# Patient Record
Sex: MALE | Race: WHITE | NOT HISPANIC OR LATINO | Employment: OTHER | URBAN - NONMETROPOLITAN AREA
[De-identification: names, ages, dates, MRNs, and addresses within clinical notes are randomized per-mention and may not be internally consistent; named-entity substitution may affect disease eponyms.]

---

## 2017-03-23 ENCOUNTER — COMMUNICATION - GICH (OUTPATIENT)
Dept: FAMILY MEDICINE | Facility: OTHER | Age: 82
End: 2017-03-23

## 2017-03-23 DIAGNOSIS — I10 ESSENTIAL (PRIMARY) HYPERTENSION: ICD-10-CM

## 2017-04-25 ENCOUNTER — HISTORY (OUTPATIENT)
Dept: FAMILY MEDICINE | Facility: OTHER | Age: 82
End: 2017-04-25

## 2017-04-25 ENCOUNTER — OFFICE VISIT - GICH (OUTPATIENT)
Dept: FAMILY MEDICINE | Facility: OTHER | Age: 82
End: 2017-04-25

## 2017-04-25 DIAGNOSIS — E11.65 TYPE 2 DIABETES MELLITUS WITH HYPERGLYCEMIA (H): ICD-10-CM

## 2017-04-25 DIAGNOSIS — I10 ESSENTIAL (PRIMARY) HYPERTENSION: ICD-10-CM

## 2017-04-25 DIAGNOSIS — N40.0 ENLARGED PROSTATE WITHOUT LOWER URINARY TRACT SYMPTOMS (LUTS): ICD-10-CM

## 2017-04-25 DIAGNOSIS — Z00.00 ENCOUNTER FOR GENERAL ADULT MEDICAL EXAMINATION WITHOUT ABNORMAL FINDINGS: ICD-10-CM

## 2017-04-26 ENCOUNTER — AMBULATORY - GICH (OUTPATIENT)
Dept: LAB | Facility: OTHER | Age: 82
End: 2017-04-26

## 2017-04-26 DIAGNOSIS — I10 ESSENTIAL (PRIMARY) HYPERTENSION: ICD-10-CM

## 2017-04-26 DIAGNOSIS — E11.65 TYPE 2 DIABETES MELLITUS WITH HYPERGLYCEMIA (H): ICD-10-CM

## 2017-04-26 DIAGNOSIS — Z00.00 ENCOUNTER FOR GENERAL ADULT MEDICAL EXAMINATION WITHOUT ABNORMAL FINDINGS: ICD-10-CM

## 2017-04-26 LAB
A/G RATIO - HISTORICAL: 1.4 (ref 1–2)
ABSOLUTE BASOPHILS - HISTORICAL: 0 THOU/CU MM
ABSOLUTE EOSINOPHILS - HISTORICAL: 0.1 THOU/CU MM
ABSOLUTE LYMPHOCYTES - HISTORICAL: 2.2 THOU/CU MM (ref 0.9–2.9)
ABSOLUTE MONOCYTES - HISTORICAL: 0.5 THOU/CU MM
ABSOLUTE NEUTROPHILS - HISTORICAL: 3.5 THOU/CU MM (ref 1.7–7)
ALBUMIN SERPL-MCNC: 4 G/DL (ref 3.5–5.7)
ALP SERPL-CCNC: 65 IU/L (ref 34–104)
ALT (SGPT) - HISTORICAL: 13 IU/L (ref 7–52)
ANION GAP - HISTORICAL: 8 (ref 5–18)
AST SERPL-CCNC: 18 IU/L (ref 13–39)
BASOPHILS # BLD AUTO: 0.5 %
BILIRUB SERPL-MCNC: 0.9 MG/DL (ref 0.3–1)
BILIRUB UR QL: NEGATIVE
BUN SERPL-MCNC: 18 MG/DL (ref 7–25)
BUN/CREAT RATIO - HISTORICAL: 17
CALCIUM SERPL-MCNC: 9.5 MG/DL (ref 8.6–10.3)
CHLORIDE SERPLBLD-SCNC: 103 MMOL/L (ref 98–107)
CHOL/HDL RATIO - HISTORICAL: 3.79
CHOLESTEROL TOTAL: 182 MG/DL
CLARITY, URINE: CLEAR CLARITY
CO2 SERPL-SCNC: 28 MMOL/L (ref 21–31)
COLOR UR: YELLOW COLOR
CREAT SERPL-MCNC: 1.03 MG/DL (ref 0.7–1.3)
EOSINOPHIL NFR BLD AUTO: 1.4 %
ERYTHROCYTE [DISTWIDTH] IN BLOOD BY AUTOMATED COUNT: 12.1 % (ref 11.5–15.5)
ESTIMATED AVERAGE GLUCOSE: 163 MG/DL
GFR IF NOT AFRICAN AMERICAN - HISTORICAL: >60 ML/MIN/1.73M2
GLOBULIN - HISTORICAL: 2.8 G/DL (ref 2–3.7)
GLUCOSE SERPL-MCNC: 149 MG/DL (ref 70–105)
GLUCOSE URINE: NEGATIVE MG/DL
HCT VFR BLD AUTO: 47.8 % (ref 37–53)
HDLC SERPL-MCNC: 48 MG/DL (ref 23–92)
HEMOGLOBIN A1C MONITORING (POCT) - HISTORICAL: 7.3 % (ref 4–6.2)
HEMOGLOBIN: 15.9 G/DL (ref 13.5–17.5)
KETONES UR QL: NEGATIVE MG/DL
LDLC SERPL CALC-MCNC: 107 MG/DL
LEUKOCYTE ESTERASE URINE: NEGATIVE
LYMPHOCYTES NFR BLD AUTO: 35 % (ref 20–44)
MCH RBC QN AUTO: 31.4 PG (ref 26–34)
MCHC RBC AUTO-ENTMCNC: 33.3 G/DL (ref 32–36)
MCV RBC AUTO: 94 FL (ref 80–100)
MONOCYTES NFR BLD AUTO: 8 %
NEUTROPHILS NFR BLD AUTO: 55.1 % (ref 42–72)
NITRITE UR QL STRIP: NEGATIVE
NON-HDL CHOLESTEROL - HISTORICAL: 134 MG/DL
OCCULT BLOOD,URINE - HISTORICAL: NEGATIVE
PATIENT STATUS - HISTORICAL: ABNORMAL
PH UR: 6 [PH]
PLATELET # BLD AUTO: 162 THOU/CU MM (ref 140–440)
PMV BLD: 8.4 FL (ref 6.5–11)
POTASSIUM SERPL-SCNC: 4.6 MMOL/L (ref 3.5–5.1)
PROT SERPL-MCNC: 6.8 G/DL (ref 6.4–8.9)
PROTEIN QUALITATIVE,URINE - HISTORICAL: NEGATIVE MG/DL
RED BLOOD COUNT - HISTORICAL: 5.07 MIL/CU MM (ref 4.3–5.9)
SODIUM SERPL-SCNC: 139 MMOL/L (ref 133–143)
SP GR UR STRIP: 1.02
TRIGL SERPL-MCNC: 136 MG/DL
TSH - HISTORICAL: 1.8 UIU/ML (ref 0.34–5.6)
UROBILINOGEN,QUALITATIVE - HISTORICAL: NORMAL EU/DL
WHITE BLOOD COUNT - HISTORICAL: 6.3 THOU/CU MM (ref 4.5–11)

## 2017-05-04 ENCOUNTER — AMBULATORY - GICH (OUTPATIENT)
Dept: EDUCATION SERVICES | Facility: OTHER | Age: 82
End: 2017-05-04

## 2017-05-04 DIAGNOSIS — E11.65 TYPE 2 DIABETES MELLITUS WITH HYPERGLYCEMIA (H): ICD-10-CM

## 2017-05-25 ENCOUNTER — OFFICE VISIT - GICH (OUTPATIENT)
Dept: FAMILY MEDICINE | Facility: OTHER | Age: 82
End: 2017-05-25

## 2017-05-25 ENCOUNTER — HISTORY (OUTPATIENT)
Dept: FAMILY MEDICINE | Facility: OTHER | Age: 82
End: 2017-05-25

## 2017-05-25 DIAGNOSIS — E11.65 TYPE 2 DIABETES MELLITUS WITH HYPERGLYCEMIA (H): ICD-10-CM

## 2017-06-20 ENCOUNTER — COMMUNICATION - GICH (OUTPATIENT)
Dept: FAMILY MEDICINE | Facility: OTHER | Age: 82
End: 2017-06-20

## 2017-06-20 DIAGNOSIS — I10 ESSENTIAL (PRIMARY) HYPERTENSION: ICD-10-CM

## 2017-07-13 ENCOUNTER — HISTORY (OUTPATIENT)
Dept: EMERGENCY MEDICINE | Facility: OTHER | Age: 82
End: 2017-07-13

## 2017-07-18 ENCOUNTER — COMMUNICATION - GICH (OUTPATIENT)
Dept: FAMILY MEDICINE | Facility: OTHER | Age: 82
End: 2017-07-18

## 2017-07-24 ENCOUNTER — HISTORY (OUTPATIENT)
Dept: FAMILY MEDICINE | Facility: OTHER | Age: 82
End: 2017-07-24

## 2017-07-24 ENCOUNTER — OFFICE VISIT - GICH (OUTPATIENT)
Dept: FAMILY MEDICINE | Facility: OTHER | Age: 82
End: 2017-07-24

## 2017-07-24 DIAGNOSIS — I10 ESSENTIAL (PRIMARY) HYPERTENSION: ICD-10-CM

## 2017-07-24 DIAGNOSIS — R00.1 BRADYCARDIA: ICD-10-CM

## 2017-07-26 ENCOUNTER — HISTORY (OUTPATIENT)
Dept: FAMILY MEDICINE | Facility: OTHER | Age: 82
End: 2017-07-26

## 2017-07-26 ENCOUNTER — OFFICE VISIT - GICH (OUTPATIENT)
Dept: FAMILY MEDICINE | Facility: OTHER | Age: 82
End: 2017-07-26

## 2017-07-26 ENCOUNTER — COMMUNICATION - GICH (OUTPATIENT)
Dept: CARDIOLOGY | Facility: OTHER | Age: 82
End: 2017-07-26

## 2017-07-26 DIAGNOSIS — I10 ESSENTIAL (PRIMARY) HYPERTENSION: ICD-10-CM

## 2017-07-26 DIAGNOSIS — E11.9 TYPE 2 DIABETES MELLITUS WITHOUT COMPLICATIONS (H): ICD-10-CM

## 2017-07-26 LAB
ESTIMATED AVERAGE GLUCOSE: 131 MG/DL
HEMOGLOBIN A1C MONITORING (POCT) - HISTORICAL: 6.2 % (ref 4–6.2)

## 2017-08-08 ENCOUNTER — OFFICE VISIT - GICH (OUTPATIENT)
Dept: FAMILY MEDICINE | Facility: OTHER | Age: 82
End: 2017-08-08

## 2017-08-08 ENCOUNTER — HISTORY (OUTPATIENT)
Dept: FAMILY MEDICINE | Facility: OTHER | Age: 82
End: 2017-08-08

## 2017-08-08 DIAGNOSIS — I10 ESSENTIAL (PRIMARY) HYPERTENSION: ICD-10-CM

## 2017-08-17 ENCOUNTER — OFFICE VISIT - GICH (OUTPATIENT)
Dept: CARDIOLOGY | Facility: OTHER | Age: 82
End: 2017-08-17

## 2017-08-17 ENCOUNTER — HISTORY (OUTPATIENT)
Dept: CARDIOLOGY | Facility: OTHER | Age: 82
End: 2017-08-17

## 2017-08-17 ENCOUNTER — TRANSFERRED RECORDS (OUTPATIENT)
Dept: HEALTH INFORMATION MANAGEMENT | Facility: CLINIC | Age: 82
End: 2017-08-17

## 2017-08-17 ENCOUNTER — MEDICAL CORRESPONDENCE (OUTPATIENT)
Dept: CARDIOLOGY | Facility: CLINIC | Age: 82
End: 2017-08-17
Payer: COMMERCIAL

## 2017-08-17 DIAGNOSIS — E78.5 HYPERLIPIDEMIA: ICD-10-CM

## 2017-08-17 DIAGNOSIS — R00.1 BRADYCARDIA: ICD-10-CM

## 2017-08-17 DIAGNOSIS — I47.10 SUPRAVENTRICULAR TACHYCARDIA (H): ICD-10-CM

## 2017-08-17 DIAGNOSIS — I10 ESSENTIAL (PRIMARY) HYPERTENSION: ICD-10-CM

## 2017-08-17 PROCEDURE — 99204 OFFICE O/P NEW MOD 45 MIN: CPT | Mod: ZP | Performed by: NURSE PRACTITIONER

## 2017-08-18 ENCOUNTER — COMMUNICATION - GICH (OUTPATIENT)
Dept: CARDIOLOGY | Facility: OTHER | Age: 82
End: 2017-08-18

## 2017-09-01 ENCOUNTER — COMMUNICATION - GICH (OUTPATIENT)
Dept: FAMILY MEDICINE | Facility: OTHER | Age: 82
End: 2017-09-01

## 2017-09-01 DIAGNOSIS — I10 ESSENTIAL (PRIMARY) HYPERTENSION: ICD-10-CM

## 2017-09-21 ENCOUNTER — HISTORY (OUTPATIENT)
Dept: FAMILY MEDICINE | Facility: OTHER | Age: 82
End: 2017-09-21

## 2017-09-21 ENCOUNTER — OFFICE VISIT - GICH (OUTPATIENT)
Dept: FAMILY MEDICINE | Facility: OTHER | Age: 82
End: 2017-09-21

## 2017-09-21 DIAGNOSIS — I10 ESSENTIAL (PRIMARY) HYPERTENSION: ICD-10-CM

## 2017-09-21 DIAGNOSIS — N40.0 ENLARGED PROSTATE WITHOUT LOWER URINARY TRACT SYMPTOMS (LUTS): ICD-10-CM
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
## 2017-12-28 NOTE — TELEPHONE ENCOUNTER
Patient Information     Patient Name MRN Ilia Gerardo [id number] Male 1932      Telephone Encounter by Mayte Wagner RN at 2017  8:00 AM     Author:  Mayte Wagner RN Service:  (none) Author Type:  NURS- Registered Nurse     Filed:  2017  8:08 AM Encounter Date:  2017 Status:  Signed     :  Mayte Wagner RN (NURS- Registered Nurse)            Ace Inhibitors    Office visit in the past 12 months or per provider note.    Last visit with ZEN GALLARDO was on: 2017 in [facility]  Next visit with ZEN GALLARDO is on: No future appointment listed with this provider  Next visit with Family Practice is on: No future appointment listed in this department    Lab test requirements:  Creatinine and Potassium annually, if ordering lab, order BMP.  CREATININE (mg/dL)    Date Value   2017 1.01     POTASSIUM (mmol/L)    Date Value   2017 4.4     BP Readings from Last 4 Encounters:    17 136/74   17 142/70   17 134/66   17 140/68       Max refill for 12 months from last office visit or per provider note    Dose increased 17. Patient BP was controlled at cardiology follow up on 17. Patient was to follow up two weeks after dose increase, no appointment noted. Limited refill and letter sent to patient.    Prescription refilled per RN Medication Refill Policy.................... Mayte Wagner RN ....................  2017   8:04 AM

## 2017-12-28 NOTE — TELEPHONE ENCOUNTER
Patient Information     Patient Name MRN Ilia Gerardo [id number] Male 1932      Telephone Encounter by Mónica Trujillo at 2017  4:53 PM     Author:  Mónica Trujillo Service:  (none) Author Type:  (none)     Filed:  2017  4:53 PM Encounter Date:  2017 Status:  Signed     :  Mónica Trujillo            ----- Message from Radha Burnett NP sent at 2017 10:24 AM CDT -----  Regarding: patient call back  Please notify patient that his cardiac event monitor was reviewed it is not felt that he qualifies for a pacemaker currently. His lowest heart rates were only occurring during the night which is considered normal. Throughout the day his heart rate was stable averaging 65 bpm. There was no second or third degree AV block or pauses on monitor which is also reassuring. He should follow-up with cardiology if he develops recurrence of his previous symptoms i.e. lightheadedness, near syncope or jerking.  Thanks!  Radha
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
## 2017-12-28 NOTE — TELEPHONE ENCOUNTER
Patient Information     Patient Name MRN Ilia Gerardo [id number] Male 1932      Telephone Encounter by Isis Lazaro at 2017  2:25 PM     Author:  Isis Lazaro Service:  (none) Author Type:  (none)     Filed:  2017  2:28 PM Encounter Date:  2017 Status:  Signed     :  Isis Lazaro            Appointment made.  Isis Lazaro LPN....................2017 2:28 PM

## 2017-12-28 NOTE — TELEPHONE ENCOUNTER
Patient Information     Patient Name MRN Ilia Gerardo [id number] Male 1932      Telephone Encounter by Bell Webster at 2017  2:25 PM     Author:  Bell Webster Service:  (none) Author Type:  (none)     Filed:  2017  2:30 PM Encounter Date:  2017 Status:  Signed     :  Bell Webster            Patient stopped by the window with questions regarding the follow-up he is supposed to have with [facility].  It was supposed to be done in 3 weeks which would be the first week of Aug.  He has a monitor on right now that will be on for 2 weeks and is worried about making an appt with [facility] before those results come back.  Either way, both [facility] and [facility] are filling up fast and will need to be worked in depending on what is decided.  There is one opening for [facility] on 2017 but he did not wish to schedule without talking to Dr./Nurse.  Please call to discuss and advise.  Bell Webster ....................  2017   2:29 PM
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
## 2017-12-28 NOTE — TELEPHONE ENCOUNTER
Patient Information     Patient Name MRN Ilia Gerardo [id number] Male 1932      Telephone Encounter by Mayte Wagner RN at 2017  9:00 AM     Author:  Mayte Wagner RN Service:  (none) Author Type:  NURS- Registered Nurse     Filed:  2017  9:03 AM Encounter Date:  2017 Status:  Signed     :  Mayte Wagner RN (NURS- Registered Nurse)            Redundant Refill Request refused;  Medication:atenolol (TENORMIN) 50 mg tablet    Qty:90 tablet   Ref:3  Start:2017   Unable to complete prescription refill per RN Medication Refill Policy.................... Mayte Wagner RN ....................  2017   9:00 AM

## 2017-12-28 NOTE — TELEPHONE ENCOUNTER
Patient Information     Patient Name MRN Ilia Gerardo [id number] Male 1932      Telephone Encounter by Sulema Vargas RN at 2017  2:50 PM     Author:  Sulema Vargas RN Service:  (none) Author Type:  NURS- Registered Nurse     Filed:  2017  2:57 PM Encounter Date:  2017 Status:  Signed     :  Sulema Vargas RN (NURS- Registered Nurse)            Called pt to discuss [employer] patch instructions and follow up appointment.  He was not sure how to get the patch to where it was supposed to go when he's done.  Informed him it should be mailed to the company per the instructions that came with it.  He also was not quite sure if the follow up was supposed to be 2-3 after his ER visit or from the time he sent the patch in.  Informed him it takes about 2 weeks for the results to get to us.  He verbalized understanding.  Scheduled him for Aug 11, at 0845 with Radha Burnett NP.    Sulema Vargas RN 2017 2:54 PM

## 2017-12-28 NOTE — TELEPHONE ENCOUNTER
Patient Information     Patient Name MRN Ilia Gerardo [id number] Male 1932      Telephone Encounter by Liliane Navarro at 2017  2:06 PM     Author:  Liliane Navarro Service:  (none) Author Type:  (none)     Filed:  2017  2:08 PM Encounter Date:  2017 Status:  Signed     :  Liliane Navarro            RE: Pt called, he was in the ER, he needs a blood pressure check and his BP meds checked, ER follow up, next available was for Monday which we made an appt. for 9:00, if he can be worked in Thurs.  please call patient back.

## 2017-12-28 NOTE — TELEPHONE ENCOUNTER
Patient Information     Patient Name MRN Ilia Gerardo [id number] Male 1932      Telephone Encounter by Mónica Trujillo at 2017  4:53 PM     Author:  Mónica Trujillo Service:  (none) Author Type:  (none)     Filed:  2017  4:54 PM Encounter Date:  2017 Status:  Signed     :  Mónica Trujillo            After verification, notified patient of results per Dr. Cuellar / Radha Burnett NP. Mónica Trujillo 2017 4:53 PM

## 2017-12-30 NOTE — NURSING NOTE
Patient Information     Patient Name MRN Ilia eGrardo [id number] Male 1932      Nursing Note by Mónica Trujillo at 2017  1:45 PM     Author:  Mónica Trujillo Service:  (none) Author Type:  (none)     Filed:  2017  2:17 PM Encounter Date:  2017 Status:  Signed     :  Mónica Trujillo            Patient comes in for follow up on medication and tests.  Mónica Trujillo LPN ....................  2017   2:17 PM

## 2017-12-30 NOTE — NURSING NOTE
Patient Information     Patient Name MRN Ilia Gerardo [id number] Male 1932      Nursing Note by Isis Lazaro at 2017  8:45 AM     Author:  Isis Lazaro Service:  (none) Author Type:  (none)     Filed:  2017  8:57 AM Encounter Date:  2017 Status:  Signed     :  Isis Lazaro            Patient presents to the clinic to get medications refilled.  Isis Lazaro LPN....................2017 8:49 AM

## 2017-12-30 NOTE — NURSING NOTE
Patient Information     Patient Name MRN Ilia Gerardo [id number] Male 1932      Nursing Note by Isis Lazaro at 2017  1:15 PM     Author:  Isis Lazaro Service:  (none) Author Type:  (none)     Filed:  2017  4:24 PM Encounter Date:  2017 Status:  Signed     :  Isis Lazaro            Patient presents to the clinic for a blood pressure check.  It's been running okay.  140's/80's          Isis Lazaro LPN..............2017 1:08 PM    (pt states he is not a diabetic, he started checking his blood sugars because he has a family history of diabetes but it was determined that he is not diabetic)

## 2017-12-30 NOTE — NURSING NOTE
Patient Information     Patient Name MRN Ilia Gerardo [id number] Male 1932      Nursing Note by Isis Lazaro at 2017  9:45 AM     Author:  Isis Lazaro Service:  (none) Author Type:  (none)     Filed:  2017 10:17 AM Encounter Date:  2017 Status:  Signed     :  Isis Lazaro            Patient presents to the clinic for a follow up on his blood pressure.  Isis Lazaro LPN....................2017 9:54 AM

## 2017-12-30 NOTE — NURSING NOTE
Patient Information     Patient Name MRN Sex Ilia Galicia [id number] Male 1932      Nursing Note by Mónica Trujillo at 2017  1:45 PM     Author:  Mónica Trujillo Service:  (none) Author Type:  (none)     Filed:  2017  2:42 PM Encounter Date:  2017 Status:  Signed     :  Mónica Trujillo            Performed EKG in clinic per VORB from Radha Ling.  Order sent to provider for co-sign.  Mónica Trujillo 2017 2:42 PM

## 2017-12-30 NOTE — NURSING NOTE
Patient Information     Patient Name MRN Ilia Gerardo [id number] Male 1932      Nursing Note by Isis Lazaro at 2017  9:00 AM     Author:  Isis Lazaro Service:  (none) Author Type:  (none)     Filed:  2017  9:00 AM Encounter Date:  2017 Status:  Signed     :  Isis Lazaro            Patient presents to the clinic for a follow up on his blood pressure.  Isis Lazaro LPN....................2017 8:45 AM
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
## 2018-01-04 NOTE — ADDENDUM NOTE
Patient Information     Patient Name MRN Ilia Gerardo [id number] Male 1932      Addendum Note by Cholo Rodriguez MD at 2017 10:57 AM     Author:  Cholo Rodriguez MD Service:  (none) Author Type:  Physician     Filed:  2017 10:57 AM Encounter Date:  2017 Status:  Signed     :  Chloo Rodriguez MD (Physician)       Addended by: CHOLO RODRIGUEZ on: 2017 10:57 AM        Modules accepted: Orders

## 2018-01-04 NOTE — NURSING NOTE
Patient Information     Patient Name MRN Ilia Gerardo [id number] Male 1932      Nursing Note by Isis Lazaro at 2017 10:15 AM     Author:  Isis Lazaro Service:  (none) Author Type:  (none)     Filed:  2017 10:25 AM Encounter Date:  2017 Status:  Signed     :  Isis Lazaro            Patient presents to the clinic to get medications refilled.  Isis Lazaro LPN....................2017 10:15 AM

## 2018-01-04 NOTE — TELEPHONE ENCOUNTER
Patient Information     Patient Name MRN Sex Ilia Galicia [id number] Male 1932      Telephone Encounter by Sulema Vargas RN at 3/24/2017  8:41 AM     Author:  Sulema Vargas RN Service:  (none) Author Type:  NURS- Registered Nurse     Filed:  3/24/2017  8:43 AM Encounter Date:  3/23/2017 Status:  Signed     :  Sulema Vargas RN (NURS- Registered Nurse)            Ace Inhibitors    Office visit in the past 12 months or per provider note.    Last visit with ZEN GALLARDO was on: 2016 in [facility]  Next visit with ZEN GALLARDO is on: No future appointment listed with this provider  Next visit with Family Practice is on: No future appointment listed in this department    Lab test requirements:  Creatinine and Potassium annually, if ordering lab, order BMP.  CREATININE (mg/dL)    Date Value   2016 1.03     POTASSIUM (mmol/L)    Date Value   2016 4.5       Max refill for 12 months from last office visit or per provider note    Beta Blockers     Office visit in the past 12 months or per provider note.    Max refill for 12 months from last office visit or per provider note.  Patient is due for medication management appointment. Limited refill provided at this time and letter sent for reminder to patient. Prescription refilled per RN Medication Refill Policy.................... Sulema Vargas RN ....................  3/24/2017   8:42 AM

## 2018-01-05 NOTE — NURSING NOTE
Patient Information     Patient Name MRN Ilia Gerardo [id number] Male 1932      Nursing Note by Isis Lazaro at 2017 10:15 AM     Author:  Isis Lazaro Service:  (none) Author Type:  (none)     Filed:  2017 10:19 AM Encounter Date:  2017 Status:  Signed     :  Isis Lazaro            Patient presents to the clinic for a diabetic follow up, he saw the dietitian, has been keeping track of his blood sugars.  Isis Lazaro LPN....................2017 10:19 AM
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
## 2018-01-24 ENCOUNTER — DOCUMENTATION ONLY (OUTPATIENT)
Dept: FAMILY MEDICINE | Facility: OTHER | Age: 83
End: 2018-01-24

## 2018-01-24 PROBLEM — R73.01 IMPAIRED FASTING GLUCOSE: Status: ACTIVE | Noted: 2018-01-24

## 2018-01-24 PROBLEM — E66.9 OBESITY: Status: ACTIVE | Noted: 2018-01-24

## 2018-01-24 PROBLEM — R00.1 BRADYCARDIA: Status: ACTIVE | Noted: 2017-07-24

## 2018-01-24 PROBLEM — F52.8 PSYCHOSEXUAL DYSFUNCTION WITH INHIBITED SEXUAL EXCITEMENT: Status: ACTIVE | Noted: 2018-01-24

## 2018-01-24 PROBLEM — Z87.448 PERSONAL HISTORY OF OTHER DISORDER OF URINARY SYSTEM: Status: ACTIVE | Noted: 2018-01-24

## 2018-01-24 PROBLEM — I10 HYPERTENSION: Status: ACTIVE | Noted: 2018-01-24

## 2018-01-24 PROBLEM — I47.10 PAROXYSMAL SVT (SUPRAVENTRICULAR TACHYCARDIA) (H): Status: ACTIVE | Noted: 2017-08-17

## 2018-01-24 PROBLEM — E78.5 HYPERLIPIDEMIA: Status: ACTIVE | Noted: 2018-01-24

## 2018-01-24 RX ORDER — TAMSULOSIN HYDROCHLORIDE 0.4 MG/1
0.4 CAPSULE ORAL
COMMUNITY
End: 2018-07-30

## 2018-01-24 RX ORDER — ASPIRIN 81 MG/1
81 TABLET ORAL
COMMUNITY
Start: 2013-06-17 | End: 2018-07-30

## 2018-01-24 RX ORDER — DIPHENOXYLATE HYDROCHLORIDE AND ATROPINE SULFATE 2.5; .025 MG/1; MG/1
1 TABLET ORAL DAILY
COMMUNITY

## 2018-01-24 RX ORDER — ENALAPRIL MALEATE 20 MG/1
20 TABLET ORAL DAILY
COMMUNITY
Start: 2017-09-21 | End: 2018-04-26

## 2018-01-27 VITALS
BODY MASS INDEX: 29.12 KG/M2 | SYSTOLIC BLOOD PRESSURE: 122 MMHG | BODY MASS INDEX: 28.92 KG/M2 | WEIGHT: 202 LBS | DIASTOLIC BLOOD PRESSURE: 64 MMHG | WEIGHT: 203.4 LBS | HEIGHT: 70 IN | DIASTOLIC BLOOD PRESSURE: 80 MMHG | HEIGHT: 70 IN | SYSTOLIC BLOOD PRESSURE: 112 MMHG

## 2018-01-27 VITALS
WEIGHT: 203 LBS | BODY MASS INDEX: 28.92 KG/M2 | SYSTOLIC BLOOD PRESSURE: 140 MMHG | DIASTOLIC BLOOD PRESSURE: 74 MMHG | HEART RATE: 59 BPM | HEIGHT: 70 IN | DIASTOLIC BLOOD PRESSURE: 68 MMHG | WEIGHT: 202 LBS | BODY MASS INDEX: 29.06 KG/M2 | SYSTOLIC BLOOD PRESSURE: 136 MMHG | HEART RATE: 70 BPM

## 2018-01-27 VITALS
HEIGHT: 70 IN | BODY MASS INDEX: 28.98 KG/M2 | WEIGHT: 202.4 LBS | DIASTOLIC BLOOD PRESSURE: 66 MMHG | HEART RATE: 62 BPM | SYSTOLIC BLOOD PRESSURE: 134 MMHG

## 2018-01-27 VITALS — SYSTOLIC BLOOD PRESSURE: 142 MMHG | BODY MASS INDEX: 28.46 KG/M2 | WEIGHT: 198.8 LBS | DIASTOLIC BLOOD PRESSURE: 70 MMHG

## 2018-01-27 VITALS
SYSTOLIC BLOOD PRESSURE: 134 MMHG | DIASTOLIC BLOOD PRESSURE: 80 MMHG | WEIGHT: 205 LBS | HEIGHT: 70 IN | BODY MASS INDEX: 29.35 KG/M2

## 2018-02-13 ENCOUNTER — APPOINTMENT (OUTPATIENT)
Dept: CT IMAGING | Facility: OTHER | Age: 83
DRG: 065 | End: 2018-02-13
Attending: FAMILY MEDICINE
Payer: MEDICARE

## 2018-02-13 ENCOUNTER — HOSPITAL ENCOUNTER (INPATIENT)
Facility: OTHER | Age: 83
LOS: 3 days | Discharge: NURSING, REHAB OR LONG-TERM CARE | DRG: 065 | End: 2018-02-16
Attending: FAMILY MEDICINE | Admitting: INTERNAL MEDICINE
Payer: MEDICARE

## 2018-02-13 ENCOUNTER — MEDICAL CORRESPONDENCE (OUTPATIENT)
Dept: HEALTH INFORMATION MANAGEMENT | Facility: OTHER | Age: 83
End: 2018-02-13

## 2018-02-13 DIAGNOSIS — I63.312 CEREBROVASCULAR ACCIDENT (CVA) DUE TO THROMBOSIS OF LEFT MIDDLE CEREBRAL ARTERY (H): Primary | ICD-10-CM

## 2018-02-13 DIAGNOSIS — Z79.82 ENCOUNTER FOR LONG-TERM (CURRENT) USE OF ASPIRIN: ICD-10-CM

## 2018-02-13 DIAGNOSIS — I67.89 ACUTE, BUT ILL-DEFINED, CEREBROVASCULAR DISEASE: ICD-10-CM

## 2018-02-13 DIAGNOSIS — I10 ESSENTIAL HYPERTENSION, MALIGNANT: ICD-10-CM

## 2018-02-13 DIAGNOSIS — Z79.899 DRUG THERAPY: ICD-10-CM

## 2018-02-13 DIAGNOSIS — E78.5 HYPERLIPIDEMIA, UNSPECIFIED HYPERLIPIDEMIA TYPE: ICD-10-CM

## 2018-02-13 DIAGNOSIS — I10 ESSENTIAL HYPERTENSION: ICD-10-CM

## 2018-02-13 DIAGNOSIS — E66.9 OBESITY, UNSPECIFIED CLASSIFICATION, UNSPECIFIED OBESITY TYPE, UNSPECIFIED WHETHER SERIOUS COMORBIDITY PRESENT: ICD-10-CM

## 2018-02-13 PROBLEM — I63.9 STROKE (H): Status: ACTIVE | Noted: 2018-02-13

## 2018-02-13 LAB
ANION GAP SERPL CALCULATED.3IONS-SCNC: 11 MMOL/L (ref 3–14)
APTT PPP: 30 SEC (ref 29–50)
BASOPHILS # BLD AUTO: 0 10E9/L (ref 0–0.2)
BASOPHILS NFR BLD AUTO: 0.3 %
BUN SERPL-MCNC: 14 MG/DL (ref 7–25)
CALCIUM SERPL-MCNC: 9.5 MG/DL (ref 8.6–10.3)
CHLORIDE SERPL-SCNC: 106 MMOL/L (ref 98–107)
CO2 SERPL-SCNC: 26 MMOL/L (ref 21–31)
CREAT SERPL-MCNC: 0.94 MG/DL (ref 0.7–1.3)
DIFFERENTIAL METHOD BLD: NORMAL
EOSINOPHIL # BLD AUTO: 0.1 10E9/L (ref 0–0.7)
EOSINOPHIL NFR BLD AUTO: 0.9 %
ERYTHROCYTE [DISTWIDTH] IN BLOOD BY AUTOMATED COUNT: 13.4 % (ref 10–15)
GFR SERPL CREATININE-BSD FRML MDRD: 76 ML/MIN/1.7M2
GLUCOSE SERPL-MCNC: 127 MG/DL (ref 70–105)
HCT VFR BLD AUTO: 41.4 % (ref 40–53)
HGB BLD-MCNC: 14.3 G/DL (ref 13.3–17.7)
IMM GRANULOCYTES # BLD: 0 10E9/L (ref 0–0.4)
IMM GRANULOCYTES NFR BLD: 0.3 %
INR PPP: 0.98 (ref 0–1.3)
LYMPHOCYTES # BLD AUTO: 1.7 10E9/L (ref 0.8–5.3)
LYMPHOCYTES NFR BLD AUTO: 29.4 %
MCH RBC QN AUTO: 31.1 PG (ref 26.5–33)
MCHC RBC AUTO-ENTMCNC: 34.5 G/DL (ref 31.5–36.5)
MCV RBC AUTO: 90 FL (ref 78–100)
MONOCYTES # BLD AUTO: 0.5 10E9/L (ref 0–1.3)
MONOCYTES NFR BLD AUTO: 8.9 %
NEUTROPHILS # BLD AUTO: 3.5 10E9/L (ref 1.6–8.3)
NEUTROPHILS NFR BLD AUTO: 60.2 %
PLATELET # BLD AUTO: 176 10E9/L (ref 150–450)
POTASSIUM SERPL-SCNC: 4.1 MMOL/L (ref 3.5–5.1)
RBC # BLD AUTO: 4.6 10E12/L (ref 4.4–5.9)
SODIUM SERPL-SCNC: 143 MMOL/L (ref 134–144)
TROPONIN I SERPL-MCNC: <0.03 UG/L (ref 0–0.03)
TROPONIN I SERPL-MCNC: <0.03 UG/L (ref 0–0.03)
WBC # BLD AUTO: 5.8 10E9/L (ref 4–11)

## 2018-02-13 PROCEDURE — 25000132 ZZH RX MED GY IP 250 OP 250 PS 637: Mod: GY | Performed by: INTERNAL MEDICINE

## 2018-02-13 PROCEDURE — 99284 EMERGENCY DEPT VISIT MOD MDM: CPT | Mod: Z6 | Performed by: FAMILY MEDICINE

## 2018-02-13 PROCEDURE — 93010 ELECTROCARDIOGRAM REPORT: CPT | Performed by: INTERNAL MEDICINE

## 2018-02-13 PROCEDURE — 84484 ASSAY OF TROPONIN QUANT: CPT | Performed by: FAMILY MEDICINE

## 2018-02-13 PROCEDURE — 85730 THROMBOPLASTIN TIME PARTIAL: CPT | Performed by: FAMILY MEDICINE

## 2018-02-13 PROCEDURE — 25000128 H RX IP 250 OP 636: Performed by: FAMILY MEDICINE

## 2018-02-13 PROCEDURE — 85025 COMPLETE CBC W/AUTO DIFF WBC: CPT | Performed by: FAMILY MEDICINE

## 2018-02-13 PROCEDURE — A9270 NON-COVERED ITEM OR SERVICE: HCPCS | Mod: GY | Performed by: INTERNAL MEDICINE

## 2018-02-13 PROCEDURE — 93005 ELECTROCARDIOGRAM TRACING: CPT | Performed by: INTERNAL MEDICINE

## 2018-02-13 PROCEDURE — 99285 EMERGENCY DEPT VISIT HI MDM: CPT | Mod: 25 | Performed by: FAMILY MEDICINE

## 2018-02-13 PROCEDURE — 12000000 ZZH R&B MED SURG/OB

## 2018-02-13 PROCEDURE — 85610 PROTHROMBIN TIME: CPT | Performed by: FAMILY MEDICINE

## 2018-02-13 PROCEDURE — 36415 COLL VENOUS BLD VENIPUNCTURE: CPT | Performed by: INTERNAL MEDICINE

## 2018-02-13 PROCEDURE — 99223 1ST HOSP IP/OBS HIGH 75: CPT | Mod: AI | Performed by: INTERNAL MEDICINE

## 2018-02-13 PROCEDURE — 84484 ASSAY OF TROPONIN QUANT: CPT | Performed by: INTERNAL MEDICINE

## 2018-02-13 PROCEDURE — 25000125 ZZHC RX 250: Performed by: FAMILY MEDICINE

## 2018-02-13 PROCEDURE — 80048 BASIC METABOLIC PNL TOTAL CA: CPT | Performed by: FAMILY MEDICINE

## 2018-02-13 PROCEDURE — 93005 ELECTROCARDIOGRAM TRACING: CPT | Performed by: FAMILY MEDICINE

## 2018-02-13 PROCEDURE — 25000128 H RX IP 250 OP 636: Performed by: INTERNAL MEDICINE

## 2018-02-13 PROCEDURE — 70496 CT ANGIOGRAPHY HEAD: CPT

## 2018-02-13 PROCEDURE — 36415 COLL VENOUS BLD VENIPUNCTURE: CPT | Performed by: FAMILY MEDICINE

## 2018-02-13 PROCEDURE — 70450 CT HEAD/BRAIN W/O DYE: CPT

## 2018-02-13 RX ORDER — NALOXONE HYDROCHLORIDE 0.4 MG/ML
.1-.4 INJECTION, SOLUTION INTRAMUSCULAR; INTRAVENOUS; SUBCUTANEOUS
Status: DISCONTINUED | OUTPATIENT
Start: 2018-02-13 | End: 2018-02-16 | Stop reason: HOSPADM

## 2018-02-13 RX ORDER — AMOXICILLIN 250 MG
1 CAPSULE ORAL 2 TIMES DAILY PRN
Status: DISCONTINUED | OUTPATIENT
Start: 2018-02-13 | End: 2018-02-16 | Stop reason: HOSPADM

## 2018-02-13 RX ORDER — SODIUM CHLORIDE 9 MG/ML
INJECTION, SOLUTION INTRAVENOUS
Status: DISCONTINUED
Start: 2018-02-13 | End: 2018-02-13 | Stop reason: HOSPADM

## 2018-02-13 RX ORDER — PROCHLORPERAZINE MALEATE 5 MG
5 TABLET ORAL EVERY 6 HOURS PRN
Status: DISCONTINUED | OUTPATIENT
Start: 2018-02-13 | End: 2018-02-16 | Stop reason: HOSPADM

## 2018-02-13 RX ORDER — ONDANSETRON 2 MG/ML
4 INJECTION INTRAMUSCULAR; INTRAVENOUS EVERY 6 HOURS PRN
Status: DISCONTINUED | OUTPATIENT
Start: 2018-02-13 | End: 2018-02-16 | Stop reason: HOSPADM

## 2018-02-13 RX ORDER — AMOXICILLIN 250 MG
2 CAPSULE ORAL 2 TIMES DAILY PRN
Status: DISCONTINUED | OUTPATIENT
Start: 2018-02-13 | End: 2018-02-16 | Stop reason: HOSPADM

## 2018-02-13 RX ORDER — ACETAMINOPHEN 650 MG/1
650 SUPPOSITORY RECTAL EVERY 4 HOURS PRN
Status: DISCONTINUED | OUTPATIENT
Start: 2018-02-13 | End: 2018-02-16 | Stop reason: HOSPADM

## 2018-02-13 RX ORDER — ONDANSETRON 4 MG/1
4 TABLET, ORALLY DISINTEGRATING ORAL EVERY 6 HOURS PRN
Status: DISCONTINUED | OUTPATIENT
Start: 2018-02-13 | End: 2018-02-16 | Stop reason: HOSPADM

## 2018-02-13 RX ORDER — BISACODYL 10 MG
10 SUPPOSITORY, RECTAL RECTAL DAILY PRN
Status: DISCONTINUED | OUTPATIENT
Start: 2018-02-13 | End: 2018-02-16 | Stop reason: HOSPADM

## 2018-02-13 RX ORDER — CLOPIDOGREL BISULFATE 75 MG/1
75 TABLET ORAL DAILY
Status: DISCONTINUED | OUTPATIENT
Start: 2018-02-13 | End: 2018-02-16 | Stop reason: HOSPADM

## 2018-02-13 RX ORDER — SODIUM CHLORIDE 9 MG/ML
INJECTION, SOLUTION INTRAVENOUS CONTINUOUS
Status: DISCONTINUED | OUTPATIENT
Start: 2018-02-13 | End: 2018-02-15

## 2018-02-13 RX ORDER — PROCHLORPERAZINE 25 MG
12.5 SUPPOSITORY, RECTAL RECTAL EVERY 12 HOURS PRN
Status: DISCONTINUED | OUTPATIENT
Start: 2018-02-13 | End: 2018-02-16 | Stop reason: HOSPADM

## 2018-02-13 RX ORDER — ACETAMINOPHEN 325 MG/1
650 TABLET ORAL EVERY 4 HOURS PRN
Status: DISCONTINUED | OUTPATIENT
Start: 2018-02-13 | End: 2018-02-16 | Stop reason: HOSPADM

## 2018-02-13 RX ORDER — TAMSULOSIN HYDROCHLORIDE 0.4 MG/1
0.4 CAPSULE ORAL DAILY
Status: DISCONTINUED | OUTPATIENT
Start: 2018-02-14 | End: 2018-02-16 | Stop reason: HOSPADM

## 2018-02-13 RX ORDER — ENALAPRIL MALEATE 10 MG/1
20 TABLET ORAL DAILY
Status: DISCONTINUED | OUTPATIENT
Start: 2018-02-14 | End: 2018-02-14 | Stop reason: CLARIF

## 2018-02-13 RX ORDER — LIDOCAINE 40 MG/G
CREAM TOPICAL
Status: DISCONTINUED | OUTPATIENT
Start: 2018-02-13 | End: 2018-02-16 | Stop reason: HOSPADM

## 2018-02-13 RX ADMIN — IOHEXOL 100 ML: 350 INJECTION, SOLUTION INTRAVENOUS at 17:58

## 2018-02-13 RX ADMIN — ASPIRIN 325 MG: 325 TABLET, COATED ORAL at 21:19

## 2018-02-13 RX ADMIN — CLOPIDOGREL 75 MG: 75 TABLET, FILM COATED ORAL at 21:19

## 2018-02-13 RX ADMIN — SODIUM CHLORIDE: 900 INJECTION, SOLUTION INTRAVENOUS at 21:52

## 2018-02-13 RX ADMIN — SODIUM CHLORIDE 500 ML: 900 INJECTION, SOLUTION INTRAVENOUS at 17:28

## 2018-02-13 ASSESSMENT — ACTIVITIES OF DAILY LIVING (ADL)
RETIRED_COMMUNICATION: 0-->UNDERSTANDS/COMMUNICATES WITHOUT DIFFICULTY
COGNITION: 0 - NO COGNITION ISSUES REPORTED
TRANSFERRING: 0-->INDEPENDENT
SWALLOWING: 0-->SWALLOWS FOODS/LIQUIDS WITHOUT DIFFICULTY
NUMBER_OF_TIMES_PATIENT_HAS_FALLEN_WITHIN_LAST_SIX_MONTHS: 1
TOILETING: 0-->INDEPENDENT
AMBULATION: 0-->INDEPENDENT
DRESS: 0-->INDEPENDENT
BATHING: 0-->INDEPENDENT
FALL_HISTORY_WITHIN_LAST_SIX_MONTHS: YES
RETIRED_EATING: 0-->INDEPENDENT
CHANGE_IN_FUNCTIONAL_STATUS_SINCE_ONSET_OF_CURRENT_ILLNESS/INJURY: YES

## 2018-02-13 ASSESSMENT — ENCOUNTER SYMPTOMS
EYES NEGATIVE: 1
FEVER: 0
RESPIRATORY NEGATIVE: 1
CHILLS: 0
DIAPHORESIS: 0
SPEECH DIFFICULTY: 1
CARDIOVASCULAR NEGATIVE: 1

## 2018-02-13 ASSESSMENT — VISUAL ACUITY
OU: NORMAL ACUITY
OU: NORMAL ACUITY

## 2018-02-13 NOTE — IP AVS SNAPSHOT
[facility]    [address]    [place] [address]    Phone:  [phone]    Fax:  [phone]                                       After Visit Summary   2/13/2018    Ilia Ferguson    MRN: [id number]           After Visit Summary Signature Page     I have received my discharge instructions, and my questions have been answered. I have discussed any challenges I see with this plan with the nurse or doctor.    ..........................................................................................................................................  Patient/Patient Representative Signature      ..........................................................................................................................................  Patient Representative Print Name and Relationship to Patient    ..................................................               ................................................  Date                                            Time    ..........................................................................................................................................  Reviewed by Signature/Title    ...................................................              ..............................................  Date                                                            Time

## 2018-02-13 NOTE — IP AVS SNAPSHOT
Ilia Ferguson #[id number] (CSN: [id number])  (85 year old M)  (Adm: 18)     [id number]               [facility]: [phone]            Patient Demographics     Patient Name Sex          Age SSN Address Phone    Ilia Ferguson Male 1932 (85 year old) [id number] [facility] [address] [phone] (Home)      Emergency Contact(s)     Name Relation Home Work Mobile    Rima Ferguson Spouse [phone]      Parrish Ferguson Daughter [phone]        Admission Information     Attending Provider Admitting Provider Admission Type Admission Date/Time    Tulio Ibrahim MD Harker, Jamison L, MD Emergency 18  1655    Discharge Date Hospital Service Auth/Cert Status Service Area     Hospitalist [facility]    Unit Room/Bed Admission Status        MEDICAL SURGICAL  Admission (Confirmed)       Admission     Complaint    Stroke (H), Stroke (H)      Hospital Account     Name Acct ID Class Status Primary Coverage    Ilia Ferguson [id number] Inpatient Open MEDICARE - MEDICARE FOR HB SUPPLEMENT            Guarantor Account (for Hospital Account #[id number])     Name Relation to Pt Service Area Active? Acct Type    Ilia Ferguson Self FCS Yes Personal/Family    Address Phone          [address] [place] [address] [phone](H)              Coverage Information (for Hospital Account #[id number])     1. MEDICARE/MEDICARE FOR HB SUPPLEMENT     F/O Payor/Plan Precert #    MEDICARE/MEDICARE FOR HB SUPPLEMENT     Subscriber Subscriber #    Ilia Ferguson [id number]    Address Phone    ATTN CLAIMS  [address]  [place] [address] [phone]          2. BCBS/BCBS PLATINUM BLUE     F/O Payor/Plan Precert #    BCBS/BCBS PLATINUM BLUE     Subscriber Subscriber #    Ilia Ferguson [id number]    Address Phone    [address] [address]  [place] [address] [phone]                                                       "INTERAGENCY TRANSFER FORM - PHYSICIAN ORDERS   2/13/2018                       [facility]: [phone]            Attending Provider: Tulio Ibrahim MD     Allergies:  Sulfa Drugs    Infection:  None   Service:  HOSPITALIST    Ht:  1.778 m (5' 10\")   Wt:  92.5 kg (203 lb 14.8 oz)   Admission Wt:  90.7 kg (200 lb)    BMI:  29.26 kg/m 2   BSA:  2.14 m 2            ED Clinical Impression     Diagnosis Description Comment Added By Time Added    Acute, but ill-defined, cerebrovascular disease [I67.89] Acute, but ill-defined, cerebrovascular disease [I67.89]  Jace Haines MD 2/13/2018  5:15 PM    Essential hypertension [I10] Essential hypertension [I10]  Jace Haines MD 2/13/2018  6:44 PM      Hospital Problems as of 2/16/2018              Priority Class Noted POA    Pulmonary nodules/lesions, multiple Medium  12/11/2013 Yes    Hypertension Medium  1/24/2018 Yes    * (Principal)Stroke (H) Medium  2/13/2018 Yes      Non-Hospital Problems as of 2/16/2018              Priority Class Noted    Anemia Medium  12/20/2010    Hematuria Medium  12/20/2010    Other cells and casts in urine Medium  2/21/2013    Abnormal CXR Medium  12/9/2013    Bradycardia Medium  7/24/2017    Paroxysmal SVT (supraventricular tachycardia) (H) Medium  8/17/2017    Psychosexual dysfunction with inhibited sexual excitement Medium  1/24/2018    Hyperlipidemia Medium  1/24/2018    Impaired fasting glucose Medium  1/24/2018    Obesity Medium  1/24/2018    Personal history of other disorder of urinary system Medium  1/24/2018      Code Status History     Date Active Date Inactive Code Status Order ID Comments User Context    2/16/2018  9:30 AM  Full Code [id number]  Tulio Ibrahim MD Outpatient    2/13/2018  8:11 PM 2/16/2018  9:30 AM Full Code [id number]  Tulio Ibrahim MD Inpatient      Current Code Status     Date Active Code Status Order ID Comments User Context       Prior      Summary of Visit     " Reason for your hospital stay       stroke                Medication Review      START taking        Dose / Directions Comments    atorvastatin 20 MG tablet   Commonly known as:  LIPITOR        Dose:  20 mg   Take 1 tablet (20 mg) by mouth daily   Quantity:  30 tablet   Refills:  1        clopidogrel 75 MG tablet   Commonly known as:  PLAVIX        Dose:  75 mg   Start taking on:  2/17/2018   1 tablet (75 mg) by Oral or NG Tube route daily   Quantity:  30 tablet   Refills:  0          CONTINUE these medications which may have CHANGED, or have new prescriptions. If we are uncertain of the size of tablets/capsules you have at home, strength may be listed as something that might have changed.        Dose / Directions Comments    * acetaminophen 500 MG tablet   Commonly known as:  TYLENOL   This may have changed:  Another medication with the same name was added. Make sure you understand how and when to take each.        Dose:  500 mg   Take 500 mg by mouth daily as needed (leg pain)   Refills:  0        * acetaminophen 650 MG Suppository   Commonly known as:  TYLENOL   This may have changed:  You were already taking a medication with the same name, and this prescription was added. Make sure you understand how and when to take each.        Dose:  650 mg   Place 1 suppository (650 mg) rectally every 4 hours as needed for mild pain   Quantity:  60 suppository   Refills:  0        * Notice:  This list has 2 medication(s) that are the same as other medications prescribed for you. Read the directions carefully, and ask your doctor or other care provider to review them with you.      CONTINUE these medications which have NOT CHANGED        Dose / Directions Comments    aspirin EC 81 MG EC tablet        Dose:  81 mg   Take 81 mg by mouth daily with food   Refills:  0        enalapril 20 MG tablet   Commonly known as:  VASOTEC        Dose:  20 mg   Take 20 mg by mouth daily   Refills:  0        MULTI-VITAMINS Tabs        Dose:   1 tablet   Take 1 tablet by mouth daily   Refills:  0        naproxen sodium 220 MG tablet   Commonly known as:  ANAPROX        Dose:  220 mg   Take 220 mg by mouth daily as needed (leg pain)   Refills:  0        tamsulosin 0.4 MG capsule   Commonly known as:  FLOMAX        Dose:  0.4 mg   Take 0.4 mg by mouth daily with food   Refills:  0                After Care     Activity - Up with assistive device           Activity - Up with nursing assistance           Advance Diet as Tolerated       Follow this diet upon discharge: Orders Placed This Encounter      Dysphagia Diet Level 3 Advanced Nectar Thickened Liquids (pre-thickened or use instant food thickener)       General info for SNF       Length of Stay Estimate: Short Term Care: Estimated # of Days 31-90  Condition at Discharge: Stable  Level of care:skilled   Rehabilitation Potential: Good  Admission H&P remains valid and up-to-date: Yes  Recent Chemotherapy: N/A  Use Nursing Home Standing Orders: Yes       Mantoux instructions       Give two-step Mantoux (PPD) Per Facility Policy Yes             Referrals     Occupational Therapy Adult Consult       Evaluate and treat as clinically indicated.    Reason:  stroke       Physical Therapy Adult Consult       Evaluate and treat as clinically indicated.    Reason:  stroke       Speech Language Path Adult Consult       Evaluate and treat as clinically indicated.    Reason:  stroke             Statement of Approval     Ordered          02/16/18 0932  I have reviewed and agree with all the recommendations and orders detailed in this document.  EFFECTIVE NOW     Approved and electronically signed by:  Tulio Ibrahim MD                                                 INTERAGENCY TRANSFER FORM - NURSING   2/13/2018                       [facility]: [phone]            Attending Provider: Tulio Ibrahim MD     Allergies:  Sulfa Drugs    Infection:  None   Service:  HOSPITALIST    Ht:  1.778  "m (5' 10\")   Wt:  92.5 kg (203 lb 14.8 oz)   Admission Wt:  90.7 kg (200 lb)    BMI:  29.26 kg/m 2   BSA:  2.14 m 2            Advance Directives        Scanned docmt in ACP Activity?           No scanned doc        Immunizations     Name Date      Influenza (High Dose) 3 valent vaccine 02/15/18       ASSESSMENT     Discharge Profile Flowsheet     DISCHARGE NEEDS ASSESSMENT     GASTROINTESTINAL (ADULT,PEDIATRIC,OB)      Readmission Within The Last 30 Days  no previous admission in last 30 days 02/13/18 2006   GI WDL  WDL 02/16/18 0854    Anticipated Changes Related to Illness  none 02/13/18 2006   Last Bowel Movement  02/14/18 02/14/18 1922    Equipment Currently Used at Home  none 02/14/18 1600   Passing flatus  yes 02/15/18 1902    Transportation Available  car 02/14/18 1600   COMMUNICATION ASSESSMENT      FUNCTIONAL LEVEL CURRENT     Patient's communication style  spoken language (English or Bilingual) 02/13/18 2009    Ambulation  4 - completely dependent 02/16/18 0854   SKIN      Transferring  3 - assistive equipment and person 02/16/18 0854   Inspection of bony prominences  Full 02/16/18 0925    Toileting  3 - assistive equipment and person 02/16/18 0854   Inspection under devices  Full 02/16/18 0532    Bathing  2 - assistive person 02/16/18 0854   Skin WDL  WDL 02/16/18 0925    Dressing  2 - assistive person 02/16/18 0854   SAFETY      Eating  2 - assistive person 02/16/18 0854   Safety WDL  WDL;safety factors 02/16/18 0854    Communication  0 - understands/communicates without difficulty 02/16/18 0854   Safety Factors  upper side rails raised x 2, lower side rail raised x 1;call light in reach;bed in low position 02/16/18 0854    Swallowing  2 - difficulty swallowing liquids/foods 02/16/18 0854   All Alarms  alarm(s) activated and audible 02/16/18 0409    Change in Functional Status Since Onset of Current Illness/Injury  yes (Acute TIA resolving) 02/13/18 2004                      Assessment WDL (Within " "Defined Limits) Definitions           Safety WDL     Effective: 09/28/15    Row Information: <b>WDL Definition:</b> Bed in low position, wheels locked; call light in reach; upper side rails up x 2; ID band on<br> <font color=\"gray\"><i>Item=AS safety wdl>>List=AS safety wdl>>Version=F14</i></font>      Skin WDL     Effective: 09/28/15    Row Information: <b>WDL Definition:</b> Warm; dry; intact; elastic; without discoloration; pressure points without redness<br> <font color=\"gray\"><i>Item=AS skin wdl>>List=AS skin wdl>>Version=F14</i></font>      Vitals     Vital Signs Flowsheet     VITAL SIGNS     Word Pain Scale  0 02/14/18 1545    Temp  97.2  F (36.2  C) 02/16/18 0814   POINT OF CARE TESTING      Temp src  Tympanic 02/16/18 0814   Puncture Site  fingertip 02/16/18 0915    Resp  18 02/16/18 0814   Bedside Glucose (mg/dl )   131 mg/dl 02/16/18 0915    Pulse  66 02/16/18 0814   HEIGHT AND WEIGHT      Heart Rate  68 02/15/18 1824   Height  1.778 m (5' 10\") 02/13/18 1959    Pulse/Heart Rate Source  Radial 02/15/18 1824   Height Method  Stated 02/13/18 1659    BP  165/64 02/16/18 0814   Weight  92.5 kg (203 lb 14.8 oz) 02/16/18 0609    ECG     Weight Method  Standing scale 02/16/18 0609    ECG Rhythm  Sinus rhythm;First degree AV block 02/16/18 0935   BSA (Calculated - sq m)  2.13 02/13/18 1959    Ectopy  Other (Comment) (sinus pause at times) 02/16/18 0935   BMI (Calculated)  29.13 02/13/18 1959    Ectopy Frequency  -- (pauses) 02/16/18 0935   ABRAN COMA SCALE      MI Interval  0.20 02/16/18 0935   Best Eye Response  4-->(E4) spontaneous 02/16/18 0819    QRS Interval  0.09 02/16/18 0935   Best Motor Response  6-->(M6) obeys commands 02/16/18 0819    QT Interval  0.34 02/16/18 0935   Best Verbal Response  5-->(V5) oriented 02/16/18 0819    Lead Monitored  Lead II 02/16/18 0935   Abran Coma Scale Score  15 02/16/18 0819    Rhythm Comment  Other (describe) (yung, pauses) 02/14/18 6975   Assessment Qualifiers  " patient not sedated/intubated 02/13/18 1928    OXYGEN THERAPY     POSITIONING      SpO2  95 % 02/16/18 0814   Body Position  supine;log-rolled;position maintained 02/16/18 0200    O2 Device  None (Room air) 02/16/18 0530   Head of Bed (HOB)  HOB at 20 degrees 02/15/18 1716    PAIN/COMFORT     Positioning/Transfer Devices  pillows 02/15/18 2235    Patient Currently in Pain  no 02/16/18 0530   DAILY CARE      Preferred Pain Scale  word (verbal rating pain scale) 02/15/18 1824   Activity Management  activity encouraged;up in chair 02/16/18 0925    Patient's Stated Pain Goal  No pain 02/14/18 1545   Activity Assistance Provided  assistance, 2 people 02/16/18 0925    0-10 Pain Scale  0 02/15/18 1824                 Patient Lines/Drains/Airways Status    Active LINES/DRAINS/AIRWAYS     None            Patient Lines/Drains/Airways Status    Active PICC/CVC     None            Intake/Output Detail Report     Date Intake     Output Net    Shift P.O. I.V. IV Piggyback Total Urine Total       Noc 02/14/18 2300 - 02/15/18 0659 -- 605 -- 605 50 50 555    Day 02/15/18 0700 - 02/15/18 1459 840 651 -- 1491     [place]ierra 02/15/18 1500 - 02/15/18 2259 -- -- -- -- 325 325 -325    Noc 02/15/18 2300 - 02/16/18 0659 -- -- -- -- 450 450 -450    Day 02/16/18 0700 - 02/16/18 1459 150 -- -- 150 150 150 0      Last Void/BM       Most Recent Value    Urine Occurrence 1 at 02/16/2018 0922    Stool Occurrence       Case Management/Discharge Planning     Case Management/Discharge Planning Flowsheet     LIVING ENVIRONMENT     Discharge Plan Concerns  no concerns 02/13/18 2006    Lives With  spouse 02/14/18 1600   FINAL RESOURCES      Living Arrangements  house 02/14/18 1600   Equipment Currently Used at Home  none 02/14/18 1600    COPING/STRESS     ABUSE RISK SCREEN      Major Change/Loss/Stressor  denies 02/13/18 2009   QUESTION TO PATIENT:  Has a member of your family or a partner(now or in the past) intimidated, hurt, manipulated, or  controlled you in any way?  no 02/13/18 2005    DISCHARGE PLANNING     QUESTION TO PATIENT: Do you feel safe going back to the place where you are living?  yes 02/13/18 2005    Readmission Within The Last 30 Days  no previous admission in last 30 days 02/13/18 2006   OBSERVATION: Is there reason to believe there has been maltreatment of a vulnerable adult (ie. Physical/Sexual/Emotional abuse, self neglect, lack of adequate food, shelter, medical care, or financial exploitation)?  no 02/13/18 2005    Anticipated Changes Related to Illness  none 02/13/18 2006   OTHER      Transportation Available  car 02/14/18 1600   Are you depressed or being treated for depression?  No 02/13/18 2005                  [facility]: [phone]            Medication Administration Report for Ilia Ferguson as of 02/16/18 1007   Legend:    Given Hold Not Given Due Canceled Entry Other Actions    Time Time (Time) Time  Time-Action       Inactive    Active    Linked        Medications 02/10/18 02/11/18 02/12/18 02/13/18 02/14/18 02/15/18 02/16/18    acetaminophen (TYLENOL) Suppository 650 mg  Dose: 650 mg  Freq: EVERY 4 HOURS PRN Route: RE  PRN Reason: mild pain  Start: 02/13/18 2011   Admin Instructions: Alternate ibuprofen (if ordered) with acetaminophen.  Maximum acetaminophen dose from all sources = 75 mg/kg/day not to exceed 4 grams/day.               acetaminophen (TYLENOL) tablet 650 mg  Dose: 650 mg  Freq: EVERY 4 HOURS PRN Route: PO  PRN Reason: mild pain  Start: 02/13/18 2011   Admin Instructions: Alternate ibuprofen (if ordered) with acetaminophen.  Maximum acetaminophen dose from all sources = 75 mg/kg/day not to exceed 4 grams/day.               aspirin EC EC tablet 81 mg  Dose: 81 mg  Freq: DAILY WITH BREAKFAST Route: PO  Start: 02/15/18 0800   Admin Instructions: DO NOT CRUSH.          0754 (81 mg)-Given        0918 (81 mg)-Given           bisacodyl (DULCOLAX) Suppository 10 mg  Dose: 10 mg  Freq: DAILY  "PRN Route: RE  PRN Reason: constipation  Start: 02/13/18 2011   Admin Instructions: Hold for loose stools.  This is the third step of a three step constipation treatment.               clopidogrel (PLAVIX) tablet 75 mg  Dose: 75 mg  Freq: DAILY Route: ORAL OR NG T  Start: 02/13/18 2015   Admin Instructions: Give immediately upon admission if not given in ED, then daily.        2119 (75 mg)-Given        1042 (75 mg)-Given        1106 (75 mg)-Given        0918 (75 mg)-Given           lidocaine (LMX4) cream  Freq: EVERY 1 HOUR PRN Route: Top  PRN Reason: pain  PRN Comment: with VAD insertion or accessing implanted port.  Start: 02/13/18 2011   Admin Instructions: Do NOT give if patient has a history of allergy to any local anesthetic or any \"crystal\" product.   Apply 30 minutes prior to VAD insertion or port access.  MAX Dose:  2.5 g (  of 5 g tube)               lidocaine 1 % 1 mL  Dose: 1 mL  Freq: EVERY 1 HOUR PRN Route: OTHER  PRN Comment: mild pain with VAD insertion or accessing implanted port  Start: 02/13/18 2011   Admin Instructions: Do NOT give if patient has a history of allergy to any local anesthetic or any \"crystal\" product. MAX dose 1 mL subcutaneous OR intradermal in divided doses.               LORazepam (ATIVAN) tablet 0.5 mg  Dose: 0.5 mg  Freq: EVERY 6 HOURS PRN Route: PO  PRN Reason: anxiety  Start: 02/14/18 2103        2154 (0.5 mg)-Given        2231 (0.5 mg)-Given            magnesium hydroxide (MILK OF MAGNESIA) suspension 30 mL  Dose: 30 mL  Freq: DAILY PRN Route: PO  PRN Reason: constipation  Start: 02/13/18 2011   Admin Instructions: Hold for loose stools.  This is the second step of a three step constipation treatment.               naloxone (NARCAN) injection 0.1-0.4 mg  Dose: 0.1-0.4 mg  Freq: EVERY 2 MIN PRN Route: IV  PRN Reason: opioid reversal  Start: 02/13/18 2011   Admin Instructions: For respiratory rate LESS than or EQUAL to 8.  Partial reversal dose:  0.1 mg titrated q 2 minutes for " Analgesia Side Effects Monitoring Sedation Level of 3 (frequently drowsy, arousable, drifts to sleep during conversation).Full reversal dose:  0.4 mg bolus for Analgesia Side Effects Monitoring Sedation Level of 4 (somnolent, minimal or no response to stimulation).  For ordered doses up to 2mg give IVP. Give each 0.4mg over 15 seconds in emergency situations. For non-emergent situations further dilute in 9mL of NS to facilitate titration of response.               ondansetron (ZOFRAN-ODT) ODT tab 4 mg  Dose: 4 mg  Freq: EVERY 6 HOURS PRN Route: PO  PRN Reasons: nausea,vomiting  Start: 02/13/18 2011   Admin Instructions: This is Step 1 of nausea and vomiting management.  If nausea not resolved in 15 minutes, go to Step 2 prochlorperazine (COMPAZINE). Do not push through foil backing. Peel back foil and gently remove. Place on tongue immediately. Administration with liquid unnecessary  With dry hands, peel back foil backing and gently remove tablet; do not push oral disintegrating tablet through foil backing; administer immediately on tongue and oral disintegrating tablet dissolves in seconds; then swallow with saliva; liquid not required.              Or  ondansetron (ZOFRAN) injection 4 mg  Dose: 4 mg  Freq: EVERY 6 HOURS PRN Route: IV  PRN Reasons: nausea,vomiting  Start: 02/13/18 2011   Admin Instructions: This is Step 1 of nausea and vomiting management.  If nausea not resolved in 15 minutes, go to Step 2 prochlorperazine (COMPAZINE).  Irritant. For ordered doses up to 4 mg, give IV Push undiluted over 2-5 minutes.               prochlorperazine (COMPAZINE) injection 5 mg  Dose: 5 mg  Freq: EVERY 6 HOURS PRN Route: IV  PRN Reasons: nausea,vomiting  Start: 02/13/18 2011   Admin Instructions: This is Step 2 of nausea and vomiting management. Give if nausea not resolved 15 minutes after giving ondansetron (ZOFRAN). If nausea not resolved in 15 minutes, go to Step 3 metoclopramide (REGLAN), if ordered.  For ordered  doses up to 10 mg, give IV Push undiluted. Each 5mg over 1 minute.              Or  prochlorperazine (COMPAZINE) tablet 5 mg  Dose: 5 mg  Freq: EVERY 6 HOURS PRN Route: PO  PRN Reason: vomiting  Start: 02/13/18 2011   Admin Instructions: This is Step 2 of nausea and vomiting management. Give if nausea not resolved 15 minutes after giving ondansetron (ZOFRAN). If nausea not resolved in 15 minutes, go to Step 3 metoclopramide (REGLAN), if ordered.              Or  prochlorperazine (COMPAZINE) Suppository 12.5 mg  Dose: 12.5 mg  Freq: EVERY 12 HOURS PRN Route: RE  PRN Reasons: nausea,vomiting  Start: 02/13/18 2011   Admin Instructions: This is Step 2 of nausea and vomiting management. Give if nausea not resolved 15 minutes after giving ondansetron (ZOFRAN). If nausea not resolved in 15 minutes, go to Step 3 metoclopramide (REGLAN), if ordered.               senna-docusate (SENOKOT-S;PERICOLACE) 8.6-50 MG per tablet 1 tablet  Dose: 1 tablet  Freq: 2 TIMES DAILY PRN Route: PO  PRN Reason: constipation  Start: 02/13/18 2011   Admin Instructions: If no bowel movement in 24 hours, increase to 2 tablets PO.  Hold for loose stools.  This is the first step of a three step constipation treatment.              Or  senna-docusate (SENOKOT-S;PERICOLACE) 8.6-50 MG per tablet 2 tablet  Dose: 2 tablet  Freq: 2 TIMES DAILY PRN Route: PO  PRN Reason: constipation  Start: 02/13/18 2011   Admin Instructions: Hold for loose stools.  This is the first step of a three step constipation treatment.               sodium chloride (PF) 0.9% PF flush 3 mL  Dose: 3 mL  Freq: EVERY 8 HOURS Route: IK  Start: 02/13/18 2015   Admin Instructions: And Q1H PRN, to lock peripheral IV dormant line.        2122 (3 mL)-Given        0514-Hold       1043 (3 mL)-Given       1215-Hold [C]       2155 (3 mL)-Given        (0619)-Not Given              1648 (3 mL)-Given       2232 (3 mL)-Given        (0921)-Not Given [C]       [ ] 1400       [ ] 2201            sodium chloride (PF) 0.9% PF flush 3 mL  Dose: 3 mL  Freq: EVERY 1 HOUR PRN Route: IK  PRN Reason: line flush  PRN Comment: for peripheral IV flush post IV meds  Start: 02/13/18 2011              tamsulosin (FLOMAX) capsule 0.4 mg  Dose: 0.4 mg  Freq: DAILY Route: PO  Start: 02/14/18 1000   Admin Instructions: Administer 30 minutes after the same meal each day.  Capsules should be swallowed whole; do not crush chew or open.         1042 (0.4 mg)-Given        1106 (0.4 mg)-Given        0918 (0.4 mg)-Given          Completed Medications  Medications 02/10/18 02/11/18 02/12/18 02/13/18 02/14/18 02/15/18 02/16/18         Dose: 500 mL  Freq: ONCE Route: IV  Last Dose: Stopped (02/13/18 1816)  Start: 02/13/18 1703   End: 02/13/18 1816       1728 (500 mL)-New Bag       1816-Stopped                Dose: 225 mg  Freq: ONCE Route: PO  Start: 02/15/18 1115   End: 02/15/18 1257         1257 (225 mg)-Given              Dose: 20 mL  Freq: ONCE Route: IV  Start: 02/14/18 0845   End: 02/14/18 0936        0936 (20 mL)-Given               Dose: 0.5 mL  Freq: PRIOR TO DISCHARGE Route: IM  Start: 02/14/18 1000   End: 02/15/18 1109   Admin Instructions: Administer when afebrile (less than 100.4F) x 24 hours, or Prior to Discharge.  If not administering when scheduled , change the due time by following the instructions in the reference link below.  If patient refuses vaccine, chart as Vaccine Refused.          1109 (0.5 mL)-Given              Dose: 100 mL  Freq: ONCE Route: IV  Start: 02/13/18 1758   End: 02/13/18 1758       1758 (100 mL)-Given                Dose: 20 mL  Freq: ONCE Route: IV  Start: 02/14/18 1015   End: 02/14/18 1010   Admin Instructions: FOR RADIOLOGY PROCEDURE ONLY         1010 (30 mL)-Given            Discontinued Medications  Medications 02/10/18 02/11/18 02/12/18 02/13/18 02/14/18 02/15/18 02/16/18         Rate: 100 mL/hr   Freq: CONTINUOUS Route: IV  Last Dose: Stopped (02/15/18 1340)  Start: 02/13/18 2015   End:  02/15/18 1314       2152 ( )-New Bag        0754 ( )-New Bag       2018 ( )-New Bag        0644 ( )-New Bag       1314-Med Discontinued  1340-Stopped              Dose: 325 mg  Freq: DAILY Route: PO  Start: 02/13/18 2015   End: 02/14/18 1151   Admin Instructions: DO NOT CRUSH.        2119 (325 mg)-Given        1042 (325 mg)-Given       1151-Med Discontinued           Dose: 20 mg  Freq: DAILY Route: PO  Start: 02/14/18 1000   End: 02/14/18 0851        0851-Med Discontinued           Dose: 20 mg  Freq: DAILY Route: PO  Start: 02/14/18 1000   End: 02/15/18 1101   Admin Instructions: Changed from enalapril per [facility] policy         1042 (20 mg)-Given        1101-Med Discontinued                Start: 02/13/18 1726   End: 02/13/18 1932   Admin Instructions: Violet Black P: cabinet override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 RICHARDSON MD[JH1.1]          Revision History        User Key Date/Time User Provider Type Action    > JH1.2 2/16/2018  9:48 AM Tulio Ibrahim MD Physician Sign     JH1.3 2/16/2018  9:33 AM Tulio Ibrahim MD Physician      JH1.1 2/16/2018  9:32 AM Tulio Ibrahim MD Physician                       Consult Notes      Consults by Froylan Martinez MD at 2018  6:48 PM     Author:  Froylan Martinez MD Service:  Neurology Author Type:  Physician    Filed:  2018  6:48 PM Date of Service:  2018  6:48 PM Creation Time:  2018  6:32 PM    Status:  Signed :  Froylan Martinez MD (Physician)     Consult Orders:    1. Tele-Stroke Video Adult IP Consult: Patient to be seen: EMERGENT within 10 minutes; Call back #: [phone]; New RUE weakness since 11AM and chronic RLE weakness (sciatica hx) possibly worse. [[id number]] ordered by Jace Haines MD at 18 1702                Stroke Consult Note - [facility]    Patient Name: Ilia Ferguson  : 1932 MRN#: [id number]    Contact received from [facility].     STROKE DATA    Stroke Code:  Time called:[OB1.1]  2018[OB1.2] 1739  Time patient seen:[OB1.1]  2018[OB1.2] 1750  Onset of symptoms:[OB1.1]  2018[OB1.3] 1230  Last known normal (pt's baseline):[OB1.1]  2018[OB1.3] 1100  Head CT read by me at:[OB1.1]  2018[OB1.4] 1750  Stroke Code de-escalated at[OB1.1] 2018[OB1.4] 1835 after discussion with Dr. Margot Haines due to patient is outside emergent treatment time parameters.     TPA treatment:  Not given due to outside the time window.    National Institutes of Health Stroke Scale    Time NIHSS Done:[OB1.1] 18[OB1.5] 1812    Score   Level of consciousness: (0)   Alert, keenly responsive   LOC questions: (0)   Answers both questions correctly   LOC commands: (0)   Performs both tasks correctly   Best gaze: (0)   Normal   Visual: (0)   No visual loss   Facial palsy: (1)  Minor paralysis (flat nasolabial fold, smile asymmetry)   Motor arm (left): (0)   No drift   Motor arm (right): (1)   Drift   Motor leg (left): (0)   No drift   Motor leg (right): (1)   Drift   Limb ataxia: (0)   Absent   Sensory: (0)   Normal- no sensory loss   Best language:  "(0)   Normal- no aphasia   Dysarthria: (0)   Normal   Extinction and inattention: (0)   No abnormality       NIHSS Total Score: 3          ASSESSMENT     85 year old, male who presented with rt arm face leg weakness. Concerning for acute stroke. CTH with possible left thalamic stroke. CTA with no major vessels occlusion.      RECOMMENDATION       #Work-up for Ischemic Stroke (no TPA)     The patient will remain at local hospital.    -Dysphagia swallow screen recommended before any po intake.  - TTE, lipid panel, A1c  -cont aspirin 81 mg and add plavix 75 mg  -start atorvastatin 40 mg daily  -MRI brain stroke  -PT/OT/SLP    Please call hospital Physician referral number at [phone] with questions or change in patient state.    Froylan Martinez MD[OB1.1]        Revision History        User Key Date/Time User Provider Type Action    > OB1.5 2/13/2018  6:48 PM Froylan Martinez MD Physician Sign     OB1.4 2/13/2018  6:36 PM Froylan Martinez MD Physician      OB1.3 2/13/2018  6:35 PM Froylan Martinez MD Physician      OB1.2 2/13/2018  6:33 PM Froylan Martinez MD Physician      OB1.1 2/13/2018  6:32 PM Froylan Martinez MD Physician                      Progress Notes - Physician (Notes for yesterday and today)      Progress Notes by Tulio Ibrahim MD at 2/15/2018 11:17 AM     Author:  Tulio Ibrahim MD Service:  Internal Medicine Author Type:  Physician    Filed:  2/15/2018 12:28 PM Date of Service:  2/15/2018 11:17 AM Creation Time:  2/15/2018 11:17 AM    Status:  Signed :  Tulio Ibrahim MD (Physician)         [facility]    Hospitalist Progress Note      Assessment & Plan   Ilia Ferguson is a 85 year old male who was admitted on 2/13/2018. Overnight, symptoms of right sided weakness have persisted. I spoke with Dr. Martinez from Neurology, who was the initial consultant in our ED with telemedicine. 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 He recommended a clopidogrel load of 300 mg today to help stabilize the plaque.    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     Plan: Discussed with neurology as above. Per recommendations of Dr. Martinez: Continue aspirin 81 mg daily, Plavix load 300 mg today and daily Plavix 75 mg. Speech/swallow therapy, physical and occupational therapy.  I talked with the patient and his wife and strongly recommended an acute rehab unit rather than skilled nursing facility.      Hypertension    Assessment: some normal/low bp's overnight, would like to avoid this and allow for permissive hypertension post stroke.    Plan: stop lisinopril      Pulmonary nodules/lesions, multiple    Assessment: chronic      Sinus bradycardia     Assessment: overnight, while sleeping. Patient had pauses. No recurrence during the day. Not on beta blocker or CCB.     Plan: continue to monitor on tele.     # Pain Assessment:   Current Pain Score 2/14/2018 2/14/2018 2/13/2018   Patient currently in pain? no no no   Pain score (0-10) 0 0 0   Ilia s pain level was assessed and he currently denies pain.      DVT Prophylaxis: Pneumatic Compression Devices  Code Status: Full Code    Tulio Ibrahim    Interval History[JH1.1]   Persistent right-sided weakness.  He denies any pain.  He denies fevers or chills.  He denies any shortness of breath.  He denies any nausea or vomiting.  He tolerated eating breakfast.[JH1.2]    -Data reviewed today: I reviewed all new labs and imaging results over the last 24 hours. I personally reviewed[JH1.1] the tele tracing showing pauses and some sinus bradycardia.[JH1.2]    Physical Exam   Temp: 98.8  F (37.1  C) Temp src: Tympanic BP: 130/62 Pulse: 58 Heart Rate: 56 Resp: 16 SpO2: 93 % O2 Device: None (Room air)    Vitals:    02/13/18 1956 02/14/18 0607 02/15/18 0500   Weight: 91.9 kg (202 lb 9.6 oz) 93.5 kg (206 lb 2.1 oz) 93.2 kg (205 lb 7.5 oz) "     Vital Signs with Ranges  Temp:  [97.1  F (36.2  C)-98.8  F (37.1  C)] 98.8  F (37.1  C)  Pulse:  [42-64] 58  Heart Rate:  [56-71] 56  Resp:  [16-18] 16  BP: (106-146)/(57-70) 130/62  SpO2:  [93 %-97 %] 93 %  I/O last 3 completed shifts:  In: 2455 [P.O.:480; I.V.:1975]  Out: 625 [Urine:625][JH1.1]    GENERAL: Comfortable, talkative, in no apparent distress.  HEENT: Anicteric, non-injected sclera, mouth moist.   NECK: No JVD.  CARDIOVASCULAR: regular rate and rhythm, no murmur. No lower extremity edema   RESPIRATORY: Clear to auscultation bilaterally, no wheezes, no crackles.  GI: Non-distended, normal bowel sounds, soft, non-tender.  SKIN: No rashes, sores.   NEUROLOGY: Alert and oriented x3, follows commands, slurred speech. Right upper extremity flaccid, left upper extremity 5/5, right lower extremity weakness[JH1.2]    Medications     NaCl 100 mL/hr at 02/15/18 0644       clopidogrel  225 mg Oral Once     aspirin EC  81 mg Oral Daily with breakfast     tamsulosin  0.4 mg Oral Daily     sodium chloride (PF)  3 mL Intracatheter Q8H     clopidogrel  75 mg Oral or NG Tube Daily       Data     Recent Labs  Lab 02/14/18  0412 02/13/18  2040 02/13/18  1710   WBC 5.5  --  5.8   HGB 12.1*  --  14.3   MCV 90  --  90     --  176   INR  --   --  0.98     --  143   POTASSIUM 4.0  --  4.1   CHLORIDE 109*  --  106   CO2 28  --  26   BUN 11  --  14   CR 0.92  --  0.94   ANIONGAP 6  --  11   VON 8.2*  --  9.5   *  --  127*   ALBUMIN 3.1*  --   --    PROTTOTAL 4.9*  --   --    BILITOTAL 0.6  --   --    ALKPHOS 60  --   --    ALT 9  --   --    AST 14  --   --    TROPI <0.030 <0.030 <0.030       No results found for this or any previous visit (from the past 24 hour(s)).[JH1.1]       Revision History        User Key Date/Time User Provider Type Action    > JH1.2 2/15/2018 12:28 PM Tulio Ibrahim MD Physician Sign     JH1.1 2/15/2018 11:17 AM Tulio Ibrahim MD Physician                   Procedure Notes   "   No notes of this type exist for this encounter.         Progress Notes - Therapies (Notes from 02/13/18 through 02/16/18)      Progress Notes by Bhavesh Stanton SLP at 2/14/2018  5:28 PM     Author:  Bhavesh Stanton SLP Service:  (none) Author Type:  Speech Therapist    Filed:  2/14/2018  5:28 PM Date of Service:  2/14/2018  5:28 PM Creation Time:  2/14/2018  5:28 PM    Status:  Signed :  Bhavesh Stanton SLP (Speech Therapist)            02/14/18 1700   General Information, SLP   Type of Evaluation Dysarthria   Type of Visit Initial   Start of Care Date 02/14/18   Onset of Illness/Injury or Date of Surgery - Date 02/13/18   Referring Physician Tulio Ibrahim MD   Patient/Family Goals Statement \"I want to talk again.\"    Pertinent History of Current Problem CVA with right sided weakness   Precautions/Limitations swallowing precautions   General Observations Alert but fatigued   Oral Motor Sensory Function   Completed on Swallow Evaluation Completed Clinical Bedside Swallow Evaluation   Deficits noted in Labial Function (m-VII, S-V) Coordination;Protrusion   Deficits noted in Lingual Function (m-XII, s-V, VII, IX, XII) Protrusion;Retraction   Deficits noted in Mandibular Function (m-V) None   Deficits noted in Velar Function (m-V, X, XI, s-IX) None   Deficits noted in Laryngeal Function (m-X, s-X) Elevation   Speech   Deficits in Speech Respiration None   Deficits in Phonation Loudness (soft)   Sustained vowel production 5   Deficits in Articulation Flaccid dysarthria   Deficits in [place] Disordered stress patterns   AIDS-words, % intelligible 90   AIDS-sentences, % intelligible 80   Speech Comments Mild dysarthria    General Therapy Interventions   Planned Therapy Interventions Communication   Communication Improve speech intelligibility   Intervention Comments Over-articulation and education   Clinical Impression, SLP Eval   Criteria for Skilled Therapeutic Interventions Met Yes   SLP Diagnosis Dysarthria "   Influenced by the following factors/impairments CVA, Right sided weakness   Functional limitations due to impairments impaired intelligibility   Rehab Potential Good, to achieve stated therapy goals   Rehab potential affected by Motivation to return to prior function   Therapy Frequency 5 times/wk   Predicted Duration of Therapy Intervention (days/wks) 1 week   Anticipated Equipment Needs at Discharge other (see comments)  (NTL)   Anticipated Discharge Disposition Acute Rehabilitation Facility   Risks and Benefits of Treatment have been explained. Yes   Patient, Family & other staff in agreement with plan of care Yes   Clinical Impression Comments PT with mild dysarthria and would benefit from ongoing ST to better communicate with spouse    Therapy Certification   Certification date from 02/14/18   Certification date to 02/21/18   Medical Diagnosis CVA; Dysarthria; Dysphagia    Total Evaluation Time      Total Evaluation Time (Minutes) 15[AG1.1]        Revision History        User Key Date/Time User Provider Type Action    > AG1.1 2/14/2018  5:28 PM Bhavesh Stanton, SLP Speech Therapist Sign            Progress Notes by Arslan Jose PT at 2/14/2018  4:49 PM     Author:  Arslan Jose PT Service:  (none) Author Type:  Physical Therapist    Filed:  2/14/2018  4:49 PM Date of Service:  2/14/2018  4:49 PM Creation Time:  2/14/2018  4:49 PM    Status:  Signed :  Arslan Jose PT (Physical Therapist)            02/14/18 1600   Functional Level Prior   Ambulation 0-->independent   Transferring 0-->independent   Toileting 0-->independent   Communication 0-->understands/communicates without difficulty   Cognition 0 - no cognition issues reported   Fall history within last six months yes   Number of times patient has fallen within last six months 1   Which of the above functional risks had a recent onset or change? ambulation;transferring;toileting;cognition   General Information   Referring Physician  Patrice   Patient/Family Goals Statement return home eventually   Pertinent History of Current Problem (include personal factors and/or comorbidities that impact the POC) moderate   Precautions/Limitations fall precautions;swallowing precautions   Weight-Bearing Status - LLE full weight-bearing   Weight-Bearing Status - RLE full weight-bearing   Cognitive Status Examination   Orientation orientation to person, place and time   Level of Consciousness alert   Follows Commands and Answers Questions 100% of the time   Personal Safety and Judgment intact   Memory intact   Pain Assessment   Patient Currently in Pain No   Range of Motion (ROM)   ROM Comment decreased right LE AROM secondary to weakness   Strength   Strength Comments right LE grossly 2+/5 proximally and 1/5 distally   Bed Mobility   Bed Mobility Bed mobility skill: Sit to supine;Bed mobility skill: Supine to sit   Bed Mobility Skill: Sit to Supine   Level of [place]: Sit/Supine minimum assist (75% patients effort)   Physical Assist/Nonphysical Assist: Sit/Supine 2 persons   Bed Mobility Skill: Supine to Sit   Level of [place]: Supine/Sit moderate assist (50% patients effort)   Physical Assist/Nonphysical Assist: Supine/Sit 2 persons   Transfer Skills   Transfer Transfer Skill: Bed to Chair/Chair to Bed   Transfer Skill: Bed/Chair   Level of [place]: Bed/Chair minimum assist (75% patients effort)   Physical/Nonphysical Assist: Bed/Chair 1 person assist   Weightbearing Restrictions: Bed/Chair full weight-bearing   Assistive Device: Bed/Chair rolling walker   Gait   Gait Gait Skill   Gait Skills   Level of [place]: Gait minimum assist (75% patients effort)   Physical Assist/Nonphysical Assist: Gait 2 persons;verbal cues   Weight-Bearing Restrictions: Gait full weight-bearing   Assistive Device for Transfer: Gait rolling walker   Gait Distance 75 feet   Balance   Balance other (describe)  (notable decreased dynamic stability)   Balance  "Comments ( decreased dynamic stability)   Sensory Examination   Sensory Perception (propriception and light touch appear intact in right LE)   Coordination   Coordination other (see comments)   Coordination Comments decreased right LE coordination   General Therapy Interventions   Planned Therapy Interventions bed mobility training;gait training;neuromuscular re-education;strengthening;transfer training   Clinical Impression   Criteria for Skilled Therapeutic Intervention yes, treatment indicated   PT Diagnosis CVA   Functional limitations due to impairments safe mobility   Clinical Presentation Evolving/Changing   Clinical Decision Making (Complexity) Moderate complexity   Therapy Frequency` daily   Predicted Duration of Therapy Intervention (days/wks) 3-5 days   Anticipated Equipment Needs at Discharge front wheeled walker   Anticipated Discharge Disposition Acute Rehabilitation Facility   Risk & Benefits of therapy have been explained Yes   Patient, Family & other staff in agreement with plan of care Yes   Total Evaluation Time   Total Evaluation Time (Minutes) 15[JM1.1]        Revision History        User Key Date/Time User Provider Type Action    > JM1.1 2/14/2018  4:49 PM Arslan Jose, PT Physical Therapist Sign            Progress Notes by Bhavesh Stanton SLP at 2/14/2018  4:22 PM     Author:  Bhavesh Stanton SLP Service:  (none) Author Type:  Speech Therapist    Filed:  2/14/2018  4:22 PM Date of Service:  2/14/2018  4:22 PM Creation Time:  2/14/2018  4:22 PM    Status:  Signed :  Bhavesh Stanton SLP (Speech Therapist)          02/14/18 1500   General Information   Onset Date 02/13/18   Start of Care Date 02/14/18   Referring Physician Tulio Ibrahim MD   Patient/Family Goals Statement Family would like \"for him to get better. Not cough so much.\"    Swallowing Evaluation Bedside swallow evaluation   Behaviorial Observations WFL (within functional limits)   Mode of current nutrition Oral diet "   Type of oral diet Regular   Respiratory Status Room air   Clinical Swallow Evaluation   Oral Musculature anomalies present   Structural Abnormalities present   Dentition present and adequate   Secretion Management problems swallowing secretions   Mucosal Quality adequate   Mandibular Strength and Mobility impaired   Oral Labial Strength and Mobility impaired retraction;impaired pursing;impaired seal;impaired coordination   Lingual Strength and Mobility impaired coordination   Velar Elevation intact   Buccal Strength and Mobility intact   Laryngeal Function Cough;Throat clear   Oral Musculature Comments Mild right sided weakness    Additional Documentation Yes   Clinical Swallow Eval: Thin Liquid Texture Trial   Mode of Presentation, Thin Liquids cup;straw   Volume of Liquid or Food Presented 1 teaspoon +   Oral Phase of Swallow Premature pharyngeal entry   Pharyngeal Phase of Swallow coughing/choking   Successful Strategies Trialed During Procedure chin tuck   Diagnostic Statement DIfficulties with thin liquids on numerous attempts    Clinical Swallow Eval: Nectar Thick Liquid Texture Trial   Mode of Presentation, Nectar cup   Oral Phase, [place] WFL   Pharyngeal Phase, Nectar intact   Successful Strategies Trialed During Procedure, Nectar chin tuck   Diagnostic Statement No difficulties with NTL   Clinical Swallow Eval: Semisolid Texture Trial   Mode of Presentation, Semisolid self-fed;spoon   Oral Phase, Semisolid WFL   Pharyngeal Phase, Semisolid intact   Diagnostic Statement No overt signs or symptoms of aspiration   Clinical Swallow Eval: Solid Food Texture Trial   Mode of Presentation, Solid self-fed   Oral Phase, Solid WFL   Pharyngeal Phase, Solid intact   Successful Strategies Trialed During Procedure, Solid chin tuck   Diagnostic Statement No overt signs or symptoms of aspiration   Swallow Compensations   Swallow Compensations Chin tuck   Results Suspect aspiration   Esophageal Phase of Swallow   Patient  reports or presents with symptoms of esophageal dysphagia No   Esophageal sweep performed during today s vidofluoroscopic exam  No   General Therapy Interventions   Planned Therapy Interventions Dysphagia Treatment   Dysphagia treatment Oropharyngeal exercise training;Instruction of safe swallow strategies;Modified diet education;Compensatory strategies for swallowing   Intervention Comments No difficulties, family in agreement   Swallow Eval: Clinical Impressions   Skilled Criteria for Therapy Intervention Skilled criteria met.  Treatment indicated.   Functional Assessment Scale (FAS) 5   Dysphagia Outcome Severity Scale (LINDSAY) Level 5 - LINDSAY   Treatment Diagnosis Dysphagia    Diet texture recommendations Regular diet;Nectar thick liquids   Recommended Feeding/Eating Techniques tuck chin during every swallow   Therapy Frequency 5 times/wk   Predicted Duration of Therapy Intervention (days/wks) 1 week   Anticipated Discharge Disposition inpatient rehabilitation facility   Risks and Benefits of Treatment have been explained. Yes   Patient, family and/or staff in agreement with Plan of Care Yes   Therapy Certification   Certification date from 02/14/18   Certification date to 02/21/18   Medical Diagnosis Dysphagia    Total Evaluation Time   Total Evaluation Time (Minutes) 15[AG1.1]        Revision History        User Key Date/Time User Provider Type Action    > AG1.1 2/14/2018  4:22 PM Bhavesh Stanton, SLP Speech Therapist Sign            Progress Notes by Sulema Hairston OT at 2/14/2018  3:59 PM     Author:  Sulema Hairston OT Service:  (none) Author Type:  Occupational Therapist    Filed:  2/14/2018  4:00 PM Date of Service:  2/14/2018  3:59 PM Creation Time:  2/14/2018  3:59 PM    Status:  Signed :  Sulema Hairston OT (Occupational Therapist)            02/14/18 1400   Quick Adds   Type of Visit Initial Occupational Therapy Evaluation   Living Environment   Lives With spouse   Living Arrangements  house   Home Accessibility bath not on first floor   Number of Stairs Within Home 13   Stair Railings at Home inside, present on left side   Transportation Available car   Self-Care   Dominant Hand right   Usual Activity Tolerance excellent   Current Activity Tolerance poor   Equipment Currently Used at Home none   Functional Level Prior   Ambulation 3-->assistive equipment and person   Transferring 3-->assistive equipment and person   General Information   Referring Physician Dr Ibrahim    Cognitive Status Examination   Orientation orientation to person, place and time   Level of Consciousness alert   Able to Follow Commands success, 2-step commands   Memory intact   Attention No deficits were identified   Visual Perception   Visual Perception No deficits were identified   Visual Acuity wears reading glassess normally    Visual Field intact   Sensation Light Touch, Rehab Eval   RUE within normal limits   Pain Assessment   Patient Currently in Pain No   Range of Motion (ROM)   ROM Comment Left U/E WNL, Impaired Right U/E    MMT: Shoulder   Left Flexion (5/5) normal, left   Right Flexion (2/5) poor, right   Hand Strength   Hand Strength Comments Left WNL, Right 2/5    Coordination   Upper Extremity Coordination Right UE impaired   Fine Motor Coordination impaired right    Mobility   Bed Mobility Bed mobility skill: Supine to sit   Bed Mobility Skill: Supine to Sit   Level of [place]: Supine/Sit maximum assist (25% patients effort)   Transfer Skills   Transfer Transfer Safety Analysis Bed/Chair   Transfer Skill: Bed to Chair/Chair to Bed   Level of [place]: Bed to Chair maximum assist (25% patients effort)   Physical Assist/Nonphysical Assist: Bed to Chair 2 persons   Assistive Device - Transfer Skill Bed to Chair Chair to Bed Rehab Eval rolling walker   Transfer Skill: Sit to Stand   Level of [place]: Sit/Stand minimum assist (75% patients effort)   Bathing   Level of [place] minimum assist (75%  patients effort)  (with upper body using left hand )   Activities of Daily Living Analysis   Impairments Contributing to Impaired Activities of Daily Living balance impaired;coordination impaired;strength decreased   General Therapy Interventions   Planned Therapy Interventions ADL retraining;balance training;strengthening   Clinical Impression   Criteria for Skilled Therapeutic Interventions Met yes, treatment indicated   OT Diagnosis CVA   Influenced by the following impairments weakness, balance,    Assessment of Occupational Performance 3-5 Performance Deficits   Identified Performance Deficits dressing, bathing, mobility, strenght coordination    Clinical Decision Making (Complexity) Moderate complexity   Therapy Frequency daily   Predicted Duration of Therapy Intervention (days/wks) 3-4 days    Anticipated Equipment Needs at Discharge shower chair   Anticipated Discharge Disposition Acute Rehabilitation Facility   Risks and Benefits of Treatment have been explained. Yes   Patient, Family & other staff in agreement with plan of care Yes   Total Evaluation Time   Total Evaluation Time (Minutes) 15[RQ1.1]        Revision History        User Key Date/Time User Provider Type Action    > RQ1.1 2/14/2018  4:00 PM Sulema Hairston OT Occupational Therapist Sign                                                      INTERAGENCY TRANSFER FORM - LAB / IMAGING / EKG / EMG RESULTS   2/13/2018                       [facility]: [phone]            Unresulted Labs     None         Lab Results - 3 Days      Basic metabolic panel [[id number]] (Abnormal)  Resulted: 02/14/18 0544, Result status: Final result    Ordering provider: Tulio Ibrahim MD  02/13/18 2300 Resulting lab: [facility]    Specimen Information    Type Source Collected On   Blood  02/14/18 0180          Components       Value Reference Range Flag Lab   Sodium 143 134 - 144 mmol/L  [facility]   Potassium 4.0 3.5  - 5.1 mmol/L  [facility]   Chloride 109 98 - 107 mmol/L H [facility]   Carbon Dioxide 28 21 - 31 mmol/L  [facility]   Anion Gap 6 3 - 14 mmol/L  [facility]   Glucose 126 70 - 105 mg/dL H [facility]   Urea Nitrogen 11 7 - 25 mg/dL  [facility]   Creatinine 0.92 0.70 - 1.30 mg/dL  [facility]   GFR Estimate 78 >60 mL/min/1.7m2  [facility]   GFR Estimate If Black >90 >60 mL/min/1.7m2  [facility]   Calcium 8.2 8.6 - 10.3 mg/dL [facility]            Lipid Profile [[id number]]  Resulted: 02/14/18 0544, Result status: Final result    Ordering provider: Tulio Ibrahim MD  02/13/18 2300 Resulting lab: [facility]    Specimen Information    Type Source Collected On   Blood  02/14/18 0412          Components       Value Reference Range Flag Lab   Cholesterol 152 <200 mg/dL  [facility]   Triglycerides 89 <150 mg/dL  [facility]   HDL Cholesterol 41 23 - 92 mg/dL  [facility]   LDL Cholesterol Calculated 93 <100 mg/dL  [facility]   Comment:  Desirable:       <100 mg/dl   Non HDL Cholesterol 111 <130 mg/dL  [facility]            Hemoglobin A1c [[id number]] (Abnormal)  Resulted: 02/14/18 0542, Result status: Final result    Ordering provider: Tulio Ibrahim MD  02/14/18 0501 Resulting lab: [facility]    Specimen Information    Type Source Collected On     02/14/18 0412          Components       Value Reference Range Flag Lab   Hemoglobin A1C 6.9 4.0 - 6.0 % H [facility]            Hepatic panel [[id number]] (Abnormal)  Resulted: 02/14/18 0536, Result status: Final result    Ordering provider: Tulio Ibrahim MD  02/14/18 0501 Resulting lab: [facility]    Specimen Information    Type Source Collected On     02/14/18 0412          Components       Value Reference Range Flag Lab   Bilirubin Direct 0.1 0.0 - 0.2 mg/dL  [facility]   Bilirubin Total 0.6 0.3 - 1.0 mg/dL  [facility]   Albumin 3.1 3.5 - 5.7 g/dL L [facility]   Protein Total 4.9 6.4 - 8.9 g/dL L  [facility]   Alkaline Phosphatase 60 34 - 104 U/L  [facility]   ALT 9 7 - 52 U/L  [facility]   AST 14 13 - 39 U/L  [facility]            Troponin I [[id number]]  Resulted: 02/14/18 0536, Result status: Final result    Ordering provider: Tulio Ibrahim MD  02/13/18 2215 Resulting lab: [facility]    Specimen Information    Type Source Collected On   Blood  02/14/18 0412          Components       Value Reference Range Flag Lab   Troponin I ES <0.030 0.000 - 0.034 ug/L  [facility]            CBC with platelets [[id number]] (Abnormal)  Resulted: 02/14/18 0534, Result status: Final result    Ordering provider: Tulio Ibrahim MD  02/13/18 2300 Resulting lab: [facility]    Specimen Information    Type Source Collected On   Blood  02/14/18 0412          Components       Value Reference Range Flag Lab   WBC 5.5 4.0 - 11.0 10e9/L  [facility]   RBC Count 3.87 4.4 - 5.9 10e12/L L [facility]   Hemoglobin 12.1 13.3 - 17.7 g/dL L [facility]   Hematocrit 35.0 40.0 - 53.0 % L [facility]   MCV 90 78 - 100 fl  [facility]   MCH 31.3 26.5 - 33.0 pg  [facility]   MCHC 34.6 31.5 - 36.5 g/dL  [facility]   RDW 13.3 10.0 - 15.0 %  [facility]   Platelet Count 165 150 - 450 10e9/L  [facility]            Troponin I [[id number]]  Resulted: 02/13/18 2114, Result status: Final result    Ordering provider: Tulio Ibrahim MD  02/13/18 2011 Resulting lab: [facility]    Specimen Information    Type Source Collected On   Blood  02/13/18 2040          Components       Value Reference Range Flag Lab   Troponin I ES <0.030 0.000 - 0.034 ug/L  [facility]            Basic metabolic panel [[id number]] (Abnormal)  Resulted: 02/13/18 1747, Result status: Final result    Ordering provider: Jace Haines MD  02/13/18 1702 Resulting lab: [facility]    Specimen Information    Type Source Collected On   Blood  02/13/18 1710          Components       Value  Reference Range Flag Lab   Sodium 143 134 - 144 mmol/L  [facility]   Potassium 4.1 3.5 - 5.1 mmol/L  [facility]   Chloride 106 98 - 107 mmol/L  [facility]   Carbon Dioxide 26 21 - 31 mmol/L  [facility]   Anion Gap 11 3 - 14 mmol/L  [facility]   Glucose 127 70 - 105 mg/dL H [facility]   Urea Nitrogen 14 7 - 25 mg/dL  [facility]   Creatinine 0.94 0.70 - 1.30 mg/dL  [facility]   GFR Estimate 76 >60 mL/min/1.7m2  [facility]   GFR Estimate If Black >90 >60 mL/min/1.7m2  [facility]   Calcium 9.5 8.6 - 10.3 mg/dL  [facility]            Troponin I [[id number]]  Resulted: 02/13/18 1747, Result status: Final result    Ordering provider: Jace Haines MD  02/13/18 1702 Resulting lab: [facility]    Specimen Information    Type Source Collected On   Blood  02/13/18 1710          Components       Value Reference Range Flag Lab   Troponin I ES <0.030 0.000 - 0.034 ug/L  [facility]            INR [[id number]]  Resulted: 02/13/18 1738, Result status: Final result    Ordering provider: Jace Haines MD  02/13/18 1702 Resulting lab: [facility]    Specimen Information    Type Source Collected On   Blood  02/13/18 1710          Components       Value Reference Range Flag Lab   INR 0.98 0 - 1.3  [facility]            Partial thromboplastin time [[id number]]  Resulted: 02/13/18 1738, Result status: Final result    Ordering provider: Jace Haines MD  02/13/18 1702 Resulting lab: [facility]    Specimen Information    Type Source Collected On   Blood  02/13/18 1710          Components       Value Reference Range Flag Lab   PTT 30 29 - 50 sec  [facility]            CBC with platelets differential [[id number]]  Resulted: 02/13/18 1725, Result status: Final result    Ordering provider: Jace Haines MD  02/13/18 1702 Resulting lab: [facility]    Specimen Information    Type Source Collected On   Blood  02/13/18 6141           Components       Value Reference Range Flag Lab   WBC 5.8 4.0 - 11.0 10e9/L  [facility]   RBC Count 4.60 4.4 - 5.9 10e12/L  [facility]   Hemoglobin 14.3 13.3 - 17.7 g/dL  [facility]   Hematocrit 41.4 40.0 - 53.0 %  [facility]   MCV 90 78 - 100 fl  [facility]   MCH 31.1 26.5 - 33.0 pg  [facility]   MCHC 34.5 31.5 - 36.5 g/dL  [facility]   RDW 13.4 10.0 - 15.0 %  [facility]   Platelet Count 176 150 - 450 10e9/L  [facility]   Diff Method Automated Method   [facility]   Absolute Basophils 0.0 0.0 - 0.2 10e9/L  [facility]   Absolute Eosinophils 0.1 0.0 - 0.7 10e9/L  [facility]   Abs Immature Granulocytes 0.0 0 - 0.4 10e9/L  [facility]   Absolute Lymphocytes 1.7 0.8 - 5.3 10e9/L  [facility]   Absolute Monocytes 0.5 0.0 - 1.3 10e9/L  [facility]   Absolute Neutrophil 3.5 1.6 - 8.3 10e9/L  [facility]   % Basophils 0.3 %  [facility]   % Eosinophils 0.9 %  [facility]   % Immature Granulocytes 0.3 %  [facility]   % Lymphocytes 29.4 %  [facility]   % Monocytes 8.9 %  [facility]   % Neutrophils 60.2 %  [facility]            Testing Performed By     Lab - Abbreviation Name Director Address Valid Date Range    1743 - [facility] Unknown [address]  [facility] [address] 08/07/15 0948 - Present               Imaging Results - 3 Days      MRI Brain w & w/o contrast [[id number]]  Resulted: 02/14/18 1020, Result status: Final result    Ordering provider: Tulio Ibrahim MD  02/13/18 2011 Resulted by: Juan Steele MD    Performed: 02/14/18 0912 - 02/14/18 0949 Resulting lab: RADIOLOGY RESULTS    Narrative:       EXAM:  MR BRAIN W/O & W CONTRAST    HISTORY:  Suspected stroke, right-sided weakness. .    TECHNIQUE:  Sagittal T1, axial T1, T2, FLAIR, diffusion, echo planar  as well as axial and 3D postcontrast imaging of the whole brain was  performed. SWI sequences were also obtained.    COMPARISON:  CT brain 2/13/2018     FINDINGS:    Restricted diffusion is present within the left  medial central aspect  of the jerel. No other restricted diffusion is present. No abnormal  enhancement is seen. A few scattered foci of gradient susceptibility  are nonspecific, likely chronic microhemorrhages. There are moderate  background microvascular changes.    No abnormal extra-axial collection, hydrocephalus, mass effect or  midline shift is seen. The basal cisterns are preserved. The major  intracranial vascular flow voids are preserved.     The T1 marrow signal is unremarkable.      Impression:       IMPRESSION: Subtle focal restricted diffusion within the left central  aspect of the jerel, most compatible with acute ischemia and likely  accounting for the described symptoms.    CARLY RICHARDSON MD      CTA Angiogram Head Neck [[id number]]  Resulted: 02/13/18 1855, Result status: Final result    Ordering provider: Jace Haines MD  02/13/18 1747 Resulted by: Jaden Banerjee MD    Performed: 02/13/18 1755 - 02/13/18 1821 Resulting lab: RADIOLOGY RESULTS    Narrative:       PROCEDURE: CTA ANGIOGRAM HEAD NECK 2/13/2018 6:21 PM    HISTORY: RUE and RLE weakness and word finding difficulty - last known  well 11AM with waxing/waning course.;     COMPARISONS: None.  Technique: CT  Angiogram of the neck with sagittal coronal reconstructions. CT  angiogram of the brain with sagittal coronal reconstructions      FINDINGS: The brachiocephalic artery is widely patent. There is  calcific plaquing seen in the right subclavian artery. The right  vertebral artery is widely patent to the skull base. Common carotid  artery on the right is widely patent. There is some mild calcific  plaquing at the carotid bifurcation on the right. The right internal  carotid artery is widely patent to the skull base. There is some mild  calcific plaquing without stenoses in the left common carotid artery.  There is mild calcific plaquing in the proximal internal carotid  artery on the left without stenosis.    The distal  vertebral arteries are widely patent basilar artery is  widely patent both superior cerebellar and posterior cerebral branches  appear normal. There is some calcific plaquing in the carotid siphons.  Supraclinoid internal carotid artery appears normal. Both anterior  cerebral arteries are widely patent both middle cerebral arteries are  widely patent. No intracranial stenoses aneurysms or vascular shifts  are noted.    The paranasal sinuses are clear. The muscles of mastication in the  parapharyngeal spaces are normal. The parotid and submandibular glands  appear normal. The larynx and upper trachea is normal. Thyroid gland  appears normal. There is abnormal areas of increased density in both  upper lobes. This is suspicious for infectious disease.         Impression:       IMPRESSION: No intracranial stenoses aneurysms or vascular shifts.  There is mild calcific plaquing in the carotid systems bilaterally but  no significant significant stenoses are noted. In the vertebral  systems the vertebral arteries are markedly tortuous particularly in  the upper cervical region but no significant stenoses are noted    DARIEN JENNINGS MD      CT Head w/o Contrast [[id number]]  Resulted: 02/13/18 1717, Result status: Final result    Ordering provider: Jace Haines MD  02/13/18 1702 Resulted by: Juan Steele MD    Performed: 02/13/18 1702 - 02/13/18 1710 Resulting lab: RADIOLOGY RESULTS    Narrative:       PROCEDURE: CT HEAD W/O CONTRAST     HISTORY: onset of RUE weakness and change in speech since 11AM.; .    COMPARISON: None.    TECHNIQUE:  Helical images of the head from the foramen magnum to the  vertex were obtained without contrast.    FINDINGS: There is a subtle area of diminished attenuation within the  left thalamus on axial image 13. A more well-defined focal area of  hypoattenuation is present in the medial right thalamus on image 14.  Scattered areas of hypoattenuation are seen within the  supratentorial  white matter.    There is mild generalized volume loss. No acute intracranial  hemorrhage, mass effect, midline shift, hydrocephalus or basilar  cistern effacement is seen.    The calvarium is intact. There is atherosclerotic calcifications of  the intracranial internal carotid arteries and vertebral arteries.       Impression:       IMPRESSION: Age indeterminant bilateral thalamic ischemic injuries.  The patient's symptoms could potentially correlate with an acute  (left) thalamic infarct. Moderate background age-indeterminate  microvascular ischemic changes. Consider MR for more definitive  assessment for acute ischemia.      CARLY RICHARDSON MD      Testing Performed By     Lab - Abbreviation Name Director Address Valid Date Range    104 - [facility] RADIOLOGY RESULTS Unknown Unknown 05 1553 - Present               ECG/EMG Results      Echocardiogram - bubble study [[id number]]  Resulted: 18, Result status: Edited Result - FINAL    Ordering provider: Tulio Ibrahim MD  18 Resulted by: MASON Man MD    Performed: 18 0945 - 18 104 Resulting lab: RADIOLOGY RESULTS    Narrative:       [id number]  [facility]  [id number]  [id number]^FRANCISCO^TULIO^RENNY        [facility]  [address] [place] [address]     Name: AMINA STEELE  MRN: [id number]  : 1932  Study Date: 2018 08:19 AM  Age: 85 yrs  Gender: Male  Patient Location: [facility]  Reason For Study: Cerebrovascular Incident  Ordering Physician: TULIO IBRAHIM  Performed By: [place]     BSA: 2.1 m2  Height: 70 in  Weight: 206 lb  HR: 64  BP: 144/72 mmHg  _____________________________________________________________________________  __        Procedure  Bubble Echocardiogram with two-dimensional, color and spectral Doppler  performed.  _____________________________________________________________________________  __        Interpretation Summary  No cardiac source  for embolus identified. The atrial septum is intact as  assessed by color Doppler and agitated dextrose bubble study .  _____________________________________________________________________________  __        Left Ventricle  Global and regional left ventricular function is normal with an EF of 60-65%.  Left ventricular size is normal. Borderline concentric wall thickening  consistent with left ventricular hypertrophy is present. Left ventricular  diastolic function is indeterminate. No regional wall motion abnormalities are  seen.     Right Ventricle  Right ventricular function, chamber size, wall motion, and thickness are  normal.     Atria  The right atria appears normal. Severe left atrial enlargement is present. The  atrial septum is intact as assessed by color Doppler and agitated dextrose  bubble study .     Mitral Valve  The mitral valve is normal. Mild mitral insufficiency is present.     Aortic Valve  Mild aortic valve sclerosis is present. Mild aortic insufficiency is present.        Tricuspid Valve  The tricuspid valve is normal. Trace to mild tricuspid insufficiency is  present.     Pulmonic Valve  The pulmonic valve is normal. Trace pulmonic insufficiency is present.     Vessels  The pulmonary artery is normal. The inferior vena cava is normal. Ascending  aorta 4.2 cm.     Pericardium  No pericardial effusion is present.     _____________________________________________________________________________  __  MMode/2D Measurements & Calculations  IVSd: 1.1 cm  LVIDd: 4.4 cm  LVIDs: 2.9 cm  LVPWd: 1.1 cm  FS: 35.0 %  EDV(Teich): 89.1 ml  ESV(Teich): 31.7 ml  LV mass(C)d: 172.9 grams  LV mass(C)dI: 81.8 grams/m2  Ao root diam: 3.7 cm  LA dimension: 5.6 cm  asc Aorta Diam: 4.2 cm     LA/Ao: 1.5  LVOT diam: 2.3 cm  LVOT area: 4.2 cm2  LA Volume (BP): 106.0 ml  LA Volume Index (BP): 50.2 ml/m2  RWT: 0.52        Doppler Measurements & Calculations  MV E max stephanie: 90.7 cm/sec  MV A max stephanie: 85.4 cm/sec  MV E/A:  1.1  MV dec time: 0.16 sec  Ao V2 max: 157.0 cm/sec  Ao max PG: 10.0 mmHg  Ao V2 mean: 109.0 cm/sec  Ao mean P.0 mmHg  Ao V2 VTI: 38.4 cm  STEVEN(I,D): 2.7 cm2  STEVEN(V,D): 2.3 cm2  AI P1/2t: 789.6 msec     LV V1 max PG: 3.1 mmHg  LV V1 max: 87.5 cm/sec  LV V1 VTI: 24.9 cm  SV(LVOT): 103.5 ml  SI(LVOT): 48.9 ml/m2  STEVEN Index (cm2/m2): 1.3  E/E' av.1  Lateral E/e': 11.3  Medial E/e': 14.9           _____________________________________________________________________________  __           Report approved by: Richmond Gregg 2018 11:10 AM       1    Type Source Collected On     18 0819          View Image (below)              Encounter-Level Documents:     There are no encounter-level documents.      Order-Level Documents:     There are no order-level documents.

## 2018-02-13 NOTE — IP AVS SNAPSHOT
MRN:[id number]                      After Visit Summary   2/13/2018    Ilia Ferguson    MRN: [id number]           Thank you!     Thank you for choosing [facility] for your care. Our goal is always to provide you with excellent care. Hearing back from our patients is one way we can continue to improve our services. Please take a few minutes to complete the written survey that you may receive in the mail after you visit with us. Thank you!        Patient Information     Date Of Birth          2/20/1932        Designated Caregiver       Most Recent Value    Caregiver    Will someone help with your care after discharge? yes    Name of designated caregiver Radha    Phone number of caregiver [phone]    Caregiver address [place], [place] wife of patient      About your hospital stay     You were admitted on:  February 13, 2018 You last received care in the:  [facility]    You were discharged on:  February 16, 2018        Reason for your hospital stay       stroke                  Who to Call     For medical emergencies, please call 911.  For non-urgent questions about your medical care, please call your primary care provider or clinic, [phone]          Attending Provider     Provider Specialty    Jace Haines MD [facility], Tulio LAWSON MD Internal Medicine       Primary Care Provider Office Phone # Fax #    Cholo Rodriguez [phone]      After Care Instructions     Activity - Up with assistive device           Activity - Up with nursing assistance           Advance Diet as Tolerated       Follow this diet upon discharge: Orders Placed This Encounter      Dysphagia Diet Level 3 Advanced Nectar Thickened Liquids (pre-thickened or use instant food thickener)            General info for SNF       Length of Stay Estimate: Short Term Care: Estimated # of Days 31-90  Condition at Discharge: Stable  Level of care:skilled   Rehabilitation  "Potential: Good  Admission H&P remains valid and up-to-date: Yes  Recent Chemotherapy: N/A  Use Nursing Home Standing Orders: Yes            Mantoux instructions       Give two-step Mantoux (PPD) Per Facility Policy Yes                  Additional Services     Occupational Therapy Adult Consult       Evaluate and treat as clinically indicated.    Reason:  stroke            Physical Therapy Adult Consult       Evaluate and treat as clinically indicated.    Reason:  stroke            Speech Language Path Adult Consult       Evaluate and treat as clinically indicated.    Reason:  stroke                  Pending Results     Date and Time Order Name Status Description    2/13/2018 2011 EKG 12-lead, tracing only In process     2/13/2018 1702 EKG 12-lead, tracing only In process             Statement of Approval     Ordered          02/16/18 0932  I have reviewed and agree with all the recommendations and orders detailed in this document.  EFFECTIVE NOW     Approved and electronically signed by:  Tulio Ibrahim MD             Admission Information     Date & Time Provider Department Dept. Phone    2/13/2018 Tulio Ibrahim MD [facility] [phone]      Your Vitals Were     Blood Pressure Pulse Temperature Respirations Height Weight    165/64 66 97.2  F (36.2  C) (Tympanic) 18 1.778 m (5' 10\") 92.5 kg (203 lb 14.8 oz)    Pulse Oximetry BMI (Body Mass Index)                95% 29.26 kg/m2          Optimal [employer] Information     [employer] lets you send messages to your doctor, view your test results, renew your prescriptions, schedule appointments and more. To sign up, go to [email or web address] . Click on \"Log in\" on the left side of the screen, which will take you to the Welcome page. Then click on \"Sign up Now\" on the right side of the page.     You will be asked to enter the access code listed below, as well as some personal information. Please follow the directions to create your username and " password.     Your access code is: [email or web address]  Expires: 2018  6:51 PM     Your access code will  in 90 days. If you need help or a new code, please call your [facility] clinic or [phone].        Care EveryWhere ID     This is your Care EveryWhere ID. This could be used by other organizations to access your [facility] medical records  [id number]        Equal Access to Services     APOORVA THAKKAR : Hadii chandler ku hadasho Soomaali, waaxda luqadaha, qaybta kaalmada adeegyada, waxhemanth mylain hayangusn lennieareli godfrey claudia . So [facility] [phone].    ATENCIÓN: Si habla español, tiene a ramesh disposición servicios gratuitos de asistencia lingüística. Aubreyame al [phone].    We comply with applicable federal civil rights laws and [place] laws. We do not discriminate on the basis of race, color, national origin, age, disability, sex, sexual orientation, or gender identity.               Review of your medicines      START taking        Dose / Directions    atorvastatin 20 MG tablet   Commonly known as:  LIPITOR        Dose:  20 mg   Take 1 tablet (20 mg) by mouth daily   Quantity:  30 tablet   Refills:  1       clopidogrel 75 MG tablet   Commonly known as:  PLAVIX        Dose:  75 mg   Start taking on:  2018   1 tablet (75 mg) by Oral or NG Tube route daily   Quantity:  30 tablet   Refills:  0         CONTINUE these medicines which may have CHANGED, or have new prescriptions. If we are uncertain of the size of tablets/capsules you have at home, strength may be listed as something that might have changed.        Dose / Directions    * acetaminophen 500 MG tablet   Commonly known as:  TYLENOL   This may have changed:  Another medication with the same name was added. Make sure you understand how and when to take each.        Dose:  500 mg   Take 500 mg by mouth daily as needed (leg pain)   Refills:  0       * acetaminophen 650 MG Suppository   Commonly known as:  TYLENOL   This may have changed:  You were already  taking a medication with the same name, and this prescription was added. Make sure you understand how and when to take each.        Dose:  650 mg   Place 1 suppository (650 mg) rectally every 4 hours as needed for mild pain   Quantity:  60 suppository   Refills:  0       * Notice:  This list has 2 medication(s) that are the same as other medications prescribed for you. Read the directions carefully, and ask your doctor or other care provider to review them with you.      CONTINUE these medicines which have NOT CHANGED        Dose / Directions    aspirin EC 81 MG EC tablet        Dose:  81 mg   Take 81 mg by mouth daily with food   Refills:  0       enalapril 20 MG tablet   Commonly known as:  VASOTEC        Dose:  20 mg   Take 20 mg by mouth daily   Refills:  0       MULTI-VITAMINS Tabs        Dose:  1 tablet   Take 1 tablet by mouth daily   Refills:  0       naproxen sodium 220 MG tablet   Commonly known as:  ANAPROX        Dose:  220 mg   Take 220 mg by mouth daily as needed (leg pain)   Refills:  0       tamsulosin 0.4 MG capsule   Commonly known as:  FLOMAX        Dose:  0.4 mg   Take 0.4 mg by mouth daily with food   Refills:  0            Where to get your medicines      Some of these will need a paper prescription and others can be bought over the counter. Ask your nurse if you have questions.     You don't need a prescription for these medications     acetaminophen 650 MG Suppository    atorvastatin 20 MG tablet    clopidogrel 75 MG tablet                Protect others around you: Learn how to safely use, store and throw away your medicines at www.disposemymeds.org.             Medication List: This is a list of all your medications and when to take them. Check marks below indicate your daily home schedule. Keep this list as a reference.      Medications           Morning Afternoon Evening Bedtime As Needed    * acetaminophen 500 MG tablet   Commonly known as:  TYLENOL   Take 500 mg by mouth daily as needed  (leg pain)                                * acetaminophen 650 MG Suppository   Commonly known as:  TYLENOL   Place 1 suppository (650 mg) rectally every 4 hours as needed for mild pain                                aspirin EC 81 MG EC tablet   Take 81 mg by mouth daily with food   Last time this was given:  81 mg on 2/16/2018  9:18 AM                                atorvastatin 20 MG tablet   Commonly known as:  LIPITOR   Take 1 tablet (20 mg) by mouth daily                                clopidogrel 75 MG tablet   Commonly known as:  PLAVIX   1 tablet (75 mg) by Oral or NG Tube route daily   Start taking on:  2/17/2018   Last time this was given:  75 mg on 2/16/2018  9:18 AM                                enalapril 20 MG tablet   Commonly known as:  VASOTEC   Take 20 mg by mouth daily                                MULTI-VITAMINS Tabs   Take 1 tablet by mouth daily                                naproxen sodium 220 MG tablet   Commonly known as:  ANAPROX   Take 220 mg by mouth daily as needed (leg pain)                                tamsulosin 0.4 MG capsule   Commonly known as:  FLOMAX   Take 0.4 mg by mouth daily with food   Last time this was given:  0.4 mg on 2/16/2018  9:18 AM                                * Notice:  This list has 2 medication(s) that are the same as other medications prescribed for you. Read the directions carefully, and ask your doctor or other care provider to review them with you.
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
## 2018-02-13 NOTE — ED NOTES
Pt reports having decreased movement in right hand, LKW 1330. Pt is confused hard time gather thoughts. Pt is alert and oriented X3, person, time, place. Pt is brought in by EMS IV inserted. 12 lead negative. . VS taken.    Zoey Monzon RN

## 2018-02-13 NOTE — ED NOTES
Pt taken directly to CT scan after vitals were taken and MD had talked with pt.    Zoey Monzon RN

## 2018-02-14 ENCOUNTER — APPOINTMENT (OUTPATIENT)
Dept: PHYSICAL THERAPY | Facility: OTHER | Age: 83
DRG: 065 | End: 2018-02-14
Attending: INTERNAL MEDICINE
Payer: MEDICARE

## 2018-02-14 ENCOUNTER — APPOINTMENT (OUTPATIENT)
Dept: MRI IMAGING | Facility: OTHER | Age: 83
DRG: 065 | End: 2018-02-14
Attending: INTERNAL MEDICINE
Payer: MEDICARE

## 2018-02-14 ENCOUNTER — APPOINTMENT (OUTPATIENT)
Dept: SPEECH THERAPY | Facility: OTHER | Age: 83
DRG: 065 | End: 2018-02-14
Attending: INTERNAL MEDICINE
Payer: MEDICARE

## 2018-02-14 ENCOUNTER — APPOINTMENT (OUTPATIENT)
Dept: CARDIOLOGY | Facility: OTHER | Age: 83
DRG: 065 | End: 2018-02-14
Attending: INTERNAL MEDICINE
Payer: MEDICARE

## 2018-02-14 ENCOUNTER — APPOINTMENT (OUTPATIENT)
Dept: OCCUPATIONAL THERAPY | Facility: OTHER | Age: 83
DRG: 065 | End: 2018-02-14
Attending: INTERNAL MEDICINE
Payer: MEDICARE

## 2018-02-14 LAB
ALBUMIN SERPL-MCNC: 3.1 G/DL (ref 3.5–5.7)
ALP SERPL-CCNC: 60 U/L (ref 34–104)
ALT SERPL W P-5'-P-CCNC: 9 U/L (ref 7–52)
ANION GAP SERPL CALCULATED.3IONS-SCNC: 6 MMOL/L (ref 3–14)
AST SERPL W P-5'-P-CCNC: 14 U/L (ref 13–39)
BILIRUB DIRECT SERPL-MCNC: 0.1 MG/DL (ref 0–0.2)
BILIRUB SERPL-MCNC: 0.6 MG/DL (ref 0.3–1)
BUN SERPL-MCNC: 11 MG/DL (ref 7–25)
CALCIUM SERPL-MCNC: 8.2 MG/DL (ref 8.6–10.3)
CHLORIDE SERPL-SCNC: 109 MMOL/L (ref 98–107)
CHOLEST SERPL-MCNC: 152 MG/DL
CO2 SERPL-SCNC: 28 MMOL/L (ref 21–31)
CREAT SERPL-MCNC: 0.92 MG/DL (ref 0.7–1.3)
ERYTHROCYTE [DISTWIDTH] IN BLOOD BY AUTOMATED COUNT: 13.3 % (ref 10–15)
GFR SERPL CREATININE-BSD FRML MDRD: 78 ML/MIN/1.7M2
GLUCOSE SERPL-MCNC: 126 MG/DL (ref 70–105)
HBA1C MFR BLD: 6.9 % (ref 4–6)
HCT VFR BLD AUTO: 35 % (ref 40–53)
HDLC SERPL-MCNC: 41 MG/DL (ref 23–92)
HGB BLD-MCNC: 12.1 G/DL (ref 13.3–17.7)
LDLC SERPL CALC-MCNC: 93 MG/DL
MCH RBC QN AUTO: 31.3 PG (ref 26.5–33)
MCHC RBC AUTO-ENTMCNC: 34.6 G/DL (ref 31.5–36.5)
MCV RBC AUTO: 90 FL (ref 78–100)
NONHDLC SERPL-MCNC: 111 MG/DL
PLATELET # BLD AUTO: 165 10E9/L (ref 150–450)
POTASSIUM SERPL-SCNC: 4 MMOL/L (ref 3.5–5.1)
PROT SERPL-MCNC: 4.9 G/DL (ref 6.4–8.9)
RBC # BLD AUTO: 3.87 10E12/L (ref 4.4–5.9)
SODIUM SERPL-SCNC: 143 MMOL/L (ref 134–144)
TRIGL SERPL-MCNC: 89 MG/DL
TROPONIN I SERPL-MCNC: <0.03 UG/L (ref 0–0.03)
WBC # BLD AUTO: 5.5 10E9/L (ref 4–11)

## 2018-02-14 PROCEDURE — 25000132 ZZH RX MED GY IP 250 OP 250 PS 637: Mod: GY | Performed by: INTERNAL MEDICINE

## 2018-02-14 PROCEDURE — 80076 HEPATIC FUNCTION PANEL: CPT | Performed by: INTERNAL MEDICINE

## 2018-02-14 PROCEDURE — 36415 COLL VENOUS BLD VENIPUNCTURE: CPT | Performed by: INTERNAL MEDICINE

## 2018-02-14 PROCEDURE — 70553 MRI BRAIN STEM W/O & W/DYE: CPT

## 2018-02-14 PROCEDURE — 40000133 ZZH STATISTIC OT WARD VISIT: Performed by: OCCUPATIONAL THERAPIST

## 2018-02-14 PROCEDURE — 93306 TTE W/DOPPLER COMPLETE: CPT | Mod: 26 | Performed by: INTERNAL MEDICINE

## 2018-02-14 PROCEDURE — A9270 NON-COVERED ITEM OR SERVICE: HCPCS | Mod: GY | Performed by: INTERNAL MEDICINE

## 2018-02-14 PROCEDURE — A9575 INJ GADOTERATE MEGLUMI 0.1ML: HCPCS | Performed by: INTERNAL MEDICINE

## 2018-02-14 PROCEDURE — 97162 PT EVAL MOD COMPLEX 30 MIN: CPT | Mod: GP

## 2018-02-14 PROCEDURE — 25000128 H RX IP 250 OP 636: Performed by: INTERNAL MEDICINE

## 2018-02-14 PROCEDURE — 99232 SBSQ HOSP IP/OBS MODERATE 35: CPT | Performed by: INTERNAL MEDICINE

## 2018-02-14 PROCEDURE — 97530 THERAPEUTIC ACTIVITIES: CPT | Mod: GP

## 2018-02-14 PROCEDURE — 92522 EVALUATE SPEECH PRODUCTION: CPT | Mod: GN

## 2018-02-14 PROCEDURE — 97535 SELF CARE MNGMENT TRAINING: CPT | Mod: GO | Performed by: OCCUPATIONAL THERAPIST

## 2018-02-14 PROCEDURE — 97116 GAIT TRAINING THERAPY: CPT | Mod: GP

## 2018-02-14 PROCEDURE — 80061 LIPID PANEL: CPT | Performed by: INTERNAL MEDICINE

## 2018-02-14 PROCEDURE — 25500064 ZZH RX 255 OP 636: Performed by: INTERNAL MEDICINE

## 2018-02-14 PROCEDURE — 80048 BASIC METABOLIC PNL TOTAL CA: CPT | Performed by: INTERNAL MEDICINE

## 2018-02-14 PROCEDURE — 12000000 ZZH R&B MED SURG/OB

## 2018-02-14 PROCEDURE — 85027 COMPLETE CBC AUTOMATED: CPT | Performed by: INTERNAL MEDICINE

## 2018-02-14 PROCEDURE — 40000225 ZZH STATISTIC SLP WARD VISIT

## 2018-02-14 PROCEDURE — 97530 THERAPEUTIC ACTIVITIES: CPT | Mod: GO | Performed by: OCCUPATIONAL THERAPIST

## 2018-02-14 PROCEDURE — 40000193 ZZH STATISTIC PT WARD VISIT

## 2018-02-14 PROCEDURE — 92610 EVALUATE SWALLOWING FUNCTION: CPT | Mod: GN

## 2018-02-14 PROCEDURE — 97166 OT EVAL MOD COMPLEX 45 MIN: CPT | Mod: GO | Performed by: OCCUPATIONAL THERAPIST

## 2018-02-14 PROCEDURE — 93306 TTE W/DOPPLER COMPLETE: CPT

## 2018-02-14 PROCEDURE — 83036 HEMOGLOBIN GLYCOSYLATED A1C: CPT | Performed by: INTERNAL MEDICINE

## 2018-02-14 PROCEDURE — 93010 ELECTROCARDIOGRAM REPORT: CPT | Performed by: INTERNAL MEDICINE

## 2018-02-14 PROCEDURE — 84484 ASSAY OF TROPONIN QUANT: CPT | Performed by: INTERNAL MEDICINE

## 2018-02-14 PROCEDURE — 93005 ELECTROCARDIOGRAM TRACING: CPT

## 2018-02-14 RX ORDER — LISINOPRIL 20 MG/1
20 TABLET ORAL DAILY
Status: DISCONTINUED | OUTPATIENT
Start: 2018-02-14 | End: 2018-02-15

## 2018-02-14 RX ORDER — NAPROXEN SODIUM 220 MG
220 TABLET ORAL DAILY PRN
COMMUNITY
End: 2018-04-26

## 2018-02-14 RX ORDER — ACETAMINOPHEN 500 MG
500 TABLET ORAL DAILY PRN
COMMUNITY
End: 2018-07-30

## 2018-02-14 RX ORDER — ASPIRIN 81 MG/1
81 TABLET ORAL
Status: DISCONTINUED | OUTPATIENT
Start: 2018-02-15 | End: 2018-02-16 | Stop reason: HOSPADM

## 2018-02-14 RX ORDER — GADOTERATE MEGLUMINE 376.9 MG/ML
20 INJECTION INTRAVENOUS ONCE
Status: COMPLETED | OUTPATIENT
Start: 2018-02-14 | End: 2018-02-14

## 2018-02-14 RX ORDER — LORAZEPAM 0.5 MG/1
0.5 TABLET ORAL EVERY 6 HOURS PRN
Status: DISCONTINUED | OUTPATIENT
Start: 2018-02-14 | End: 2018-02-16 | Stop reason: HOSPADM

## 2018-02-14 RX ADMIN — ASPIRIN 325 MG: 325 TABLET, COATED ORAL at 10:42

## 2018-02-14 RX ADMIN — LISINOPRIL 20 MG: 20 TABLET ORAL at 10:42

## 2018-02-14 RX ADMIN — GADOTERATE MEGLUMINE 20 ML: 376.9 INJECTION INTRAVENOUS at 09:36

## 2018-02-14 RX ADMIN — SODIUM CHLORIDE: 900 INJECTION, SOLUTION INTRAVENOUS at 20:18

## 2018-02-14 RX ADMIN — SODIUM CHLORIDE: 900 INJECTION, SOLUTION INTRAVENOUS at 07:54

## 2018-02-14 RX ADMIN — TAMSULOSIN HYDROCHLORIDE 0.4 MG: 0.4 CAPSULE ORAL at 10:42

## 2018-02-14 RX ADMIN — CLOPIDOGREL 75 MG: 75 TABLET, FILM COATED ORAL at 10:42

## 2018-02-14 RX ADMIN — LORAZEPAM 0.5 MG: 0.5 TABLET ORAL at 21:54

## 2018-02-14 ASSESSMENT — VISUAL ACUITY
OU: NORMAL ACUITY

## 2018-02-14 NOTE — PLAN OF CARE
Problem: Patient Care Overview  Goal: Plan of Care/Patient Progress Review  Discharge Planner PT   Patient plan for discharge: return home when able  Current status: decreased functional mobility  Barriers to return to prior living situation: weakness, instability of gait  Recommendations for discharge: ARU  Rationale for recommendations: to promote strength, stability and safe mobility       Entered by: Arslan Jose 02/14/2018 4:47 PM

## 2018-02-14 NOTE — PROGRESS NOTES
" 02/14/18 1500   General Information   Onset Date 02/13/18   Start of Care Date 02/14/18   Referring Physician Tulio Ibrahim MD   Patient/Family Goals Statement Family would like \"for him to get better. Not cough so much.\"    Swallowing Evaluation Bedside swallow evaluation   Behaviorial Observations WFL (within functional limits)   Mode of current nutrition Oral diet   Type of oral diet Regular   Respiratory Status Room air   Clinical Swallow Evaluation   Oral Musculature anomalies present   Structural Abnormalities present   Dentition present and adequate   Secretion Management problems swallowing secretions   Mucosal Quality adequate   Mandibular Strength and Mobility impaired   Oral Labial Strength and Mobility impaired retraction;impaired pursing;impaired seal;impaired coordination   Lingual Strength and Mobility impaired coordination   Velar Elevation intact   Buccal Strength and Mobility intact   Laryngeal Function Cough;Throat clear   Oral Musculature Comments Mild right sided weakness    Additional Documentation Yes   Clinical Swallow Eval: Thin Liquid Texture Trial   Mode of Presentation, Thin Liquids cup;straw   Volume of Liquid or Food Presented 1 teaspoon +   Oral Phase of Swallow Premature pharyngeal entry   Pharyngeal Phase of Swallow coughing/choking   Successful Strategies Trialed During Procedure chin mereck   Diagnostic Statement DIfficulties with thin liquids on numerous attempts    Clinical Swallow Eval: Nectar Thick Liquid Texture Trial   Mode of Presentation, Nectar cup   Oral Phase, [place] WFL   Pharyngeal Phase, Nectar intact   Successful Strategies Trialed During Procedure, Nectar chin tuck   Diagnostic Statement No difficulties with NTL   Clinical Swallow Eval: Semisolid Texture Trial   Mode of Presentation, Semisolid self-fed;spoon   Oral Phase, Semisolid WFL   Pharyngeal Phase, Semisolid intact   Diagnostic Statement No overt signs or symptoms of aspiration   Clinical Swallow Eval: " Solid Food Texture Trial   Mode of Presentation, Solid self-fed   Oral Phase, Solid WFL   Pharyngeal Phase, Solid intact   Successful Strategies Trialed During Procedure, Solid chin tuck   Diagnostic Statement No overt signs or symptoms of aspiration   Swallow Compensations   Swallow Compensations Chin tuck   Results Suspect aspiration   Esophageal Phase of Swallow   Patient reports or presents with symptoms of esophageal dysphagia No   Esophageal sweep performed during today s vidofluoroscopic exam  No   General Therapy Interventions   Planned Therapy Interventions Dysphagia Treatment   Dysphagia treatment Oropharyngeal exercise training;Instruction of safe swallow strategies;Modified diet education;Compensatory strategies for swallowing   Intervention Comments No difficulties, family in agreement   Swallow Eval: Clinical Impressions   Skilled Criteria for Therapy Intervention Skilled criteria met.  Treatment indicated.   Functional Assessment Scale (FAS) 5   Dysphagia Outcome Severity Scale (LINDSAY) Level 5 - LINDSAY   Treatment Diagnosis Dysphagia    Diet texture recommendations Regular diet;Nectar thick liquids   Recommended Feeding/Eating Techniques tuck chin during every swallow   Therapy Frequency 5 times/wk   Predicted Duration of Therapy Intervention (days/wks) 1 week   Anticipated Discharge Disposition inpatient rehabilitation facility   Risks and Benefits of Treatment have been explained. Yes   Patient, family and/or staff in agreement with Plan of Care Yes   Therapy Certification   Certification date from 02/14/18   Certification date to 02/21/18   Medical Diagnosis Dysphagia    Total Evaluation Time   Total Evaluation Time (Minutes) 15

## 2018-02-14 NOTE — PROGRESS NOTES
Patient admitted to room [facility] at 1945 from Emergency Department. Patient's wife Radha and friend present at time of admission. Orientated to room and call light within reach. MD Dr. Ibrahim aware.

## 2018-02-14 NOTE — PROGRESS NOTES
Pharmacy -- Admission Medication Reconciliation    Prior to admission (PTA) medications were reviewed and the patient's PTA medication list was updated.    Sources Consulted: Patient interview, [facility]    The reliability of this Medication Reconciliation is: Reliability: Reliable  ** Completed without pharmacy records verifying Flomax.     The following significant changes were made:  1. Added acetaminophen PRN  2. Added naproxen PRN    In addition, the patient's allergies were reviewed with the patient and updated as follows:   Allergies: Sulfa drugs    The pharmacist has reviewed with the patient that all personal medications should be removed from the building or locked in the belongings safe.  Patient shall only take medications ordered by the physician and administered by the nursing staff.     Medication barriers identified: None noted.    Medication adherence concerns: None noted.    Understanding of emergency medications: LILIAN Ramos RP, 2/14/2018,  12:34 PM

## 2018-02-14 NOTE — PLAN OF CARE
Problem: Patient Care Overview  Goal: Plan of Care/Patient Progress Review  Discharge Planner OT   Patient plan for discharge: Pt did not fully state, is open to on-going rehab   Current status: pt needs max assist of 2 for transfers, unable to use right hand/arm functionally  Barriers to return to prior living situation: weakness, impaired balance, needs assist with all self cares   Recommendations for discharge: pt would benefit from on-going Speech, PT and OT at an ARU, as he was very independent prior and very motivated.   Rationale for recommendations: functional performance.        Entered by: Sulema Hairston 02/14/2018 4:04 PM

## 2018-02-14 NOTE — PROGRESS NOTES
[facility]    Hospitalist Progress Note      Assessment & Plan   Ilia Ferguson is a 85 year old male who was admitted on 2/13/2018.       Stroke (H)    Assessment: Acute, present on admission.  Patient was evaluated by telemedicine stroke team from [place].  MRI confirms stroke in jerel.The patient has been on an aspirin, and was started on Plavix. No evidence for cardiac source of stroke on echo. Would benefit from ARU.     Plan: physical and occupational therapy, speech/swallow therapy        Hypertension    Assessment: chronic, will allow for permissive     Plan: monitor, continue enalapril       Pulmonary nodules/lesions, multiple    Assessment: chronic      # Pain Assessment:   Current Pain Score 2/14/2018 2/13/2018   Patient currently in pain? no no   Pain score (0-10) 0 0   Ilia graham pain level was assessed and he currently denies pain.      DVT Prophylaxis: Pneumatic Compression Devices  Code Status: Full Code    Tulio Ibrahim    Interval History   Patient denies any headache.  Continues to have right-sided weakness.  Still having some troubles with swallowing and handling secretions. No nausea, vomiting. No fc.     -Data reviewed today: I reviewed all new labs and imaging results over the last 24 hours. I personally reviewed the brain MRI image(s) showing left pos stroke on diffusion imaging and the echo image(s) showing no cardiac source of stroke.    Physical Exam   Temp: 98.4  F (36.9  C) Temp src: Tympanic BP: 163/73 Pulse: 65 Heart Rate: 59 Resp: 18 SpO2: 98 % O2 Device: None (Room air)    Vitals:    02/13/18 1658 02/13/18 1956 02/14/18 0607   Weight: 90.7 kg (200 lb) 91.9 kg (202 lb 9.6 oz) 93.5 kg (206 lb 2.1 oz)     Vital Signs with Ranges  Temp:  [96.1  F (35.6  C)-98.4  F (36.9  C)] 98.4  F (36.9  C)  Pulse:  [49-78] 65  Heart Rate:  [56-86] 59  Resp:  [8-23] 18  BP: (126-163)/() 163/73  SpO2:  [93 %-98 %] 98 %  I/O last 3 completed shifts:  In: 854 [I.V.:854]  Out: 175  [Urine:175]    GENERAL: Comfortable, talkative, in no apparent distress.  HEENT: Anicteric, non-injected sclera, mouth moist.   NECK: No JVD.  CARDIOVASCULAR: regular rate and rhythm, no murmur. No lower extremity edema   RESPIRATORY: Clear to auscultation bilaterally, no wheezes, no crackles.  GI: Non-distended, normal bowel sounds, soft, non-tender.  SKIN: No rashes, sores.   NEUROLOGY: Alert and oriented x3, right facial droop. Right upper extremity, right lower extremity weakness.    Medications     NaCl 100 mL/hr at 18 0754       lisinopril  20 mg Oral Daily     [START ON 2/15/2018] aspirin EC  81 mg Oral Daily with breakfast     tamsulosin  0.4 mg Oral Daily     sodium chloride (PF)  3 mL Intracatheter Q8H     clopidogrel  75 mg Oral or NG Tube Daily     influenza Vac Split High-Dose  0.5 mL Intramuscular Prior to discharge       Data     Recent Labs  Lab 18  0412 18  2040 18  1710   WBC 5.5  --  5.8   HGB 12.1*  --  14.3   MCV 90  --  90     --  176   INR  --   --  0.98     --  143   POTASSIUM 4.0  --  4.1   CHLORIDE 109*  --  106   CO2 28  --  26   BUN 11  --  14   CR 0.92  --  0.94   ANIONGAP 6  --  11   VON 8.2*  --  9.5   *  --  127*   ALBUMIN 3.1*  --   --    PROTTOTAL 4.9*  --   --    BILITOTAL 0.6  --   --    ALKPHOS 60  --   --    ALT 9  --   --    AST 14  --   --    TROPI <0.030 <0.030 <0.030     Recent Results (from the past 4320 hour(s))   Echocardiogram - bubble study    Narrative    [id number]  [id number]  [id number]  [id number]^FRANCISCO^KATELYN^RENNY        [facility]  [address] [place] [address]     Name: AMINA STEELE  MRN: [id number]  : 1932  Study Date: 2018 08:19 AM  Age: 85 yrs  Gender: Male  Patient Location: [facility]  Reason For Study: Cerebrovascular Incident  Ordering Physician: KATELYN SINGH  Performed By: [place]     BSA: 2.1 m2  Height: 70 in  Weight: 206 lb  HR: 64  BP: 144/72  mmHg  _____________________________________________________________________________  __        Procedure  Bubble Echocardiogram with two-dimensional, color and spectral Doppler  performed.  _____________________________________________________________________________  __        Interpretation Summary  No cardiac source for embolus identified. The atrial septum is intact as  assessed by color Doppler and agitated dextrose bubble study .  _____________________________________________________________________________  __        Left Ventricle  Global and regional left ventricular function is normal with an EF of 60-65%.  Left ventricular size is normal. Borderline concentric wall thickening  consistent with left ventricular hypertrophy is present. Left ventricular  diastolic function is indeterminate. No regional wall motion abnormalities are  seen.     Right Ventricle  Right ventricular function, chamber size, wall motion, and thickness are  normal.     Atria  The right atria appears normal. Severe left atrial enlargement is present. The  atrial septum is intact as assessed by color Doppler and agitated dextrose  bubble study .     Mitral Valve  The mitral valve is normal. Mild mitral insufficiency is present.     Aortic Valve  Mild aortic valve sclerosis is present. Mild aortic insufficiency is present.        Tricuspid Valve  The tricuspid valve is normal. Trace to mild tricuspid insufficiency is  present.     Pulmonic Valve  The pulmonic valve is normal. Trace pulmonic insufficiency is present.     Vessels  The pulmonary artery is normal. The inferior vena cava is normal. Ascending  aorta 4.2 cm.     Pericardium  No pericardial effusion is present.     _____________________________________________________________________________  __  MMode/2D Measurements & Calculations  IVSd: 1.1 cm  LVIDd: 4.4 cm  LVIDs: 2.9 cm  LVPWd: 1.1 cm  FS: 35.0 %  EDV(Teich): 89.1 ml  ESV(Teich): 31.7 ml  LV mass(C)d: 172.9 grams  LV  mass(C)dI: 81.8 grams/m2  Ao root diam: 3.7 cm  LA dimension: 5.6 cm  asc Aorta Diam: 4.2 cm     LA/Ao: 1.5  LVOT diam: 2.3 cm  LVOT area: 4.2 cm2  LA Volume (BP): 106.0 ml  LA Volume Index (BP): 50.2 ml/m2  RWT: 0.52        Doppler Measurements & Calculations  MV E max stephanie: 90.7 cm/sec  MV A max stephanie: 85.4 cm/sec  MV E/A: 1.1  MV dec time: 0.16 sec  Ao V2 max: 157.0 cm/sec  Ao max PG: 10.0 mmHg  Ao V2 mean: 109.0 cm/sec  Ao mean P.0 mmHg  Ao V2 VTI: 38.4 cm  STEVEN(I,D): 2.7 cm2  STEVEN(V,D): 2.3 cm2  AI P1/2t: 789.6 msec     LV V1 max PG: 3.1 mmHg  LV V1 max: 87.5 cm/sec  LV V1 VTI: 24.9 cm  SV(LVOT): 103.5 ml  SI(LVOT): 48.9 ml/m2  STEVEN Index (cm2/m2): 1.3  E/E' av.1  Lateral E/e': 11.3  Medial E/e': 14.9           _____________________________________________________________________________  __           Report approved by: Richmond Gregg 2018 11:10 AM        MRI Brain w & w/o contrast    Narrative    EXAM:  MR BRAIN W/O & W CONTRAST    HISTORY:  Suspected stroke, right-sided weakness. .    TECHNIQUE:  Sagittal T1, axial T1, T2, FLAIR, diffusion, echo planar  as well as axial and 3D postcontrast imaging of the whole brain was  performed. SWI sequences were also obtained.    COMPARISON:  CT brain 2018     FINDINGS:    Restricted diffusion is present within the left medial central aspect  of the jerel. No other restricted diffusion is present. No abnormal  enhancement is seen. A few scattered foci of gradient susceptibility  are nonspecific, likely chronic microhemorrhages. There are moderate  background microvascular changes.    No abnormal extra-axial collection, hydrocephalus, mass effect or  midline shift is seen. The basal cisterns are preserved. The major  intracranial vascular flow voids are preserved.     The T1 marrow signal is unremarkable.      Impression    IMPRESSION: Subtle focal restricted diffusion within the left central  aspect of the jerel, most compatible with acute ischemia and  likely  accounting for the described symptoms.    CARLY RICHARDSON MD

## 2018-02-14 NOTE — PROGRESS NOTES
"   02/14/18 1700   General Information, SLP   Type of Evaluation Dysarthria   Type of Visit Initial   Start of Care Date 02/14/18   Onset of Illness/Injury or Date of Surgery - Date 02/13/18   Referring Physician Tulio Ibrahim MD   Patient/Family Goals Statement \"I want to talk again.\"    Pertinent History of Current Problem CVA with right sided weakness   Precautions/Limitations swallowing precautions   General Observations Alert but fatigued   Oral Motor Sensory Function   Completed on Swallow Evaluation Completed Clinical Bedside Swallow Evaluation   Deficits noted in Labial Function (m-VII, S-V) Coordination;Protrusion   Deficits noted in Lingual Function (m-XII, s-V, VII, IX, XII) Protrusion;Retraction   Deficits noted in Mandibular Function (m-V) None   Deficits noted in Velar Function (m-V, X, XI, s-IX) None   Deficits noted in Laryngeal Function (m-X, s-X) Elevation   Speech   Deficits in Speech Respiration None   Deficits in Phonation Loudness (soft)   Sustained vowel production 5   Deficits in Articulation Flaccid dysarthria   Deficits in [place] Disordered stress patterns   AIDS-words, % intelligible 90   AIDS-sentences, % intelligible 80   Speech Comments Mild dysarthria    General Therapy Interventions   Planned Therapy Interventions Communication   Communication Improve speech intelligibility   Intervention Comments Over-articulation and education   Clinical Impression, SLP Eval   Criteria for Skilled Therapeutic Interventions Met Yes   SLP Diagnosis Dysarthria   Influenced by the following factors/impairments CVA, Right sided weakness   Functional limitations due to impairments impaired intelligibility   Rehab Potential Good, to achieve stated therapy goals   Rehab potential affected by Motivation to return to prior function   Therapy Frequency 5 times/wk   Predicted Duration of Therapy Intervention (days/wks) 1 week   Anticipated Equipment Needs at Discharge other (see comments)  (NTL) "   Anticipated Discharge Disposition Acute Rehabilitation Facility   Risks and Benefits of Treatment have been explained. Yes   Patient, Family & other staff in agreement with plan of care Yes   Clinical Impression Comments PT with mild dysarthria and would benefit from ongoing ST to better communicate with spouse    Therapy Certification   Certification date from 02/14/18   Certification date to 02/21/18   Medical Diagnosis CVA; Dysarthria; Dysphagia    Total Evaluation Time      Total Evaluation Time (Minutes) 15

## 2018-02-14 NOTE — PROGRESS NOTES
:    Met with patient in his room to discuss discharge planning services and options.  Also present was patient's wife Radha.  We discussed ARU and SNF options.  Patient is unsure if he wants to be out of town.  They would like some time to think about this.  Provided them with several brochures on local facilities and the ARU in [place].   to follow up tomorrow.    SANDIE Staples on 2/14/2018 at 3:49 PM

## 2018-02-14 NOTE — CONSULTS
Stroke Consult Note - [facility]    Patient Name: Ilia Ferguson  : 1932 MRN#: [id number]    Contact received from [facility].     STROKE DATA    Stroke Code:  Time called:  2018 1739  Time patient seen:  2018 1750  Onset of symptoms:  2018 1230  Last known normal (pt's baseline):  2018 1100  Head CT read by me at:  2018 1750  Stroke Code de-escalated at 2018 1835 after discussion with Dr. Margot Haines due to patient is outside emergent treatment time parameters.     TPA treatment:  Not given due to outside the time window.    National Institutes of Health Stroke Scale    Time NIHSS Done: 18 1812    Score   Level of consciousness: (0)   Alert, keenly responsive   LOC questions: (0)   Answers both questions correctly   LOC commands: (0)   Performs both tasks correctly   Best gaze: (0)   Normal   Visual: (0)   No visual loss   Facial palsy: (1)  Minor paralysis (flat nasolabial fold, smile asymmetry)   Motor arm (left): (0)   No drift   Motor arm (right): (1)   Drift   Motor leg (left): (0)   No drift   Motor leg (right): (1)   Drift   Limb ataxia: (0)   Absent   Sensory: (0)   Normal- no sensory loss   Best language: (0)   Normal- no aphasia   Dysarthria: (0)   Normal   Extinction and inattention: (0)   No abnormality       NIHSS Total Score: 3          ASSESSMENT     85 year old, male who presented with rt arm face leg weakness. Concerning for acute stroke. CTH with possible left thalamic stroke. CTA with no major vessels occlusion.      RECOMMENDATION       #Work-up for Ischemic Stroke (no TPA)     The patient will remain at local hospital.    -Dysphagia swallow screen recommended before any po intake.  - TTE, lipid panel, A1c  -cont aspirin 81 mg and add plavix 75 mg  -start atorvastatin 40 mg daily  -MRI brain stroke  -PT/OT/SLP    Please call hospital Physician referral number at [phone] with questions or change in patient state.    Froylan  SANGEETA Martinez MD       Addendum:      Telestroke Service Details:    Type of service telemedicine diagnostic assessment of acute neurological changes   Time/date service began 2/13/18 1751   Time/date service ended 2/13/18 1830   Reason telemedicine is appropriate patient required immediate assistance from specialist   Mode of transmission secure interactive audio and video communication per Paolo   Originating site (patient location) [facility]    Distant site (provider location) [facility]

## 2018-02-14 NOTE — PROGRESS NOTES
02/14/18 1600   Functional Level Prior   Ambulation 0-->independent   Transferring 0-->independent   Toileting 0-->independent   Communication 0-->understands/communicates without difficulty   Cognition 0 - no cognition issues reported   Fall history within last six months yes   Number of times patient has fallen within last six months 1   Which of the above functional risks had a recent onset or change? ambulation;transferring;toileting;cognition   General Information   Referring Physician Patrice   Patient/Family Goals Statement return home eventually   Pertinent History of Current Problem (include personal factors and/or comorbidities that impact the POC) moderate   Precautions/Limitations fall precautions;swallowing precautions   Weight-Bearing Status - LLE full weight-bearing   Weight-Bearing Status - RLE full weight-bearing   Cognitive Status Examination   Orientation orientation to person, place and time   Level of Consciousness alert   Follows Commands and Answers Questions 100% of the time   Personal Safety and Judgment intact   Memory intact   Pain Assessment   Patient Currently in Pain No   Range of Motion (ROM)   ROM Comment decreased right LE AROM secondary to weakness   Strength   Strength Comments right LE grossly 2+/5 proximally and 1/5 distally   Bed Mobility   Bed Mobility Bed mobility skill: Sit to supine;Bed mobility skill: Supine to sit   Bed Mobility Skill: Sit to Supine   Level of [place]: Sit/Supine minimum assist (75% patients effort)   Physical Assist/Nonphysical Assist: Sit/Supine 2 persons   Bed Mobility Skill: Supine to Sit   Level of [place]: Supine/Sit moderate assist (50% patients effort)   Physical Assist/Nonphysical Assist: Supine/Sit 2 persons   Transfer Skills   Transfer Transfer Skill: Bed to Chair/Chair to Bed   Transfer Skill: Bed/Chair   Level of [place]: Bed/Chair minimum assist (75% patients effort)   Physical/Nonphysical Assist: Bed/Chair 1 person assist    Weightbearing Restrictions: Bed/Chair full weight-bearing   Assistive Device: Bed/Chair rolling walker   Gait   Gait Gait Skill   Gait Skills   Level of [place]: Gait minimum assist (75% patients effort)   Physical Assist/Nonphysical Assist: Gait 2 persons;verbal cues   Weight-Bearing Restrictions: Gait full weight-bearing   Assistive Device for Transfer: Gait rolling walker   Gait Distance 75 feet   Balance   Balance other (describe)  (notable decreased dynamic stability)   Balance Comments ( decreased dynamic stability)   Sensory Examination   Sensory Perception (propriception and light touch appear intact in right LE)   Coordination   Coordination other (see comments)   Coordination Comments decreased right LE coordination   General Therapy Interventions   Planned Therapy Interventions bed mobility training;gait training;neuromuscular re-education;strengthening;transfer training   Clinical Impression   Criteria for Skilled Therapeutic Intervention yes, treatment indicated   PT Diagnosis CVA   Functional limitations due to impairments safe mobility   Clinical Presentation Evolving/Changing   Clinical Decision Making (Complexity) Moderate complexity   Therapy Frequency` daily   Predicted Duration of Therapy Intervention (days/wks) 3-5 days   Anticipated Equipment Needs at Discharge front wheeled walker   Anticipated Discharge Disposition Acute Rehabilitation Facility   Risk & Benefits of therapy have been explained Yes   Patient, Family & other staff in agreement with plan of care Yes   Total Evaluation Time   Total Evaluation Time (Minutes) 15

## 2018-02-14 NOTE — PROGRESS NOTES
02/14/18 1400   Quick Adds   Type of Visit Initial Occupational Therapy Evaluation   Living Environment   Lives With spouse   Living Arrangements house   Home Accessibility bath not on first floor   Number of Stairs Within Home 13   Stair Railings at Home inside, present on left side   Transportation Available car   Self-Care   Dominant Hand right   Usual Activity Tolerance excellent   Current Activity Tolerance poor   Equipment Currently Used at Home none   Functional Level Prior   Ambulation 3-->assistive equipment and person   Transferring 3-->assistive equipment and person   General Information   Referring Physician Dr Ibrahim    Cognitive Status Examination   Orientation orientation to person, place and time   Level of Consciousness alert   Able to Follow Commands success, 2-step commands   Memory intact   Attention No deficits were identified   Visual Perception   Visual Perception No deficits were identified   Visual Acuity wears reading glassess normally    Visual Field intact   Sensation Light Touch, Rehab Eval   RUE within normal limits   Pain Assessment   Patient Currently in Pain No   Range of Motion (ROM)   ROM Comment Left U/E WNL, Impaired Right U/E    MMT: Shoulder   Left Flexion (5/5) normal, left   Right Flexion (2/5) poor, right   Hand Strength   Hand Strength Comments Left WNL, Right 2/5    Coordination   Upper Extremity Coordination Right UE impaired   Fine Motor Coordination impaired right    Mobility   Bed Mobility Bed mobility skill: Supine to sit   Bed Mobility Skill: Supine to Sit   Level of [place]: Supine/Sit maximum assist (25% patients effort)   Transfer Skills   Transfer Transfer Safety Analysis Bed/Chair   Transfer Skill: Bed to Chair/Chair to Bed   Level of [place]: Bed to Chair maximum assist (25% patients effort)   Physical Assist/Nonphysical Assist: Bed to Chair 2 persons   Assistive Device - Transfer Skill Bed to Chair Chair to Bed Rehab Eval rolling walker   Transfer  Skill: Sit to Stand   Level of [place]: Sit/Stand minimum assist (75% patients effort)   Bathing   Level of [place] minimum assist (75% patients effort)  (with upper body using left hand )   Activities of Daily Living Analysis   Impairments Contributing to Impaired Activities of Daily Living balance impaired;coordination impaired;strength decreased   General Therapy Interventions   Planned Therapy Interventions ADL retraining;balance training;strengthening   Clinical Impression   Criteria for Skilled Therapeutic Interventions Met yes, treatment indicated   OT Diagnosis CVA   Influenced by the following impairments weakness, balance,    Assessment of Occupational Performance 3-5 Performance Deficits   Identified Performance Deficits dressing, bathing, mobility, strenght coordination    Clinical Decision Making (Complexity) Moderate complexity   Therapy Frequency daily   Predicted Duration of Therapy Intervention (days/wks) 3-4 days    Anticipated Equipment Needs at Discharge shower chair   Anticipated Discharge Disposition Acute Rehabilitation Facility   Risks and Benefits of Treatment have been explained. Yes   Patient, Family & other staff in agreement with plan of care Yes   Total Evaluation Time   Total Evaluation Time (Minutes) 15

## 2018-02-14 NOTE — PLAN OF CARE
Problem: Stroke (Ischemic) (Adult)  Goal: Signs and Symptoms of Listed Potential Problems Will be Absent, Minimized or Managed (Stroke)  Signs and symptoms of listed potential problems will be absent, minimized or managed by discharge/transition of care (reference Stroke (Ischemic) (Adult) CPG).   Outcome: Declining  Patient has R sided weakness that has changed throughout the shift. He has been able to weakly squeeze my hand and lift the R arm but in the afternoon was unable to squeeze my hand or move his R hand/arm. Spoke to Dr. Neff and he stated that the weakness will not improve and that family would be notified by primary MD in the am. Dr. Neff did say that if patient has mental status changes then he would need another CT scan but the R sided weakness was expected.
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
## 2018-02-14 NOTE — PLAN OF CARE
Problem: Patient Care Overview  Goal: Plan of Care/Patient Progress Review  Discharge Planner SLP   Patient plan for discharge: ARU vs home  Current status: Regular with Nectar thickened, Mild dysarthria  Barriers to return to prior living situation: Dysphagia, Right sided weakness, Dysarthria   Recommendations for discharge: Continued rehab with ARU   Rationale for recommendations: Based on ability this date, pt would benefit from ongoing ST to address dysarthria and dysphagia        Entered by: Bhavesh Stanton 02/14/2018 5:32 PM

## 2018-02-14 NOTE — PLAN OF CARE
Problem: Patient Care Overview  Goal: Plan of Care/Patient Progress Review  Outcome: Improving  Patient's neurological checks remain the same and patient is able to speak sightly more clearly when asked questions.

## 2018-02-15 ENCOUNTER — APPOINTMENT (OUTPATIENT)
Dept: OCCUPATIONAL THERAPY | Facility: OTHER | Age: 83
DRG: 065 | End: 2018-02-15
Payer: MEDICARE

## 2018-02-15 ENCOUNTER — APPOINTMENT (OUTPATIENT)
Dept: PHYSICAL THERAPY | Facility: OTHER | Age: 83
DRG: 065 | End: 2018-02-15
Payer: MEDICARE

## 2018-02-15 ENCOUNTER — APPOINTMENT (OUTPATIENT)
Dept: SPEECH THERAPY | Facility: OTHER | Age: 83
DRG: 065 | End: 2018-02-15
Payer: MEDICARE

## 2018-02-15 PROCEDURE — A9270 NON-COVERED ITEM OR SERVICE: HCPCS | Mod: GY | Performed by: INTERNAL MEDICINE

## 2018-02-15 PROCEDURE — 40000193 ZZH STATISTIC PT WARD VISIT

## 2018-02-15 PROCEDURE — 25000132 ZZH RX MED GY IP 250 OP 250 PS 637: Mod: GY | Performed by: INTERNAL MEDICINE

## 2018-02-15 PROCEDURE — 99232 SBSQ HOSP IP/OBS MODERATE 35: CPT | Performed by: INTERNAL MEDICINE

## 2018-02-15 PROCEDURE — 40000133 ZZH STATISTIC OT WARD VISIT

## 2018-02-15 PROCEDURE — 92507 TX SP LANG VOICE COMM INDIV: CPT | Mod: GN

## 2018-02-15 PROCEDURE — 97116 GAIT TRAINING THERAPY: CPT | Mod: GP

## 2018-02-15 PROCEDURE — 90662 IIV NO PRSV INCREASED AG IM: CPT | Performed by: INTERNAL MEDICINE

## 2018-02-15 PROCEDURE — 40000225 ZZH STATISTIC SLP WARD VISIT

## 2018-02-15 PROCEDURE — 92526 ORAL FUNCTION THERAPY: CPT | Mod: GN

## 2018-02-15 PROCEDURE — 25000128 H RX IP 250 OP 636: Performed by: INTERNAL MEDICINE

## 2018-02-15 PROCEDURE — 97530 THERAPEUTIC ACTIVITIES: CPT | Mod: GO

## 2018-02-15 PROCEDURE — 12000000 ZZH R&B MED SURG/OB

## 2018-02-15 RX ORDER — CLOPIDOGREL BISULFATE 75 MG/1
225 TABLET ORAL ONCE
Status: COMPLETED | OUTPATIENT
Start: 2018-02-15 | End: 2018-02-15

## 2018-02-15 RX ADMIN — CLOPIDOGREL 225 MG: 75 TABLET, FILM COATED ORAL at 12:57

## 2018-02-15 RX ADMIN — ASPIRIN 81 MG: 81 TABLET ORAL at 07:54

## 2018-02-15 RX ADMIN — INFLUENZA A VIRUSA/MICHIGAN/45/2015 X-275 (H1N1) ANTIGEN (FORMALDEHYDE INACTIVATED), INFLUENZA A VIRUS A/HONG KONG/4801/2014 X-263B (H3N2) ANTIGEN (FORMALDEHYDE INACTIVATED), AND INFLUENZA B VIRUS B/BRISBANE/60/2008 ANTIGEN (FORMALDEHYDE INACTIVATED) 0.5 ML: 60; 60; 60 INJECTION, SUSPENSION INTRAMUSCULAR at 11:09

## 2018-02-15 RX ADMIN — TAMSULOSIN HYDROCHLORIDE 0.4 MG: 0.4 CAPSULE ORAL at 11:06

## 2018-02-15 RX ADMIN — SODIUM CHLORIDE: 900 INJECTION, SOLUTION INTRAVENOUS at 06:44

## 2018-02-15 RX ADMIN — CLOPIDOGREL 75 MG: 75 TABLET, FILM COATED ORAL at 11:06

## 2018-02-15 RX ADMIN — LORAZEPAM 0.5 MG: 0.5 TABLET ORAL at 22:31

## 2018-02-15 ASSESSMENT — VISUAL ACUITY
OU: NORMAL ACUITY

## 2018-02-15 NOTE — PROGRESS NOTES
"[facility]    Hospitalist Progress Note      Assessment & Plan   Ilia Ferguson is a 85 year old male who was admitted on 2/13/2018. Overnight, symptoms of right sided weakness have persisted. I spoke with Dr. Martinez from Neurology, who was the initial consultant in our ED with telemedicine. 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 He recommended a clopidogrel load of 300 mg today to help stabilize the plaque.    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     Plan: Discussed with neurology as above. Per recommendations of Dr. Martinez: Continue aspirin 81 mg daily, Plavix load 300 mg today and daily Plavix 75 mg. Speech/swallow therapy, physical and occupational therapy.  I talked with the patient and his wife and strongly recommended an acute rehab unit rather than skilled nursing facility.      Hypertension    Assessment: some normal/low bp's overnight, would like to avoid this and allow for permissive hypertension post stroke.    Plan: stop lisinopril      Pulmonary nodules/lesions, multiple    Assessment: chronic      Sinus bradycardia     Assessment: overnight, while sleeping. Patient had pauses. No recurrence during the day. Not on beta blocker or CCB.     Plan: continue to monitor on tele.     # Pain Assessment:   Current Pain Score 2/14/2018 2/14/2018 2/13/2018   Patient currently in pain? no no no   Pain score (0-10) 0 0 0   Ilia graham pain level was assessed and he currently denies pain.      DVT Prophylaxis: Pneumatic Compression Devices  Code Status: Full Code    Tulio LAWSON Patrice    Interval History   Persistent right-sided weakness.  He denies any pain.  He denies fevers or chills.  He denies any shortness of breath.  He denies any nausea or vomiting.  He tolerated eating breakfast.    -Data reviewed today: I reviewed all new labs and imaging results over the last 24 hours. I personally reviewed the tele " tracing showing pauses and some sinus bradycardia.    Physical Exam   Temp: 98.8  F (37.1  C) Temp src: Tympanic BP: 130/62 Pulse: 58 Heart Rate: 56 Resp: 16 SpO2: 93 % O2 Device: None (Room air)    Vitals:    02/13/18 1956 02/14/18 0607 02/15/18 0500   Weight: 91.9 kg (202 lb 9.6 oz) 93.5 kg (206 lb 2.1 oz) 93.2 kg (205 lb 7.5 oz)     Vital Signs with Ranges  Temp:  [97.1  F (36.2  C)-98.8  F (37.1  C)] 98.8  F (37.1  C)  Pulse:  [42-64] 58  Heart Rate:  [56-71] 56  Resp:  [16-18] 16  BP: (106-146)/(57-70) 130/62  SpO2:  [93 %-97 %] 93 %  I/O last 3 completed shifts:  In: 2455 [P.O.:480; I.V.:1975]  Out: 625 [Urine:625]    GENERAL: Comfortable, talkative, in no apparent distress.  HEENT: Anicteric, non-injected sclera, mouth moist.   NECK: No JVD.  CARDIOVASCULAR: regular rate and rhythm, no murmur. No lower extremity edema   RESPIRATORY: Clear to auscultation bilaterally, no wheezes, no crackles.  GI: Non-distended, normal bowel sounds, soft, non-tender.  SKIN: No rashes, sores.   NEUROLOGY: Alert and oriented x3, follows commands, slurred speech. Right upper extremity flaccid, left upper extremity 5/5, right lower extremity weakness    Medications     NaCl 100 mL/hr at 02/15/18 0644       clopidogrel  225 mg Oral Once     aspirin EC  81 mg Oral Daily with breakfast     tamsulosin  0.4 mg Oral Daily     sodium chloride (PF)  3 mL Intracatheter Q8H     clopidogrel  75 mg Oral or NG Tube Daily       Data     Recent Labs  Lab 02/14/18  0412 02/13/18  2040 02/13/18  1710   WBC 5.5  --  5.8   HGB 12.1*  --  14.3   MCV 90  --  90     --  176   INR  --   --  0.98     --  143   POTASSIUM 4.0  --  4.1   CHLORIDE 109*  --  106   CO2 28  --  26   BUN 11  --  14   CR 0.92  --  0.94   ANIONGAP 6  --  11   VON 8.2*  --  9.5   *  --  127*   ALBUMIN 3.1*  --   --    PROTTOTAL 4.9*  --   --    BILITOTAL 0.6  --   --    ALKPHOS 60  --   --    ALT 9  --   --    AST 14  --   --    TROPI <0.030 <0.030 <0.030        No results found for this or any previous visit (from the past 24 hour(s)).

## 2018-02-15 NOTE — PLAN OF CARE
Problem: Patient Care Overview  Goal: Plan of Care/Patient Progress Review  Discharge Planner OT   Patient plan for discharge: Agreed to ARU per MD report   Current status: Patient required Mod/Max assist for functional transfers, ADLs, and ambulation.  Prefers FWW over alee-walker at this time.    Barriers to return to prior living situation: decreased independence with ADLs   Recommendations for discharge: ARU for further therapies   Rationale for recommendations: Patient would benefit from daily rehabilitation in PT, OT, and SLP       Entered by: Palma Warren 02/15/2018 12:14 PM

## 2018-02-15 NOTE — PROGRESS NOTES
:    Met with patient and his wife to discuss discharge planning.  Patient would like to admit to the ARU in [place].      Contact with Cynthia, Admission's Coordinator, at  in [place].  Provided referral information on patient.  She will call once referral information has been reviewed.  Faxed referral information.    SANDIE Staples on 2/15/2018 at 11:22 AM

## 2018-02-15 NOTE — PROGRESS NOTES
:    Coordination with Cynthia from [facility].  They have accepted patient to admit tomorrow.  Patient will need to admit to their facility by 1300.  Discharge orders and summary to be faxed to [phone].  Nurse to Nurse to be made at [phone].      Attempted to reach patient's wife by telephone.  A message was left.  Met with patient in his room.  A family friend was also present.  He stated that his wife would be coming back in about 1 hour.  Provided patient with information on discharge planning and a written list of supplies he will need/directions/phone numbers.   Discussed transportation.  Patient said that his sister would be driving him to [place].   Will plan to attempt to meet with wife again later today to update on discharge planning.      SANDIE Staples on 2/15/2018 at 3:20 PM

## 2018-02-15 NOTE — PROGRESS NOTES
:    Telephone contact with patient's wife Radha.  Updated her on discharge planning.  Patient's sister Anahy Jenkins will be transporting patient to [place].  Radha will contact her to coordinate regarding a discharge for tomorrow.  Notified her that I have left a list of directions/address/needed items in patient's room.   will continue to coordinate through discharge.    SANDIE Staples on 2/15/2018 at 4:32 PM

## 2018-02-15 NOTE — PLAN OF CARE
Problem: Patient Care Overview  Goal: Plan of Care/Patient Progress Review  Discharge Planner SLP   Patient plan for discharge: Pt would like to attend ARU for continued strengthening and rehabilitation.   Current status: Pt had difficulties with dry solids this date, but was able to tolerate NDD III with Nectar Thickened Liquids. He was highly motivated to complete oral motor exercises and speech strategies.   Barriers to return to prior living situation: None, highly motivated to return to prior function and independence   Recommendations for discharge: Continued speech therapy at ARU for both dysarthric speech and dysphagia.   Rationale for recommendations: Pt is highly motivated to return to prior function and independence and shows improvement from previous date with ROM, strength, and speech intelligibility.        Entered by: Bhavesh Stanton 02/15/2018 3:22 PM

## 2018-02-15 NOTE — PLAN OF CARE
Problem: Patient Care Overview  Goal: Plan of Care/Patient Progress Review  Outcome: No Change  Patient has continued to have no right arm/hand movement, able to slightly move right leg/foot but not against gravity.  Patient has no decrease in sensation and was able to stand at side of bed for linen change.  Patient has remained alert, oriented X 4 and answers all questions appropriately.  Telemetry shows when patient is sleeping he drops to 30-40s.  This was also happening last night as well.  When patient is awakened rate is 50-60s.  VSS.  Patient denies any lightheadedness or dizziness.      After family left and this writer was doing assessment it was apparent that patient was anxious and worried.  Frustrated that now he was no longer able to even move his fingers.  Notified Dr. Neff and received orders for ativan and this was given last evening before bedtime, see MAR.  Patient rested throughout the night and in early morning neuro check patient smiled and stated that he slept well and felt more relaxed.

## 2018-02-15 NOTE — PLAN OF CARE
Problem: Patient Care Overview  Goal: Plan of Care/Patient Progress Review  Discharge Planner PT   Patient plan for discharge: Patient would benefit from discharge to possible ARU facility to continue with further therapy.  Current status: Patient is able to ambulate further compared to yesterday. Still requiring min assist for ambulation. Toe drag still present. Patient is aware of his limitations and deficits. Able to identify light touch to his right LE when assessed  Barriers to return to prior living situation: Impaired balance, weakness, impaired coordination and motor planning  Recommendations for discharge: Patient would benefit from continued therapy at an ARU facility. He was an extremely independent individual prior to his stroke and is very motivate to return to his prior level of function.   Rationale for recommendations: Still demonstrating impairments in his balance, coordination, motor planning, and mobility that would warrant further therapy to improve.        Entered by: Elias Bills 02/15/2018 11:44 AM

## 2018-02-16 ENCOUNTER — APPOINTMENT (OUTPATIENT)
Dept: PHYSICAL THERAPY | Facility: OTHER | Age: 83
DRG: 065 | End: 2018-02-16
Payer: MEDICARE

## 2018-02-16 ENCOUNTER — APPOINTMENT (OUTPATIENT)
Dept: OCCUPATIONAL THERAPY | Facility: OTHER | Age: 83
DRG: 065 | End: 2018-02-16
Payer: MEDICARE

## 2018-02-16 VITALS
WEIGHT: 203.93 LBS | OXYGEN SATURATION: 95 % | SYSTOLIC BLOOD PRESSURE: 165 MMHG | HEIGHT: 70 IN | HEART RATE: 66 BPM | BODY MASS INDEX: 29.19 KG/M2 | TEMPERATURE: 97.2 F | RESPIRATION RATE: 18 BRPM | DIASTOLIC BLOOD PRESSURE: 64 MMHG

## 2018-02-16 PROCEDURE — 40000133 ZZH STATISTIC OT WARD VISIT: Performed by: OCCUPATIONAL THERAPIST

## 2018-02-16 PROCEDURE — 40000193 ZZH STATISTIC PT WARD VISIT

## 2018-02-16 PROCEDURE — 97530 THERAPEUTIC ACTIVITIES: CPT | Mod: GP

## 2018-02-16 PROCEDURE — 99239 HOSP IP/OBS DSCHRG MGMT >30: CPT | Performed by: INTERNAL MEDICINE

## 2018-02-16 PROCEDURE — 25000132 ZZH RX MED GY IP 250 OP 250 PS 637: Mod: GY | Performed by: INTERNAL MEDICINE

## 2018-02-16 PROCEDURE — A9270 NON-COVERED ITEM OR SERVICE: HCPCS | Mod: GY | Performed by: INTERNAL MEDICINE

## 2018-02-16 PROCEDURE — 97116 GAIT TRAINING THERAPY: CPT | Mod: GP

## 2018-02-16 PROCEDURE — 97535 SELF CARE MNGMENT TRAINING: CPT | Mod: GO | Performed by: OCCUPATIONAL THERAPIST

## 2018-02-16 RX ORDER — ACETAMINOPHEN 650 MG/1
650 SUPPOSITORY RECTAL EVERY 4 HOURS PRN
Qty: 60 SUPPOSITORY | DISCHARGE
Start: 2018-02-16 | End: 2018-04-26

## 2018-02-16 RX ORDER — ATORVASTATIN CALCIUM 20 MG/1
20 TABLET, FILM COATED ORAL DAILY
Qty: 30 TABLET | Refills: 1 | DISCHARGE
Start: 2018-02-16 | End: 2018-04-26

## 2018-02-16 RX ORDER — CLOPIDOGREL BISULFATE 75 MG/1
75 TABLET ORAL DAILY
Qty: 30 TABLET | DISCHARGE
Start: 2018-02-17 | End: 2018-04-26

## 2018-02-16 RX ADMIN — TAMSULOSIN HYDROCHLORIDE 0.4 MG: 0.4 CAPSULE ORAL at 09:18

## 2018-02-16 RX ADMIN — ASPIRIN 81 MG: 81 TABLET ORAL at 09:18

## 2018-02-16 RX ADMIN — CLOPIDOGREL 75 MG: 75 TABLET, FILM COATED ORAL at 09:18

## 2018-02-16 ASSESSMENT — VISUAL ACUITY
OU: NORMAL ACUITY
OU: NORMAL ACUITY

## 2018-02-16 NOTE — PROGRESS NOTES
:    Met with patient this morning and updated on discharge planning.  Patient stated that his ride was set up.      Telephone call to patient's wife Radha and also spoke with daughter Cira.  They will be coming to the hospital around 1000.  Provided additional discharge information for placement at ARU.    [facility]  Spoke with Nesha and updated that patient will be discharging by 1100 to arrive at their facility by 1300.      SANDIE Staples on 2/16/2018 at 8:52 AM

## 2018-02-16 NOTE — PLAN OF CARE
Problem: Patient Care Overview  Goal: Plan of Care/Patient Progress Review  Discharge Planner PT   Patient plan for discharge: to ARU today  Current status: requiring assist of 1-2 for mobility: right hemiparesis  Barriers to return to prior living situation: weakness decrease mobility  Recommendations for discharge: ARU  Rationale for recommendations: effects of CVA require ongoing PT       Entered by: Arslan Jose 02/16/2018 9:54 AM

## 2018-02-16 NOTE — PLAN OF CARE
Problem: Patient Care Overview  Goal: Plan of Care/Patient Progress Review  Physical Therapy Discharge Summary    Reason for therapy discharge:    Discharged to acute rehabilitation facility.    Progress towards therapy goal(s). See goals on Care Plan in [facility] electronic health record for goal details.  Goals not met.  Barriers to achieving goals:   discharge from facility.    Therapy recommendation(s):    Continued therapy is recommended.  Rationale/Recommendations:  decreased functional mobility.

## 2018-02-16 NOTE — PHARMACY - DISCHARGE MEDICATION RECONCILIATION AND EDUCATION
Pharmacy:  Discharge Counseling and Medication Reconciliation    Ilia Ferguson  [address] [facility] [id number] [phone] (home)   85 year old male  PCP: Cholo Rodriguez    Allergies: Sulfa drugs    Discharge Counseling:    Pharmacist met with patient (and/or family) today to review the medication portion of the After Visit Summary (with an emphasis on NEW medications) and to address patient's questions/concerns.    Summary of Education: Pt was educated on use, duration and side effects of clopidogrel and atorvastatin.     Materials Provided:  MedCounselor sheets printed from Clinical Pharmacology on: clopidogrel and atorvastatin    Discharge Medication Reconciliation:    Eliana Almaguer has reviewed the patient's discharge medication orders and has compared them to the inpatient medication administration record and to what the patient was taking prior to admission - any discrepancies have been resolved.    It has been determined that the patient has an adequate supply of medications available or which can be obtained from [facility].    Thank you for the consult.    Eliana Almaguer........February 16, 2018 10:43 AM

## 2018-02-16 NOTE — PLAN OF CARE
Problem: Patient Care Overview  Goal: Plan of Care/Patient Progress Review  Outcome: Adequate for Discharge Date Met: 02/16/18  [facility] DISCHARGE NOTE    Patient discharged to acute rehab at 11:15 AM via wheel chair. Accompanied by daughter and staff. Discharge instructions reviewed with patient and daughter, opportunity offered to ask questions. Prescriptions sent to patients preferred pharmacy. All belongings sent with patient.    Melany Schaeffer RN

## 2018-02-16 NOTE — PLAN OF CARE
Problem: Stroke (Ischemic) (Adult)  Goal: Signs and Symptoms of Listed Potential Problems Will be Absent, Minimized or Managed (Stroke)  Signs and symptoms of listed potential problems will be absent, minimized or managed by discharge/transition of care (reference Stroke (Ischemic) (Adult) CPG).   Outcome: No Change  Pt up in chair several hours - tolerated well, but tired. Able to pivot transfer with assist of 2. Tolerating Dysphagia 3 diet with nectar thick liquids. Pt had episode this am of HR of 26 and this afternoon HR was 125 for approx one minute. Dr Ibrahim notified of both at time of event.

## 2018-02-16 NOTE — PLAN OF CARE
Problem: Patient Care Overview  Goal: Plan of Care/Patient Progress Review  Outcome: Improving  Occupational Therapy Discharge Summary    Reason for therapy discharge:    Discharged to acute rehabilitation facility.    Progress towards therapy goal(s). See goals on Care Plan in [facility] electronic health record for goal details.  Goals partially met.  Barriers to achieving goals:   discharge from facility.    Therapy recommendation(s):    Continued therapy is recommended.  Rationale/Recommendations:  ongoing daily OT at Acute Rehab ., pt very motivated to improve, excellent candidate for inpatient rehab.

## 2018-02-16 NOTE — DISCHARGE SUMMARY
"[facility]    Discharge Summary  Hospitalist    Date of Admission:  2/13/2018  Date of Discharge:  2/16/2018  Discharging Provider: Tulio Ibrahim  Date of Service (when I saw the patient): 02/16/18    Discharge Diagnoses   Principal Problem:    Stroke (H) (2/13/2018)  Active Problems:    Hypertension (1/24/2018)    Pulmonary nodules/lesions, multiple (12/11/2013)      History of Present Illness   Ilia Ferguson is an 85 year old male who presented with sided weakness and dysarthria.  Per the H&P:  \"Ilia Ferguson is a 85 year old male who presents with right-sided weakness and dysarthria.  He states he came back from coffee this morning to the house, and noted around 11 AM that he was having trouble walking and using his right hand.  His speech was also slurred and difficult to understand.  He had trouble swallowing and was choking on water.  Apparently he and his wife observe the symptoms until 4 PM, when he presented to the emergency department.  He has a history of having had a supraventricular tachycardia in the past but is on no rate controlling medication.  He has no history of stroke or diabetes.  He has high blood pressure and is on enalapril.  He noted a headache at the onset of symptoms this morning, but that is improved.  He denies any visual disturbances.  Denies any chest pain or palpitations.  He denies any fevers or chills.\"      Hospital Course   Ilia Ferguson was admitted on 2/13/2018.  The following problems were addressed during his hospitalization:    Stroke  He was assessed by telemedicine stroke in the emergency department. Dr. Martinez was the neurologist on service.  He underwent CT angiogram showing no intervenable occlusions.  Patient was outside of the window for thrombolytic therapy and his right-sided symptoms appear to be resolving in the emergency department.  Patient normally takes an 81 mg aspirin and it was recommended that he start clopidogrel 75 mg daily.  Was " admitted to the hospital.  On hospital day #2 he had more right-sided weakness later in the day.  We questioned whether this was more fatigue related or change in his stroke symptoms.  By the next day it was clear that his right-sided weakness and dysarthria were worse.  I spoke with Dr. Martinez who called this type of stroke in the lateral aspect of the jerel a stuttering lacune.  He recommended a Plavix load at this time 300 mg, by daily 75 mg dosing.  The patient participated with speech and swallow therapy, physical therapy and occupational therapy.  Considered a good candidate for an acute rehab unit.  She will work was involved and he will be transferred to [facility] on discharge today.    Bradycardia  Patient was noted to have sinus bradycardia with a few pauses at night on hospital day 1.  He had some bradycardia but no pauses the rest of his hospital stay.    Hypertension  Phonically takes enalapril.  This was held to allow for permissive hypertension but restarted at the time of discharge.    Pulmonary nodules  These are chronic and have been seen on imaging studies in the past.      # Discharge Pain Plan:   - Patient currently has NO PAIN and is not being prescribed pain medications on discharge.    Tulio Ibrahim MD      Code Status   Full Code       Primary Care Physician   ZEN GALLARDO    Physical Exam   Temp: 97.2  F (36.2  C) Temp src: Tympanic BP: 165/64 Pulse: 66 Heart Rate: 68 Resp: 18 SpO2: 95 % O2 Device: None (Room air)    Vitals:    02/14/18 0607 02/15/18 0500 02/16/18 0609   Weight: 93.5 kg (206 lb 2.1 oz) 93.2 kg (205 lb 7.5 oz) 92.5 kg (203 lb 14.8 oz)     Vital Signs with Ranges  Temp:  [97.2  F (36.2  C)-99.4  F (37.4  C)] 97.2  F (36.2  C)  Pulse:  [64-71] 66  Heart Rate:  [68-72] 68  Resp:  [16-20] 18  BP: (137-167)/(64-94) 165/64  SpO2:  [94 %-97 %] 95 %  I/O last 3 completed shifts:  In: 1491 [P.O.:840; I.V.:651]  Out: 975 [Urine:975]    GENERAL: Comfortable, no apparent  distress.  CARDIOVASCULAR: regular rate and rhythm, no murmur. No lower extremity edema   RESPIRATORY: Clear to auscultation bilaterally, no wheezes or crackles.  GI: non-tender, non-distended, normal bowel sounds.   SKIN: warm periphery, no rashes    Discharge Disposition   Discharged to rehabilitation facility  Condition at discharge: Stable    Consultations This Hospital Stay   TELE-STROKE VIDEO ADULT IP CONSULT  PHYSICAL THERAPY ADULT IP CONSULT  OCCUPATIONAL THERAPY ADULT IP CONSULT  SPEECH LANGUAGE PATH ADULT IP CONSULT  SWALLOW EVAL SPEECH PATH AT BEDSIDE IP CONSULT  SMOKING CESSATION PROGRAM IP CONSULT  OCCUPATIONAL THERAPY ADULT IP CONSULT  SOCIAL WORK IP CONSULT  PHYSICAL THERAPY ADULT IP CONSULT  OCCUPATIONAL THERAPY ADULT IP CONSULT  SPEECH LANGUAGE PATH ADULT IP CONSULT    Time Spent on this Encounter   ITulio, personally saw the patient today and spent greater than 30 minutes discharging this patient.    Discharge Orders     General info for SNF   Length of Stay Estimate: Short Term Care: Estimated # of Days 31-90  Condition at Discharge: Stable  Level of care:skilled   Rehabilitation Potential: Good  Admission H&P remains valid and up-to-date: Yes  Recent Chemotherapy: N/A  Use Nursing Home Standing Orders: Yes     Mantoux instructions   Give two-step Mantoux (PPD) Per Facility Policy Yes     Reason for your hospital stay   stroke     Activity - Up with nursing assistance     Activity - Up with assistive device     Full Code     Physical Therapy Adult Consult   Evaluate and treat as clinically indicated.    Reason:  stroke     Occupational Therapy Adult Consult   Evaluate and treat as clinically indicated.    Reason:  stroke     Speech Language Path Adult Consult   Evaluate and treat as clinically indicated.    Reason:  stroke     Advance Diet as Tolerated   Follow this diet upon discharge: Orders Placed This Encounter     Dysphagia Diet Level 3 Advanced Nectar Thickened Liquids  (pre-thickened or use instant food thickener)       Discharge Medications   Current Discharge Medication List      START taking these medications    Details   acetaminophen (TYLENOL) 650 MG Suppository Place 1 suppository (650 mg) rectally every 4 hours as needed for mild pain  Qty: 60 suppository    Associated Diagnoses: Cerebrovascular accident (CVA) due to thrombosis of left middle cerebral artery (H)      clopidogrel (PLAVIX) 75 MG tablet 1 tablet (75 mg) by Oral or NG Tube route daily  Qty: 30 tablet    Associated Diagnoses: Cerebrovascular accident (CVA) due to thrombosis of left middle cerebral artery (H)      atorvastatin (LIPITOR) 20 MG tablet Take 1 tablet (20 mg) by mouth daily  Qty: 30 tablet, Refills: 1    Associated Diagnoses: Cerebrovascular accident (CVA) due to thrombosis of left middle cerebral artery (H)         CONTINUE these medications which have NOT CHANGED    Details   acetaminophen (TYLENOL) 500 MG tablet Take 500 mg by mouth daily as needed (leg pain)      naproxen sodium (ANAPROX) 220 MG tablet Take 220 mg by mouth daily as needed (leg pain)      aspirin EC 81 MG EC tablet Take 81 mg by mouth daily with food      enalapril (VASOTEC) 20 MG tablet Take 20 mg by mouth daily      Multiple Vitamin (MULTI-VITAMINS) TABS Take 1 tablet by mouth daily      tamsulosin (FLOMAX) 0.4 MG capsule Take 0.4 mg by mouth daily with food           Allergies   Allergies   Allergen Reactions     Sulfa Drugs Hives     Data   Most Recent 3 CBC's:  Recent Labs   Lab Test  02/14/18   0412  02/13/18   1710  07/13/17   1358   WBC  5.5  5.8   --    HGB  12.1*  14.3  13.0*   MCV  90  90  92   PLT  165  176  155      Most Recent 3 BMP's:  Recent Labs   Lab Test  02/14/18   0412  02/13/18   1710  07/13/17   1417   NA  143  143  138   POTASSIUM  4.0  4.1  4.4   CHLORIDE  109*  106  106   CO2  28  26  26   BUN  11  14  18   CR  0.92  0.94  1.01   ANIONGAP  6  11   --    VON  8.2*  9.5  9.1   GLC  126*  127*  184*     Most  Recent 2 LFT's:  Recent Labs   Lab Test  02/14/18 0412 07/13/17   1417   AST  14   --    ALT  9   --    ALKPHOS  60  54   BILITOTAL  0.6  0.5     Most Recent INR's and Anticoagulation Dosing History:  Anticoagulation Dose History     Recent Dosing and Labs Latest Ref Rng & Units 2/13/2018    INR 0 - 1.3 0.98        Most Recent 3 Troponin's:  Recent Labs   Lab Test  02/14/18 0412 02/13/18   2040  02/13/18   1710   TROPI  <0.030  <0.030  <0.030     Most Recent Cholesterol Panel:  Recent Labs   Lab Test  02/14/18 0412   CHOL  152   LDL  93   HDL  41   TRIG  89     Most Recent 6 Bacteria Isolates From Any Culture (See EPIC Reports for Culture Details):No lab results found.  Most Recent TSH, T4 and A1c Labs:  Recent Labs   Lab Test  02/14/18 0412   A1C  6.9*     Results for orders placed or performed during the hospital encounter of 02/13/18   CT Head w/o Contrast    Narrative    PROCEDURE: CT HEAD W/O CONTRAST     HISTORY: onset of RUE weakness and change in speech since 11AM.; .    COMPARISON: None.    TECHNIQUE:  Helical images of the head from the foramen magnum to the  vertex were obtained without contrast.    FINDINGS: There is a subtle area of diminished attenuation within the  left thalamus on axial image 13. A more well-defined focal area of  hypoattenuation is present in the medial right thalamus on image 14.  Scattered areas of hypoattenuation are seen within the supratentorial  white matter.    There is mild generalized volume loss. No acute intracranial  hemorrhage, mass effect, midline shift, hydrocephalus or basilar  cistern effacement is seen.    The calvarium is intact. There is atherosclerotic calcifications of  the intracranial internal carotid arteries and vertebral arteries.       Impression    IMPRESSION: Age indeterminant bilateral thalamic ischemic injuries.  The patient's symptoms could potentially correlate with an acute  (left) thalamic infarct. Moderate background  age-indeterminate  microvascular ischemic changes. Consider MR for more definitive  assessment for acute ischemia.      CARLY RICHARDSON MD   CTA Angiogram Head Neck    Narrative    PROCEDURE: CTA ANGIOGRAM HEAD NECK 2/13/2018 6:21 PM    HISTORY: RUE and RLE weakness and word finding difficulty - last known  well 11AM with waxing/waning course.;     COMPARISONS: None.  Technique: CT  Angiogram of the neck with sagittal coronal reconstructions. CT  angiogram of the brain with sagittal coronal reconstructions      FINDINGS: The brachiocephalic artery is widely patent. There is  calcific plaquing seen in the right subclavian artery. The right  vertebral artery is widely patent to the skull base. Common carotid  artery on the right is widely patent. There is some mild calcific  plaquing at the carotid bifurcation on the right. The right internal  carotid artery is widely patent to the skull base. There is some mild  calcific plaquing without stenoses in the left common carotid artery.  There is mild calcific plaquing in the proximal internal carotid  artery on the left without stenosis.    The distal vertebral arteries are widely patent basilar artery is  widely patent both superior cerebellar and posterior cerebral branches  appear normal. There is some calcific plaquing in the carotid siphons.  Supraclinoid internal carotid artery appears normal. Both anterior  cerebral arteries are widely patent both middle cerebral arteries are  widely patent. No intracranial stenoses aneurysms or vascular shifts  are noted.    The paranasal sinuses are clear. The muscles of mastication in the  parapharyngeal spaces are normal. The parotid and submandibular glands  appear normal. The larynx and upper trachea is normal. Thyroid gland  appears normal. There is abnormal areas of increased density in both  upper lobes. This is suspicious for infectious disease.         Impression    IMPRESSION: No intracranial stenoses aneurysms or  vascular shifts.  There is mild calcific plaquing in the carotid systems bilaterally but  no significant significant stenoses are noted. In the vertebral  systems the vertebral arteries are markedly tortuous particularly in  the upper cervical region but no significant stenoses are noted    DARIEN JENNINGS MD   MRI Brain w & w/o contrast    Narrative    EXAM:  MR BRAIN W/O & W CONTRAST    HISTORY:  Suspected stroke, right-sided weakness. .    TECHNIQUE:  Sagittal T1, axial T1, T2, FLAIR, diffusion, echo planar  as well as axial and 3D postcontrast imaging of the whole brain was  performed. SWI sequences were also obtained.    COMPARISON:  CT brain 2/13/2018     FINDINGS:    Restricted diffusion is present within the left medial central aspect  of the jerel. No other restricted diffusion is present. No abnormal  enhancement is seen. A few scattered foci of gradient susceptibility  are nonspecific, likely chronic microhemorrhages. There are moderate  background microvascular changes.    No abnormal extra-axial collection, hydrocephalus, mass effect or  midline shift is seen. The basal cisterns are preserved. The major  intracranial vascular flow voids are preserved.     The T1 marrow signal is unremarkable.      Impression    IMPRESSION: Subtle focal restricted diffusion within the left central  aspect of the jerel, most compatible with acute ischemia and likely  accounting for the described symptoms.    CARLY RICHARDSON MD

## 2018-03-15 ENCOUNTER — TELEPHONE (OUTPATIENT)
Dept: FAMILY MEDICINE | Facility: OTHER | Age: 83
End: 2018-03-15

## 2018-03-15 PROCEDURE — G0180 MD CERTIFICATION HHA PATIENT: HCPCS | Performed by: FAMILY MEDICINE

## 2018-03-15 NOTE — TELEPHONE ENCOUNTER
URGENT: Response needed within 24 hours    This timeframe is established by CMS as  best practice  for the delivery of home health care. The home health clinician may need to contact you again if this timeframe is not met.      S:    Medication reconciliation discrepancies and/or drug interactions/contraindications have been identified.  Home Care s drug regime review has revealed significant medication issues.    B:    You are being contacted for orders related to medication issues.     A:     INTERACTIONS:  Moderate:  Plavix and Naproxen  Plavix and Aspirin EC  Plavix and Voltaren  Naproxen and Vasotec  Aspirin EC and Vasotec  Vasotec and Voltaren  Major:  Naproxen and Aspirin EC  Aspirin and Voltaren    R:    Please evaluate this information and indicate below whether or not changes are required. A copy of the patient's drug interaction/contraindications report is available upon request.       OK TO CONTINUE THESE MEDICATIONS?    Thank you for your time. Please call with questions or concerns.    Jesika Herrera RN on 3/15/2018 at 2:14 PM

## 2018-03-20 ENCOUNTER — TELEPHONE (OUTPATIENT)
Dept: FAMILY MEDICINE | Facility: OTHER | Age: 83
End: 2018-03-20

## 2018-03-20 NOTE — TELEPHONE ENCOUNTER
"Request for Home Care Physical Therapy orders as follows:    PT eval and treat date:  3/15/18    1 x 1 week then     Continuation frequency =  2 x week x _4___ weeks              Effective date = 3/19/18    Interventions include:    Therapeutic Exercise  Gait Training  Gait/Balance/Dysfunction  Home Safety Assessment      Acknowledging this order with an \"OK\" will suffice. Thank you for your time.  Please call if you have any questions or concerns.    Therapist: Rene Bustos PT      Request for Home Care Occupational Therapy orders as follows:    OT eval and treat date:  3/15/18    1 x 1 week then     Continuation frequency =  2 x week x _4___ weeks               Effective date = 3/19/18    Interventions include:    Therapeutic Exercise  ADL training  Adaptive equipment  Home safety assessment  Cognitive neuro rehab                                     Acknowledging this order with an \"OK\" will suffice. Thank you for your time.  Please call if you have any questions or concerns.    For therapist: Carmen Claudio, OT  "

## 2018-04-06 ENCOUNTER — TELEPHONE (OUTPATIENT)
Dept: FAMILY MEDICINE | Facility: OTHER | Age: 83
End: 2018-04-06

## 2018-04-06 DIAGNOSIS — I63.9 CEREBROVASCULAR ACCIDENT (CVA), UNSPECIFIED MECHANISM (H): Primary | ICD-10-CM

## 2018-04-06 NOTE — TELEPHONE ENCOUNTER
Dr. Rodriguez,    Mr. Ferguson has progressed very nicely with Home Care PT/OT after returning home from rehab unit in [place].    Both OT and PT, are ready to discharge him from home care. He does need to continue rehab.    Could you please put in some orders to outpatient rehab for both OT (right hand/arm post CVA)  and PT (gait, balance, moblity, strength post CVA).     (I gave him the options of other rehab clinics in [facility], and he chose [facility])    Thank you,  Rene Bustos.

## 2018-04-09 RX ORDER — GABAPENTIN 100 MG/1
CAPSULE ORAL
Qty: 30 CAPSULE | OUTPATIENT
Start: 2018-04-09

## 2018-04-09 NOTE — TELEPHONE ENCOUNTER
This medication appears to have been last prescribed at [facility] and [facility], per snapshot, on 3/14/2018 with an end date of 4/13/2018. This medication has never been prescribed to this Patient at [facility].    Patient will need appointment. Refill request refused at this time and message sent to [facility].    Unable to complete prescription refill per RN Medication Refill Policy. Sulema Gamino RN .............. 4/9/2018  11:21 AM

## 2018-04-12 ENCOUNTER — HOSPITAL ENCOUNTER (OUTPATIENT)
Dept: OCCUPATIONAL THERAPY | Facility: OTHER | Age: 83
Setting detail: THERAPIES SERIES
End: 2018-04-12
Attending: FAMILY MEDICINE
Payer: MEDICARE

## 2018-04-12 PROCEDURE — 97165 OT EVAL LOW COMPLEX 30 MIN: CPT | Mod: GO | Performed by: OCCUPATIONAL THERAPIST

## 2018-04-12 PROCEDURE — G8988 SELF CARE GOAL STATUS: HCPCS | Mod: GO,CJ | Performed by: OCCUPATIONAL THERAPIST

## 2018-04-12 PROCEDURE — 97127 ZZHC OT THERAPEUTIC INTERVENTION W/FOCUS ON COGNITIVE FUNCTION,EA 15 MIN: CPT | Mod: GO | Performed by: OCCUPATIONAL THERAPIST

## 2018-04-12 PROCEDURE — 97110 THERAPEUTIC EXERCISES: CPT | Mod: GO | Performed by: OCCUPATIONAL THERAPIST

## 2018-04-12 PROCEDURE — G8987 SELF CARE CURRENT STATUS: HCPCS | Mod: GO,CM | Performed by: OCCUPATIONAL THERAPIST

## 2018-04-13 NOTE — PROGRESS NOTES
[facility]          OUTPATIENT OCCUPATIONAL THERAPY  EVALUATION  PLAN OF TREATMENT FOR OUTPATIENT REHABILITATION  (COMPLETE FOR INITIAL CLAIMS ONLY)  Patient's Last Name, First Name, M.I.  YOB: 1932  Ilia Ferguson                        Provider's Name  [facility] Medical Record No.  [id number]                               Onset Date:     02/13/18   Start of Care Date:     04/12/18   Type:     ___PT   _X_OT   ___SLP Medical Diagnosis:     S/P CVA                          OT Diagnosis:     decline in ADLs Visits from SOC:  1   _________________________________________________________________________________  Plan of Treatment/Functional Goals:      [place]           Goals  Goal Identifier: self cares  Goal Description: Pt. will report the ability to hold a can of beer in his dominate right hand with mild to no difficulty  Target Date: 05/03/18     Goal Identifier: IADLs  Goal Description: Pt. will pass [facility] neccessary assessments for driving with a score of safe or better  Target Date: 06/07/18     Goal Identifier: ADLs  Goal Description: Pt. will report the ability to grasp and carry his dished with his right hand to the kitchen with mild to no difficulty  Target Date: 05/24/18            Therapy Frequency: 2x/week      Predicted Duration of Therapy Intervention (days/wks): W+8  Phyllis Jefferson OT          I CERTIFY THE NEED FOR THESE SERVICES FURNISHED UNDER        THIS PLAN OF TREATMENT AND WHILE UNDER MY CARE     (Physician co-signature of this document indicates review and certification of the therapy plan).                 ,     ,                 Referring Physician: Dr. Rodriguez/Dr. Dahl     Initial Assessment        See Epic Evaluation      Start Of Care Date: 04/12/18

## 2018-04-13 NOTE — PROGRESS NOTES
04/12/18 1500   Quick Adds   Type of Visit Initial Outpatient Occupational Therapy Evaluation   General Information   Start Of Care Date 04/12/18   Referring Physician Dr. Rodriguez/Dr. Dahl   Orders Evaluate and treat as indicated   Other Orders 4/6/2018   Medical Diagnosis S/P CVA   Onset of Illness/Injury or Date of Surgery 02/13/18   Surgical/Medical History Reviewed Yes   Additional Occupational Profile Info/Pertinent History of Current Problem Pt. is a 86 year old male who experienced a CVA on Feb. 3rd. he spent 2 days at the hospitalthen 4 weeks at [facility] in [place]. 2 weeks [facility] and had home.care. he is now transitioning to out patient.     Role/Living Environment   Current Living Environment House   Primary Bathroom Location/Comments is shower upstairs in the bathtub [facility] a shower chair adn [facility].   Prior Level - ADLS Independent   Prior Responsibilities - IADL (wash dishes, puzzles, )   Patient/family Goals Statement t [facility] right hang stronger    Fall Risk Screen   Fall screen completed by OT   Have you fallen 2 or more times in the past year? No   Have you fallen and had an injury in the past year? No   Is patient a fall risk? No   Cognitive Status Examination   Orientation Orientation to person, place and time   Level of Consciousness Alert   Follows Commands and Answers Questions 100% of the time   Personal Safety and Judgment Intact   Memory Impaired  (remembers 3/5 words after one minute)   Memory Comments remembers 3/5 words after one minute   Visual Perception   Visual Acuity cataract and implants   Visual Field intact   Visual Attention WFL   Oculomotor WFL   Left Shoulder Flexion ROM   Left Shoulder Flexion AROM - degrees 160   Left Shoulder ABduction ROM   Left Shoulder ABduction AROM - degrees 168   Right Shoulder Flexion ROM   Right Shoulder Flexion AROM - degrees 96   Right Shoulder Flexion PROM - degrees 102   Right Shoulder ABduction ROM   Right Shoulder ABduction AROM - degrees 97   Right  Shoulder ABduction PROM - degrees 118   Left Elbow Extension/Flexion ROM   Left Elbow Extension/Flexion AROM - degrees 0 to 141   Left Elbow Extension/Flexion PROM - degrees 0 to 141   Right Elbow Extension/Flexion ROM   Right Elbow Extension/Flexion AROM - degrees -6 to 131   Right Elbow Extension/Flexion PROM - degrees -6 to 134   Right Forearm Supination ROM   Right Forearm Supination AROM - degrees 75   Right Wrist Flexion ROM   Right Wrist Flexion AROM - degrees 56   Right Wrist Extension ROM   Right Wrist Extension AROM - degrees 56   Hand Strength   Hand Dominance Right   Left Hand  (pounds) 74 pounds   Right Hand  (pounds) 19 pounds   Left Lateral Pinch (pounds) 18 pounds   Right Lateral Pinch (pounds) 10 pounds   Left Three Point Pinch (pounds) 18 pounds   Right Three Point Pinch (pounds) 8 pounds   Coordination   Right Hand, Nine Hole Peg Test (seconds) 42   Left Hand, Box and Blocks Test (cubes transferred in 1 minute) 30.75   Bathing   Level of [place] - Bathing independent   Assistive Device tub seat with back   Upper Body Dressing   Level of [place]: Dress Upper Body independent   Lower Body Dressing   Level of [place]: Dress Lower Body independent   Toileting   Level of [place]: Toilet independent   OT Goal 1   Goal Identifier self cares   Goal Description Pt. will report the ability to hold a can of beer in his dominate right hand with mild to no difficulty   Target Date 05/03/18   OT Goal 2   Goal Identifier IADLs   Goal Description Pt. will pass [facility] neccessary assessments for driving with a score of safe or better   Target Date 06/07/18   OT Goal 3   Goal Identifier ADLs   Goal Description Pt. will report the ability to grasp and carry his dished with his right hand to the kitchen with mild to no difficulty   Target Date 05/24/18   Clinical Impression   Criteria for Skilled Therapeutic Interventions Met Yes, treatment indicated   OT Diagnosis decline in ADLs   Influenced  by the following impairments hand weakness, decreae ROM, decreae coordinaito,    Assessment of Occupational Performance 3-5 Performance Deficits   Clinical Decision Making (Complexity) Low complexity   Therapy Frequency 2x/week    Predicted Duration of Therapy Intervention (days/wks) W+8   Risks and Benefits of Treatment have been explained. Yes   Patient, Family & other staff in agreement with plan of care Yes   Clinical Impression Comments Decreae functional use of dominate hand   Total Evaluation Time   Total Evaluation Time 20

## 2018-04-17 ENCOUNTER — HOSPITAL ENCOUNTER (OUTPATIENT)
Dept: OCCUPATIONAL THERAPY | Facility: OTHER | Age: 83
Setting detail: THERAPIES SERIES
End: 2018-04-17
Attending: FAMILY MEDICINE
Payer: MEDICARE

## 2018-04-17 ENCOUNTER — HOSPITAL ENCOUNTER (OUTPATIENT)
Dept: PHYSICAL THERAPY | Facility: OTHER | Age: 83
Setting detail: THERAPIES SERIES
End: 2018-04-17
Attending: FAMILY MEDICINE
Payer: MEDICARE

## 2018-04-17 ENCOUNTER — TELEPHONE (OUTPATIENT)
Dept: FAMILY MEDICINE | Facility: OTHER | Age: 83
End: 2018-04-17

## 2018-04-17 DIAGNOSIS — G62.9 NEUROPATHY: Primary | ICD-10-CM

## 2018-04-17 PROBLEM — R13.12 OROPHARYNGEAL DYSPHAGIA: Status: ACTIVE | Noted: 2018-02-16

## 2018-04-17 PROBLEM — R47.1 DYSARTHRIA: Status: ACTIVE | Noted: 2018-02-16

## 2018-04-17 PROBLEM — N40.0 BENIGN PROSTATIC HYPERPLASIA WITHOUT LOWER URINARY TRACT SYMPTOMS: Status: ACTIVE | Noted: 2018-02-16

## 2018-04-17 PROBLEM — M54.31 SCIATICA OF RIGHT SIDE: Status: ACTIVE | Noted: 2018-02-16

## 2018-04-17 PROCEDURE — 97530 THERAPEUTIC ACTIVITIES: CPT | Mod: GO | Performed by: OCCUPATIONAL THERAPIST

## 2018-04-17 PROCEDURE — 97110 THERAPEUTIC EXERCISES: CPT | Mod: GP

## 2018-04-17 PROCEDURE — G8979 MOBILITY GOAL STATUS: HCPCS | Mod: GP,CI

## 2018-04-17 PROCEDURE — G8978 MOBILITY CURRENT STATUS: HCPCS | Mod: GP,CJ

## 2018-04-17 PROCEDURE — 97110 THERAPEUTIC EXERCISES: CPT | Mod: GO | Performed by: OCCUPATIONAL THERAPIST

## 2018-04-17 PROCEDURE — 40000185 ZZHC STATISTIC PT OUTPT VISIT

## 2018-04-17 PROCEDURE — 97162 PT EVAL MOD COMPLEX 30 MIN: CPT | Mod: GP

## 2018-04-17 PROCEDURE — 97112 NEUROMUSCULAR REEDUCATION: CPT | Mod: GP

## 2018-04-17 RX ORDER — GABAPENTIN 100 MG/1
CAPSULE ORAL
Refills: 0 | COMMUNITY
Start: 2018-03-14 | End: 2018-04-17

## 2018-04-17 RX ORDER — GABAPENTIN 100 MG/1
100 CAPSULE ORAL AT BEDTIME
Qty: 90 CAPSULE | Refills: 3 | Status: SHIPPED | OUTPATIENT
Start: 2018-04-17 | End: 2018-04-26

## 2018-04-17 NOTE — PROGRESS NOTES
04/17/18 0800   Quick Adds   Quick Adds Certification   Type of Visit Initial OP PT Evaluation   General Information   Start of Care Date 04/17/18   Referring Physician Jennifer   Orders Evaluate and Treat as Indicated   Order Date 04/09/18   Medical Diagnosis CVA   Onset of illness/injury or Date of Surgery 02/13/18   Surgical/Medical history reviewed Yes  (4 yr hx R herniated disk- chronic pain R buttock, numb toes)   Pertinent history of current problem (include personal factors and/or comorbidities that impact the POC) CVA 2/13/18, was in hopsital for 3 days then rehab in [place], had home health therapy and now starting out patient.  His R side was affected and pt feels making improvement, but not there yet.  is not very strong. Has AFO on R leg, otherwise the big toe drags. End of the foot is numb, but is getting better.  He's been doing HEP given by HHPT x3 days per week and about same by OT.    Prior level of functional mobility Transfers;Ambulation   Transfers independent   Ambulation independent without AD   Prior level of function comment Independent, no AD   Previous/Current Treatment Physical Therapy;Occupational Therapy   Improvement after PT Moderate   Improvement after OT Moderate   Current Community Support Family/friend caregiver   Patient role/Employment history Retired   Living environment House/[facility]   Home/Community Accessibility Comments Rails on all stairs, no grab bars. Shower in basement, tub upstairs. Currently uses the upstairs   Current Assistive Devices Standard Cane   Patient/Family Goals Statement Improve , gait, strength   Fall Risk Screen   Fall screen completed by PT   Have you fallen 2 or more times in the past year? Yes   Have you fallen and had an injury in the past year? No   Timed Up and Go score (seconds) 14.2   Is patient a fall risk? Yes   Pain   Patient currently in pain Yes   Pain location R arm   Pain rating 3   Pain description Ache   Additional pain  locations? Pain location 2   Pain location 2 R buttock into LE around knee   Pain rating 2 3   Pain comments not too bad   Cognitive Status Examination   Orientation orientation to person, place and time   Level of Consciousness alert   Personal Safety and Judgment intact   Observation   Observation Wife brought him to PT but didn't stay for session.    Strength   Strength Comments Hip flex L 4+/5, R 3+/5; hip abduction L 4/5, R 3/5; hip adduction B 4/5; knee ext L 5/5, R 4+/5; knee flex L 4+/5, R 3+/5; ankle DF L 4/5, R 3+; PF L 4+/5, R 3+/5;  Great toe ext R 4/5   Gait   Gait Gait Analysis   Gait Analysis   Gait Deviations Noted decreased stride length  (limited on R, more noticable as he fatigues)   Impairments Contributing to Gait Deviations impaired balance   Balance   Balance Comments unsteady without AD   Balance Special Tests   Balance Special Tests Timed up and go   Balance Special Tests Timed Up and Go   Seconds 14.2 Seconds   Comments with cane in L, antalgic gait on R   Sensory Examination   Sensory Perception Comments light touch- decreased on lateral and plantar and medial R foot, numb across toes   Muscle Tone   Muscle Tone no deficits were identified   Planned Therapy Interventions   Planned Therapy Interventions balance training;gait training;neuromuscular re-education;strengthening;ROM;stretching;transfer training   Clinical Impression   Criteria for Skilled Therapeutic Interventions Met yes, treatment indicated   PT Diagnosis CVA affecting R side   Influenced by the following impairments weakness, decreased ROM   Functional limitations due to impairments Impaired balance, impaired gait, impaired bed mobility   Clinical Presentation Evolving/Changing   Clinical Presentation Rationale Age, co-morbidities (chronic back issue), decreased activity level   Clinical Decision Making (Complexity) Moderate complexity   Therapy Frequency 2 times/Week   Predicted Duration of Therapy Intervention (days/wks) 8  weeks   Risk & Benefits of therapy have been explained Yes   Patient, Family & other staff in agreement with plan of care Yes   Clinical Impression Comments [id number]   GOALS   PT Eval Goals 1;2;3;4   Goal 1   Goal Identifier assistive device   Goal Description Pt to feels safe ambulating without cane in 8 weeks   Target Date 06/12/18   Goal 2   Goal Identifier gait speed    Goal Description Improve gait speed and safety demonstrated with TUG of 12 sec or better in 8 weeks.    Target Date 06/12/18   Goal 3   Goal Identifier balance   Goal Description Pt to feel safe walking on all surfaces to be able to walk outside this summer   Target Date 06/12/18   Goal 4   Goal Identifier stairs   Goal Description Pt comfortable going up/down stairs without rail in 8 weeks   Target Date 06/12/18   Total Evaluation Time   Total Evaluation Time (Minutes) 30   Therapy Certification   Certification date from 04/17/18   Certification date to 06/12/18   Medical Diagnosis CVA

## 2018-04-17 NOTE — TELEPHONE ENCOUNTER
Tried calling patient to verify dose and sig of Gabapentin. As it is not on his med list but is in the Medications that need reconciliation. Also need to get pharmacy.   Left message to call back  Alexia Chavis LPN........................4/17/2018  11:06 AM

## 2018-04-17 NOTE — TELEPHONE ENCOUNTER
Spoke with patient after he confirmed his last name and birth date. Patient is calling to request a refill of his Gabapentin. Verified the dose and pharmacy with the patient. Please sign if appropriate.  Sheridan Stewart LPN 4/17/2018 2:07 PM

## 2018-04-17 NOTE — TELEPHONE ENCOUNTER
Patient is requesting a refill on his Gabapentin. He missed his appointment with Dr Dahl so is rescheduled for 04/26/18 with Dr Dahl. Could he get enough medication until this appointment?    Monisha Sanders on 4/17/2018 at 11:01 AM

## 2018-04-17 NOTE — PROGRESS NOTES
[facility]        OUTPATIENT PHYSICAL THERAPY FUNCTIONAL EVALUATION  PLAN OF TREATMENT FOR OUTPATIENT REHABILITATION  (COMPLETE FOR INITIAL CLAIMS ONLY)  Patient's Last Name, First Name, M.I.  YOB: 1932  Ilia Ferguson     Provider's Name   [facility]   Medical Record No.  [id number]     Start of Care Date:  04/17/18   Onset Date:  02/13/18   Type:     _X__PT   ____OT  ____SLP Medical Diagnosis:  CVA     PT Diagnosis:  CVA affecting R side Visits from SOC:  1                              __________________________________________________________________________________  Plan of Treatment/Functional Goals:  balance training, gait training, neuromuscular re-education, strengthening, ROM, stretching, transfer training           GOALS  assistive device  Pt to feels safe ambulating without cane in 8 weeks  06/12/18    gait speed   Improve gait speed and safety demonstrated with TUG of 12 sec or better in 8 weeks.   06/12/18    balance  Pt to feel safe walking on all surfaces to be able to walk outside this summer  06/12/18    stairs  Pt comfortable going up/down stairs without rail in 8 weeks  06/12/18       Therapy Frequency:  2 times/Week   Predicted Duration of Therapy Intervention:  8 weeks    Archana Dowell, PT                                    I CERTIFY THE NEED FOR THESE SERVICES FURNISHED UNDER        THIS PLAN OF TREATMENT AND WHILE UNDER MY CARE     (Physician co-signature of this document indicates review and certification of the therapy plan).                Certification Date From:  04/17/18   Certification Date To:  06/12/18    Referring Provider:  Jennifer    Initial Assessment  See Epic Evaluation- Start of Care Date: 04/17/18

## 2018-04-20 ENCOUNTER — HOSPITAL ENCOUNTER (OUTPATIENT)
Dept: PHYSICAL THERAPY | Facility: OTHER | Age: 83
Setting detail: THERAPIES SERIES
End: 2018-04-20
Attending: FAMILY MEDICINE
Payer: MEDICARE

## 2018-04-20 ENCOUNTER — HOSPITAL ENCOUNTER (OUTPATIENT)
Dept: OCCUPATIONAL THERAPY | Facility: OTHER | Age: 83
Setting detail: THERAPIES SERIES
End: 2018-04-20
Attending: FAMILY MEDICINE
Payer: MEDICARE

## 2018-04-20 DIAGNOSIS — I69.351 HEMIPARESIS AFFECTING RIGHT SIDE AS LATE EFFECT OF CEREBROVASCULAR ACCIDENT (H): Primary | ICD-10-CM

## 2018-04-20 PROCEDURE — 97530 THERAPEUTIC ACTIVITIES: CPT | Mod: GO | Performed by: OCCUPATIONAL THERAPIST

## 2018-04-20 PROCEDURE — 97112 NEUROMUSCULAR REEDUCATION: CPT | Mod: GP

## 2018-04-20 PROCEDURE — 40000185 ZZHC STATISTIC PT OUTPT VISIT

## 2018-04-20 PROCEDURE — 97110 THERAPEUTIC EXERCISES: CPT | Mod: GO | Performed by: OCCUPATIONAL THERAPIST

## 2018-04-24 ENCOUNTER — HOSPITAL ENCOUNTER (OUTPATIENT)
Dept: PHYSICAL THERAPY | Facility: OTHER | Age: 83
Setting detail: THERAPIES SERIES
End: 2018-04-24
Attending: FAMILY MEDICINE
Payer: MEDICARE

## 2018-04-24 ENCOUNTER — HOSPITAL ENCOUNTER (OUTPATIENT)
Dept: OCCUPATIONAL THERAPY | Facility: OTHER | Age: 83
Setting detail: THERAPIES SERIES
End: 2018-04-24
Attending: FAMILY MEDICINE
Payer: MEDICARE

## 2018-04-24 PROCEDURE — 97140 MANUAL THERAPY 1/> REGIONS: CPT | Mod: GO | Performed by: OCCUPATIONAL THERAPIST

## 2018-04-24 PROCEDURE — 97112 NEUROMUSCULAR REEDUCATION: CPT | Mod: GP

## 2018-04-24 PROCEDURE — 97110 THERAPEUTIC EXERCISES: CPT | Mod: GP

## 2018-04-24 PROCEDURE — 97110 THERAPEUTIC EXERCISES: CPT | Mod: GO | Performed by: OCCUPATIONAL THERAPIST

## 2018-04-24 PROCEDURE — 40000185 ZZHC STATISTIC PT OUTPT VISIT

## 2018-04-26 ENCOUNTER — HOSPITAL ENCOUNTER (OUTPATIENT)
Dept: OCCUPATIONAL THERAPY | Facility: OTHER | Age: 83
Setting detail: THERAPIES SERIES
End: 2018-04-26
Attending: FAMILY MEDICINE
Payer: MEDICARE

## 2018-04-26 ENCOUNTER — OFFICE VISIT (OUTPATIENT)
Dept: PEDIATRICS | Facility: OTHER | Age: 83
End: 2018-04-26
Attending: INTERNAL MEDICINE
Payer: MEDICARE

## 2018-04-26 ENCOUNTER — HOSPITAL ENCOUNTER (OUTPATIENT)
Dept: PHYSICAL THERAPY | Facility: OTHER | Age: 83
Setting detail: THERAPIES SERIES
End: 2018-04-26
Attending: FAMILY MEDICINE
Payer: MEDICARE

## 2018-04-26 VITALS
HEART RATE: 90 BPM | HEIGHT: 70 IN | DIASTOLIC BLOOD PRESSURE: 80 MMHG | WEIGHT: 200 LBS | SYSTOLIC BLOOD PRESSURE: 126 MMHG | BODY MASS INDEX: 28.63 KG/M2

## 2018-04-26 DIAGNOSIS — Z76.89 ENCOUNTER TO ESTABLISH CARE: Primary | ICD-10-CM

## 2018-04-26 DIAGNOSIS — R91.1 INCIDENTAL PULMONARY NODULE: ICD-10-CM

## 2018-04-26 DIAGNOSIS — I10 ESSENTIAL HYPERTENSION: ICD-10-CM

## 2018-04-26 DIAGNOSIS — I63.312 CEREBROVASCULAR ACCIDENT (CVA) DUE TO THROMBOSIS OF LEFT MIDDLE CEREBRAL ARTERY (H): ICD-10-CM

## 2018-04-26 DIAGNOSIS — E11.8 CONTROLLED TYPE 2 DIABETES MELLITUS WITH COMPLICATION, WITHOUT LONG-TERM CURRENT USE OF INSULIN (H): ICD-10-CM

## 2018-04-26 DIAGNOSIS — G62.9 NEUROPATHY: ICD-10-CM

## 2018-04-26 DIAGNOSIS — I47.10 PAROXYSMAL SVT (SUPRAVENTRICULAR TACHYCARDIA) (H): ICD-10-CM

## 2018-04-26 DIAGNOSIS — M51.9 LUMBAR DISC DISEASE: ICD-10-CM

## 2018-04-26 DIAGNOSIS — N40.0 BENIGN PROSTATIC HYPERPLASIA WITHOUT LOWER URINARY TRACT SYMPTOMS: ICD-10-CM

## 2018-04-26 DIAGNOSIS — Z23 NEED FOR VACCINATION: ICD-10-CM

## 2018-04-26 DIAGNOSIS — M62.81 RIGHT-SIDED MUSCLE WEAKNESS: ICD-10-CM

## 2018-04-26 DIAGNOSIS — E78.2 MIXED HYPERLIPIDEMIA: ICD-10-CM

## 2018-04-26 DIAGNOSIS — R97.20 ELEVATED PROSTATE SPECIFIC ANTIGEN (PSA): ICD-10-CM

## 2018-04-26 PROBLEM — R47.1 DYSARTHRIA: Status: RESOLVED | Noted: 2018-02-16 | Resolved: 2018-04-26

## 2018-04-26 PROBLEM — I63.9 STROKE (H): Status: RESOLVED | Noted: 2018-02-13 | Resolved: 2018-04-26

## 2018-04-26 PROBLEM — E78.5 HYPERLIPIDEMIA: Status: RESOLVED | Noted: 2018-01-24 | Resolved: 2018-04-26

## 2018-04-26 PROCEDURE — 99214 OFFICE O/P EST MOD 30 MIN: CPT | Performed by: INTERNAL MEDICINE

## 2018-04-26 PROCEDURE — 97112 NEUROMUSCULAR REEDUCATION: CPT | Mod: GP

## 2018-04-26 PROCEDURE — G0009 ADMIN PNEUMOCOCCAL VACCINE: HCPCS

## 2018-04-26 PROCEDURE — 90670 PCV13 VACCINE IM: CPT | Performed by: INTERNAL MEDICINE

## 2018-04-26 PROCEDURE — G0463 HOSPITAL OUTPT CLINIC VISIT: HCPCS | Mod: 25

## 2018-04-26 PROCEDURE — 97110 THERAPEUTIC EXERCISES: CPT | Mod: GO | Performed by: OCCUPATIONAL THERAPIST

## 2018-04-26 PROCEDURE — 97110 THERAPEUTIC EXERCISES: CPT | Mod: GP

## 2018-04-26 PROCEDURE — 40000185 ZZHC STATISTIC PT OUTPT VISIT

## 2018-04-26 PROCEDURE — G0463 HOSPITAL OUTPT CLINIC VISIT: HCPCS

## 2018-04-26 RX ORDER — ATORVASTATIN CALCIUM 40 MG/1
40 TABLET, FILM COATED ORAL DAILY
Qty: 90 TABLET | Refills: 4 | Status: SHIPPED | OUTPATIENT
Start: 2018-04-26 | End: 2019-02-11

## 2018-04-26 RX ORDER — CLOPIDOGREL BISULFATE 75 MG/1
75 TABLET ORAL DAILY
Qty: 90 TABLET | Refills: 3 | Status: SHIPPED | OUTPATIENT
Start: 2018-04-26 | End: 2019-02-11

## 2018-04-26 RX ORDER — TAMSULOSIN HYDROCHLORIDE 0.4 MG/1
0.8 CAPSULE ORAL DAILY
Qty: 90 CAPSULE | Refills: 3 | Status: SHIPPED | OUTPATIENT
Start: 2018-04-26 | End: 2018-11-09

## 2018-04-26 RX ORDER — TAMSULOSIN HYDROCHLORIDE 0.4 MG/1
0.4 CAPSULE ORAL
Qty: 90 CAPSULE | Refills: 3 | Status: CANCELLED | OUTPATIENT
Start: 2018-04-26

## 2018-04-26 RX ORDER — GABAPENTIN 100 MG/1
100 CAPSULE ORAL AT BEDTIME
Qty: 90 CAPSULE | Refills: 3 | Status: SHIPPED | OUTPATIENT
Start: 2018-04-26 | End: 2019-02-11

## 2018-04-26 RX ORDER — ENALAPRIL MALEATE 20 MG/1
40 TABLET ORAL DAILY
Qty: 180 TABLET | Refills: 3 | Status: SHIPPED | OUTPATIENT
Start: 2018-04-26 | End: 2019-02-11

## 2018-04-26 RX ORDER — NAPROXEN SODIUM 220 MG
220 TABLET ORAL DAILY PRN
Qty: 90 TABLET | Refills: 3 | Status: SHIPPED | OUTPATIENT
Start: 2018-04-26 | End: 2019-02-11

## 2018-04-26 ASSESSMENT — PAIN SCALES - GENERAL: PAINLEVEL: NO PAIN (0)

## 2018-04-26 NOTE — PROGRESS NOTES
Subjective  Ilia Ferguson is a 86 year old male who presents for \[facility] [place] [facility]\"" primary care.  Previous physician was Dr. Rodriguez.  Unfortunately on 2018 he sustained a cerebrovascular accident.  He has had right-sided weakness including the foot drop and weakness of the right hand and foot.  He starting to make improvements with physical therapy.  He was started on Plavix and continues on aspirin for prophylaxis.  History of hyperlipidemia which has been stable with Lipitor.  History of hypertension which is borderline controlled with enalapril.  History of diabetes mellitus type 2 which has been well-controlled on metformin.  History of benign prostate enlargement and continues on tamsulosin.    Problem List/PMH: reviewed in EMR, and made relevant updates today.  Medications: reviewed in EMR, and made relevant updates today.  Allergies: reviewed in EMR, and made relevant updates today.    Social Hx:  Social History   Substance Use Topics     Smoking status: Never Smoker     Smokeless tobacco: Never Used     Alcohol use 0.0 oz/week      Comment: Alcoholic Drinks/day: 1 per day     Social History     Social History Narrative    Wife (Rima)    , nine children.      Lives in [place].      Retired oil  and .    Attends [employer].          I reviewed social history and made relevant updates today.    Family Hx:   Family History   Problem Relation Age of Onset     HEART DISEASE Father      Heart Disease, at age 57, MI     Hypertension Father      Hypertension     Other - See Comments Father 54     Stroke     HEART DISEASE Mother      Heart Disease, at age 86, heart failure.     Unknown/Adopted Brother      Unknown, at age 49, sudden death.     DIABETES Brother      Diabetes,Age 70     Hypertension Brother      Hypertension       Objective  Vitals: reviewed in EMR.  /80 (BP Location: Right arm, Patient Position: Sitting, Cuff  "Size: Adult Large)  Pulse 90  Ht 5' 10\" (1.778 m)  Wt 200 lb (90.7 kg)  BMI 28.7 kg/m2    Gen: Pleasant male, NAD.  HEENT: MMM, no OP erythema.   Neck: Supple, no JVD, no bruits.  CV: RRR no m/r/g.   Pulm: CTAB no w/r/r  Neuro: Grossly intact  Msk: No lower extremity edema.  Skin: No concerning lesions.  Psychiatric: Normal affect and insight. Does not appear anxious or depressed.    Labs:  Glucose   Date Value Ref Range Status   02/14/2018 126 (H) 70 - 105 mg/dL Final   02/13/2018 127 (H) 70 - 105 mg/dL Final     Hemoglobin A1C   Date Value Ref Range Status   02/14/2018 6.9 (H) 4.0 - 6.0 % Final     Creatinine   Date Value Ref Range Status   02/14/2018 0.92 0.70 - 1.30 mg/dL Final   02/13/2018 0.94 0.70 - 1.30 mg/dL Final     LDL Cholesterol Calculated   Date Value Ref Range Status   02/14/2018 93 <100 mg/dL Final     Comment:     Desirable:       <100 mg/dl             Assessment    ICD-10-CM    1. Encounter to establish care Z76.89 naproxen sodium (ANAPROX) 220 MG tablet   2. Cerebrovascular accident (CVA) due to thrombosis of left middle cerebral artery (H) I63.312 atorvastatin (LIPITOR) 40 MG tablet     clopidogrel (PLAVIX) 75 MG tablet     aspirin 81 MG EC tablet     Basic metabolic panel     Lipid Profile   3. Neuropathy G62.9 gabapentin (NEURONTIN) 100 MG capsule   4. Essential hypertension I10 enalapril (VASOTEC) 20 MG tablet   5. Mixed hyperlipidemia E78.2 Basic metabolic panel   6. Paroxysmal SVT (supraventricular tachycardia) (H) I47.1    7. Benign prostatic hyperplasia without lower urinary tract symptoms N40.0 tamsulosin (FLOMAX) 0.4 MG capsule   8. Incidental pulmonary nodule, benign R91.1     s/[facility]   9. Lumbar disc disease M51.9    10. Controlled type 2 diabetes mellitus with complication, without long-term current use of insulin (H) E11.8 metFORMIN (GLUCOPHAGE) 500 MG tablet     aspirin 81 MG EC tablet     Hemoglobin A1c     Basic metabolic panel   11. Right-sided muscle " weakness M62.81    12. Elevated prostate specific antigen (PSA) R97.20 tamsulosin (FLOMAX) 0.4 MG capsule   13. Need for vaccination Z23 Pneumococcal vaccine 13 valent PCV13 IM (Prevnar) [[id number]]     CANCELED: TDAP VACCINE (BOOSTRIX)     Orders Placed This Encounter   Procedures     Pneumococcal vaccine 13 valent PCV13 IM (Prevnar) [[id number]]     Hemoglobin A1c     Basic metabolic panel     Lipid Profile         Plan   -- Expected clinical course discussed   -- Medications and their side effects discussed  Patient Instructions    -- Start metformin 500 mg twice daily for diabetes   -- Increase atorvastatin to 40 mg for stroke prevention   -- Increase enalapril to 40 mg daily for blood pressure   -- Goal blood pressure is in 130s for top number   -- Increase tamsulosin to 0.8 mg for prostate   -- Take Diabetes Basics class   -- Lab visit in 3 months, fasting panel    -- MD visit after lab draw, at least an hour    Signed, Trino Dahl MD  Internal Medicine & Pediatrics

## 2018-04-26 NOTE — NURSING NOTE
Patient presents to clinic to establish care today and go over over his stroke and his medications.  Marely Gan LPN ....................  4/26/2018   8:46 AM

## 2018-04-26 NOTE — PATIENT INSTRUCTIONS
-- Start metformin 500 mg twice daily for diabetes   -- Increase atorvastatin to 40 mg for stroke prevention   -- Increase enalapril to 40 mg daily for blood pressure   -- Goal blood pressure is in 130s for top number   -- Increase tamsulosin to 0.8 mg for prostate   -- Take Diabetes Basics class   -- Lab visit in 3 months, fasting panel    -- MD visit after lab draw, at least an hour

## 2018-04-26 NOTE — MR AVS SNAPSHOT
After Visit Summary   4/26/2018    Ilia Ferguson    MRN: [id number]           Patient Information     Date Of Birth          2/20/1932        Visit Information        Provider Department      4/26/2018 8:30 AM Trino Dahl MD [facility]        Today's Diagnoses     Encounter to establish care    -  1    Cerebrovascular accident (CVA) due to thrombosis of left middle cerebral artery (H)        Neuropathy        Essential hypertension        Mixed hyperlipidemia        Paroxysmal SVT (supraventricular tachycardia) (H)        Benign prostatic hyperplasia without lower urinary tract symptoms        Incidental pulmonary nodule, benign        Lumbar disc disease        Controlled type 2 diabetes mellitus with complication, without long-term current use of insulin (H)        Right-sided muscle weakness        Elevated prostate specific antigen (PSA)        Need for vaccination          Care Instructions     -- Start metformin 500 mg twice daily for diabetes   -- Increase atorvastatin to 40 mg for stroke prevention   -- Increase enalapril to 40 mg daily for blood pressure   -- Goal blood pressure is in 130s for top number   -- Increase tamsulosin to 0.8 mg for prostate   -- Take Diabetes Basics class   -- Lab visit in 3 months, fasting panel    -- MD visit after lab draw, at least an hour          Follow-ups after your visit        Follow-up notes from your care team     Return in about 3 months (around 7/26/2018) for medication management, diabetes.      Your next 10 appointments already scheduled     Apr 26, 2018 10:00 AM CDT   Treatment with Phyllis Jefferson, OT   [facility] ([facility])    [address] [address]   [phone]            Apr 26, 2018  1:00 PM CDT   Treatment with Archana Dowell, PT   [facility] ([facility])    [address]  [address]   [phone]            May 03, 2018 10:30 AM CDT   Treatment with Phyllis LAWSON Isiahcameron, OT   [facility]    [address] [address]   [phone]            May 09, 2018 10:45 AM CDT   Treatment with Phyllis LAWSON Isiahcameron, OT   [facility]    [address] [address]   [phone]            May 11, 2018 10:15 AM CDT   Treatment with Phyllis LAWSON sIiahcameron, OT   [facility]    [address] [address]   [phone]            May 15, 2018 10:30 AM CDT   Treatment with Phyllis LAWSON Isiahcameron, OT   [facility]    [address] [address]   [phone]            May 17, 2018 10:30 AM CDT   Treatment with Phyllis LAWSON Isiahcameron, OT   [facility]    [address] [address]   [phone]            May 22, 2018 10:30 AM CDT   Treatment with Phyllis LAWSON Isiahcameron, OT   [facility]    [address] [address]   [phone]            May 24, 2018 10:30 AM CDT   Treatment with Phyllis LAWSON Ronny, OT   [facility]    [address] [address]   [phone]              Future tests that were ordered for you today     Open Future Orders        Priority Expected Expires Ordered    Hemoglobin A1c Routine  4/25/2019 4/26/2018    Basic metabolic panel Routine  4/25/2019 4/26/2018    Lipid Profile Routine  4/25/2019 4/26/2018            Who to contact     If you have questions or need follow up information about today's clinic visit or your schedule please contact [facility] directly at [phone].  Normal or non-critical lab and imaging results will be communicated to you by MyChart, letter or phone within 4 business days after the clinic has received the results. If you do not hear from us within 7 days, please contact the clinic through [employer] or phone. If you have a critical or abnormal lab result, we will notify you by phone as soon as possible.  Submit refill requests through [employer] or call your pharmacy and they will forward the refill request to us. Please allow 3 business days for your refill to be completed.          Additional Information About Your Visit        [employer] Information     [employer] lets you send messages to your doctor, view your test results, renew your prescriptions, schedule appointments and more. To sign up, go to [email or web address] . Click on \"Log in\" on the left side of the screen, which will take you to the Welcome page. Then click on \"Sign up Now\" on the right side of the page.     You will be asked to enter the access code listed below, as well as some personal information. Please follow the directions to create your username and password.     Your access code is: [email or web address]  Expires: 2018  7:51 PM     Your access code will  in 90 days. If you need help or a new code, please call your [facility] clinic or [phone].        Care EveryWhere ID     This is your Care EveryWhere ID. This could be used by other organizations to access your [facility] medical records  [id number]        Your Vitals Were     Pulse Height BMI (Body Mass Index)             90 5' 10\" (1.778 m) 28.7 kg/m2          Blood Pressure from Last 3 Encounters:   18 140/84   18 165/64   17 112/64    Weight from Last 3 Encounters:   18 200 lb (90.7 kg)   18 203 lb 14.8 oz (92.5 kg)   17 202 lb (91.6 kg)              We Performed the Following     Pneumococcal vaccine 13 valent PCV13 IM (Prevnar) [08250]     TDAP VACCINE " (BOOSTRIX)          Today's Medication Changes          These changes are accurate as of 4/26/18  9:44 AM.  If you have any questions, ask your nurse or doctor.               Start taking these medicines.        Dose/Directions    metFORMIN 500 MG tablet   Commonly known as:  GLUCOPHAGE   Used for:  Controlled type 2 diabetes mellitus with complication, without long-term current use of insulin (H)   Started by:  Trino Dahl MD        Dose:  500 mg   Take 1 tablet (500 mg) by mouth 2 times daily (with meals)   Quantity:  180 tablet   Refills:  3         These medicines have changed or have updated prescriptions.        Dose/Directions    acetaminophen 500 MG tablet   Commonly known as:  TYLENOL   This may have changed:  Another medication with the same name was removed. Continue taking this medication, and follow the directions you see here.   Changed by:  Trino Dahl MD        Dose:  500 mg   Take 500 mg by mouth daily as needed (leg pain)   Refills:  0       * aspirin EC 81 MG EC tablet   This may have changed:  Another medication with the same name was added. Make sure you understand how and when to take each.   Changed by:  Trino Dahl MD        Dose:  81 mg   Take 81 mg by mouth daily with food   Refills:  0       * aspirin 81 MG EC tablet   This may have changed:  You were already taking a medication with the same name, and this prescription was added. Make sure you understand how and when to take each.   Used for:  Cerebrovascular accident (CVA) due to thrombosis of left middle cerebral artery (H), Controlled type 2 diabetes mellitus with complication, without long-term current use of insulin (H)   Changed by:  Trino Dahl MD        Dose:  81 mg   Take 1 tablet (81 mg) by mouth daily   Quantity:  90 tablet   Refills:  3       atorvastatin 40 MG tablet   Commonly known as:  LIPITOR   This may have changed:    - medication strength  - how much to take   Used for:   Cerebrovascular accident (CVA) due to thrombosis of left middle cerebral artery (H)   Changed by:  Trino Dahl MD        Dose:  40 mg   Take 1 tablet (40 mg) by mouth daily   Quantity:  90 tablet   Refills:  4       clopidogrel 75 MG tablet   Commonly known as:  PLAVIX   This may have changed:  how to take this   Used for:  Cerebrovascular accident (CVA) due to thrombosis of left middle cerebral artery (H)   Changed by:  Trino Dahl MD        Dose:  75 mg   Take 1 tablet (75 mg) by mouth daily   Quantity:  90 tablet   Refills:  3       enalapril 20 MG tablet   Commonly known as:  VASOTEC   This may have changed:  how much to take   Used for:  Essential hypertension   Changed by:  Trino Dahl MD        Dose:  40 mg   Take 2 tablets (40 mg) by mouth daily   Quantity:  180 tablet   Refills:  3       * tamsulosin 0.4 MG capsule   Commonly known as:  FLOMAX   This may have changed:  Another medication with the same name was added. Make sure you understand how and when to take each.   Changed by:  Trino Dahl MD        Dose:  0.4 mg   Take 0.4 mg by mouth daily with food   Refills:  0       * tamsulosin 0.4 MG capsule   Commonly known as:  FLOMAX   This may have changed:  You were already taking a medication with the same name, and this prescription was added. Make sure you understand how and when to take each.   Used for:  Benign prostatic hyperplasia without lower urinary tract symptoms, Elevated prostate specific antigen (PSA)   Changed by:  Trino Dalh MD        Dose:  0.8 mg   Take 2 capsules (0.8 mg) by mouth daily   Quantity:  90 capsule   Refills:  3       * Notice:  This list has 4 medication(s) that are the same as other medications prescribed for you. Read the directions carefully, and ask your doctor or other care provider to review them with you.         Where to get your medicines      These medications were sent to [facility] -  [place], [place] - [address]  [address], [facility] [address]     Phone:  [phone]     aspirin 81 MG EC tablet    atorvastatin 40 MG tablet    clopidogrel 75 MG tablet    enalapril 20 MG tablet    gabapentin 100 MG capsule    metFORMIN 500 MG tablet    naproxen sodium 220 MG tablet    tamsulosin 0.4 MG capsule                Primary Care Provider Office Phone # Fax #    Trino Jose Dahl [phone]       [address]  [facility] [address]        Equal Access to Services     [facility]: Hadii aad ku hadasho Soomaali, waaxda luqadaha, qaybta kaalmada adeegyada, waxhemanth roberto hayjoe taylor . So [facility] [phone].    ATENCIÓN: Si habla español, tiene a ramesh disposición servicios gratuitos de asistencia lingüística. [facility] [phone].    We comply with applicable federal civil rights laws and [place] laws. We do not discriminate on the basis of race, color, national origin, age, disability, sex, sexual orientation, or gender identity.            Thank you!     Thank you for choosing [facility]  for your care. Our goal is always to provide you with excellent care. Hearing back from our patients is one way we can continue to improve our services. Please take a few minutes to complete the written survey that you may receive in the mail after your visit with us. Thank you!             Your Updated Medication List - Protect others around you: Learn how to safely use, store and throw away your medicines at www.disposemymeds.org.          This list is accurate as of 4/26/18  9:44 AM.  Always use your most recent med list.                   Brand Name Dispense Instructions for use Diagnosis    acetaminophen 500 MG tablet    TYLENOL     Take 500 mg by mouth daily as needed (leg pain)        * aspirin EC 81 MG EC tablet      Take 81 mg by mouth daily with food        * aspirin 81 MG EC tablet     90 tablet    Take 1 tablet (81 mg) by  mouth daily    Cerebrovascular accident (CVA) due to thrombosis of left middle cerebral artery (H), Controlled type 2 diabetes mellitus with complication, without long-term current use of insulin (H)       atorvastatin 40 MG tablet    LIPITOR    90 tablet    Take 1 tablet (40 mg) by mouth daily    Cerebrovascular accident (CVA) due to thrombosis of left middle cerebral artery (H)       clopidogrel 75 MG tablet    PLAVIX    90 tablet    Take 1 tablet (75 mg) by mouth daily    Cerebrovascular accident (CVA) due to thrombosis of left middle cerebral artery (H)       enalapril 20 MG tablet    VASOTEC    180 tablet    Take 2 tablets (40 mg) by mouth daily    Essential hypertension       gabapentin 100 MG capsule    NEURONTIN    90 capsule    Take 1 capsule (100 mg) by mouth At Bedtime    Neuropathy       metFORMIN 500 MG tablet    GLUCOPHAGE    180 tablet    Take 1 tablet (500 mg) by mouth 2 times daily (with meals)    Controlled type 2 diabetes mellitus with complication, without long-term current use of insulin (H)       MULTI-VITAMINS Tabs      Take 1 tablet by mouth daily        naproxen sodium 220 MG tablet    ANAPROX    90 tablet    Take 1 tablet (220 mg) by mouth daily as needed (leg pain)    Encounter to establish care       * tamsulosin 0.4 MG capsule    FLOMAX     Take 0.4 mg by mouth daily with food        * tamsulosin 0.4 MG capsule    FLOMAX    90 capsule    Take 2 capsules (0.8 mg) by mouth daily    Benign prostatic hyperplasia without lower urinary tract symptoms, Elevated prostate specific antigen (PSA)       * Notice:  This list has 4 medication(s) that are the same as other medications prescribed for you. Read the directions carefully, and ask your doctor or other care provider to review them with you.

## 2018-04-27 ASSESSMENT — PATIENT HEALTH QUESTIONNAIRE - PHQ9: SUM OF ALL RESPONSES TO PHQ QUESTIONS 1-9: 0

## 2018-05-03 ENCOUNTER — HOSPITAL ENCOUNTER (OUTPATIENT)
Dept: OCCUPATIONAL THERAPY | Facility: OTHER | Age: 83
Setting detail: THERAPIES SERIES
End: 2018-05-03
Attending: FAMILY MEDICINE
Payer: MEDICARE

## 2018-05-03 PROCEDURE — 97110 THERAPEUTIC EXERCISES: CPT | Mod: GO | Performed by: OCCUPATIONAL THERAPIST

## 2018-05-04 ENCOUNTER — HOSPITAL ENCOUNTER (OUTPATIENT)
Dept: PHYSICAL THERAPY | Facility: OTHER | Age: 83
Setting detail: THERAPIES SERIES
End: 2018-05-04
Attending: INTERNAL MEDICINE
Payer: MEDICARE

## 2018-05-04 PROCEDURE — 97112 NEUROMUSCULAR REEDUCATION: CPT | Mod: GP

## 2018-05-04 PROCEDURE — 97110 THERAPEUTIC EXERCISES: CPT | Mod: GP

## 2018-05-04 PROCEDURE — 40000185 ZZHC STATISTIC PT OUTPT VISIT

## 2018-05-09 ENCOUNTER — HOSPITAL ENCOUNTER (OUTPATIENT)
Dept: OCCUPATIONAL THERAPY | Facility: OTHER | Age: 83
Setting detail: THERAPIES SERIES
End: 2018-05-09
Attending: FAMILY MEDICINE
Payer: MEDICARE

## 2018-05-09 PROCEDURE — 97010 HOT OR COLD PACKS THERAPY: CPT | Mod: GO | Performed by: OCCUPATIONAL THERAPIST

## 2018-05-09 PROCEDURE — 97110 THERAPEUTIC EXERCISES: CPT | Mod: GO | Performed by: OCCUPATIONAL THERAPIST

## 2018-05-10 ENCOUNTER — HOSPITAL ENCOUNTER (OUTPATIENT)
Dept: PHYSICAL THERAPY | Facility: OTHER | Age: 83
Setting detail: THERAPIES SERIES
End: 2018-05-10
Attending: INTERNAL MEDICINE
Payer: MEDICARE

## 2018-05-10 PROCEDURE — 40000185 ZZHC STATISTIC PT OUTPT VISIT

## 2018-05-10 PROCEDURE — 97110 THERAPEUTIC EXERCISES: CPT | Mod: GP

## 2018-05-10 PROCEDURE — 97112 NEUROMUSCULAR REEDUCATION: CPT | Mod: GP

## 2018-05-11 ENCOUNTER — HOSPITAL ENCOUNTER (OUTPATIENT)
Dept: OCCUPATIONAL THERAPY | Facility: OTHER | Age: 83
Setting detail: THERAPIES SERIES
End: 2018-05-11
Attending: FAMILY MEDICINE
Payer: MEDICARE

## 2018-05-11 PROCEDURE — 97110 THERAPEUTIC EXERCISES: CPT | Mod: GO | Performed by: OCCUPATIONAL THERAPIST

## 2018-05-15 ENCOUNTER — HOSPITAL ENCOUNTER (OUTPATIENT)
Dept: OCCUPATIONAL THERAPY | Facility: OTHER | Age: 83
Setting detail: THERAPIES SERIES
End: 2018-05-15
Attending: FAMILY MEDICINE
Payer: MEDICARE

## 2018-05-15 PROCEDURE — 97110 THERAPEUTIC EXERCISES: CPT | Mod: GO | Performed by: OCCUPATIONAL THERAPIST

## 2018-05-16 ENCOUNTER — HOSPITAL ENCOUNTER (OUTPATIENT)
Dept: PHYSICAL THERAPY | Facility: OTHER | Age: 83
Setting detail: THERAPIES SERIES
End: 2018-05-16
Attending: INTERNAL MEDICINE
Payer: MEDICARE

## 2018-05-16 PROCEDURE — 40000185 ZZHC STATISTIC PT OUTPT VISIT

## 2018-05-16 PROCEDURE — 97112 NEUROMUSCULAR REEDUCATION: CPT | Mod: GP

## 2018-05-16 PROCEDURE — 97110 THERAPEUTIC EXERCISES: CPT | Mod: GP

## 2018-05-17 ENCOUNTER — HOSPITAL ENCOUNTER (OUTPATIENT)
Dept: OCCUPATIONAL THERAPY | Facility: OTHER | Age: 83
Setting detail: THERAPIES SERIES
End: 2018-05-17
Attending: FAMILY MEDICINE
Payer: MEDICARE

## 2018-05-17 PROCEDURE — 97110 THERAPEUTIC EXERCISES: CPT | Mod: GO | Performed by: OCCUPATIONAL THERAPIST

## 2018-05-17 PROCEDURE — 97530 THERAPEUTIC ACTIVITIES: CPT | Mod: GO | Performed by: OCCUPATIONAL THERAPIST

## 2018-05-17 PROCEDURE — 97018 PARAFFIN BATH THERAPY: CPT | Mod: GO | Performed by: OCCUPATIONAL THERAPIST

## 2018-05-17 NOTE — PROGRESS NOTES
"Outpatient Occupational Therapy Progress Note     Patient: Ilia Ferguson  : 1932    Beginning/End Dates of Reporting Period:  2018 to 2018    Referring Provider: Dr. Dahl    Therapy Diagnosis: S/P CVA with right side weakness    Client Self Report: I can feel the muscel in my arm  and I can lift it with out it falling down.      Objective Measurements:     Objective Measure: right hand strength:    Details: previous: : 19 key: 10 three point: 8 Current: : 23 key: 11 Three point: 9 A new dynamometer was used and readings are lower than expected.    Objective Measure: Shoulder ROM.    Details: Right: previous; flexoin: 96 current: 142  abduction: previous: 97 current: 140         Goals:     Goal Identifier self cares   Goal Description Pt. will report the ability to hold a can of beer in his dominate right hand with mild to no difficulty   Target Date 18 (5/3: \"I even drank a beer with my hand last night\" )   Date Met      Progress:     Goal Identifier IADLs   Goal Description Pt. will pass [facility] neccessary assessments for driving with a score of safe or better   Target Date 18   Date Met      Progress:     Goal Identifier ADLs   Goal Description Pt. will report the ability to grasp and carry his dished with his right hand to the kitchen with mild to no difficulty   Target Date 18 (is able to carry light dishes, half a glass of water,)   Date Met      Progress:       Progress Toward Goals:   Progress this reporting period: Ilia has progressed well in all areas. His RUE strength has improved so he is now able to lift his arm with control it on the way down . He is using his dominate RUE more for lifting and carrying of objects.     Plan:  Continue therapy per current plan of care.    Discharge:  No  "

## 2018-05-18 ENCOUNTER — HOSPITAL ENCOUNTER (OUTPATIENT)
Dept: PHYSICAL THERAPY | Facility: OTHER | Age: 83
Setting detail: THERAPIES SERIES
End: 2018-05-18
Attending: INTERNAL MEDICINE
Payer: MEDICARE

## 2018-05-18 PROCEDURE — 97112 NEUROMUSCULAR REEDUCATION: CPT | Mod: GP

## 2018-05-18 PROCEDURE — 40000185 ZZHC STATISTIC PT OUTPT VISIT

## 2018-05-18 PROCEDURE — 97110 THERAPEUTIC EXERCISES: CPT | Mod: GP

## 2018-05-22 ENCOUNTER — HOSPITAL ENCOUNTER (OUTPATIENT)
Dept: PHYSICAL THERAPY | Facility: OTHER | Age: 83
Setting detail: THERAPIES SERIES
End: 2018-05-22
Attending: INTERNAL MEDICINE
Payer: MEDICARE

## 2018-05-22 ENCOUNTER — HOSPITAL ENCOUNTER (OUTPATIENT)
Dept: OCCUPATIONAL THERAPY | Facility: OTHER | Age: 83
Setting detail: THERAPIES SERIES
End: 2018-05-22
Attending: FAMILY MEDICINE
Payer: MEDICARE

## 2018-05-22 PROCEDURE — 97110 THERAPEUTIC EXERCISES: CPT | Mod: GP

## 2018-05-22 PROCEDURE — 40000185 ZZHC STATISTIC PT OUTPT VISIT

## 2018-05-22 PROCEDURE — 97110 THERAPEUTIC EXERCISES: CPT | Mod: GO | Performed by: OCCUPATIONAL THERAPIST

## 2018-05-22 PROCEDURE — 97112 NEUROMUSCULAR REEDUCATION: CPT | Mod: GP

## 2018-05-24 ENCOUNTER — HOSPITAL ENCOUNTER (OUTPATIENT)
Dept: OCCUPATIONAL THERAPY | Facility: OTHER | Age: 83
Setting detail: THERAPIES SERIES
End: 2018-05-24
Attending: FAMILY MEDICINE
Payer: MEDICARE

## 2018-05-24 ENCOUNTER — HOSPITAL ENCOUNTER (OUTPATIENT)
Dept: PHYSICAL THERAPY | Facility: OTHER | Age: 83
Setting detail: THERAPIES SERIES
End: 2018-05-24
Attending: INTERNAL MEDICINE
Payer: MEDICARE

## 2018-05-24 PROCEDURE — 40000185 ZZHC STATISTIC PT OUTPT VISIT

## 2018-05-24 PROCEDURE — 97110 THERAPEUTIC EXERCISES: CPT | Mod: GP

## 2018-05-24 PROCEDURE — G8979 MOBILITY GOAL STATUS: HCPCS | Mod: GP,CI

## 2018-05-24 PROCEDURE — 97110 THERAPEUTIC EXERCISES: CPT | Mod: GO | Performed by: OCCUPATIONAL THERAPIST

## 2018-05-24 PROCEDURE — G8978 MOBILITY CURRENT STATUS: HCPCS | Mod: GP,CI

## 2018-05-24 PROCEDURE — 97112 NEUROMUSCULAR REEDUCATION: CPT | Mod: GP

## 2018-05-24 PROCEDURE — 97112 NEUROMUSCULAR REEDUCATION: CPT | Mod: GO | Performed by: OCCUPATIONAL THERAPIST

## 2018-05-24 NOTE — PROGRESS NOTES
Outpatient Physical Therapy Progress Note     Patient: Ilia Ferguson  : 1932    Beginning/End Dates of Reporting Period:  18 to 2018    Referring Provider: Dr. Rodriguez    Therapy Diagnosis: CVA with R side weakness     Client Self Report: Getting some of HEP in, yesterday got other 1/2 of lawn mowed.  Still stubs R toe on things not every day anymore, no falls. Can manage getting up in the night in the dark without cane.  Hasn't tried steps without rail.     Goals:  Goal Identifier assistive device   Goal Description Pt to feels safe ambulating without cane in 8 weeks   Target Date 18   Date Met  18   Progress:     Goal Identifier gait speed    Goal Description Improve gait speed and safety demonstrated with TUG of 12 sec or better in 8 weeks.    Target Date 18   Date Met  18   Progress:     Goal Identifier balance   Goal Description Pt to feel safe walking on all surfaces to be able to walk outside this summer   Target Date 18   Date Met  18   Progress:     Goal Identifier stairs   Goal Description Pt comfortable going up/down stairs without rail in 8 weeks   Target Date 18   Date Met      Progress:     Progress Toward Goals:   Progress this reporting period: making good gains    Plan:  Continue therapy per current plan of care.    Discharge:  No

## 2018-05-31 ENCOUNTER — HOSPITAL ENCOUNTER (OUTPATIENT)
Dept: OCCUPATIONAL THERAPY | Facility: OTHER | Age: 83
Setting detail: THERAPIES SERIES
End: 2018-05-31
Attending: FAMILY MEDICINE
Payer: MEDICARE

## 2018-05-31 PROCEDURE — 97110 THERAPEUTIC EXERCISES: CPT | Mod: GO | Performed by: OCCUPATIONAL THERAPIST

## 2018-05-31 PROCEDURE — 97112 NEUROMUSCULAR REEDUCATION: CPT | Mod: GO | Performed by: OCCUPATIONAL THERAPIST

## 2018-06-05 ENCOUNTER — HOSPITAL ENCOUNTER (OUTPATIENT)
Dept: PHYSICAL THERAPY | Facility: OTHER | Age: 83
Setting detail: THERAPIES SERIES
End: 2018-06-05
Attending: FAMILY MEDICINE
Payer: MEDICARE

## 2018-06-05 ENCOUNTER — HOSPITAL ENCOUNTER (OUTPATIENT)
Dept: OCCUPATIONAL THERAPY | Facility: OTHER | Age: 83
Setting detail: THERAPIES SERIES
End: 2018-06-05
Attending: FAMILY MEDICINE
Payer: MEDICARE

## 2018-06-05 PROCEDURE — 97112 NEUROMUSCULAR REEDUCATION: CPT | Mod: GP

## 2018-06-05 PROCEDURE — 97110 THERAPEUTIC EXERCISES: CPT | Mod: GO | Performed by: OCCUPATIONAL THERAPIST

## 2018-06-05 PROCEDURE — 40000185 ZZHC STATISTIC PT OUTPT VISIT

## 2018-06-05 PROCEDURE — 97110 THERAPEUTIC EXERCISES: CPT | Mod: GP

## 2018-06-07 ENCOUNTER — HOSPITAL ENCOUNTER (OUTPATIENT)
Dept: PHYSICAL THERAPY | Facility: OTHER | Age: 83
Setting detail: THERAPIES SERIES
End: 2018-06-07
Attending: FAMILY MEDICINE
Payer: MEDICARE

## 2018-06-07 ENCOUNTER — HOSPITAL ENCOUNTER (OUTPATIENT)
Dept: OCCUPATIONAL THERAPY | Facility: OTHER | Age: 83
Setting detail: THERAPIES SERIES
End: 2018-06-07
Attending: FAMILY MEDICINE
Payer: MEDICARE

## 2018-06-07 PROCEDURE — 40000185 ZZHC STATISTIC PT OUTPT VISIT

## 2018-06-07 PROCEDURE — 97110 THERAPEUTIC EXERCISES: CPT | Mod: GP

## 2018-06-07 PROCEDURE — 97018 PARAFFIN BATH THERAPY: CPT | Mod: GO,XU | Performed by: OCCUPATIONAL THERAPIST

## 2018-06-07 PROCEDURE — 97140 MANUAL THERAPY 1/> REGIONS: CPT | Mod: GO | Performed by: OCCUPATIONAL THERAPIST

## 2018-06-07 PROCEDURE — 97110 THERAPEUTIC EXERCISES: CPT | Mod: GO | Performed by: OCCUPATIONAL THERAPIST

## 2018-06-07 PROCEDURE — 97530 THERAPEUTIC ACTIVITIES: CPT | Mod: GO,XU | Performed by: OCCUPATIONAL THERAPIST

## 2018-06-07 PROCEDURE — 97112 NEUROMUSCULAR REEDUCATION: CPT | Mod: GP

## 2018-06-07 NOTE — PROGRESS NOTES
"                                                                                [facility]      OUTPATIENT OCCUPATIONAL THERAPY  PLAN OF TREATMENT FOR OUTPATIENT REHABILITATION    Patient's Last Name, First Name, M.I.                YOB: 1932  Ilia Ferguson                        Provider's Name  [facility] Medical Record No.  [id number]                               Onset Date: 2/13/2018   Start of Care Date: 4/12/2018   Type:     ___PT   _X_OT   ___SLP Medical Diagnosis: S/P CVA                       OT Diagnosis: right side weakness, hemiplegia      _________________________________________________________________________________  Plan of Treatment:    Frequency/Duration: 2x/week for 8 weeks  New certification Date: 6/7/2018 to 8/2/2018     Goals:    Goal Identifier self cares   Goal Description Pt. will report the ability to hold a can of beer in his dominate right hand with mild to no difficulty   Target Date 05/03/18 (5/3: \"I even drank a beer with my hand last night\" )   Date Met      Progress: 6/7/2018 reports increase consistency.      Goal Identifier IADLs   Goal Description Pt. will pass [facility] neccessary assessments for driving with a score of safe or better   Target Date 06/07/18   Date Met      Progress: 6/6/2018 scares are fall in the unsafe range when using his RUE.      Goal Identifier ADLs   Goal Description Pt. will report the ability to grasp and carry his dished with his right hand to the kitchen with mild to no difficulty   Target Date 05/24/18 (is able to carry light dishes, half a glass of water,)   Date Met      Progress: 6/6/2018 able to carry a full glass of milk and one plate.         Progress Toward Goals:   Progress this reporting period: Ilia is demonstrating increase RUE strength. He is now able to carry a glass of water in his right hand and lifting his arm above shoulder height to grab a towel with decrease compensation.  He " still has difficulty carrying object that are over 1#. He remains appropriate for skilled OT to maximize strength and function in RUE for increase independence and safety.     Certification date from 4/12/2018 to 6/7/2018.    Phyllis Jefferson, OT          I CERTIFY THE NEED FOR THESE SERVICES FURNISHED UNDER        THIS PLAN OF TREATMENT AND WHILE UNDER MY CARE     (Physician co-signature of this document indicates review and certification of the therapy plan).                Referring Provider: Dr. Dahl

## 2018-06-12 ENCOUNTER — HOSPITAL ENCOUNTER (OUTPATIENT)
Dept: OCCUPATIONAL THERAPY | Facility: OTHER | Age: 83
Setting detail: THERAPIES SERIES
End: 2018-06-12
Attending: FAMILY MEDICINE
Payer: MEDICARE

## 2018-06-12 ENCOUNTER — HOSPITAL ENCOUNTER (OUTPATIENT)
Dept: PHYSICAL THERAPY | Facility: OTHER | Age: 83
Setting detail: THERAPIES SERIES
End: 2018-06-12
Attending: FAMILY MEDICINE
Payer: MEDICARE

## 2018-06-12 PROCEDURE — 97530 THERAPEUTIC ACTIVITIES: CPT | Mod: GO

## 2018-06-12 PROCEDURE — 97110 THERAPEUTIC EXERCISES: CPT | Mod: GO

## 2018-06-12 PROCEDURE — 97112 NEUROMUSCULAR REEDUCATION: CPT | Mod: GO

## 2018-06-12 PROCEDURE — 40000185 ZZHC STATISTIC PT OUTPT VISIT

## 2018-06-12 PROCEDURE — 97110 THERAPEUTIC EXERCISES: CPT | Mod: GP

## 2018-06-12 PROCEDURE — 97112 NEUROMUSCULAR REEDUCATION: CPT | Mod: GP

## 2018-06-12 PROCEDURE — 40000125 ZZHC STATISTIC OT OUTPT VISIT

## 2018-06-12 NOTE — PROGRESS NOTES
[facility]      OUTPATIENT PHYSICAL THERAPY  PLAN OF TREATMENT FOR OUTPATIENT REHABILITATION    Patient's Last Name, First Name, M.I.                YOB: 1932  Ilia Ferguson                        Provider's Name  [facility] Medical Record No.  [id number]                               Onset Date: 2/13/18   Start of Care Date: 4/17/18   Type:     _X_PT   ___OT   ___SLP Medical Diagnosis: CVA                       PT Diagnosis: CVA with decreased balance, gait, R side weakness      _________________________________________________________________________________  Plan of Treatment:    Frequency/Duration: 1-2x/wk, we've worked with him for 8 weeks, still making gains and would like to continue 1-2 more months     Goals:  Goal Identifier assistive device   Goal Description Pt to feels safe ambulating without cane in 8 weeks   Target Date 06/12/18   Date Met  05/24/18   Progress:     Goal Identifier gait speed    Goal Description Improve gait speed and safety demonstrated with TUG of 12 sec or better in 8 weeks.    Target Date 06/12/18   Date Met  05/16/18   Progress:     Goal Identifier balance   Goal Description Pt to feel safe walking on all surfaces to be able to walk outside this summer   Target Date 06/12/18   Date Met  05/16/18   Progress:     Goal Identifier stairs   Goal Description Pt comfortable going up/down stairs without rail in 8 weeks   Target Date 06/12/18   Date Met      Progress: still needs rail for all stairs 6/12/18     Progress Toward Goals:   Progress this reporting period: improved balance, safety with gait, still limited R PF and DF and only seldom catches toe any more.  He's d/c AFO and cane for all gait.  Still building strength and balance with R leg and willing to continue for a month or two.     Certification date from 6/12/18 to 8/7/18.    Archana  Delmer, PT          I CERTIFY THE NEED FOR THESE SERVICES FURNISHED UNDER        THIS PLAN OF TREATMENT AND WHILE UNDER MY CARE     (Physician co-signature of this document indicates review and certification of the therapy plan).                Referring Provider: Dr. Dahl (initated by Dr. Rodriguez)

## 2018-06-13 ENCOUNTER — HOSPITAL ENCOUNTER (OUTPATIENT)
Dept: OCCUPATIONAL THERAPY | Facility: OTHER | Age: 83
Setting detail: THERAPIES SERIES
End: 2018-06-13
Attending: FAMILY MEDICINE
Payer: MEDICARE

## 2018-06-13 PROCEDURE — 97110 THERAPEUTIC EXERCISES: CPT | Mod: GO | Performed by: OCCUPATIONAL THERAPIST

## 2018-06-13 PROCEDURE — 97112 NEUROMUSCULAR REEDUCATION: CPT | Mod: GO | Performed by: OCCUPATIONAL THERAPIST

## 2018-06-13 NOTE — ADDENDUM NOTE
Encounter addended by: Phyllis Jefferson OT on: 6/13/2018  9:11 AM<BR>     Actions taken: Sign clinical note

## 2018-06-14 ENCOUNTER — HOSPITAL ENCOUNTER (OUTPATIENT)
Dept: PHYSICAL THERAPY | Facility: OTHER | Age: 83
Setting detail: THERAPIES SERIES
End: 2018-06-14
Attending: FAMILY MEDICINE
Payer: MEDICARE

## 2018-06-14 PROCEDURE — 40000185 ZZHC STATISTIC PT OUTPT VISIT

## 2018-06-14 PROCEDURE — 97110 THERAPEUTIC EXERCISES: CPT | Mod: GP

## 2018-06-14 PROCEDURE — 97112 NEUROMUSCULAR REEDUCATION: CPT | Mod: GP

## 2018-06-19 ENCOUNTER — HOSPITAL ENCOUNTER (OUTPATIENT)
Dept: PHYSICAL THERAPY | Facility: OTHER | Age: 83
Setting detail: THERAPIES SERIES
End: 2018-06-19
Attending: FAMILY MEDICINE
Payer: MEDICARE

## 2018-06-19 ENCOUNTER — HOSPITAL ENCOUNTER (OUTPATIENT)
Dept: OCCUPATIONAL THERAPY | Facility: OTHER | Age: 83
Setting detail: THERAPIES SERIES
End: 2018-06-19
Attending: FAMILY MEDICINE
Payer: MEDICARE

## 2018-06-19 PROCEDURE — 97110 THERAPEUTIC EXERCISES: CPT | Mod: GP

## 2018-06-19 PROCEDURE — 97110 THERAPEUTIC EXERCISES: CPT | Mod: GO | Performed by: OCCUPATIONAL THERAPIST

## 2018-06-19 PROCEDURE — 97112 NEUROMUSCULAR REEDUCATION: CPT | Mod: GO | Performed by: OCCUPATIONAL THERAPIST

## 2018-06-19 PROCEDURE — 40000185 ZZHC STATISTIC PT OUTPT VISIT

## 2018-06-19 PROCEDURE — 97530 THERAPEUTIC ACTIVITIES: CPT | Mod: GO | Performed by: OCCUPATIONAL THERAPIST

## 2018-06-21 ENCOUNTER — HOSPITAL ENCOUNTER (OUTPATIENT)
Dept: PHYSICAL THERAPY | Facility: OTHER | Age: 83
Setting detail: THERAPIES SERIES
End: 2018-06-21
Attending: FAMILY MEDICINE
Payer: MEDICARE

## 2018-06-21 ENCOUNTER — HOSPITAL ENCOUNTER (OUTPATIENT)
Dept: OCCUPATIONAL THERAPY | Facility: OTHER | Age: 83
Setting detail: THERAPIES SERIES
End: 2018-06-21
Attending: FAMILY MEDICINE
Payer: MEDICARE

## 2018-06-21 PROCEDURE — 40000185 ZZHC STATISTIC PT OUTPT VISIT

## 2018-06-21 PROCEDURE — 97110 THERAPEUTIC EXERCISES: CPT | Mod: GP

## 2018-06-21 PROCEDURE — 97110 THERAPEUTIC EXERCISES: CPT | Mod: GO | Performed by: OCCUPATIONAL THERAPIST

## 2018-06-21 PROCEDURE — 97140 MANUAL THERAPY 1/> REGIONS: CPT | Mod: GO | Performed by: OCCUPATIONAL THERAPIST

## 2018-06-22 ENCOUNTER — TELEPHONE (OUTPATIENT)
Dept: OCCUPATIONAL THERAPY | Facility: OTHER | Age: 83
End: 2018-06-22

## 2018-06-22 NOTE — TELEPHONE ENCOUNTER
Ilia Mercer has been wondering about resuming driving. I have done several of the driving assessment pieces with him and he has done well. The only concerns was with his right foot being able to move from the gas to the brake pedal quickly and accurately. We have been working on the in our sessions and he is sitting in the pickup at home and working on moving his foot back and forth. He is doing well. Cognitively, he is aware enough to use compensatory techniques and has also practiced using his left foot on the brake. Would you be OK in [place] for now? Then progressing to town trips after her is more comfortable with managing the pedals. Thanks, Phyllis

## 2018-06-26 ENCOUNTER — TELEPHONE (OUTPATIENT)
Dept: OCCUPATIONAL THERAPY | Facility: OTHER | Age: 83
End: 2018-06-26

## 2018-06-26 ENCOUNTER — TELEPHONE (OUTPATIENT)
Dept: PEDIATRICS | Facility: OTHER | Age: 83
End: 2018-06-26

## 2018-06-26 ENCOUNTER — HOSPITAL ENCOUNTER (OUTPATIENT)
Dept: OCCUPATIONAL THERAPY | Facility: OTHER | Age: 83
Setting detail: THERAPIES SERIES
End: 2018-06-26
Attending: FAMILY MEDICINE
Payer: MEDICARE

## 2018-06-26 ENCOUNTER — HOSPITAL ENCOUNTER (OUTPATIENT)
Dept: PHYSICAL THERAPY | Facility: OTHER | Age: 83
Setting detail: THERAPIES SERIES
End: 2018-06-26
Attending: FAMILY MEDICINE
Payer: MEDICARE

## 2018-06-26 PROCEDURE — 97110 THERAPEUTIC EXERCISES: CPT | Mod: GO | Performed by: OCCUPATIONAL THERAPIST

## 2018-06-26 PROCEDURE — 97110 THERAPEUTIC EXERCISES: CPT | Mod: GP

## 2018-06-26 PROCEDURE — 40000185 ZZHC STATISTIC PT OUTPT VISIT

## 2018-06-26 PROCEDURE — 97112 NEUROMUSCULAR REEDUCATION: CPT | Mod: GP

## 2018-06-26 NOTE — PROGRESS NOTES
"Outpatient Occupational Therapy Progress Note     Patient: Ilia Ferguson  : 1932    Beginning/End Dates of Reporting Period:  2018 to 2018    Referring Provider: Dr. Rodriguez , Primary provider: Dr. Dahl    Therapy Diagnosis: CVA    Client Self Report: Rei state he spills his coffee when carrying it in his right hand. The cup tips forward. He is goig to drive the pickup to the store today to get groceries. he was remeindeed that he has been instructed not to drive. he is interersted in [facility] into [place] after discharge to continue working on his strength.      Objective Measurements:     Objective Measure: right hand strength:    Details: previous: : 19 key: 10 three point: 8 Current: : 23 key: 11 Three point: 9 A new dynamometer was used and readings aer lower than expected.    Objective Measure: Shoulder ROM.    Details: Right: previous; flexion: 96 current: 142  abduction: previous: 97 current: 140                 Goals:     Goal Identifier self cares   Goal Description Pt. will report the ability to hold a can of beer in his dominate right hand with mild to no difficulty   Target Date 18 (5/3: \"I even drank a beer with my hand last night\" )   Date Met     5/3/2018   Progress:     Goal Identifier IADLs   Goal Description [facility] with a score of safe or better   Target Date 18   Date Met   2018   Progress: Note sent to Dr. Dahl to allow driving.      Goal Identifier ADLs   Goal Description Pt. will report the ability to grasp and carry his dished with his right hand to the kitchen with mild to no difficulty   Target Date 18 (is able to carry light dishes, half a glass of water,)   Date Met      Progress: 2018. Is able to carry dishes with both hands. Difficulty carrying full coffee cup      Progress Toward Goals:   Progress this reporting period: Rei stated his RU is about 90-95% of PLOF. He feels tightness in the " dorsum of his hand when making a tight fist. No other real concerns.     Plan:  Continue therapy per current plan of care for one more session to complete training in exercises routine for [employer] and final approval for driving.     Discharge:  No

## 2018-06-28 ENCOUNTER — HOSPITAL ENCOUNTER (OUTPATIENT)
Dept: OCCUPATIONAL THERAPY | Facility: OTHER | Age: 83
Setting detail: THERAPIES SERIES
End: 2018-06-28
Attending: FAMILY MEDICINE
Payer: MEDICARE

## 2018-06-28 PROCEDURE — 97110 THERAPEUTIC EXERCISES: CPT | Mod: GO | Performed by: OCCUPATIONAL THERAPIST

## 2018-06-29 ENCOUNTER — HOSPITAL ENCOUNTER (OUTPATIENT)
Dept: PHYSICAL THERAPY | Facility: OTHER | Age: 83
Setting detail: THERAPIES SERIES
End: 2018-06-29
Attending: FAMILY MEDICINE
Payer: MEDICARE

## 2018-06-29 PROCEDURE — 40000185 ZZHC STATISTIC PT OUTPT VISIT

## 2018-06-29 PROCEDURE — G8979 MOBILITY GOAL STATUS: HCPCS | Mod: GP,CI

## 2018-06-29 PROCEDURE — G8980 MOBILITY D/C STATUS: HCPCS | Mod: GP,CI

## 2018-06-29 PROCEDURE — 97110 THERAPEUTIC EXERCISES: CPT | Mod: GP

## 2018-06-29 PROCEDURE — 97112 NEUROMUSCULAR REEDUCATION: CPT | Mod: GP

## 2018-06-29 NOTE — PROGRESS NOTES
Outpatient Physical Therapy Discharge Note     Patient: Ilia Ferguson  : 1932    Beginning/End Dates of Reporting Period:  18 to 2018    Referring Provider: Dr. Rodriguez - switched to Dr. Dahl    Therapy Diagnosis: CVA     Client Self Report: Doing well, willing to continue on his own, set up with program to come into PT gym for [employer] and he is motivated to do 2x/wk.     Objective Measurements:  Objective Measure: Subjective pain rating   Details: 0/10       Goals:  Goal Identifier assistive device   Goal Description Pt to feels safe ambulating without cane in 8 weeks   Target Date 18   Date Met  18   Progress:     Goal Identifier gait speed    Goal Description Improve gait speed and safety demonstrated with TUG of 12 sec or better in 8 weeks.    Target Date 18   Date Met  18   Progress:     Goal Identifier balance   Goal Description Pt to feel safe walking on all surfaces to be able to walk outside this summer   Target Date 18   Date Met  18   Progress:     Goal Identifier stairs   Goal Description Pt comfortable going up/down stairs without rail in 8 weeks   Target Date 18   Date Met  18 (Still uses rail on stairs for safety, but much improved.)   Progress:       Progress Toward Goals:   Progress this reporting period: met    Plan:  Discharge from therapy.    Discharge:    Reason for Discharge: Patient has met all goals.    Equipment Issued: none    Discharge Plan: Patient to continue home program.

## 2018-06-29 NOTE — ADDENDUM NOTE
Encounter addended by: Phyllis Jefferson OT on: 6/29/2018  4:08 PM<BR>     Actions taken: Charge Capture section accepted

## 2018-07-11 NOTE — PROGRESS NOTES
"Outpatient Occupational Therapy Discharge Note     Patient: Ilia Ferguson  : 1932    Beginning/End Dates of Reporting Period:  2018 to 2018    Referring Provider: Dr. Dahl    Therapy Diagnosis: decline in self cares    Client Self Report:   Rei state he spills his coffee when carrying it in his right hand. The cup tips forward. He is goig to drive the pickup to the store today to get groceries. he was remeindeed that he has been in structed not to drive. he is interersted in [facility] into [place] after discharge to continue working on his strength.     Objective Measurements:     Objective Measure: right hand strength:    Details: previous: : 19 current: 52 key: 10 current: three point: 8 Current: : 23 key: 11 Three point: 9 A new dynamometer was used and readings aer lower than expected.    Objective Measure: Shoulder ROM.    Details: Right: previous; fleixon: 96 current: 142  abduction: previous: 97 current: 140                 Goals:     Goal Identifier self cares   Goal Description Pt. will report the ability to hold a can of beer in his dominate right hand with mild to no difficulty   Target Date 18 (5/3: \"I even drank a beer with my hand last night\" )   Date Met      Progress:   Goal met     Goal Identifier IADLs   Goal Description Pt. will pass [facility] neccessary assessments for driving with a score of safe or better (Rei has met the requirements on the dynavision and cogniti)   Target Date 18   Date Met      Progress: Goal met with written OK form Dr. Dahl to resume driving.      Goal Identifier ADLs   Goal Description Pt. will report the ability to grasp and carry his dished with his right hand to the kitchen with mild to no difficulty ( a bowl , cup and plate goal met)   Target Date 18 (is able to carry light dishes, half a glass of water,)   Date Met      Progress: gaol met      Progress Toward Goals:   Progress this reporting period: Rie is now able to use his " dominate right hand at 90-95% of PLOF. He is independence in all self cares with increase and independence. He has resumed driving.     Plan:  Discharge from therapy.    Discharge:    Reason for Discharge: Patient has met all goals.     Discharge Plan: Patient to continue home program.

## 2018-07-11 NOTE — ADDENDUM NOTE
Encounter addended by: Phyllis Jefferson OT on: 7/11/2018 10:34 AM<BR>     Actions taken: Flowsheet accepted, Pend clinical note, Sign clinical note, Episode resolved

## 2018-07-20 NOTE — ADDENDUM NOTE
Encounter addended by: Phyllis Jefferson OT on: 7/20/2018  3:39 PM<BR>     Actions taken: Charge Capture section accepted

## 2018-07-23 NOTE — PROGRESS NOTES
Patient Information     Patient Name  Ilia Ferguson MRN  [id number] Sex  Male   1932      Letter by Cholo Rodriguez MD at      Author:  Cholo Rodriguez MD Service:  (none) Author Type:  (none)    Filed:   Encounter Date:  2017 Status:  (Other)           Ilia Ferguson  [address] [facility] [address]          2017    Dear Mr. Ferguson:    Your labs suggest that your diabetes has not worsened, but you should eat less carbs and sweets in general. Avoid more than small amounts of bread, pasta, potatoes,a nd rice also. Your lipid levels look good.   Cholo Rodriguez MD ....................  2017   11:28 AM     Results for orders placed or performed in visit on 17       COMP METABOLIC PANEL       Result  Value Ref Range Status    SODIUM 139 133 - 143 mmol/L Final    POTASSIUM 4.6 3.5 - 5.1 mmol/L Final    CHLORIDE 103 98 - 107 mmol/L Final    CO2,TOTAL 28 21 - 31 mmol/L Final    ANION GAP 8 5 - 18                 Final    GLUCOSE 149 (H) 70 - 105 mg/dL Final    CALCIUM 9.5 8.6 - 10.3 mg/dL Final    BUN 18 7 - 25 mg/dL Final    CREATININE 1.03 0.70 - 1.30 mg/dL Final    BUN/CREAT RATIO           17                 Final    GFR if African American >60 >60 ml/min/1.73m2 Final    GFR if not African American >60 >60 ml/min/1.73m2 Final    ALBUMIN 4.0 3.5 - 5.7 g/dL Final    PROTEIN,TOTAL 6.8 6.4 - 8.9 g/dL Final    GLOBULIN                  2.8 2.0 - 3.7 g/dL Final    A/G RATIO 1.4 1.0 - 2.0                 Final    BILIRUBIN,TOTAL 0.9 0.3 - 1.0 mg/dL Final    ALK PHOSPHATASE 65 34 - 104 IU/L Final    ALT (SGPT) 13 7 - 52 IU/L Final    AST (SGOT) 18 13 - 39 IU/L Final   HEMOGLOBIN A1C MONITORING (POCT)       Result  Value Ref Range Status    HEMOGLOBIN A1C MONITORING (POCT) 7.3 (H) 4.0 - 6.2 % Final    ESTIMATED AVERAGE GLUCOSE  163 mg/dL Final   TSH       Result  Value Ref Range Status    TSH 1.80 0.34 - 5.60 uIU/mL Final   LIPID PANEL       Result  Value Ref Range Status     CHOLESTEROL,TOTAL 182 <200 mg/dL Final    TRIGLYCERIDES 136 <150 mg/dL Final    HDL CHOLESTEROL 48 23 - 92 mg/dL Final    NON-HDL CHOLESTEROL 134 <145 mg/dl Final    CHOL/HDL RATIO            3.79 <4.50                 Final    LDL CHOLESTEROL 107 (H) <100 mg/dL Final    PATIENT STATUS            FASTING                 Final   CBC WITH AUTO DIFFERENTIAL       Result  Value Ref Range Status    WHITE BLOOD COUNT         6.3 4.5 - 11.0 thou/cu mm Final    RED BLOOD COUNT           5.07 4.30 - 5.90 mil/cu mm Final    HEMOGLOBIN                15.9 13.5 - 17.5 g/dL Final    HEMATOCRIT                47.8 37.0 - 53.0 % Final    MCV                       94 80 - 100 fL Final    MCH                       31.4 26.0 - 34.0 pg Final    MCHC                      33.3 32.0 - 36.0 g/dL Final    RDW                       12.1 11.5 - 15.5 % Final    PLATELET COUNT            162 140 - 440 thou/cu mm Final    MPV                       8.4 6.5 - 11.0 fL Final    NEUTROPHILS               55.1 42.0 - 72.0 % Final    LYMPHOCYTES               35.0 20.0 - 44.0 % Final    MONOCYTES                 8.0 <12.0 % Final    EOSINOPHILS               1.4 <8.0 % Final    BASOPHILS                 0.5 <3.0 % Final    ABSOLUTE NEUTROPHILS      3.5 1.7 - 7.0 thou/cu mm Final    ABSOLUTE LYMPHOCYTES      2.2 0.9 - 2.9 thou/cu mm Final    ABSOLUTE MONOCYTES        0.5 <0.9 thou/cu mm Final    ABSOLUTE EOSINOPHILS      0.1 <0.5 thou/cu mm Final    ABSOLUTE BASOPHILS        0.0 <0.3 thou/cu mm Final   URINALYSIS W REFLEX MICROSCOPIC IF POSITIVE       Result  Value Ref Range Status    COLOR                     Yellow Yellow Color Final    CLARITY                   Clear Clear Clarity Final    SPECIFIC GRAVITY,URINE    1.020 1.010, 1.015, 1.020, 1.025                 Final    PH,URINE                  6.0 6.0, 7.0, 8.0, 5.5, 6.5, 7.5, 8.5                 Final    UROBILINOGEN,QUALITATIVE  Normal Normal EU/dl Final    PROTEIN, URINE Negative Negative  mg/dL Final    GLUCOSE, URINE Negative Negative mg/dL Final    KETONES,URINE             Negative Negative mg/dL Final    BILIRUBIN,URINE           Negative Negative                 Final    OCCULT BLOOD,URINE        Negative Negative                 Final    NITRITE                   Negative Negative                 Final    LEUKOCYTE ESTERASE        Negative Negative                 Final

## 2018-07-23 NOTE — PROGRESS NOTES
Patient Information     Patient Name  Ilia Ferguson MRN  [id number] Sex  Male   1932      Letter by Cholo Rodriguez MD at      Author:  Cholo Rodriguez MD Service:  (none) Author Type:  (none)    Filed:   Encounter Date:  2017 Status:  (Other)           Ilia Ferguson  [address] [facility] [address]          2017    Dear Mr. Ferguson:    This letter is to remind you that you are due for your follow up exam with Cholo Rodriguez MD after adjusting your blood pressure medication. You were instructed at your last visit to follow up around 17.    A LIMITED refill of enalapril (VASOTEC) 10 mg tablet has been called into your pharmacy. Additional refills require you to complete this appointment.    Please call the clinic at [phone] to schedule your appointment.    If you should require additional refills before your scheduled appointment, please contact your pharmacy and we will refill your medication until the date of your appointment.    Thank you for choosing [facility] for your health care needs.    Sincerely,    Refill RN  [facility]

## 2018-07-24 NOTE — PROGRESS NOTES
Patient Information     Patient Name  Ilia Ferguson MRN  [id number] Sex  Male   1932      Letter by Cholo Rodriguez MD at      Author:  Cholo Rodriguez MD Service:  (none) Author Type:  (none)    Filed:   Encounter Date:  2017 Status:  (Other)           Ilia Ferguson  [address] [address]          2017    Dear Mr. Ferguson:    Your blood sugar result is very good.  Cholo Rodriguez MD ....................  2017   3:23 PM     Results for orders placed or performed in visit on 17       Hgb A1c       Result  Value Ref Range Status    HEMOGLOBIN A1C MONITORING (POCT) 6.2 4.0 - 6.2 % Final    ESTIMATED AVERAGE GLUCOSE  131 mg/dL Final

## 2018-07-24 NOTE — PROGRESS NOTES
Patient Information     Patient Name  Ilia Ferguson MRN  [id number] Sex  Male   1932      Letter by Cholo Rodriguez MD at      Author:  Cholo Rodriguez MD Service:  (none) Author Type:  (none)    Filed:   Encounter Date:  3/23/2017 Status:  (Other)           Ilia Ferguson  [address] [facility] [address]          2017    Dear Mr. Ferguson:    This letter is to remind you that you are due for your annual exam with Cholo Rodriguez MD . Your last comprehensive medication visit was more than 12 months ago.     LIMITED refills of enalapril (VASOTEC) 10 mg tablet and atenolol (TENORMIN) 50 mg tablet have been called into your pharmacy.    Additional refills require an annual medication management appointment with Cholo Rodriguez MD. Please call the clinic at [phone] to schedule your appointment.    Thank you,    The Refill Nurse  [facility]

## 2018-07-30 ENCOUNTER — OFFICE VISIT (OUTPATIENT)
Dept: PEDIATRICS | Facility: OTHER | Age: 83
End: 2018-07-30
Attending: INTERNAL MEDICINE
Payer: MEDICARE

## 2018-07-30 VITALS
HEART RATE: 70 BPM | BODY MASS INDEX: 29.27 KG/M2 | DIASTOLIC BLOOD PRESSURE: 68 MMHG | WEIGHT: 204 LBS | SYSTOLIC BLOOD PRESSURE: 130 MMHG

## 2018-07-30 DIAGNOSIS — G56.22 CUBITAL TUNNEL SYNDROME, LEFT: ICD-10-CM

## 2018-07-30 DIAGNOSIS — E11.8 CONTROLLED TYPE 2 DIABETES MELLITUS WITH COMPLICATION, WITHOUT LONG-TERM CURRENT USE OF INSULIN (H): Primary | Chronic | ICD-10-CM

## 2018-07-30 DIAGNOSIS — I63.312 CEREBROVASCULAR ACCIDENT (CVA) DUE TO THROMBOSIS OF LEFT MIDDLE CEREBRAL ARTERY (H): ICD-10-CM

## 2018-07-30 DIAGNOSIS — I10 ESSENTIAL HYPERTENSION: Chronic | ICD-10-CM

## 2018-07-30 DIAGNOSIS — E78.2 MIXED HYPERLIPIDEMIA: ICD-10-CM

## 2018-07-30 DIAGNOSIS — I63.312 CEREBROVASCULAR ACCIDENT (CVA) DUE TO THROMBOSIS OF LEFT MIDDLE CEREBRAL ARTERY (H): Chronic | ICD-10-CM

## 2018-07-30 DIAGNOSIS — E11.8 CONTROLLED TYPE 2 DIABETES MELLITUS WITH COMPLICATION, WITHOUT LONG-TERM CURRENT USE OF INSULIN (H): ICD-10-CM

## 2018-07-30 DIAGNOSIS — E78.2 MIXED HYPERLIPIDEMIA: Chronic | ICD-10-CM

## 2018-07-30 DIAGNOSIS — G25.81 RESTLESS LEGS SYNDROME (RLS): ICD-10-CM

## 2018-07-30 DIAGNOSIS — M62.81 RIGHT-SIDED MUSCLE WEAKNESS: Chronic | ICD-10-CM

## 2018-07-30 LAB
ANION GAP SERPL CALCULATED.3IONS-SCNC: 6 MMOL/L (ref 3–14)
BUN SERPL-MCNC: 20 MG/DL (ref 7–25)
CALCIUM SERPL-MCNC: 9.3 MG/DL (ref 8.6–10.3)
CHLORIDE SERPL-SCNC: 109 MMOL/L (ref 98–107)
CHOLEST SERPL-MCNC: 133 MG/DL
CO2 SERPL-SCNC: 26 MMOL/L (ref 21–31)
CREAT SERPL-MCNC: 0.89 MG/DL (ref 0.7–1.3)
GFR SERPL CREATININE-BSD FRML MDRD: 81 ML/MIN/1.7M2
GLUCOSE SERPL-MCNC: 143 MG/DL (ref 70–105)
HBA1C MFR BLD: 6.9 % (ref 4–6)
HDLC SERPL-MCNC: 53 MG/DL (ref 23–92)
LDLC SERPL CALC-MCNC: 64 MG/DL
NONHDLC SERPL-MCNC: 80 MG/DL
POTASSIUM SERPL-SCNC: 4.2 MMOL/L (ref 3.5–5.1)
SODIUM SERPL-SCNC: 141 MMOL/L (ref 134–144)
TRIGL SERPL-MCNC: 82 MG/DL

## 2018-07-30 PROCEDURE — 36415 COLL VENOUS BLD VENIPUNCTURE: CPT | Performed by: INTERNAL MEDICINE

## 2018-07-30 PROCEDURE — 80048 BASIC METABOLIC PNL TOTAL CA: CPT | Performed by: INTERNAL MEDICINE

## 2018-07-30 PROCEDURE — 99214 OFFICE O/P EST MOD 30 MIN: CPT | Performed by: INTERNAL MEDICINE

## 2018-07-30 PROCEDURE — 80061 LIPID PANEL: CPT | Performed by: INTERNAL MEDICINE

## 2018-07-30 PROCEDURE — 83036 HEMOGLOBIN GLYCOSYLATED A1C: CPT | Performed by: INTERNAL MEDICINE

## 2018-07-30 PROCEDURE — G0463 HOSPITAL OUTPT CLINIC VISIT: HCPCS

## 2018-07-30 ASSESSMENT — ANXIETY QUESTIONNAIRES
5. BEING SO RESTLESS THAT IT IS HARD TO SIT STILL: NOT AT ALL
7. FEELING AFRAID AS IF SOMETHING AWFUL MIGHT HAPPEN: NOT AT ALL
3. WORRYING TOO MUCH ABOUT DIFFERENT THINGS: NOT AT ALL
1. FEELING NERVOUS, ANXIOUS, OR ON EDGE: NOT AT ALL
2. NOT BEING ABLE TO STOP OR CONTROL WORRYING: NOT AT ALL
6. BECOMING EASILY ANNOYED OR IRRITABLE: NOT AT ALL
GAD7 TOTAL SCORE: 0
IF YOU CHECKED OFF ANY PROBLEMS ON THIS QUESTIONNAIRE, HOW DIFFICULT HAVE THESE PROBLEMS MADE IT FOR YOU TO DO YOUR WORK, TAKE CARE OF THINGS AT HOME, OR GET ALONG WITH OTHER PEOPLE: NOT DIFFICULT AT ALL

## 2018-07-30 ASSESSMENT — PATIENT HEALTH QUESTIONNAIRE - PHQ9: 5. POOR APPETITE OR OVEREATING: NOT AT ALL

## 2018-07-30 ASSESSMENT — PAIN SCALES - GENERAL: PAINLEVEL: NO PAIN (0)

## 2018-07-30 NOTE — MR AVS SNAPSHOT
After Visit Summary   7/30/2018    Ilia Ferguson    MRN: [id number]           Patient Information     Date Of Birth          2/20/1932        Visit Information        Provider Department      7/30/2018 9:15 AM Trino Dahl MD [facility]        Today's Diagnoses     Controlled type 2 diabetes mellitus with complication, without long-term current use of insulin (H)    -  1    Essential hypertension        Restless legs syndrome (RLS)        Right-sided muscle weakness        Cerebrovascular accident (CVA) due to thrombosis of left middle cerebral artery (H)        Cubital tunnel syndrome, left        Mixed hyperlipidemia          Care Instructions     -- Nurse-only for blood pressure check, bring your machine in to test      Aspects of Diabetes we can improve:  Hemoglobin A1c Lab Results   Component Value Date    A1C 6.9 07/30/2018    A1C 6.9 02/14/2018    Goal range is under 8. Best is 6.5 to 7   Blood Pressure 130/68 Goal to keep less than 140/90   Tobacco  reports that he has never smoked. He has never used smokeless tobacco. Goal to abstain from tobacco   Aspirin yes Aspirin reduces risk of heart disease and stroke   ACE/ARB enalapril These medications reduce risk of kidney disease   Cholesterol Lipitor Statins reduce risk of heart disease and stroke   Eye Exam due Annual diabetic eye exam   Healthy weight Body mass index is 29.27 kg/(m^2). Goal BMI under 30, best is under 25.      -- I'm trying to exercise daily (goal at least 20 min/day) with moderate aerobic activity   -- Eat healthy (resources from ADA at http://www.diabetes.org/)   -- I'm taking good care of my feet. Consider seeing the Podiatrist   -- Check blood sugars as directed, record in log book and bring to every appointment   -- Next diabetes lab draw: 6 months   -- Next diabetes office visit: 6 months            Follow-ups after your visit        Who to contact     If you have questions or need follow up  information about today's clinic visit or your schedule please contact [facility] directly at [phone].  Normal or non-critical lab and imaging results will be communicated to you by MyChart, letter or phone within 4 business days after the clinic has received the results. If you do not hear from us within 7 days, please contact the clinic through MyChart or phone. If you have a critical or abnormal lab result, we will notify you by phone as soon as possible.  Submit refill requests through Contact At Once! or call your pharmacy and they will forward the refill request to us. Please allow 3 business days for your refill to be completed.          Additional Information About Your Visit        Care EveryWhere ID     This is your Care EveryWhere ID. This could be used by other organizations to access your [facility] medical records  [id number]        Your Vitals Were     Pulse BMI (Body Mass Index)                70 29.27 kg/m2           Blood Pressure from Last 3 Encounters:   07/30/18 130/68   04/26/18 126/80   02/16/18 165/64    Weight from Last 3 Encounters:   07/30/18 204 lb (92.5 kg)   04/26/18 200 lb (90.7 kg)   02/16/18 203 lb 14.8 oz (92.5 kg)              Today, you had the following     No orders found for display       Primary Care Provider Office Phone # Fax #    Trino Jose Dahl [phone]       [address] [facility] [address]        Equal Access to Services     [facility] Center[facility] AH: Hadii chandler delaney hadasho Sovesta, waaxda luqadaha, qaybta kaalmada patrice, franco bonilla. So [facility] [phone].    ATENCIÓN: Si habla español, tiene a ramesh disposición servicios gratuitos de asistencia lingüística. Llame al [phone].    We comply with applicable federal civil rights laws and [place] laws. We do not discriminate on the basis of race, color, national origin, age, disability, sex, sexual orientation, or gender identity.             Thank you!     Thank you for choosing [facility]  for your care. Our goal is always to provide you with excellent care. Hearing back from our patients is one way we can continue to improve our services. Please take a few minutes to complete the written survey that you may receive in the mail after your visit with us. Thank you!             Your Updated Medication List - Protect others around you: Learn how to safely use, store and throw away your medicines at www.disposemymeds.org.          This list is accurate as of 7/30/18 10:05 AM.  Always use your most recent med list.                   Brand Name Dispense Instructions for use Diagnosis    aspirin 81 MG EC tablet     90 tablet    Take 1 tablet (81 mg) by mouth daily    Cerebrovascular accident (CVA) due to thrombosis of left middle cerebral artery (H), Controlled type 2 diabetes mellitus with complication, without long-term current use of insulin (H)       atorvastatin 40 MG tablet    LIPITOR    90 tablet    Take 1 tablet (40 mg) by mouth daily    Cerebrovascular accident (CVA) due to thrombosis of left middle cerebral artery (H)       clopidogrel 75 MG tablet    PLAVIX    90 tablet    Take 1 tablet (75 mg) by mouth daily    Cerebrovascular accident (CVA) due to thrombosis of left middle cerebral artery (H)       enalapril 20 MG tablet    VASOTEC    180 tablet    Take 2 tablets (40 mg) by mouth daily    Essential hypertension       gabapentin 100 MG capsule    NEURONTIN    90 capsule    Take 1 capsule (100 mg) by mouth At Bedtime    Neuropathy       metFORMIN 500 MG tablet    GLUCOPHAGE    180 tablet    Take 1 tablet (500 mg) by mouth 2 times daily (with meals)    Controlled type 2 diabetes mellitus with complication, without long-term current use of insulin (H)       MULTI-VITAMINS Tabs      Take 1 tablet by mouth daily        naproxen sodium 220 MG tablet    ANAPROX    90 tablet    Take 1 tablet (220 mg) by mouth daily as needed (leg  pain)    Encounter to establish care       tamsulosin 0.4 MG capsule    FLOMAX    90 capsule    Take 2 capsules (0.8 mg) by mouth daily    Benign prostatic hyperplasia without lower urinary tract symptoms, Elevated prostate specific antigen (PSA)

## 2018-07-30 NOTE — PROGRESS NOTES
Subjective  Ilia Ferguson is a 86 year old male who presents for medication management.  He is here with his wife today.  He had a cerebrovascular accident 2018.  He feels like he is continuing to make improvements.  His wife says the only thing that is left as he has a little bit of a limp when he walks.  He continues on aspirin and Plavix for his history of stroke, history of hyperlipidemia which is stable with Lipitor.  History of hypertension which is well-controlled with enalapril.  Newly diagnosed diabetes mellitus type 2 which has been well-controlled with metformin.  He gets a burning sensation on the bottom of his feet and wonders if more should be done.  He continues on gabapentin for neuropathy.    Problem List/PMH: reviewed in EMR, and made relevant updates today.  Medications: reviewed in EMR, and made relevant updates today.  Allergies: reviewed in EMR, and made relevant updates today.    Social Hx:  Social History   Substance Use Topics     Smoking status: Never Smoker     Smokeless tobacco: Never Used     Alcohol use 0.0 oz/week      Comment: Alcoholic Drinks/day: 1 per day     Social History     Social History Narrative    Wife (Rima)    , nine children.      Lives in [place].      Retired oil  and .    Attends [id number].          I reviewed social history and made relevant updates today.    Family Hx:   Family History   Problem Relation Age of Onset     HEART DISEASE Father      Heart Disease, at age 57, MI     Hypertension Father      Hypertension     Other - See Comments Father 54     Stroke     HEART DISEASE Mother      Heart Disease, at age 86, heart failure.     Unknown/Adopted Brother      Unknown, at age 49, sudden death.     Diabetes Brother      Diabetes,Age 70     Hypertension Brother      Hypertension       Objective  Vitals: reviewed in EMR.  /68 (BP Location: Right arm, Patient Position:  Sitting, Cuff Size: Adult Large)  Pulse 70  Wt 204 lb (92.5 kg)  BMI 29.27 kg/m2    Gen: Pleasant male, NAD.  HEENT: MMM, no OP erythema.   Neck: Supple, no JVD, no bruits.  CV: RRR no m/r/g.   Pulm: CTAB no w/r/r  Neuro: Grossly intact  Msk: No lower extremity edema.  Skin: No concerning lesions.  Psychiatric: Normal affect and insight. Does not appear anxious or depressed.    Labs:  Glucose   Date Value Ref Range Status   07/30/2018 143 (H) 70 - 105 mg/dL Final   02/14/2018 126 (H) 70 - 105 mg/dL Final     Hemoglobin A1C   Date Value Ref Range Status   07/30/2018 6.9 (H) 4.0 - 6.0 % Final   02/14/2018 6.9 (H) 4.0 - 6.0 % Final     Creatinine   Date Value Ref Range Status   07/30/2018 0.89 0.70 - 1.30 mg/dL Final   02/14/2018 0.92 0.70 - 1.30 mg/dL Final     LDL Cholesterol Calculated   Date Value Ref Range Status   07/30/2018 64 <100 mg/dL Final     Comment:     Desirable:       <100 mg/dl             Assessment    ICD-10-CM    1. Controlled type 2 diabetes mellitus with complication, without long-term current use of insulin (H) E11.8    2. Essential hypertension I10    3. Restless legs syndrome (RLS) G25.81    4. Right-sided muscle weakness M62.81    5. Cerebrovascular accident (CVA) due to thrombosis of left middle cerebral artery (H) I63.312    6. Cubital tunnel syndrome, left G56.22    7. Mixed hyperlipidemia E78.2        Plan   -- Expected clinical course discussed   -- Medications and their side effects discussed  Patient Instructions      -- Nurse-only for blood pressure check, bring your machine in to test      Aspects of Diabetes we can improve:  Hemoglobin A1c Lab Results   Component Value Date    A1C 6.9 07/30/2018    A1C 6.9 02/14/2018    Goal range is under 8. Best is 6.5 to 7   Blood Pressure 130/68 Goal to keep less than 140/90   Tobacco  reports that he has never smoked. He has never used smokeless tobacco. Goal to abstain from tobacco   Aspirin yes Aspirin reduces risk of heart disease and  stroke   ACE/ARB enalapril These medications reduce risk of kidney disease   Cholesterol Lipitor Statins reduce risk of heart disease and stroke   Eye Exam due Annual diabetic eye exam   Healthy weight Body mass index is 29.27 kg/(m^2). Goal BMI under 30, best is under 25.      -- I'm trying to exercise daily (goal at least 20 min/day) with moderate aerobic activity   -- Eat healthy (resources from ADA at http://www.diabetes.org/)   -- I'm taking good care of my feet. Consider seeing the Podiatrist   -- Check blood sugars as directed, record in log book and bring to every appointment   -- Next diabetes lab draw: 6 months   -- Next diabetes office visit: 6 months      Signed, Trino Dahl MD  Internal Medicine & Pediatrics

## 2018-07-30 NOTE — PATIENT INSTRUCTIONS
-- Nurse-only for blood pressure check, bring your machine in to test      Aspects of Diabetes we can improve:  Hemoglobin A1c Lab Results   Component Value Date    A1C 6.9 07/30/2018    A1C 6.9 02/14/2018    Goal range is under 8. Best is 6.5 to 7   Blood Pressure 130/68 Goal to keep less than 140/90   Tobacco  reports that he has never smoked. He has never used smokeless tobacco. Goal to abstain from tobacco   Aspirin yes Aspirin reduces risk of heart disease and stroke   ACE/ARB enalapril These medications reduce risk of kidney disease   Cholesterol Lipitor Statins reduce risk of heart disease and stroke   Eye Exam due Annual diabetic eye exam   Healthy weight Body mass index is 29.27 kg/(m^2). Goal BMI under 30, best is under 25.      -- I'm trying to exercise daily (goal at least 20 min/day) with moderate aerobic activity   -- Eat healthy (resources from ADA at http://www.diabetes.org/)   -- I'm taking good care of my feet. Consider seeing the Podiatrist   -- Check blood sugars as directed, record in log book and bring to every appointment   -- Next diabetes lab draw: 6 months   -- Next diabetes office visit: 6 months

## 2018-07-31 ASSESSMENT — ANXIETY QUESTIONNAIRES: GAD7 TOTAL SCORE: 0

## 2018-07-31 ASSESSMENT — PATIENT HEALTH QUESTIONNAIRE - PHQ9: SUM OF ALL RESPONSES TO PHQ QUESTIONS 1-9: 0

## 2018-11-09 DIAGNOSIS — R97.20 ELEVATED PROSTATE SPECIFIC ANTIGEN (PSA): ICD-10-CM

## 2018-11-09 DIAGNOSIS — N40.0 BENIGN PROSTATIC HYPERPLASIA WITHOUT LOWER URINARY TRACT SYMPTOMS: ICD-10-CM

## 2018-11-09 RX ORDER — TAMSULOSIN HYDROCHLORIDE 0.4 MG/1
0.8 CAPSULE ORAL DAILY
Qty: 180 CAPSULE | Refills: 0 | Status: SHIPPED | OUTPATIENT
Start: 2018-11-09 | End: 2019-01-29

## 2018-11-09 NOTE — TELEPHONE ENCOUNTER
"Prescription approved per [facility] Refill Protocol.  LOV; 7/30/18  Per OV on 4/26/18  \"Increase tamsulosin to 0.8 mg for prostate\"  Laurie Albright RN on 11/9/2018 at 2:13 PM      "

## 2019-01-29 DIAGNOSIS — N40.0 BENIGN PROSTATIC HYPERPLASIA WITHOUT LOWER URINARY TRACT SYMPTOMS: ICD-10-CM

## 2019-01-29 DIAGNOSIS — R97.20 ELEVATED PROSTATE SPECIFIC ANTIGEN (PSA): ICD-10-CM

## 2019-01-30 RX ORDER — TAMSULOSIN HYDROCHLORIDE 0.4 MG/1
0.8 CAPSULE ORAL DAILY
Qty: 180 CAPSULE | Refills: 0 | Status: SHIPPED | OUTPATIENT
Start: 2019-01-30 | End: 2019-02-11

## 2019-01-30 NOTE — TELEPHONE ENCOUNTER
Prescription approved per [facility] Refill Protocol.  LOV: 7/30/18  Patient has appointment scheduled for 2/11/19    Laurie Albright RN on 1/30/2019 at 11:28 AM

## 2019-02-11 ENCOUNTER — OFFICE VISIT (OUTPATIENT)
Dept: PEDIATRICS | Facility: OTHER | Age: 84
End: 2019-02-11
Attending: INTERNAL MEDICINE
Payer: MEDICARE

## 2019-02-11 VITALS
SYSTOLIC BLOOD PRESSURE: 128 MMHG | TEMPERATURE: 97.6 F | RESPIRATION RATE: 16 BRPM | HEART RATE: 66 BPM | HEIGHT: 70 IN | WEIGHT: 206 LBS | DIASTOLIC BLOOD PRESSURE: 74 MMHG | BODY MASS INDEX: 29.49 KG/M2

## 2019-02-11 DIAGNOSIS — I63.312 CEREBROVASCULAR ACCIDENT (CVA) DUE TO THROMBOSIS OF LEFT MIDDLE CEREBRAL ARTERY (H): ICD-10-CM

## 2019-02-11 DIAGNOSIS — G62.9 NEUROPATHY: ICD-10-CM

## 2019-02-11 DIAGNOSIS — I10 ESSENTIAL HYPERTENSION: ICD-10-CM

## 2019-02-11 DIAGNOSIS — R97.20 ELEVATED PROSTATE SPECIFIC ANTIGEN (PSA): ICD-10-CM

## 2019-02-11 DIAGNOSIS — M47.9 OSTEOARTHRITIS OF SPINE, UNSPECIFIED SPINAL OSTEOARTHRITIS COMPLICATION STATUS, UNSPECIFIED SPINAL REGION: ICD-10-CM

## 2019-02-11 DIAGNOSIS — N40.0 BENIGN PROSTATIC HYPERPLASIA WITHOUT LOWER URINARY TRACT SYMPTOMS: ICD-10-CM

## 2019-02-11 DIAGNOSIS — E11.8 CONTROLLED TYPE 2 DIABETES MELLITUS WITH COMPLICATION, WITHOUT LONG-TERM CURRENT USE OF INSULIN (H): Primary | ICD-10-CM

## 2019-02-11 LAB
ANION GAP SERPL CALCULATED.3IONS-SCNC: 6 MMOL/L (ref 3–14)
BUN SERPL-MCNC: 16 MG/DL (ref 7–25)
CALCIUM SERPL-MCNC: 9.1 MG/DL (ref 8.6–10.3)
CHLORIDE SERPL-SCNC: 108 MMOL/L (ref 98–107)
CO2 SERPL-SCNC: 26 MMOL/L (ref 21–31)
CREAT SERPL-MCNC: 0.86 MG/DL (ref 0.7–1.3)
GFR SERPL CREATININE-BSD FRML MDRD: 84 ML/MIN/{1.73_M2}
GLUCOSE SERPL-MCNC: 127 MG/DL (ref 70–105)
HBA1C MFR BLD: 6.8 % (ref 4–6)
POTASSIUM SERPL-SCNC: 4.2 MMOL/L (ref 3.5–5.1)
SODIUM SERPL-SCNC: 140 MMOL/L (ref 134–144)

## 2019-02-11 PROCEDURE — 90662 IIV NO PRSV INCREASED AG IM: CPT

## 2019-02-11 PROCEDURE — 90471 IMMUNIZATION ADMIN: CPT | Performed by: INTERNAL MEDICINE

## 2019-02-11 PROCEDURE — G0463 HOSPITAL OUTPT CLINIC VISIT: HCPCS | Mod: 25

## 2019-02-11 PROCEDURE — 99214 OFFICE O/P EST MOD 30 MIN: CPT | Performed by: INTERNAL MEDICINE

## 2019-02-11 PROCEDURE — 83036 HEMOGLOBIN GLYCOSYLATED A1C: CPT | Performed by: INTERNAL MEDICINE

## 2019-02-11 PROCEDURE — G0008 ADMIN INFLUENZA VIRUS VAC: HCPCS | Performed by: INTERNAL MEDICINE

## 2019-02-11 PROCEDURE — 36415 COLL VENOUS BLD VENIPUNCTURE: CPT | Performed by: INTERNAL MEDICINE

## 2019-02-11 PROCEDURE — 80048 BASIC METABOLIC PNL TOTAL CA: CPT | Performed by: INTERNAL MEDICINE

## 2019-02-11 PROCEDURE — G0463 HOSPITAL OUTPT CLINIC VISIT: HCPCS

## 2019-02-11 RX ORDER — ATORVASTATIN CALCIUM 40 MG/1
40 TABLET, FILM COATED ORAL DAILY
Qty: 90 TABLET | Refills: 4 | Status: SHIPPED | OUTPATIENT
Start: 2019-02-11 | End: 2020-02-13

## 2019-02-11 RX ORDER — CLOPIDOGREL BISULFATE 75 MG/1
75 TABLET ORAL DAILY
Qty: 90 TABLET | Refills: 3 | Status: SHIPPED | OUTPATIENT
Start: 2019-02-11 | End: 2020-02-13

## 2019-02-11 RX ORDER — GABAPENTIN 100 MG/1
100 CAPSULE ORAL AT BEDTIME
Qty: 90 CAPSULE | Refills: 3 | Status: SHIPPED | OUTPATIENT
Start: 2019-02-11 | End: 2020-02-13

## 2019-02-11 RX ORDER — TAMSULOSIN HYDROCHLORIDE 0.4 MG/1
0.8 CAPSULE ORAL DAILY
Qty: 180 CAPSULE | Refills: 0 | Status: SHIPPED | OUTPATIENT
Start: 2019-02-11 | End: 2019-08-09

## 2019-02-11 RX ORDER — NAPROXEN SODIUM 220 MG
220 TABLET ORAL DAILY PRN
Qty: 90 TABLET | Refills: 3 | Status: SHIPPED | OUTPATIENT
Start: 2019-02-11 | End: 2020-03-02

## 2019-02-11 RX ORDER — ENALAPRIL MALEATE 20 MG/1
40 TABLET ORAL DAILY
Qty: 180 TABLET | Refills: 3 | Status: SHIPPED | OUTPATIENT
Start: 2019-02-11 | End: 2020-02-13

## 2019-02-11 ASSESSMENT — PATIENT HEALTH QUESTIONNAIRE - PHQ9
5. POOR APPETITE OR OVEREATING: NOT AT ALL
SUM OF ALL RESPONSES TO PHQ QUESTIONS 1-9: 4

## 2019-02-11 ASSESSMENT — ANXIETY QUESTIONNAIRES
7. FEELING AFRAID AS IF SOMETHING AWFUL MIGHT HAPPEN: NOT AT ALL
1. FEELING NERVOUS, ANXIOUS, OR ON EDGE: NOT AT ALL
5. BEING SO RESTLESS THAT IT IS HARD TO SIT STILL: NOT AT ALL
6. BECOMING EASILY ANNOYED OR IRRITABLE: NOT AT ALL
2. NOT BEING ABLE TO STOP OR CONTROL WORRYING: NOT AT ALL
IF YOU CHECKED OFF ANY PROBLEMS ON THIS QUESTIONNAIRE, HOW DIFFICULT HAVE THESE PROBLEMS MADE IT FOR YOU TO DO YOUR WORK, TAKE CARE OF THINGS AT HOME, OR GET ALONG WITH OTHER PEOPLE: NOT DIFFICULT AT ALL
GAD7 TOTAL SCORE: 0
3. WORRYING TOO MUCH ABOUT DIFFERENT THINGS: NOT AT ALL

## 2019-02-11 ASSESSMENT — PAIN SCALES - GENERAL: PAINLEVEL: NO PAIN (0)

## 2019-02-11 ASSESSMENT — MIFFLIN-ST. JEOR: SCORE: 1620.66

## 2019-02-11 NOTE — LETTER
February 11, 2019      Ilia Ferguson  [address] [address]        Dear ,    We are writing to inform you of your test results.    Looks great.    Resulted Orders   Hemoglobin A1c   Result Value Ref Range    Hemoglobin A1C 6.8 (H) 4.0 - 6.0 %   Basic metabolic panel   Result Value Ref Range    Sodium 140 134 - 144 mmol/L    Potassium 4.2 3.5 - 5.1 mmol/L    Chloride 108 (H) 98 - 107 mmol/L    Carbon Dioxide 26 21 - 31 mmol/L    Anion Gap 6 3 - 14 mmol/L    Glucose 127 (H) 70 - 105 mg/dL    Urea Nitrogen 16 7 - 25 mg/dL    Creatinine 0.86 0.70 - 1.30 mg/dL    GFR Estimate 84 >60 mL/min/[1.73_m2]    GFR Estimate If Black >90 >60 mL/min/[1.73_m2]    Calcium 9.1 8.6 - 10.3 mg/dL       If you have any questions or concerns, please call the clinic at the number listed above.       Sincerely,        Trino Dahl MD

## 2019-02-11 NOTE — PROGRESS NOTES
Subjective  Ilia Ferguson is a 86 year old male who presents for med check, diabetes check.  His right foot is been feeling unusual.  It feels numb all the time.  It feels shorter than the other leg.  He does have some pain that goes up to the hip area.  He had a herniated disc on that side.  He feels like his symptoms have been persistent ever since his stroke.  He is not having any troubles with the right hand, no coordination issues with the right hand.  No falls.  He did a lot of therapy initially.  Lately he is just been trying to work out at the [facility].  He did not work out in January because he was ill.  History of diabetes mellitus type 2 which is well-controlled with metformin.  History of cerebrovascular accident and continues on aspirin and Plavix.  History of hyperlipidemia which is stable with Lipitor.  History of hypertension which is stable with enalapril.  History of prostate enlargement and continues on tamsulosin.  He will have varying urinary streams.  He never has to get up and void at night.  He is not really interested in doing any more testing or treatment for prostate.    Problem List/PMH: reviewed in EMR, and made relevant updates today.  Medications: reviewed in EMR, and made relevant updates today.  Allergies: reviewed in EMR, and made relevant updates today.    Social Hx:  Social History     Tobacco Use     Smoking status: Never Smoker     Smokeless tobacco: Never Used   Substance Use Topics     Alcohol use: Yes     Alcohol/week: 0.0 oz     Comment: Alcoholic Drinks/day: 1 per day     Drug use: No     Comment: Drug use: No     Social History     Social History Narrative    Wife (Rima)    , nine children.      Lives in [place].      Retired oil  and .    Attends [employer].      I reviewed social history and made relevant updates today.    Family Hx:   Family History   Problem Relation Age of Onset     Heart Disease Father   "       Heart Disease, at age 57, MI     Hypertension Father         Hypertension     Other - See Comments Father 54        Stroke     Heart Disease Mother         Heart Disease, at age 86, heart failure.     Unknown/Adopted Brother         Unknown, at age 49, sudden death.     Diabetes Brother         Diabetes,Age 70     Hypertension Brother         Hypertension       Objective  Vitals: reviewed in EMR.  /74 (BP Location: Right arm, Patient Position: Sitting, Cuff Size: Adult Large)   Pulse 66   Temp 97.6  F (36.4  C) (Tympanic)   Resp 16   Ht 1.778 m (5' 10\")   Wt 93.4 kg (206 lb)   BMI 29.56 kg/m      Gen: Pleasant male, NAD.  HEENT: MMM, no OP erythema.   Neck: Supple, no JVD, no bruits.  CV: RRR no m/r/g.   Pulm: CTAB no w/r/r  GI: Soft, NT, ND. No HSM. No masses. Bowel sounds present.  Neuro: Grossly intact  Msk: No lower extremity edema.  Skin: No concerning lesions.  Psychiatric: Normal affect and insight. Does not appear anxious or depressed.    Results for orders placed or performed in visit on 18   Hemoglobin A1c   Result Value Ref Range    Hemoglobin A1C 6.9 (H) 4.0 - 6.0 %   Basic metabolic panel   Result Value Ref Range    Sodium 141 134 - 144 mmol/L    Potassium 4.2 3.5 - 5.1 mmol/L    Chloride 109 (H) 98 - 107 mmol/L    Carbon Dioxide 26 21 - 31 mmol/L    Anion Gap 6 3 - 14 mmol/L    Glucose 143 (H) 70 - 105 mg/dL    Urea Nitrogen 20 7 - 25 mg/dL    Creatinine 0.89 0.70 - 1.30 mg/dL    GFR Estimate 81 >60 mL/min/1.7m2    GFR Estimate If Black >90 >60 mL/min/1.7m2    Calcium 9.3 8.6 - 10.3 mg/dL   Lipid Profile   Result Value Ref Range    Cholesterol 133 <200 mg/dL    Triglycerides 82 <150 mg/dL    HDL Cholesterol 53 23 - 92 mg/dL    LDL Cholesterol Calculated 64 <100 mg/dL    Non HDL Cholesterol 80 <130 mg/dL     Labs:  Lab Results   Component Value Date    WBC 5.5 2018    HGB 12.1 (L) 2018    HCT 35.0 (L) 2018     2018    CHOL 133 " 07/30/2018    TRIG 82 07/30/2018    HDL 53 07/30/2018    ALT 9 02/14/2018    AST 14 02/14/2018     07/30/2018    BUN 20 07/30/2018    CO2 26 07/30/2018    INR 0.98 02/13/2018     Component      Latest Ref Rng & Units 12/2/2013   PSA Total (Diagnostic) - Historical      <4.00 ng/mL 7.65 (H)       Assessment    ICD-10-CM    1. Controlled type 2 diabetes mellitus with complication, without long-term current use of insulin (H) E11.8 metFORMIN (GLUCOPHAGE) 500 MG tablet     aspirin (ASA) 81 MG EC tablet     Basic metabolic panel     Hemoglobin A1c     Hemoglobin A1c     Basic metabolic panel   2. Cerebrovascular accident (CVA) due to thrombosis of left middle cerebral artery (H) I63.312 atorvastatin (LIPITOR) 40 MG tablet     clopidogrel (PLAVIX) 75 MG tablet     aspirin (ASA) 81 MG EC tablet   3. Essential hypertension I10 enalapril (VASOTEC) 20 MG tablet     Basic metabolic panel     Basic metabolic panel   4. Neuropathy G62.9 gabapentin (NEURONTIN) 100 MG capsule   5. Benign prostatic hyperplasia without lower urinary tract symptoms N40.0 tamsulosin (FLOMAX) 0.4 MG capsule   6. Elevated prostate specific antigen (PSA) R97.20 tamsulosin (FLOMAX) 0.4 MG capsule   7. Osteoarthritis of spine, unspecified spinal osteoarthritis complication status, unspecified spinal region M47.9 naproxen sodium (ANAPROX) 220 MG tablet     Orders Placed This Encounter   Procedures     GH IMM-  FLU VACCINE, INCREASED ANTIGEN, PRESV FREE     Basic metabolic panel     Hemoglobin A1c       Diabetes mellitus type 2 has been well controlled.  With regards to his tingling and weakness there is no foot drop and is not been falling.  I think it is most likely residual symptoms from his stroke.  We discussed the possibility of physical therapy, patient declined.  Warning signs were discussed.  His last PSA was fairly elevated at 7.65 6 years ago.  We discussed the possibility of further testing today and the patient declined.    Plan   --  Expected clinical course discussed   -- Medications and their side effects discussed  Patient Instructions      -- Consider recheck PSA prostate level if you would consider doing more about it    Aspects of Diabetes we can improve:  Hemoglobin A1c Lab Results   Component Value Date    A1C 6.9 07/30/2018    A1C 6.9 02/14/2018    Goal range is under 8. Best is 6.5 to 7   Blood Pressure 128/74 Goal to keep less than 140/90   Tobacco  reports that  has never smoked. he has never used smokeless tobacco. Goal to abstain from tobacco   Aspirin yes Aspirin reduces risk of heart disease and stroke   ACE/ARB enalapril These medications reduce risk of kidney disease   Cholesterol atorvastatin Statins reduce risk of heart disease and stroke   Eye Exam DUE Annual diabetic eye exam   Healthy weight Body mass index is 29.56 kg/m . Goal BMI under 30, best is under 25.      -- I'm trying to exercise daily (goal at least 20 min/day) with moderate aerobic activity   -- Eat healthy (resources from ADA at http://www.diabetes.org/)   -- I'm taking good care of my feet. Consider seeing the Podiatrist   -- Check blood sugars as directed, record in log book and bring to every appointment   -- Next diabetes lab draw: 6-12 months   -- Next diabetes office visit: 6-12 months      Signed, Trino Dahl MD  Internal Medicine & Pediatrics

## 2019-02-11 NOTE — NURSING NOTE
Patient presents to clinic for medication review and diabetic check today.  Marely Gan LPN ....................  2/11/2019   11:05 AM    No LMP for male patient.  Medication Reconciliation: complete    Marely Gan LPN  2/11/2019 11:06 AM    Previous A1C is at goal of <8  Lab Results   Component Value Date    A1C 6.9 07/30/2018    A1C 6.9 02/14/2018     Urine microalbumin:creatine: DUE  Foot exam DUE  Eye exam 3-4 years ago   Tobacco User NO  Patient is on a daily aspirin  Patient is on a Statin.  Blood pressure today of:     BP Readings from Last 1 Encounters:   02/11/19 128/74      is at the goal of <139/89 for diabetics.    Marely Gan LPN on 2/11/2019 at 11:06 AM

## 2019-02-11 NOTE — PATIENT INSTRUCTIONS
-- Consider recheck PSA prostate level if you would consider doing more about it    Aspects of Diabetes we can improve:  Hemoglobin A1c Lab Results   Component Value Date    A1C 6.9 07/30/2018    A1C 6.9 02/14/2018    Goal range is under 8. Best is 6.5 to 7   Blood Pressure 128/74 Goal to keep less than 140/90   Tobacco  reports that  has never smoked. he has never used smokeless tobacco. Goal to abstain from tobacco   Aspirin yes Aspirin reduces risk of heart disease and stroke   ACE/ARB enalapril These medications reduce risk of kidney disease   Cholesterol atorvastatin Statins reduce risk of heart disease and stroke   Eye Exam DUE Annual diabetic eye exam   Healthy weight Body mass index is 29.56 kg/m . Goal BMI under 30, best is under 25.      -- I'm trying to exercise daily (goal at least 20 min/day) with moderate aerobic activity   -- Eat healthy (resources from ADA at http://www.diabetes.org/)   -- I'm taking good care of my feet. Consider seeing the Podiatrist   -- Check blood sugars as directed, record in log book and bring to every appointment   -- Next diabetes lab draw: 6-12 months   -- Next diabetes office visit: 6-12 months

## 2019-02-12 ASSESSMENT — ANXIETY QUESTIONNAIRES: GAD7 TOTAL SCORE: 0

## 2019-08-09 DIAGNOSIS — N40.0 BENIGN PROSTATIC HYPERPLASIA WITHOUT LOWER URINARY TRACT SYMPTOMS: ICD-10-CM

## 2019-08-09 DIAGNOSIS — R97.20 ELEVATED PROSTATE SPECIFIC ANTIGEN (PSA): ICD-10-CM

## 2019-08-12 RX ORDER — TAMSULOSIN HYDROCHLORIDE 0.4 MG/1
0.8 CAPSULE ORAL DAILY
Qty: 180 CAPSULE | Refills: 0 | Status: SHIPPED | OUTPATIENT
Start: 2019-08-12 | End: 2019-11-15

## 2019-08-12 NOTE — TELEPHONE ENCOUNTER
"Refill Request for: Tamsulosin (FLOMAX) 0.4 MG capsule   Received From: [id number]  Last Written Prescription Date: 2/11/19 for 180 capsules and 0 refills  LOV: 2/11/19 with PCP. Per LOV note, \" -- Next diabetes lab draw: 6-12 months   -- Next diabetes office visit: 6-12 months.\"  Next Appointment: No upcoming office visit on file during initial refill review with PCP  Protocol: Alpha Blockers Passed - 8/12 3:10 PM    Prescription approved per [facility] Medication Refill Protocol.      Hillary SCHAFERN, RN on 8/12/2019 at 3:13 PM        "

## 2019-11-15 DIAGNOSIS — N40.0 BENIGN PROSTATIC HYPERPLASIA WITHOUT LOWER URINARY TRACT SYMPTOMS: ICD-10-CM

## 2019-11-15 DIAGNOSIS — R97.20 ELEVATED PROSTATE SPECIFIC ANTIGEN (PSA): ICD-10-CM

## 2019-11-18 RX ORDER — TAMSULOSIN HYDROCHLORIDE 0.4 MG/1
0.8 CAPSULE ORAL DAILY
Qty: 180 CAPSULE | Refills: 0 | Status: SHIPPED | OUTPATIENT
Start: 2019-11-18 | End: 2020-02-13

## 2019-11-18 NOTE — TELEPHONE ENCOUNTER
"Requested Prescriptions   Pending Prescriptions Disp Refills     tamsulosin (FLOMAX) 0.4 MG capsule [Pharmacy Med Name: TAMSULOSIN 0.4MG CAPSULE] 180 capsule 0     Sig: TAKE 2 CAPSULES (0.8 MG) BY MOUTH DAILY       Alpha Blockers Passed - 11/15/2019  9:19 AM        Passed - Blood pressure under 140/90 in past 12 months     BP Readings from Last 3 Encounters:   02/11/19 128/74   07/30/18 130/68   04/26/18 126/80                 Passed - Recent (12 mo) or future (30 days) visit within the authorizing provider's specialty     Patient has had an office visit with the authorizing provider or a provider within the authorizing providers department within the previous 12 mos or has a future within next 30 days. See \"Patient Info\" tab in inbasket, or \"Choose Columns\" in Meds & Orders section of the refill encounter.              Passed - Patient does not have Tadalafil, Vardenafil, or Sildenafil on their medication list        Passed - Medication is active on med list        Passed - Patient is 18 years of age or older        LOV 2/11/19  Prescription approved per [facility] Refill Protocol.  Brenda J. Goodell, RN on 11/18/2019 at 2:28 PM    "

## 2020-02-13 DIAGNOSIS — E11.8 CONTROLLED TYPE 2 DIABETES MELLITUS WITH COMPLICATION, WITHOUT LONG-TERM CURRENT USE OF INSULIN (H): ICD-10-CM

## 2020-02-13 DIAGNOSIS — I10 ESSENTIAL HYPERTENSION: ICD-10-CM

## 2020-02-13 DIAGNOSIS — N40.0 BENIGN PROSTATIC HYPERPLASIA WITHOUT LOWER URINARY TRACT SYMPTOMS: ICD-10-CM

## 2020-02-13 DIAGNOSIS — R97.20 ELEVATED PROSTATE SPECIFIC ANTIGEN (PSA): ICD-10-CM

## 2020-02-13 DIAGNOSIS — I63.312 CEREBROVASCULAR ACCIDENT (CVA) DUE TO THROMBOSIS OF LEFT MIDDLE CEREBRAL ARTERY (H): ICD-10-CM

## 2020-02-13 DIAGNOSIS — G62.9 NEUROPATHY: ICD-10-CM

## 2020-02-13 RX ORDER — ATORVASTATIN CALCIUM 40 MG/1
40 TABLET, FILM COATED ORAL DAILY
Qty: 90 TABLET | Refills: 4 | Status: SHIPPED | OUTPATIENT
Start: 2020-02-13 | End: 2020-03-02

## 2020-02-13 RX ORDER — CLOPIDOGREL BISULFATE 75 MG/1
75 TABLET ORAL DAILY
Qty: 90 TABLET | Refills: 3 | Status: SHIPPED | OUTPATIENT
Start: 2020-02-13 | End: 2020-03-02

## 2020-02-13 RX ORDER — TAMSULOSIN HYDROCHLORIDE 0.4 MG/1
0.8 CAPSULE ORAL DAILY
Qty: 180 CAPSULE | Refills: 0 | Status: SHIPPED | OUTPATIENT
Start: 2020-02-13 | End: 2020-03-02

## 2020-02-13 RX ORDER — GABAPENTIN 100 MG/1
100 CAPSULE ORAL AT BEDTIME
Qty: 90 CAPSULE | Refills: 3 | Status: SHIPPED | OUTPATIENT
Start: 2020-02-13 | End: 2020-03-02

## 2020-02-13 RX ORDER — ENALAPRIL MALEATE 20 MG/1
40 TABLET ORAL DAILY
Qty: 180 TABLET | Refills: 3 | Status: SHIPPED | OUTPATIENT
Start: 2020-02-13 | End: 2020-03-02

## 2020-02-13 NOTE — LETTER
February 13, 2020      Ilia Ferguson  [address] [address]        Dear Ilia,     This letter is to remind you that you are due for your annual exam with Trnio Dahl. Your last comprehensive visit was more than 12 months ago.    Your refill requests have been forwarded to your provider for further review. Additional refills require you to complete this appointment.    Please call the clinic at [phone] to schedule your appointment.    If you should require additional refills before your scheduled appointment, please contact your pharmacy and we will refill your medication until the date of your appointment.    If you are no longer seeing Trino Dahl for primary care, please call to let us know. Doing so will remove you from our call/contact list.      Thank you for choosing [facility] and [facility] for your health care needs.    Sincerely,    Refill RN  [facility]

## 2020-02-13 NOTE — TELEPHONE ENCOUNTER
"[id number] sent Rx request for the following:      ENALAPRIL 20MG TABLET  Sig: Take 2 tablets (40 mg) by mouth daily     Last Prescription Date:   2/11/2019  Last Fill Qty/Refills:         180, R-3    Last Office Visit:              2/11/2019  Future Office visit:           none       METFORMIN 500MG TABLET  Sig: Take 1 tablet (500 mg) by mouth 2 times daily (with meals)      Last Prescription Date:   2/11/2019  Last Fill Qty/Refills:         180, R-3         TAMSULOSIN 0.4MG CAPSULE  Sig: TAKE 2 CAPSULES (0.8 MG) BY MOUTH DAILY      Last Prescription Date:   11/18/2019  Last Fill Qty/Refills:         180, R-0         ATORVASTATIN 40MG TABLET  Sig: Take 1 tablet (40 mg) by mouth daily     Last Prescription Date:   2/11/2019  Last Fill Qty/Refills:         90, R-4         GABAPENTIN 100MG CAPSULE  Sig: Take 1 capsule (100 mg) by mouth At Bedtime     Last Prescription Date:   2/11/2019  Last Fill Qty/Refills:         90, R-3         CLOPIDOGREL 75MG TABLET  Sig: Take 1 tablet (75 mg) by mouth daily      Last Prescription Date:   2/11/2019  Last Fill Qty/Refills:         90, R-3        Sent reminder letter to patient to schedule annual physical.     Requested Prescriptions   Pending Prescriptions Disp Refills     enalapril (VASOTEC) 20 MG tablet [Pharmacy Med Name: ENALAPRIL 20MG TABLET] 180 tablet 3     Sig: TAKE 2 TABLETS (40 MG) BY MOUTH DAILY       ACE Inhibitors (Including Combos) Protocol Failed - 2/13/2020  1:48 PM        Failed - Blood pressure under 140/90 in past 12 months     BP Readings from Last 3 Encounters:   02/11/19 128/74   07/30/18 130/68   04/26/18 126/80                 Failed - Recent (12 mo) or future (30 days) visit within the authorizing provider's specialty     Patient has had an office visit with the authorizing provider or a provider within the authorizing providers department within the previous 12 mos or has a future within next 30 days. See \"Patient Info\" tab in inbasket, or " "\"Choose Columns\" in Meds & Orders section of the refill encounter.              Failed - Normal serum creatinine on file in past 12 months     Recent Labs   Lab Test 02/11/19  1148   CR 0.86             Failed - Normal serum potassium on file in past 12 months     Recent Labs   Lab Test 02/11/19  1148   POTASSIUM 4.2             Passed - Medication is active on med list        Passed - Patient is age 18 or older        metFORMIN (GLUCOPHAGE) 500 MG tablet [Pharmacy Med Name: METFORMIN 500MG TABLET] 180 tablet 3     Sig: TAKE 1 TABLET (500 MG) BY MOUTH 2 TIMES DAILY (WITH MEALS)       Biguanide Agents Failed - 2/13/2020  1:48 PM        Failed - Blood pressure less than 140/90 in past 6 months     BP Readings from Last 3 Encounters:   02/11/19 128/74   07/30/18 130/68   04/26/18 126/80                 Failed - Patient has documented LDL within the past 12 mos.     Recent Labs   Lab Test 07/30/18  0706   LDL 64             Failed - Patient has had a Microalbumin in the past 15 mos.     No lab results found.          Failed - Patient has documented A1c within the specified period of time.     If HgbA1C is 8 or greater, it needs to be on file within the past 3 months.  If less than 8, must be on file within the past 6 months.     Recent Labs   Lab Test 02/11/19  1148  07/26/17  1504   A1C 6.8*   < >  --    [id number]  --   --  6.2    < > = values in this interval not displayed.             Failed - Patient's CR is NOT>1.4 OR Patient's EGFR is NOT<45 within past 12 mos.     Recent Labs   Lab Test 02/11/19  1148   GFRESTIMATED 84   GFRESTBLACK >90       Recent Labs   Lab Test 02/11/19  1148   CR 0.86             Failed - Recent (6 mo) or future (30 days) visit within the authorizing provider's specialty     Patient had office visit in the last 6 months or has a visit in the next 30 days with authorizing provider or within the authorizing provider's specialty.  See \"Patient Info\" tab in inbasket, or \"Choose Columns\" in Meds & " "Orders section of the refill encounter.            Passed - Patient is age 10 or older        Passed - Patient does NOT have a diagnosis of CHF.        Passed - Medication is active on med list        tamsulosin (FLOMAX) 0.4 MG capsule [Pharmacy Med Name: TAMSULOSIN 0.4MG CAPSULE] 180 capsule 0     Sig: TAKE 2 CAPSULES (0.8 MG) BY MOUTH DAILY       Alpha Blockers Failed - 2/13/2020  1:48 PM        Failed - Blood pressure under 140/90 in past 12 months     BP Readings from Last 3 Encounters:   02/11/19 128/74   07/30/18 130/68   04/26/18 126/80                 Failed - Recent (12 mo) or future (30 days) visit within the authorizing provider's specialty     Patient has had an office visit with the authorizing provider or a provider within the authorizing providers department within the previous 12 mos or has a future within next 30 days. See \"Patient Info\" tab in inbasket, or \"Choose Columns\" in Meds & Orders section of the refill encounter.              Passed - Patient does not have Tadalafil, Vardenafil, or Sildenafil on their medication list        Passed - Medication is active on med list        Passed - Patient is 18 years of age or older        atorvastatin (LIPITOR) 40 MG tablet [Pharmacy Med Name: ATORVASTATIN 40MG TABLET] 90 tablet 4     Sig: TAKE 1 TABLET (40 MG) BY MOUTH DAILY       Statins Protocol Failed - 2/13/2020  1:48 PM        Failed - LDL on file in past 12 months     Recent Labs   Lab Test 07/30/18  0706   LDL 64             Failed - Recent (12 mo) or future (30 days) visit within the authorizing provider's specialty     Patient has had an office visit with the authorizing provider or a provider within the authorizing providers department within the previous 12 mos or has a future within next 30 days. See \"Patient Info\" tab in inbasket, or \"Choose Columns\" in Meds & Orders section of the refill encounter.              Passed - No abnormal creatine kinase in past 12 months     No lab results found. " "            Passed - Medication is active on med list        Passed - Patient is age 18 or older        gabapentin (NEURONTIN) 100 MG capsule [Pharmacy Med Name: GABAPENTIN 100MG CAPSULE] 90 capsule 3     Sig: TAKE 1 CAPSULE (100 MG) BY MOUTH AT BEDTIME       There is no refill protocol information for this order        clopidogrel (PLAVIX) 75 MG tablet [Pharmacy Med Name: CLOPIDOGREL 75MG TABLET] 90 tablet 3     Sig: TAKE 1 TABLET (75 MG) BY MOUTH DAILY       Plavix Failed - 2/13/2020  1:48 PM        Failed - Normal HGB on file in past 12 months     Recent Labs   Lab Test 02/14/18  0412   HGB 12.1*               Failed - Normal Platelets on file in past 12 months     Recent Labs   Lab Test 02/14/18  0412                  Failed - Recent (12 mo) or future (30 days) visit within the authorizing provider's specialty     Patient has had an office visit with the authorizing provider or a provider within the authorizing providers department within the previous 12 mos or has a future within next 30 days. See \"Patient Info\" tab in inbasket, or \"Choose Columns\" in Meds & Orders section of the refill encounter.              Passed - No active PPI on record unless is Protonix        Passed - Medication is active on med list        Passed - Patient is age 18 or older          Unable to complete prescription refill per RN Medication Refill Policy.................... Ac Jimenez RN ....................  2/13/2020   1:59 PM          "

## 2020-03-02 ENCOUNTER — OFFICE VISIT (OUTPATIENT)
Dept: PEDIATRICS | Facility: OTHER | Age: 85
End: 2020-03-02
Attending: INTERNAL MEDICINE
Payer: MEDICARE

## 2020-03-02 VITALS
BODY MASS INDEX: 28.59 KG/M2 | RESPIRATION RATE: 14 BRPM | HEART RATE: 94 BPM | WEIGHT: 199.7 LBS | SYSTOLIC BLOOD PRESSURE: 122 MMHG | DIASTOLIC BLOOD PRESSURE: 70 MMHG | OXYGEN SATURATION: 97 % | TEMPERATURE: 97.2 F | HEIGHT: 70 IN

## 2020-03-02 DIAGNOSIS — M47.9 OSTEOARTHRITIS OF SPINE, UNSPECIFIED SPINAL OSTEOARTHRITIS COMPLICATION STATUS, UNSPECIFIED SPINAL REGION: ICD-10-CM

## 2020-03-02 DIAGNOSIS — B35.4 RINGWORM OF BODY: ICD-10-CM

## 2020-03-02 DIAGNOSIS — M17.11 ARTHRITIS OF RIGHT KNEE: ICD-10-CM

## 2020-03-02 DIAGNOSIS — N40.0 BENIGN PROSTATIC HYPERPLASIA WITHOUT LOWER URINARY TRACT SYMPTOMS: ICD-10-CM

## 2020-03-02 DIAGNOSIS — M54.31 SCIATICA OF RIGHT SIDE: ICD-10-CM

## 2020-03-02 DIAGNOSIS — Z23 NEED FOR VACCINATION: Primary | ICD-10-CM

## 2020-03-02 DIAGNOSIS — I63.312 CEREBROVASCULAR ACCIDENT (CVA) DUE TO THROMBOSIS OF LEFT MIDDLE CEREBRAL ARTERY (H): ICD-10-CM

## 2020-03-02 DIAGNOSIS — E11.8 CONTROLLED TYPE 2 DIABETES MELLITUS WITH COMPLICATION, WITHOUT LONG-TERM CURRENT USE OF INSULIN (H): ICD-10-CM

## 2020-03-02 DIAGNOSIS — G62.9 NEUROPATHY: ICD-10-CM

## 2020-03-02 DIAGNOSIS — I10 ESSENTIAL HYPERTENSION: ICD-10-CM

## 2020-03-02 DIAGNOSIS — R97.20 ELEVATED PROSTATE SPECIFIC ANTIGEN (PSA): ICD-10-CM

## 2020-03-02 LAB
ANION GAP SERPL CALCULATED.3IONS-SCNC: 8 MMOL/L (ref 3–14)
BUN SERPL-MCNC: 22 MG/DL (ref 7–25)
CALCIUM SERPL-MCNC: 9.1 MG/DL (ref 8.6–10.3)
CHLORIDE SERPL-SCNC: 107 MMOL/L (ref 98–107)
CHOLEST SERPL-MCNC: 131 MG/DL
CO2 SERPL-SCNC: 28 MMOL/L (ref 21–31)
CREAT SERPL-MCNC: 0.93 MG/DL (ref 0.7–1.3)
GFR SERPL CREATININE-BSD FRML MDRD: 77 ML/MIN/{1.73_M2}
GLUCOSE SERPL-MCNC: 144 MG/DL (ref 70–105)
HBA1C MFR BLD: 6.3 % (ref 4–6)
HDLC SERPL-MCNC: 53 MG/DL (ref 23–92)
LDLC SERPL CALC-MCNC: 60 MG/DL
NONHDLC SERPL-MCNC: 78 MG/DL
POTASSIUM SERPL-SCNC: 4.3 MMOL/L (ref 3.5–5.1)
SODIUM SERPL-SCNC: 143 MMOL/L (ref 134–144)
TRIGL SERPL-MCNC: 90 MG/DL

## 2020-03-02 PROCEDURE — G0463 HOSPITAL OUTPT CLINIC VISIT: HCPCS | Mod: 25

## 2020-03-02 PROCEDURE — 80061 LIPID PANEL: CPT | Mod: ZL | Performed by: INTERNAL MEDICINE

## 2020-03-02 PROCEDURE — 90732 PPSV23 VACC 2 YRS+ SUBQ/IM: CPT

## 2020-03-02 PROCEDURE — 99214 OFFICE O/P EST MOD 30 MIN: CPT | Performed by: INTERNAL MEDICINE

## 2020-03-02 PROCEDURE — G0009 ADMIN PNEUMOCOCCAL VACCINE: HCPCS

## 2020-03-02 PROCEDURE — 80048 BASIC METABOLIC PNL TOTAL CA: CPT | Mod: ZL | Performed by: INTERNAL MEDICINE

## 2020-03-02 PROCEDURE — 36415 COLL VENOUS BLD VENIPUNCTURE: CPT | Mod: ZL | Performed by: INTERNAL MEDICINE

## 2020-03-02 PROCEDURE — 90662 IIV NO PRSV INCREASED AG IM: CPT

## 2020-03-02 PROCEDURE — 83036 HEMOGLOBIN GLYCOSYLATED A1C: CPT | Mod: ZL | Performed by: INTERNAL MEDICINE

## 2020-03-02 PROCEDURE — G0008 ADMIN INFLUENZA VIRUS VAC: HCPCS

## 2020-03-02 PROCEDURE — G0463 HOSPITAL OUTPT CLINIC VISIT: HCPCS

## 2020-03-02 RX ORDER — CLOTRIMAZOLE 1 %
CREAM (GRAM) TOPICAL
Qty: 30 G | Refills: 1 | Status: SHIPPED | OUTPATIENT
Start: 2020-03-02 | End: 2023-09-18

## 2020-03-02 RX ORDER — TAMSULOSIN HYDROCHLORIDE 0.4 MG/1
0.8 CAPSULE ORAL DAILY
Qty: 180 CAPSULE | Refills: 3 | Status: SHIPPED | OUTPATIENT
Start: 2020-03-02 | End: 2021-05-20

## 2020-03-02 RX ORDER — NAPROXEN SODIUM 220 MG
220 TABLET ORAL DAILY PRN
Qty: 90 TABLET | Refills: 3 | Status: SHIPPED | OUTPATIENT
Start: 2020-03-02 | End: 2021-05-20

## 2020-03-02 RX ORDER — ATORVASTATIN CALCIUM 40 MG/1
40 TABLET, FILM COATED ORAL DAILY
Qty: 90 TABLET | Refills: 4 | Status: SHIPPED | OUTPATIENT
Start: 2020-03-02 | End: 2021-04-09

## 2020-03-02 RX ORDER — ENALAPRIL MALEATE 20 MG/1
40 TABLET ORAL DAILY
Qty: 180 TABLET | Refills: 3 | Status: SHIPPED | OUTPATIENT
Start: 2020-03-02 | End: 2021-05-20

## 2020-03-02 RX ORDER — GABAPENTIN 100 MG/1
100 CAPSULE ORAL AT BEDTIME
Qty: 90 CAPSULE | Refills: 3 | Status: SHIPPED | OUTPATIENT
Start: 2020-03-02 | End: 2021-03-26

## 2020-03-02 RX ORDER — CLOPIDOGREL BISULFATE 75 MG/1
75 TABLET ORAL DAILY
Qty: 90 TABLET | Refills: 3 | Status: SHIPPED | OUTPATIENT
Start: 2020-03-02 | End: 2021-03-04

## 2020-03-02 ASSESSMENT — PAIN SCALES - GENERAL: PAINLEVEL: SEVERE PAIN (6)

## 2020-03-02 ASSESSMENT — MIFFLIN-ST. JEOR: SCORE: 1582.08

## 2020-03-02 NOTE — PATIENT INSTRUCTIONS
-- Orthopedics consult for right knee   -- PT consult for sciatica and history of stroke   -- Trial of clotrimazole for rash on right side   -- Consider trial of steroid cream or Derm consult if not improving    Aspects of Diabetes we can improve:  Hemoglobin A1c Lab Results   Component Value Date    A1C 6.8 02/11/2019    A1C 6.9 07/30/2018    A1C 6.9 02/14/2018    Goal range is under 8. Best is 6.5 to 7   Blood Pressure 122/70 Goal to keep less than 140/90   Tobacco  reports that he has never smoked. He has never used smokeless tobacco. Goal to abstain from tobacco   Aspirin yes Aspirin reduces risk of heart disease and stroke   ACE/ARB enalapril These medications reduce risk of kidney disease   Cholesterol lipitor Statins reduce risk of heart disease and stroke   Eye Exam DUE Annual diabetic eye exam   Healthy weight Body mass index is 28.65 kg/m . Goal BMI under 30, best is under 25.      -- I'm trying to exercise daily (goal at least 20 min/day) with moderate aerobic activity   -- Eat healthy (resources from ADA at http://www.diabetes.org/)   -- I'm taking good care of my feet. Consider seeing the Podiatrist   -- Check blood sugars as directed, record in log book and bring to every appointment   -- Goal sugar before breakfast: under 140   -- Goal sugar 2 hours after supper: under 170   -- Next diabetes lab draw: 6-12 months   -- Next diabetes office visit: 6-12 months

## 2020-03-02 NOTE — NURSING NOTE
Patient presents to clinic for medication review and fasting labs.  Marely Barros LPN ....................  3/2/2020   7:51 AM    No LMP for male patient.  Medication Reconciliation: complete    Marely Barros LPN  3/2/2020 7:51 AM      Previous A1C is at goal of <8  Lab Results   Component Value Date    A1C 6.8 02/11/2019    A1C 6.9 07/30/2018    A1C 6.9 02/14/2018     Urine microalbumin:creatine: DUE  Foot exam Due  Eye exam Due    Tobacco User No  Patient is on a daily aspirin  Patient is on a Statin.  Blood pressure today of:     BP Readings from Last 1 Encounters:   03/02/20 122/70      is at the goal of <139/89 for diabetics.    Marely Barros LPN on 3/2/2020 at 7:52 AM

## 2020-03-02 NOTE — NURSING NOTE
Immunization Documentation    Prior to Immunization administration, verified patients identity using patient's name and date of birth. Please see IMMUNIZATIONS  and order for additional information.  Patient / Parent instructed to remain in clinic for 15 minutes and report any adverse reaction to staff immediately.    Was entire vial of medication used? Yes  Vial/Syringe: azul Son, [facility]  3/2/2020   8:18 AM

## 2020-03-02 NOTE — LETTER
March 2, 2020      Ilia Ferguson   [address] [address]        Dear ,    We are writing to inform you of your test results.    Lab work looks good.    Signed, Trino Dahl MD, FAAP, FACP  Internal Medicine & Pediatrics      Resulted Orders   Basic Metabolic Panel   Result Value Ref Range    Sodium 143 134 - 144 mmol/L    Potassium 4.3 3.5 - 5.1 mmol/L    Chloride 107 98 - 107 mmol/L    Carbon Dioxide 28 21 - 31 mmol/L    Anion Gap 8 3 - 14 mmol/L    Glucose 144 (H) 70 - 105 mg/dL    Urea Nitrogen 22 7 - 25 mg/dL    Creatinine 0.93 0.70 - 1.30 mg/dL    GFR Estimate 77 >60 mL/min/[1.73_m2]    GFR Estimate If Black >90 >60 mL/min/[1.73_m2]    Calcium 9.1 8.6 - 10.3 mg/dL   Hemoglobin A1c   Result Value Ref Range    Hemoglobin A1C 6.3 (H) 4.0 - 6.0 %   Lipid Profile   Result Value Ref Range    Cholesterol 131 <200 mg/dL    Triglycerides 90 <150 mg/dL    HDL Cholesterol 53 23 - 92 mg/dL    LDL Cholesterol Calculated 60 <100 mg/dL      Comment:      Desirable:       <100 mg/dl    Non HDL Cholesterol 78 <130 mg/dL       If you have any questions or concerns, please call the clinic at the number listed above.       Sincerely,        Trino Dahl MD

## 2020-03-02 NOTE — PROGRESS NOTES
Subjective  Ilia Ferguson is a 88 year old male who presents for medication management.  He does have a couple questions for me.  He has been having some pain in the right upper outer buttock worse with sleeping or lying on his back.  He has tingling in the right foot and he wants to know if there is anything he can do about that.  It started after he had a stroke.  Most of his weakness has resolved.  He does take gabapentin at nighttime but is not sure if that is helping or not.  He has a rash on the right side of his torso which has been present for nearly 2 years.  He thinks it is present from rubbing on the pajamas.  His right knee is bothering him and has been hurting since he was seen [facility].  They told him there was something under the kneecap.    Problem List/PMH: reviewed in EMR, and made relevant updates today.  Medications: reviewed in EMR, and made relevant updates today.  Allergies: reviewed in EMR, and made relevant updates today.    Social Hx:  Social History     Tobacco Use     Smoking status: Never Smoker     Smokeless tobacco: Never Used   Substance Use Topics     Alcohol use: Yes     Alcohol/week: 0.0 standard drinks     Comment: Alcoholic Drinks/day: 1 per day     Drug use: No     Types: Other     Comment: Drug use: No     Social History     Social History Narrative    Wife (Rima)    , nine children.      Lives in [place].      Retired oil  and .    Attends [employer].      I reviewed social history and made relevant updates today.    Family Hx:   Family History   Problem Relation Age of Onset     Heart Disease Father         Heart Disease, at age 57, MI     Hypertension Father         Hypertension     Other - See Comments Father 54        Stroke     Heart Disease Mother         Heart Disease, at age 86, heart failure.     Unknown/Adopted Brother         Unknown, at age 49, sudden  "death.     Diabetes Brother         Diabetes,Age 70     Hypertension Brother         Hypertension       Objective  Vitals: reviewed in EMR.  /70 (BP Location: Right arm, Patient Position: Sitting, Cuff Size: Adult Large)   Pulse 94   Temp 97.2  F (36.2  C) (Tympanic)   Resp 14   Ht 1.778 m (5' 10\")   Wt 90.6 kg (199 lb 11.2 oz)   SpO2 97%   BMI 28.65 kg/m      Gen: Pleasant male, NAD.  HEENT: MMM, no OP erythema.   Neck: Supple, no JVD, no bruits.  CV: RRR no m/r/g.   Pulm: CTAB no w/r/r  Neuro: Grossly intact  Msk: No lower extremity edema.  Skin: No concerning lesions.  Psychiatric: Normal affect and insight. Does not appear anxious or depressed.    XRAY KNEE 1-2 VIEWS RT3/7/2018  [facility]  Result Narrative      Patient Name:   AMINA STEELE    YOB: 1932    Procedure: XRAY KNEE 1-2 VIEWS RT    Date of Service: 03/07/2018       XRAY KNEE 1-2 VIEWS RT    INDICATION: R knee pain, right sided hemiparesis from recent CVA     COMPARISON: None.    FINDINGS: There is mild medial compartment narrowing. There are arterial vascular calcifications. There are patellar spurs. No acute osseous abnormality observed.       Finalized by: Emmett Forman MD on 3/7/2018 4:10 PM     Patient/Procedure Information:   [facility]    MRN/SALUD: [id number]    Order Number: [id number]    Accession Number: [id number]    Ordering Provider: JAYCOB GARZA    Authorizing Provider: JAYCOB GARZA     Labs:  Glucose   Date Value Ref Range Status   03/02/2020 144 (H) 70 - 105 mg/dL Final   02/11/2019 127 (H) 70 - 105 mg/dL Final     Hemoglobin A1C   Date Value Ref Range Status   03/02/2020 6.3 (H) 4.0 - 6.0 % Final   02/11/2019 6.8 (H) 4.0 - 6.0 % Final     Creatinine   Date Value Ref Range Status   03/02/2020 0.93 0.70 - 1.30 mg/dL Final   02/11/2019 0.86 0.70 - 1.30 mg/dL Final     LDL Cholesterol Calculated   Date Value Ref Range Status   03/02/2020 60 <100 mg/dL Final     " Comment:     Desirable:       <100 mg/dl             Assessment    ICD-10-CM    1. Need for vaccination Z23    2. Benign prostatic hyperplasia without lower urinary tract symptoms N40.0 tamsulosin (FLOMAX) 0.4 MG capsule   3. Elevated prostate specific antigen (PSA) R97.20 tamsulosin (FLOMAX) 0.4 MG capsule   4. Osteoarthritis of spine, unspecified spinal osteoarthritis complication status, unspecified spinal region M47.9 naproxen sodium (ANAPROX) 220 MG tablet   5. Controlled type 2 diabetes mellitus with complication, without long-term current use of insulin (H) E11.8 metFORMIN (GLUCOPHAGE) 500 MG tablet     aspirin (ASA) 81 MG EC tablet     Hemoglobin A1c     Basic Metabolic Panel     Basic Metabolic Panel     Hemoglobin A1c   6. Neuropathy G62.9 gabapentin (NEURONTIN) 100 MG capsule   7. Essential hypertension I10 enalapril (VASOTEC) 20 MG tablet   8. Cerebrovascular accident (CVA) due to thrombosis of left middle cerebral artery (H) I63.312 clopidogrel (PLAVIX) 75 MG tablet     atorvastatin (LIPITOR) 40 MG tablet     aspirin (ASA) 81 MG EC tablet     Lipid Profile     Lipid Profile   9. Arthritis of right knee M17.11 ORTHOPEDICS ADULT REFERRAL   10. Sciatica of right side M54.31 PHYSICAL THERAPY REFERRAL   11. Ringworm of body B35.4 clotrimazole (LOTRIMIN) 1 % external cream     Orders Placed This Encounter   Procedures     PNEUMOCOCCAL VACCINE,ADULT,SQ OR IM     [facility] IMM-  FLU VACCINE, INCREASED ANTIGEN, PRESV FREE     Lipid Profile     Hemoglobin A1c     Basic Metabolic Panel     ORTHOPEDICS ADULT REFERRAL     PHYSICAL THERAPY REFERRAL     I think some of his right lower extremity symptoms are coming from neuropathic pain secondary to his history of cerebrovascular accident.  He certainly could have a component of lumbar radiculopathy.  I think it is worthwhile trying some physical therapy.  He continues to have pain in the knee with previous signs of arthritis on x-ray imaging.  He would consider less invasive  treatments including injection or arthroscopy.  Orthopedics consultation is recommended.    Plan   -- Expected clinical course discussed   -- Medications and their side effects discussed  Patient Instructions      -- Orthopedics consult for right knee   -- PT consult for sciatica and history of stroke   -- Trial of clotrimazole for rash on right side   -- Consider trial of steroid cream or Derm consult if not improving    Aspects of Diabetes we can improve:  Hemoglobin A1c Lab Results   Component Value Date    A1C 6.8 02/11/2019    A1C 6.9 07/30/2018    A1C 6.9 02/14/2018    Goal range is under 8. Best is 6.5 to 7   Blood Pressure 122/70 Goal to keep less than 140/90   Tobacco  reports that he has never smoked. He has never used smokeless tobacco. Goal to abstain from tobacco   Aspirin yes Aspirin reduces risk of heart disease and stroke   ACE/ARB enalapril These medications reduce risk of kidney disease   Cholesterol lipitor Statins reduce risk of heart disease and stroke   Eye Exam DUE Annual diabetic eye exam   Healthy weight Body mass index is 28.65 kg/m . Goal BMI under 30, best is under 25.      -- I'm trying to exercise daily (goal at least 20 min/day) with moderate aerobic activity   -- Eat healthy (resources from ADA at http://www.diabetes.org/)   -- I'm taking good care of my feet. Consider seeing the Podiatrist   -- Check blood sugars as directed, record in log book and bring to every appointment   -- Goal sugar before breakfast: under 140   -- Goal sugar 2 hours after supper: under 170   -- Next diabetes lab draw: 6-12 months   -- Next diabetes office visit: 6-12 months        Return in about 1 year (around 3/2/2021) for medication management, diabetes.    SignedTrino MD, FAAP, FACP  Internal Medicine & Pediatrics

## 2020-03-13 ENCOUNTER — HOSPITAL ENCOUNTER (OUTPATIENT)
Dept: PHYSICAL THERAPY | Facility: OTHER | Age: 85
Setting detail: THERAPIES SERIES
End: 2020-03-13
Attending: INTERNAL MEDICINE
Payer: MEDICARE

## 2020-03-13 DIAGNOSIS — M54.31 SCIATICA OF RIGHT SIDE: ICD-10-CM

## 2020-03-13 PROCEDURE — 97110 THERAPEUTIC EXERCISES: CPT | Mod: GP

## 2020-03-13 PROCEDURE — 97161 PT EVAL LOW COMPLEX 20 MIN: CPT | Mod: GP
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
## 2020-03-13 NOTE — PROGRESS NOTES
[facility]          OUTPATIENT PHYSICAL THERAPY ORTHOPEDIC EVALUATION  PLAN OF TREATMENT FOR OUTPATIENT REHABILITATION  (COMPLETE FOR INITIAL CLAIMS ONLY)  Patient's Last Name, First Name, M.I.  YOB: 1932  Ilia Ferguson    Provider s Name:  [facility]   Medical Record No.  [id number]   Start of Care Date:  03/13/20   Onset Date:  10/01/19   Type:     _X__PT   ___OT   ___SLP Medical Diagnosis:  right sciatica     PT Diagnosis:  impaired mobility   Visits from SOC:  1      _________________________________________________________________________________  Plan of Treatment/Functional Goals:  balance training, gait training, joint mobilization, manual therapy, neuromuscular re-education, ROM, strengthening, stretching     Cryotherapy, Hot packs, Ultrasound     Goals  Goal Identifier: mobility  Goal Description: Pt will have improved hip mobility to allow sitting with minimal increase in pain less than 1-2/10.  Target Date: 04/24/20    Goal Identifier: strength  Goal Description: Pt will have improved core and hip strength to promote standing 15 mintues with pain less than 4/10.  Target Date: 05/08/20    Goal Identifier: HEP  Goal Description: Pt will be independent and consistent with performance of customized home exercises for self management of symptoms.  Target Date: 05/22/20                                                           Therapy Frequency:  2 times/Week  Predicted Duration of Therapy Intervention:  10 weeks    Alethea Hurt, PT                 I CERTIFY THE NEED FOR THESE SERVICES FURNISHED UNDER        THIS PLAN OF TREATMENT AND WHILE UNDER MY CARE     (Physician co-signature of this document indicates review and certification of the therapy plan).                       Certification Date From:  03/13/20   Certification Date To:  05/22/20    Referring Provider:  Trino Dahl    Initial Assessment        See Epic Evaluation Start of Care Date:  03/13/20

## 2020-03-17 ENCOUNTER — HOSPITAL ENCOUNTER (OUTPATIENT)
Dept: PHYSICAL THERAPY | Facility: OTHER | Age: 85
Setting detail: THERAPIES SERIES
End: 2020-03-17
Attending: INTERNAL MEDICINE
Payer: MEDICARE

## 2020-03-17 PROCEDURE — 97110 THERAPEUTIC EXERCISES: CPT | Mod: GP

## 2020-03-17 PROCEDURE — 97112 NEUROMUSCULAR REEDUCATION: CPT | Mod: GP

## 2020-03-19 ENCOUNTER — TELEPHONE (OUTPATIENT)
Dept: PHYSICAL THERAPY | Facility: OTHER | Age: 85
End: 2020-03-19

## 2020-03-19 NOTE — TELEPHONE ENCOUNTER
Patient was called to discuss follow up PT appointments due to COVID-19. He is comfortable cancelling next week's appointments and continuing his home exercises. He will keep his appointment on 3/30 for now and schedule more at that time if needed.

## 2020-03-26 DIAGNOSIS — M25.561 RIGHT KNEE PAIN, UNSPECIFIED CHRONICITY: Primary | ICD-10-CM

## 2020-04-07 ENCOUNTER — OFFICE VISIT (OUTPATIENT)
Dept: ORTHOPEDICS | Facility: OTHER | Age: 85
End: 2020-04-07
Attending: INTERNAL MEDICINE
Payer: COMMERCIAL

## 2020-04-07 DIAGNOSIS — M48.062 SPINAL STENOSIS OF LUMBAR REGION WITH NEUROGENIC CLAUDICATION: Primary | ICD-10-CM

## 2020-04-07 DIAGNOSIS — M17.11 ARTHRITIS OF RIGHT KNEE: ICD-10-CM

## 2020-04-07 PROCEDURE — G0463 HOSPITAL OUTPT CLINIC VISIT: HCPCS

## 2020-04-07 PROCEDURE — 99202 OFFICE O/P NEW SF 15 MIN: CPT | Performed by: ORTHOPAEDIC SURGERY

## 2020-04-07 NOTE — PROGRESS NOTES
Visit Date:   04/07/2020      REASON FOR EVALUATION:  Right leg pain.      HISTORY:  Ilia comes in with a history of previous disk herniation, had some injections done 3 years back, seemed to help out to some extent.  Reports pain that kind of starts in the buttocks, radiates down into his foot.  He also has a little bit of knee pain associated with this.  Has been told at some other point in time that he does have some knee arthritis.  He is here look at his options.  The focal point of the pain seems to be burning up and down the leg, worse with walking, worse with standing, seems like it has progressed over time.  Has not had any recent MRI scans updated nor any therapies done recently as well.      MEDICATIONS:  In the chart have been reviewed.      ALLERGIES:  NOTED TO SULFA.      REVIEW OF SYSTEMS:  A 12-point review of systems is otherwise negative, with exception of that stated above.      PHYSICAL EXAMINATION:   GENERAL:  The patient is 5 feet 10 inches, 185 pounds, alert and oriented x3, cooperative with exam, in no acute distress.  Does ambulate with satisfactory gait.  Affect appropriate as well.     MUSCULOSKELETAL:  Examination of the right leg shows pain with straight leg raise.  Hip range of motion and knee range of motion are reasonable without significant limitations.  Does have a little bit of crepitation of the knee itself.  No pain with palpation across the bursa.  Reflex exam seems to be equal and symmetric in the bilateral lower extremities at this time.  Increased pain with hyperextension lumbar spine, less pain with forward flexion otherwise noted at this point.      IMPRESSION:  Right leg radiculopathy, likely secondary to lumbar stenosis.      PLAN:  Update MRI scan of the lumbar spine.  The patient will be contacted once the study is complete for further direction and appropriate management at this time.         JAZMYN HUANG MD             D: 04/07/2020   T: 04/07/2020   MT: GAEL       Name:     AMINA STEELE   MRN:      [id number]        Account:      [id number]   :      1932           Visit Date:   2020      Document: [id number]

## 2020-04-07 NOTE — PROGRESS NOTES
Patient is here for consult on his right leg pain.   Jessi Hyde LPN .....................4/7/2020 11:36 AM

## 2020-04-08 ENCOUNTER — HOSPITAL ENCOUNTER (OUTPATIENT)
Dept: MRI IMAGING | Facility: OTHER | Age: 85
Discharge: HOME | End: 2020-04-08
Attending: ORTHOPAEDIC SURGERY | Admitting: ORTHOPAEDIC SURGERY
Payer: MEDICARE

## 2020-04-08 DIAGNOSIS — M48.062 SPINAL STENOSIS OF LUMBAR REGION WITH NEUROGENIC CLAUDICATION: ICD-10-CM

## 2020-04-08 PROCEDURE — 72148 MRI LUMBAR SPINE W/O DYE: CPT

## 2020-04-20 DIAGNOSIS — M79.604 RIGHT LEG PAIN: Primary | ICD-10-CM

## 2020-04-20 DIAGNOSIS — M48.061 SPINAL STENOSIS OF LUMBAR REGION, UNSPECIFIED WHETHER NEUROGENIC CLAUDICATION PRESENT: ICD-10-CM

## 2020-04-21 ENCOUNTER — TELEPHONE (OUTPATIENT)
Dept: PEDIATRICS | Facility: OTHER | Age: 85
End: 2020-04-21

## 2020-04-21 NOTE — TELEPHONE ENCOUNTER
Erma from [facility] called and asked if patient was supposed to be on Plavix due to his CVA . She stated that when she called the patient, he denied taking Plavix and reports only taking aspirin for a blood thinner.  Plavix is on his medication list and was filled for 1 year on 3/2/20 at his last visit with Trino Dahl MD.  Please advise so [facility] can set him up for his lumbar injection.    Xena Albright LPN  4/21/2020 9:06 AM

## 2020-04-21 NOTE — TELEPHONE ENCOUNTER
Called [facility]. Plavix last filled 3/11/2020 and had been regularly filled prior.     -- Continue Plavix/clopidogrel   -- Okay to stop aspirin    Signed, Trino Dahl MD, FAAP, FACP  Internal Medicine & Pediatrics

## 2020-04-27 ENCOUNTER — HOSPITAL ENCOUNTER (OUTPATIENT)
Dept: GENERAL RADIOLOGY | Facility: OTHER | Age: 85
Discharge: HOME | End: 2020-04-27
Attending: ORTHOPAEDIC SURGERY | Admitting: ORTHOPAEDIC SURGERY
Payer: MEDICARE

## 2020-04-27 DIAGNOSIS — M48.061 SPINAL STENOSIS OF LUMBAR REGION, UNSPECIFIED WHETHER NEUROGENIC CLAUDICATION PRESENT: ICD-10-CM

## 2020-04-27 DIAGNOSIS — M79.604 RIGHT LEG PAIN: ICD-10-CM

## 2020-04-27 PROCEDURE — 25000125 ZZHC RX 250: Performed by: RADIOLOGY

## 2020-04-27 PROCEDURE — 62323 NJX INTERLAMINAR LMBR/SAC: CPT

## 2020-04-27 PROCEDURE — 25500064 ZZH RX 255 OP 636: Performed by: RADIOLOGY

## 2020-04-27 PROCEDURE — 25000128 H RX IP 250 OP 636: Performed by: RADIOLOGY

## 2020-04-27 RX ORDER — METHYLPREDNISOLONE ACETATE 80 MG/ML
80 INJECTION, SUSPENSION INTRA-ARTICULAR; INTRALESIONAL; INTRAMUSCULAR; SOFT TISSUE ONCE
Status: COMPLETED | OUTPATIENT
Start: 2020-04-27 | End: 2020-04-27

## 2020-04-27 RX ORDER — LIDOCAINE HYDROCHLORIDE 10 MG/ML
2 INJECTION, SOLUTION INFILTRATION; PERINEURAL ONCE
Status: COMPLETED | OUTPATIENT
Start: 2020-04-27 | End: 2020-04-27

## 2020-04-27 RX ORDER — LIDOCAINE HYDROCHLORIDE 10 MG/ML
2 INJECTION, SOLUTION EPIDURAL; INFILTRATION; INTRACAUDAL; PERINEURAL ONCE
Status: COMPLETED | OUTPATIENT
Start: 2020-04-27 | End: 2020-04-27

## 2020-04-27 RX ADMIN — LIDOCAINE HYDROCHLORIDE 2 ML: 10 INJECTION, SOLUTION INFILTRATION; PERINEURAL at 08:32

## 2020-04-27 RX ADMIN — LIDOCAINE HYDROCHLORIDE 2 ML: 10 INJECTION, SOLUTION EPIDURAL; INFILTRATION; INTRACAUDAL; PERINEURAL at 08:32

## 2020-04-27 RX ADMIN — IOHEXOL 2 ML: 240 INJECTION, SOLUTION INTRATHECAL; INTRAVASCULAR; INTRAVENOUS; ORAL at 08:32

## 2020-04-27 RX ADMIN — METHYLPREDNISOLONE ACETATE 80 MG: 80 INJECTION, SUSPENSION INTRA-ARTICULAR; INTRALESIONAL; INTRAMUSCULAR; SOFT TISSUE at 08:32

## 2021-02-16 ENCOUNTER — IMMUNIZATION (OUTPATIENT)
Dept: FAMILY MEDICINE | Facility: OTHER | Age: 86
End: 2021-02-16
Attending: INTERNAL MEDICINE
Payer: MEDICARE

## 2021-02-16 PROCEDURE — 91300 PR COVID VAC PFIZER DIL RECON 30 MCG/0.3 ML IM: CPT

## 2021-02-24 NOTE — PROGRESS NOTES
Outpatient Physical Therapy Discharge Note     Patient: Ilia Ferguson  : 1932    Beginning/End Dates of Reporting Period:  3/13/2020 to 2021    Referring Provider: Dr. Dahl    Therapy Diagnosis: impaired mobility     Client Self Report: Symptoms about the same overall, not bad today. Rates pain 1-2/10 today. Stretches hurt a little while he's doing them, but goes away pretty quickly.     Objective Measurements:  Objective Measure: pelvis screen  Details: negative FFT and stork; BINDU + and Mahin bst  Objective Measure: segmental mobility  Details: good          Outcome Measures (most recent score):  Not assessed. Unplanned discharge    Goals:  Goal Identifier mobility   Goal Description Pt will have improved hip mobility to allow sitting with minimal increase in pain less than 1-2/10.   Target Date 20   Date Met      Progress:     Goal Identifier strength   Goal Description Pt will have improved core and hip strength to promote standing 15 mintues with pain less than 4/10.   Target Date 20   Date Met      Progress:     Goal Identifier HEP   Goal Description Pt will be independent and consistent with performance of customized home exercises for self management of symptoms.   Target Date 20   Date Met      Progress:         Progress Toward Goals:   Not assessed this period.          Plan:  Discharge from therapy.    Discharge:    Reason for Discharge: Patient chooses to discontinue therapy.  Medicare G-code: Patient did not attend a final scheduled session prior to discharge. Unable to determine discharge functional status.    Equipment Issued: n/a    Discharge Plan: Patient to continue home program.

## 2021-03-04 DIAGNOSIS — I63.312 CEREBROVASCULAR ACCIDENT (CVA) DUE TO THROMBOSIS OF LEFT MIDDLE CEREBRAL ARTERY (H): ICD-10-CM

## 2021-03-04 RX ORDER — CLOPIDOGREL BISULFATE 75 MG/1
75 TABLET ORAL DAILY
Qty: 90 TABLET | Refills: 0 | Status: SHIPPED | OUTPATIENT
Start: 2021-03-04 | End: 2021-05-28

## 2021-03-04 NOTE — LETTER
March 4, 2021      Ilia Ferguson  [address] [address]        Dear Ilia,         A refill of clopidogrel (PLAVIX) 75 MG tablet  has been requested by your pharmacy.  We noticed that it has been greater than 12 months since your last comprehensive visit and labs with Trino Dahl MD.  A limited 90 day supply has been sent to your pharmacy at this time.    Additional refills require a medication management appointment.  Your health is very important to us.  Please call the clinic at [phone] to schedule your appointment.    Thank you,    The Refill Nurse  [facility]

## 2021-03-04 NOTE — TELEPHONE ENCOUNTER
[id number] sent Rx request for the following:   clopidogrel (PLAVIX) 75 MG tablet   Sig: TAKE 1 TABLET (75 MG) BY MOUTH DAILY    Last Prescription Date:   03/02/2020  Last Fill Qty/Refills:         90, R-3    Last Office Visit:              03/02/2020 ([facility])   Future Office visit:           None noted   Plavix Failed - 3/4/2021  2:59 PM        Failed - Normal HGB on file in past 12 months     Recent Labs   Lab Test 02/14/18  0412   HGB 12.1*               Failed - Normal Platelets on file in past 12 months     Recent Labs   Lab Test 02/14/18  0412               Patient due for annual review. Letter sent via [employer]. Prescription approved per [facility] Refill Protocol for 90 day deidra refill with notation made to requesting pharmacy. Unable to complete prescription refill per RN Medication Refill Policy.................... Tracy Marie RN ....................  3/4/2021   4:05 PM

## 2021-03-06 ENCOUNTER — HEALTH MAINTENANCE LETTER (OUTPATIENT)
Age: 86
End: 2021-03-06

## 2021-03-09 ENCOUNTER — IMMUNIZATION (OUTPATIENT)
Dept: FAMILY MEDICINE | Facility: OTHER | Age: 86
End: 2021-03-09
Attending: FAMILY MEDICINE
Payer: MEDICARE

## 2021-03-09 PROCEDURE — 91300 PR COVID VAC PFIZER DIL RECON 30 MCG/0.3 ML IM: CPT

## 2021-03-26 DIAGNOSIS — G62.9 NEUROPATHY: ICD-10-CM

## 2021-03-26 RX ORDER — GABAPENTIN 100 MG/1
100 CAPSULE ORAL AT BEDTIME
Qty: 90 CAPSULE | Refills: 3 | Status: SHIPPED | OUTPATIENT
Start: 2021-03-26 | End: 2021-05-28

## 2021-03-26 NOTE — TELEPHONE ENCOUNTER
[id number] sent Rx request for the following:      Requested Prescriptions   Pending Prescriptions Disp Refills     gabapentin (NEURONTIN) 100 MG capsule [Pharmacy Med Name: GABAPENTIN 100MG CAPSULE] 90 capsule 3     Sig: TAKE 1 CAPSULE (100 MG) BY MOUTH AT BEDTIME       There is no refill protocol information for this order      Last Prescription Date:   3/2/2020  Last Fill Qty/Refills:         90, R-3    Last Office Visit:              3/2/2020 ([facility])  Future Office visit:           None noted     Routing refill request to provider for review/approval because:  Drug not on the [facility], [facility] or [facility] refill protocol or controlled substance  Patient due for annual review with PCP. Letter sent via my chart notifying patient of this.   Elvi Cobos RN ....................  3/26/2021   11:08 AM

## 2021-03-26 NOTE — LETTER
March 26, 2021      Ilia Ferguson  [address] [address]        Dear Ilia,   A refill of gabapentin (NEURONTIN) 100 MG capsule  has been requested by your pharmacy.  We noticed that it has been greater than 12 months since your last comprehensive visit and labs with Trino Dahl MD.    Additional refills require a medication management appointment.  Your health is very important to us.  Please call the clinic at [phone] to schedule your appointment.    Thank you,    The Refill Nurse  [facility]

## 2021-04-08 DIAGNOSIS — I63.312 CEREBROVASCULAR ACCIDENT (CVA) DUE TO THROMBOSIS OF LEFT MIDDLE CEREBRAL ARTERY (H): ICD-10-CM

## 2021-04-08 NOTE — LETTER
April 9, 2021      Ilia Ferguson  [address] [address]        Dear Ilia,         A refill of atorvastatin (LIPITOR) 40 MG tablet  has been requested by your pharmacy.  We noticed that it has been greater than 12 months since your last comprehensive visit and labs with Trino Dahl MD.  A limited 90 day supply has been sent to your pharmacy at this time.    Additional refills require a medication management appointment.  Your health is very important to us.  Please call the clinic at [phone] to schedule your appointment.    Thank you,    The Refill Nurse  [facility]

## 2021-04-09 RX ORDER — ATORVASTATIN CALCIUM 40 MG/1
40 TABLET, FILM COATED ORAL DAILY
Qty: 90 TABLET | Refills: 0 | Status: SHIPPED | OUTPATIENT
Start: 2021-04-09 | End: 2021-05-28

## 2021-04-09 NOTE — TELEPHONE ENCOUNTER
"[id number] sent Rx request for the following:   atorvastatin (LIPITOR) 40 MG tablet   Sig TAKE 1 TABLET (40 MG) BY MOUTH DAILY    Last Prescription Date:   03/02/2020  Last Fill Qty/Refills:         90, R-4    Last Office Visit:              03/02/2020 ([facility])   Future Office visit:           None noted    Statins Protocol Failed - 4/8/2021  1:53 PM        Failed - LDL on file in past 12 months     Recent Labs   Lab Test 03/02/20  0904   LDL 60             Failed - Recent (12 mo) or future (30 days) visit within the authorizing provider's specialty     Patient has had an office visit with the authorizing provider or a provider within the authorizing providers department within the previous 12 mos or has a future within next 30 days. See \"Patient Info\" tab in inbasket, or \"Choose Columns\" in Meds & Orders section of the refill encounter.           Letter sent via [employer] regarding patient needing appointment. Prescription approved per [facility] Refill Protocol for 90 day deidra refill with notation made to requesting pharmacy. Tracy Tejeda RN ....................  4/9/2021   11:56 AM      "

## 2021-05-20 DIAGNOSIS — R97.20 ELEVATED PROSTATE SPECIFIC ANTIGEN (PSA): ICD-10-CM

## 2021-05-20 DIAGNOSIS — E11.8 CONTROLLED TYPE 2 DIABETES MELLITUS WITH COMPLICATION, WITHOUT LONG-TERM CURRENT USE OF INSULIN (H): ICD-10-CM

## 2021-05-20 DIAGNOSIS — M47.9 OSTEOARTHRITIS OF SPINE, UNSPECIFIED SPINAL OSTEOARTHRITIS COMPLICATION STATUS, UNSPECIFIED SPINAL REGION: ICD-10-CM

## 2021-05-20 DIAGNOSIS — N40.0 BENIGN PROSTATIC HYPERPLASIA WITHOUT LOWER URINARY TRACT SYMPTOMS: ICD-10-CM

## 2021-05-20 DIAGNOSIS — I10 ESSENTIAL HYPERTENSION: ICD-10-CM

## 2021-05-20 NOTE — TELEPHONE ENCOUNTER
Routing refill request to provider for review/approval because:  Labs not current:    Patient needs to be seen because it has been more than 1 year since last office visit.    LOV: 3/2/2020  Called and informed patient he is due for annual review and labs with Dr. Dahl.  Patient transferred to scheduling.    Patient states he has enough medication for about one week.    Laurie Albright RN on 5/20/2021 at 2:58 PM

## 2021-05-24 RX ORDER — TAMSULOSIN HYDROCHLORIDE 0.4 MG/1
0.8 CAPSULE ORAL DAILY
Qty: 180 CAPSULE | Refills: 0 | Status: SHIPPED | OUTPATIENT
Start: 2021-05-24 | End: 2021-05-28

## 2021-05-24 RX ORDER — NAPROXEN SODIUM 220 MG
220 TABLET ORAL DAILY PRN
Qty: 90 TABLET | Refills: 0 | Status: SHIPPED | OUTPATIENT
Start: 2021-05-24 | End: 2021-05-28

## 2021-05-24 RX ORDER — ENALAPRIL MALEATE 20 MG/1
40 TABLET ORAL DAILY
Qty: 180 TABLET | Refills: 0 | Status: SHIPPED | OUTPATIENT
Start: 2021-05-24 | End: 2021-05-28

## 2021-05-28 ENCOUNTER — OFFICE VISIT (OUTPATIENT)
Dept: PEDIATRICS | Facility: OTHER | Age: 86
End: 2021-05-28
Attending: INTERNAL MEDICINE
Payer: COMMERCIAL

## 2021-05-28 VITALS
OXYGEN SATURATION: 96 % | DIASTOLIC BLOOD PRESSURE: 80 MMHG | BODY MASS INDEX: 27.69 KG/M2 | TEMPERATURE: 98.1 F | RESPIRATION RATE: 20 BRPM | HEART RATE: 61 BPM | WEIGHT: 193 LBS | SYSTOLIC BLOOD PRESSURE: 136 MMHG

## 2021-05-28 DIAGNOSIS — Z79.899 CONTROLLED SUBSTANCE AGREEMENT SIGNED: ICD-10-CM

## 2021-05-28 DIAGNOSIS — E11.42 DIABETIC POLYNEUROPATHY ASSOCIATED WITH TYPE 2 DIABETES MELLITUS (H): ICD-10-CM

## 2021-05-28 DIAGNOSIS — M47.9 OSTEOARTHRITIS OF SPINE, UNSPECIFIED SPINAL OSTEOARTHRITIS COMPLICATION STATUS, UNSPECIFIED SPINAL REGION: ICD-10-CM

## 2021-05-28 DIAGNOSIS — I10 ESSENTIAL HYPERTENSION: ICD-10-CM

## 2021-05-28 DIAGNOSIS — R06.09 DOE (DYSPNEA ON EXERTION): ICD-10-CM

## 2021-05-28 DIAGNOSIS — I63.312 CEREBROVASCULAR ACCIDENT (CVA) DUE TO THROMBOSIS OF LEFT MIDDLE CEREBRAL ARTERY (H): ICD-10-CM

## 2021-05-28 DIAGNOSIS — Z00.00 ENCOUNTER FOR MEDICARE ANNUAL WELLNESS EXAM: Primary | ICD-10-CM

## 2021-05-28 DIAGNOSIS — G62.9 NEUROPATHY: ICD-10-CM

## 2021-05-28 DIAGNOSIS — N40.0 BENIGN PROSTATIC HYPERPLASIA WITHOUT LOWER URINARY TRACT SYMPTOMS: ICD-10-CM

## 2021-05-28 DIAGNOSIS — Z23 NEED FOR VACCINATION: ICD-10-CM

## 2021-05-28 DIAGNOSIS — M17.31 POST-TRAUMATIC OSTEOARTHRITIS OF RIGHT KNEE: ICD-10-CM

## 2021-05-28 DIAGNOSIS — R97.20 ELEVATED PROSTATE SPECIFIC ANTIGEN (PSA): ICD-10-CM

## 2021-05-28 DIAGNOSIS — E11.8 CONTROLLED TYPE 2 DIABETES MELLITUS WITH COMPLICATION, WITHOUT LONG-TERM CURRENT USE OF INSULIN (H): ICD-10-CM

## 2021-05-28 PROBLEM — R13.12 OROPHARYNGEAL DYSPHAGIA: Status: RESOLVED | Noted: 2018-02-16 | Resolved: 2021-05-28

## 2021-05-28 PROBLEM — R73.01 IMPAIRED FASTING GLUCOSE: Status: RESOLVED | Noted: 2018-01-24 | Resolved: 2021-05-28

## 2021-05-28 PROBLEM — M62.81 RIGHT-SIDED MUSCLE WEAKNESS: Chronic | Status: RESOLVED | Noted: 2018-04-26 | Resolved: 2021-05-28

## 2021-05-28 PROBLEM — E66.9 OBESITY: Status: RESOLVED | Noted: 2018-01-24 | Resolved: 2021-05-28

## 2021-05-28 PROBLEM — R00.1 BRADYCARDIA: Status: RESOLVED | Noted: 2017-07-24 | Resolved: 2021-05-28

## 2021-05-28 LAB
ANION GAP SERPL CALCULATED.3IONS-SCNC: 5 MMOL/L (ref 3–14)
BUN SERPL-MCNC: 22 MG/DL (ref 7–25)
CALCIUM SERPL-MCNC: 9.7 MG/DL (ref 8.6–10.3)
CHLORIDE SERPL-SCNC: 105 MMOL/L (ref 98–107)
CHOLEST SERPL-MCNC: 138 MG/DL
CO2 SERPL-SCNC: 30 MMOL/L (ref 21–31)
CREAT SERPL-MCNC: 0.99 MG/DL (ref 0.7–1.3)
GFR SERPL CREATININE-BSD FRML MDRD: 71 ML/MIN/{1.73_M2}
GLUCOSE SERPL-MCNC: 145 MG/DL (ref 70–105)
HBA1C MFR BLD: 6.7 % (ref 4–6)
HDLC SERPL-MCNC: 50 MG/DL (ref 23–92)
INTERPRETATION ECG - MUSE: NORMAL
LDLC SERPL CALC-MCNC: 67 MG/DL
NONHDLC SERPL-MCNC: 88 MG/DL
NT-PROBNP SERPL-MCNC: 39 PG/ML (ref 0–100)
POTASSIUM SERPL-SCNC: 4.2 MMOL/L (ref 3.5–5.1)
SODIUM SERPL-SCNC: 140 MMOL/L (ref 134–144)
TRIGL SERPL-MCNC: 103 MG/DL

## 2021-05-28 PROCEDURE — 80307 DRUG TEST PRSMV CHEM ANLYZR: CPT | Mod: ZL | Performed by: INTERNAL MEDICINE

## 2021-05-28 PROCEDURE — 83036 HEMOGLOBIN GLYCOSYLATED A1C: CPT | Mod: ZL | Performed by: INTERNAL MEDICINE

## 2021-05-28 PROCEDURE — G0463 HOSPITAL OUTPT CLINIC VISIT: HCPCS

## 2021-05-28 PROCEDURE — 36415 COLL VENOUS BLD VENIPUNCTURE: CPT | Mod: ZL | Performed by: INTERNAL MEDICINE

## 2021-05-28 PROCEDURE — 80061 LIPID PANEL: CPT | Mod: ZL | Performed by: INTERNAL MEDICINE

## 2021-05-28 PROCEDURE — 93010 ELECTROCARDIOGRAM REPORT: CPT | Performed by: INTERNAL MEDICINE

## 2021-05-28 PROCEDURE — 80048 BASIC METABOLIC PNL TOTAL CA: CPT | Mod: ZL | Performed by: INTERNAL MEDICINE

## 2021-05-28 PROCEDURE — 83880 ASSAY OF NATRIURETIC PEPTIDE: CPT | Mod: ZL | Performed by: INTERNAL MEDICINE

## 2021-05-28 PROCEDURE — G0439 PPPS, SUBSEQ VISIT: HCPCS | Performed by: INTERNAL MEDICINE

## 2021-05-28 PROCEDURE — 93005 ELECTROCARDIOGRAM TRACING: CPT

## 2021-05-28 RX ORDER — CLOPIDOGREL BISULFATE 75 MG/1
75 TABLET ORAL DAILY
Qty: 90 TABLET | Refills: 3 | Status: SHIPPED | OUTPATIENT
Start: 2021-05-28 | End: 2022-05-26

## 2021-05-28 RX ORDER — TAMSULOSIN HYDROCHLORIDE 0.4 MG/1
0.8 CAPSULE ORAL DAILY
Qty: 180 CAPSULE | Refills: 3 | Status: SHIPPED | OUTPATIENT
Start: 2021-05-28 | End: 2022-06-21

## 2021-05-28 RX ORDER — ENALAPRIL MALEATE 20 MG/1
40 TABLET ORAL DAILY
Qty: 180 TABLET | Refills: 3 | Status: SHIPPED | OUTPATIENT
Start: 2021-05-28 | End: 2022-05-25

## 2021-05-28 RX ORDER — NAPROXEN SODIUM 220 MG
220 TABLET ORAL DAILY PRN
Qty: 90 TABLET | Refills: 3 | Status: SHIPPED | OUTPATIENT
Start: 2021-05-28 | End: 2022-07-12

## 2021-05-28 RX ORDER — TRAMADOL HYDROCHLORIDE 50 MG/1
50 TABLET ORAL AT BEDTIME
Qty: 30 TABLET | Refills: 5 | Status: SHIPPED | OUTPATIENT
Start: 2021-05-28 | End: 2021-11-24

## 2021-05-28 RX ORDER — GABAPENTIN 100 MG/1
100 CAPSULE ORAL AT BEDTIME
Qty: 90 CAPSULE | Refills: 3 | Status: SHIPPED | OUTPATIENT
Start: 2021-05-28 | End: 2022-07-21

## 2021-05-28 RX ORDER — ATORVASTATIN CALCIUM 40 MG/1
40 TABLET, FILM COATED ORAL DAILY
Qty: 90 TABLET | Refills: 3 | Status: SHIPPED | OUTPATIENT
Start: 2021-05-28 | End: 2022-06-21

## 2021-05-28 RX ORDER — ZOSTER VACCINE RECOMBINANT, ADJUVANTED 50 MCG/0.5
1 KIT INTRAMUSCULAR ONCE
Qty: 0.5 ML | Refills: 1 | Status: SHIPPED | OUTPATIENT
Start: 2021-05-28 | End: 2021-05-28

## 2021-05-28 ASSESSMENT — PAIN SCALES - GENERAL: PAINLEVEL: MODERATE PAIN (4)

## 2021-05-28 NOTE — PATIENT INSTRUCTIONS
Aspects of Diabetes we can improve:  Hemoglobin A1c Lab Results   Component Value Date    A1C 6.3 03/02/2020    A1C 6.8 02/11/2019    A1C 6.9 07/30/2018    A1C 6.9 02/14/2018    Goal range is under 8. Best is 6.5 to 7   Blood Pressure 136/80 Goal to keep less than 140/90   Tobacco  reports that he has never smoked. He has never used smokeless tobacco. Goal to abstain from tobacco   Aspirin yes Aspirin reduces risk of heart disease and stroke   ACE/ARB enalapril These medications reduce risk of kidney disease   Cholesterol Lipitor Statins reduce risk of heart disease and stroke   Eye Exam annual Annual diabetic eye exam   Healthy weight Body mass index is 27.69 kg/m . Goal BMI under 30, best is under 25.      -- I'm trying to exercise daily (goal at least 20 min/day) with moderate aerobic activity   -- Eat healthy (resources from ADA at http://www.diabetes.org/)   -- I'm taking good care of my feet. Consider seeing the Podiatrist   -- Check blood sugars as directed, record in log book and bring to every appointment   -- Goal sugar before breakfast: under 140   -- Goal sugar 2 hours after supper: under 170   -- Next diabetes lab draw: 6 months   -- Next diabetes office visit: 6 months     -- Get diabetic shoes from [facility]  Referred for diabetic shoes and diabetic inserts     -- Schedule stress test: lexiscan cardiolyte    Patient Education   Personalized Prevention Plan  You are due for the preventive services outlined below.  Your care team is available to assist you in scheduling these services.  If you have already completed any of these items, please share that information with your care team to update in your medical record.  Health Maintenance Due   Topic Date Due     Kidney Microalbumin Urine Test  Never done     Zoster (Shingles) Vaccine (1 of 2) Never done     Eye Exam  02/11/2017     A1C Lab  09/02/2020     Basic Metabolic Panel  03/02/2021     Cholesterol Lab  03/02/2021     Diabetic Foot  Exam  03/02/2021     FALL RISK ASSESSMENT  03/02/2021

## 2021-05-28 NOTE — PROGRESS NOTES
DME (Durable Medical Equipment) Orders and Documentation  Orders Placed This Encounter   Procedures     Miscellaneous DME Order      The patient was assessed and it was determined the patient is in need of the following listed DME Supplies/Equipment. Please complete supporting documentation below to demonstrate medical necessity.      DME All Other Item(s) Documentation    List reason for need and supporting documentation for medical necessity below for each DME item.     1.     ICD-10-CM    1. Encounter for Medicare annual wellness exam  Z00.00    2. Controlled type 2 diabetes mellitus with complication, without long-term current use of insulin (H)  E11.8 Hemoglobin A1c     Basic Metabolic Panel     Lipid Profile     metFORMIN (GLUCOPHAGE) 500 MG tablet     Miscellaneous DME Order     Lipid Profile     Basic Metabolic Panel     Hemoglobin A1c   3. Cerebrovascular accident (CVA) due to thrombosis of left middle cerebral artery (H)  I63.312 atorvastatin (LIPITOR) 40 MG tablet     clopidogrel (PLAVIX) 75 MG tablet   4. Essential hypertension  I10 enalapril (VASOTEC) 20 MG tablet   5. Neuropathy  G62.9 gabapentin (NEURONTIN) 100 MG capsule   6. Osteoarthritis of spine, unspecified spinal osteoarthritis complication status, unspecified spinal region  M47.9 naproxen sodium (ANAPROX) 220 MG tablet     traMADol (ULTRAM) 50 MG tablet     Drug Confirmation Panel Urine with Creat   7. Benign prostatic hyperplasia without lower urinary tract symptoms  N40.0 tamsulosin (FLOMAX) 0.4 MG capsule   8. Elevated prostate specific antigen (PSA)  R97.20 tamsulosin (FLOMAX) 0.4 MG capsule   9. Post-traumatic osteoarthritis of right knee  M17.31 Orthopedic & Spine  Referral     Miscellaneous DME Order     traMADol (ULTRAM) 50 MG tablet     Drug Confirmation Panel Urine with Creat   10. LIMA (dyspnea on exertion)  R06.00 N terminal pro BNP outpatient     EKG 12-lead, tracing only     NM Lexiscan stress test     N terminal pro  BNP outpatient   11. Diabetic polyneuropathy associated with type 2 diabetes mellitus (H)  E11.42    12. Need for vaccination  Z23 zoster vaccine recombinant adjuvanted (SHINGRIX) injection     Tdap, tetanus-diptheria-acell pertussis, (BOOSTRIX) 5-2.5-18.5 LF-MCG/0.5 injection   13. Controlled substance agreement signed  Z79.899 traMADol (ULTRAM) 50 MG tablet     Drug Confirmation Panel Urine with Creat

## 2021-05-28 NOTE — NURSING NOTE
Chief Complaint   Patient presents with     Wellness Visit       Medication Reconciliation: completed   Alexia Chavis LPN  5/28/2021 8:53 AM

## 2021-05-28 NOTE — PROGRESS NOTES
"SUBJECTIVE:   Ilia Ferguson is a 89 year old male who presents for Preventive Visit.    His right knee is really bothering him.  It is mainly on the inner aspect of the knee.  It feels like a toothache.  Occasionally it elvira due to the pain.  He has had the pain for years.  When he was at [place] with his stroke they did an x-ray and said he had arthritis.  He cannot do any surgery right now because his wife is having a lot of health problems which is stressful to him.  He has a history of a cerebrovascular accident and had some right-sided weakness which has completely resolved.  His dysphagia has resolved.  He feels shortness of breath with exertion.  His exercise tolerance is less than last year.  He is only able to mow a quarter of the yard at a time and needs to stop and rest in between sections.    Patient has been advised of split billing requirements and indicates understanding: Yes   Are you in the first 12 months of your Medicare coverage?  No    HPI  Do you feel safe in your environment? Yes    Have you ever done Advance Care Planning? (For example, a Health Directive, POLST, or a discussion with a medical provider or your loved ones about your wishes): Yes, patient states has an Advance Care Planning document and will bring a copy to the clinic.      Fall risk  No falls  {If any of the above assessments are answered yes, consider ordering appropriate referrals (Optional):[id number]::\"click delete button to remove this line now\"  Cognitive Screening   1) Repeat 3 items (Leader, Season, Table)    2) Clock draw: NORMAL  3) 3 item recall: Recalls 1 object   Results: NORMAL clock, 1-2 items recalled: COGNITIVE IMPAIRMENT LESS LIKELY    Mini-CogTM Copyright JA Goodwin. Licensed by the author for use in [employer]; reprinted with permission (naomy@.[facility]). All rights reserved.      Do you have sleep apnea, excessive snoring or daytime drowsiness?: no    Reviewed and updated as needed this visit by " "clinical staff  Tobacco  Allergies  Meds  Problems   Surg Hx   Soc Hx        Reviewed and updated as needed this visit by Provider   Allergies  Meds  Problems   Surg Hx          Social History     Tobacco Use     Smoking status: Never Smoker     Smokeless tobacco: Never Used   Substance Use Topics     Alcohol use: Yes     Alcohol/week: 0.0 standard drinks     Comment: Alcoholic Drinks/day: 1 per day     If you drink alcohol do you typically have >3 drinks per day or >7 drinks per week? Not applicable                Current providers sharing in care for this patient include:   Patient Care Team:  Trino Dahl MD as PCP - General (Pediatrics)  Trino Dhal MD as Assigned PCP  Malvin Montes De Oca MD as Assigned Musculoskeletal Provider    The following health maintenance items are reviewed in Epic and correct as of today:  Health Maintenance Due   Topic Date Due     MICROALBUMIN  Never done     ZOSTER IMMUNIZATION (1 of 2) Never done     EYE EXAM  02/11/2017     DIABETIC FOOT EXAM  03/02/2021     FALL RISK ASSESSMENT  03/02/2021     Labs reviewed in [facility]  Immunizations are reviewed and up-to-date      Pertinent mammograms are reviewed under the imaging tab.    Review of Systems  Constitutional, HEENT, cardiovascular, pulmonary, gi and gu systems are negative, except as otherwise noted.    OBJECTIVE:   /80 (BP Location: Right arm, Patient Position: Sitting, Cuff Size: Adult Large)   Pulse 61   Temp 98.1  F (36.7  C) (Tympanic)   Resp 20   Wt 87.5 kg (193 lb)   SpO2 96%   BMI 27.69 kg/m   Estimated body mass index is 27.69 kg/m  as calculated from the following:    Height as of 3/2/20: 1.778 m (5' 10\").    Weight as of this encounter: 87.5 kg (193 lb).  Physical Exam  Cardiovascular: Regular rate and rhythm, no murmurs  Pulmonary lungs are clear    Foot Exam:  5/28/2021  Intact to monofilament bilaterally.  Skin intact without erythema.      Diagnostic Test Results:  Labs reviewed " in Epic    ASSESSMENT / PLAN:       ICD-10-CM    1. Encounter for Medicare annual wellness exam  Z00.00    2. Controlled type 2 diabetes mellitus with complication, without long-term current use of insulin (H)  E11.8 Hemoglobin A1c     Basic Metabolic Panel     Lipid Profile     metFORMIN (GLUCOPHAGE) 500 MG tablet     Miscellaneous DME Order     Lipid Profile     Basic Metabolic Panel     Hemoglobin A1c   3. Cerebrovascular accident (CVA) due to thrombosis of left middle cerebral artery (H)  I63.312 atorvastatin (LIPITOR) 40 MG tablet     clopidogrel (PLAVIX) 75 MG tablet   4. Essential hypertension  I10 enalapril (VASOTEC) 20 MG tablet   5. Neuropathy  G62.9 gabapentin (NEURONTIN) 100 MG capsule   6. Osteoarthritis of spine, unspecified spinal osteoarthritis complication status, unspecified spinal region  M47.9 naproxen sodium (ANAPROX) 220 MG tablet     traMADol (ULTRAM) 50 MG tablet     Drug Confirmation Panel Urine with Creat   7. Benign prostatic hyperplasia without lower urinary tract symptoms  N40.0 tamsulosin (FLOMAX) 0.4 MG capsule   8. Elevated prostate specific antigen (PSA)  R97.20 tamsulosin (FLOMAX) 0.4 MG capsule   9. Post-traumatic osteoarthritis of right knee  M17.31 Orthopedic & Spine  Referral     Miscellaneous DME Order     traMADol (ULTRAM) 50 MG tablet     Drug Confirmation Panel Urine with Creat   10. LIMA (dyspnea on exertion)  R06.00 N terminal pro BNP outpatient     EKG 12-lead, tracing only     NM Lexiscan stress test     N terminal pro BNP outpatient   11. Diabetic polyneuropathy associated with type 2 diabetes mellitus (H)  E11.42    12. Need for vaccination  Z23 zoster vaccine recombinant adjuvanted (SHINGRIX) injection     Tdap, tetanus-diptheria-acell pertussis, (BOOSTRIX) 5-2.5-18.5 LF-MCG/0.5 injection   13. Controlled substance agreement signed  Z79.899 traMADol (ULTRAM) 50 MG tablet     Drug Confirmation Panel Urine with Creat     With regards to his knee osteoarthritis  "makes the most sense.  Meniscal injury also possible.  He would likely want to consider a conservative route.  I recommend a consult with Dr. Irving to consider injection.    With regards to his shortness of breath on exertion he would be at a high risk for coronary artery disease given his history of diabetes as well as cerebrovascular accident.  EKG reviewed today without ischemic findings, just of first-degree AV block.  Recommend stress test as a next step.    Referred for diabetic shoes and diabetic inserts    Patient has been advised of split billing requirements and indicates understanding: Yes  COUNSELING:  Reviewed preventive health counseling, as reflected in patient instructions    Estimated body mass index is 27.69 kg/m  as calculated from the following:    Height as of 3/2/20: 1.778 m (5' 10\").    Weight as of this encounter: 87.5 kg (193 lb).        He reports that he has never smoked. He has never used smokeless tobacco.      Appropriate preventive services were discussed with this patient, including applicable screening as appropriate for cardiovascular disease, diabetes, osteopenia/osteoporosis, and glaucoma.  As appropriate for age/gender, discussed screening for colorectal cancer, prostate cancer, breast cancer, and cervical cancer. Checklist reviewing preventive services available has been given to the patient.    Reviewed patients plan of care and provided an AVS. The Complex Care Plan (for patients with higher acuity and needing more deliberate coordination of services) for Ilia meets the Care Plan requirement. This Care Plan has been established and reviewed with the Patient.    Counseling Resources:  ATP IV Guidelines  Pooled Cohorts Equation Calculator  Breast Cancer Risk Calculator  Breast Cancer: Medication to Reduce Risk  FRAX Risk Assessment  ICSI Preventive Guidelines  Dietary Guidelines for Americans, 2010  USDA's MyPlate  ASA Prophylaxis  Lung CA Screening    Trino Dahl, " MD  [facility]    Identified Health Risks:

## 2021-05-28 NOTE — LETTER
Opioid / Opioid Plus Controlled Substance Agreement    This is an agreement between you and your provider about the safe and appropriate use of controlled substance/opioids prescribed by your care team. Controlled substances are medicines that can cause physical and mental dependence (abuse).    There are strict laws about having and using these medicines. We here at [facility] are committing to working with you in your efforts to get better. To support you in this work, we ll help you schedule regular office appointments for medicine refills. If we must cancel or change your appointment for any reason, we ll make sure you have enough medicine to last until your next appointment.     As a Provider, I will:    Listen carefully to your concerns and treat you with respect.     Recommend a treatment plan that I believe is in your best interest. This plan may involve therapies other than opioid pain medication.     Talk with you often about the possible benefits, and the risk of harm of any medicine that we prescribe for you.     Provide a plan on how to taper (discontinue or go off) using this medicine if the decision is made to stop its use.    As a Patient, I understand that opioid(s):     Are a controlled substance prescribed by my care team to help me function or work and manage my condition(s).     Are strong medicines and can cause serious side effects such as:    Drowsiness, which can seriously affect my driving ability    A lower breathing rate, enough to cause death    Harm to my thinking ability     Depression     Abuse of and addiction to this medicine    Need to be taken exactly as prescribed. Combining opioids with certain medicines or chemicals (such as illegal drugs, sedatives, sleeping pills, and benzodiazepines) can be dangerous or even fatal. If I stop opioids suddenly, I may have severe withdrawal symptoms.    Do not work for all types of pain nor for all patients. If they re not helpful, I may  be asked to stop them.      The risks, benefits and side effects of these medicine(s) were explained to me. I agree that:  1. I will take part in other treatments as advised by my care team. This may be psychiatry or counseling, physical therapy, behavioral therapy, group treatment or a referral to a specialist.     2. I will keep all my appointments. I understand that this is part of the monitoring of opioids. My care team may require an office visit for EVERY opioid/controlled substance refill. If I miss appointments or don t follow instructions, my care team may stop my medicine.    3. I will take my medicines as prescribed. I will not change the dose or schedule unless my care team tells me to. There will be no refills if I run out early.     4. I may be asked to come to the clinic and complete a urine drug test or complete a pill count at any time. If I don t give a urine sample or participate in a pill count, the care team may stop my medicine.    5. I will only receive prescriptions from this clinic for chronic pain. If I am treated by another provider for acute pain issues, I will tell them that I am taking opioid pain medication for chronic pain and that I have a treatment agreement with this provider. I will inform my [facility] care team within one business day if I am given a prescription for any pain medication by another healthcare provider. My [facility] care team can contact other providers and pharmacists about my use of any medicines.    6. It is up to me to make sure that I don t run out of my medicines on weekends or holidays. If my care team is willing to refill my opioid prescription without a visit, I must request refills only during office hours. Refills may take up to 3 business days to process. I will use one pharmacy to fill all my opioid and other controlled substance prescriptions. I will notify the clinic about any changes to my insurance or medication  availability.    7. I am responsible for my prescriptions. If the medicine/prescription is lost, stolen or destroyed, it will not be replaced. I also agree not to share controlled substance medicines with anyone.    8. I am aware I should not use any illegal or recreational drugs. I agree not to drink alcohol unless my care team says I can.       9. If I enroll in the [place] Medical Cannabis program, I will tell my care team prior to my next refill.     10. I will tell my care team right away if I become pregnant, have a new medical problem treated outside of my regular clinic, or have a change in my medications.    11. I understand that this medicine can affect my thinking, judgment and reaction time. Alcohol and drugs affect the brain and body, which can affect the safety of my driving. Being under the influence of alcohol or drugs can affect my decision-making, behaviors, personal safety, and the safety of others. Driving while impaired (DWI) can occur if a person is driving, operating, or in physical control of a car, motorcycle, boat, snowmobile, ATV, motorbike, off-road vehicle, or any other motor vehicle ([place] Statute 169A.20). I understand the risk if I choose to drive or operate any vehicle or machinery.    I understand that if I do not follow any of the conditions above, my prescriptions or treatment may be stopped or changed.          Opioids  What You Need to Know    What are opioids?   Opioids are pain medicines that must be prescribed by a doctor. They are also known as narcotics.     Examples are:   1. morphine (MS Contin, Margo)  2. oxycodone (Oxycontin)  3. oxycodone and acetaminophen (Percocet)  4. hydrocodone and acetaminophen (Vicodin, Norco)   5. fentanyl patch (Duragesic)   6. hydromorphone (Dilaudid)   7. methadone  8. codeine (Tylenol #3)     What do opioids do well?   Opioids are best for severe short-term pain such as after a surgery or injury. They may work well for cancer pain. They may  help some people with long-lasting (chronic) pain.     What do opioids NOT do well?   Opioids never get rid of pain entirely, and they don t work well for most patients with chronic pain. Opioids don t reduce swelling, one of the causes of pain.                                    Other ways to manage chronic pain and improve function include:       Treat the health problem that may be causing pain    Anti-inflammation medicines, which reduce swelling and tenderness, such as ibuprofen (Advil, Motrin) or naproxen (Aleve)    Acetaminophen (Tylenol)    Antidepressants and anti-seizure medicines, especially for nerve pain    Topical treatments such as patches or creams    Injections or nerve blocks    Chiropractic or osteopathic treatment    Acupuncture, massage, deep breathing, meditation, visual imagery, aromatherapy    Use heat or ice at the pain site    Physical therapy     Exercise    Stop smoking    Take part in therapy       Risks and side effects     Talk to your doctor before you start or decide to keep taking opioids. Possible side effects include:      Lowering your breathing rate enough to cause death    Overdose, including death, especially if taking higher than prescribed doses    Worse depression symptoms; less pleasure in things you usually enjoy    Feeling tired or sluggish    Slower thoughts or cloudy thinking    Being more sensitive to pain over time; pain is harder to control    Trouble sleeping or restless sleep    Changes in hormone levels (for example, less testosterone)    Changes in sex drive or ability to have sex    Constipation    Unsafe driving    Itching and sweating    Dizziness    Nausea, throwing up and dry mouth    What else should I know about opioids?    Opioids may lead to dependence, tolerance, or addiction.      Dependence means that if you stop or reduce the medicine too quickly, you will have withdrawal symptoms. These include loose poop (diarrhea), jitters, flu-like symptoms,  nervousness and tremors. Dependence is not the same as addiction.                       Tolerance means needing higher doses over time to get the same effect. This may increase the chance of serious side effects.      Addiction is when people improperly use a substance that harms their body, their mind or their relations with others. Use of opiates can cause a relapse of addiction if you have a history of drug or alcohol abuse.      People who have used opioids for a long time may have a lower quality of life, worse depression, higher levels of pain and more visits to doctors.    You can overdose on opioids. Take these steps to lower your risk of overdose:    1. Recognize the signs:  Signs of overdose include decrease or loss of consciousness (blackout), slowed breathing, trouble waking up and blue lips. If someone is worried about overdose, they should call 911.    2. Talk to your doctor about Narcan (naloxone).   If you are at risk for overdose, you may be given a prescription for Narcan. This medicine very quickly reverses the effects of opioids.   If you overdose, a friend or family member can give you Narcan while waiting for the ambulance. They need to know the signs of overdose and how to give Narcan.     3. Don't use alcohol or street drugs.   Taking them with opioids can cause death.    4. Do not take any of these medicines unless your doctor says it s OK. Taking these with opioids can cause death:    Benzodiazepines, such as lorazepam (Ativan), alprazolam (Xanax) or diazepam (Valium)    Muscle relaxers, such as cyclobenzaprine (Flexeril)    Sleeping pills like zolpidem (Ambien)     Other opioids      How to keep you and other people safe while taking opioids:    1. Never share your opioids with others.  Opioid medicines are regulated by the Drug Enforcement Agency (SUSI). Selling or sharing medications is a criminal act.    2. Be sure to store opioids in a secure place, locked up if possible. Young children  can easily swallow them and overdose.    3. When you are traveling with your medicines, keep them in the original bottles. If you use a pill box, be sure you also carry a copy of your medicine list from your clinic or pharmacy.    4. Safe disposal of opioids    Most pharmacies have places to get rid of medicine, called disposal kiosks. Medicine disposal options are also available in every [facility]. Search your county and  medication disposal  to find more options. You can find more details at:  [email or web address]-[email or web address]-unwanted-medications     I agree that my provider, clinic care team, and pharmacy may work with any city, state or federal law enforcement agency that investigates the misuse, sale, or other diversion of my controlled medicine. I will allow my provider to discuss my care with, or share a copy of, this agreement with any other treating provider, pharmacy or emergency room where I receive care.    I have read this agreement and have asked questions about anything I did not understand.    _______________________________________________________  Patient Signature - Ilia Ferguson _____________________                   Date     _______________________________________________________  Provider Signature - Trino Dahl MD   _____________________                   Date     _______________________________________________________  Witness Signature (required if provider not present while patient signing)   _____________________                   Date

## 2021-06-02 LAB
CREAT UR-MCNC: 122 MG/DL
GABAPENTIN UR QL CFM: PRESENT
RPT COMMENT: ABNORMAL

## 2021-06-07 ENCOUNTER — OFFICE VISIT (OUTPATIENT)
Dept: FAMILY MEDICINE | Facility: OTHER | Age: 86
End: 2021-06-07
Attending: INTERNAL MEDICINE
Payer: MEDICARE

## 2021-06-07 ENCOUNTER — TRANSFERRED RECORDS (OUTPATIENT)
Dept: HEALTH INFORMATION MANAGEMENT | Facility: OTHER | Age: 86
End: 2021-06-07

## 2021-06-07 ENCOUNTER — HOSPITAL ENCOUNTER (OUTPATIENT)
Dept: GENERAL RADIOLOGY | Facility: OTHER | Age: 86
End: 2021-06-07
Attending: FAMILY MEDICINE
Payer: MEDICARE

## 2021-06-07 VITALS
BODY MASS INDEX: 28.17 KG/M2 | TEMPERATURE: 96.3 F | HEART RATE: 72 BPM | WEIGHT: 190.2 LBS | HEIGHT: 69 IN | OXYGEN SATURATION: 98 % | RESPIRATION RATE: 18 BRPM | DIASTOLIC BLOOD PRESSURE: 70 MMHG | SYSTOLIC BLOOD PRESSURE: 122 MMHG

## 2021-06-07 DIAGNOSIS — M17.11 PRIMARY OSTEOARTHRITIS OF RIGHT KNEE: Primary | ICD-10-CM

## 2021-06-07 PROCEDURE — 73560 X-RAY EXAM OF KNEE 1 OR 2: CPT | Mod: LT

## 2021-06-07 PROCEDURE — G0463 HOSPITAL OUTPT CLINIC VISIT: HCPCS

## 2021-06-07 PROCEDURE — 99214 OFFICE O/P EST MOD 30 MIN: CPT | Performed by: FAMILY MEDICINE

## 2021-06-07 PROCEDURE — G0463 HOSPITAL OUTPT CLINIC VISIT: HCPCS | Mod: 25

## 2021-06-07 ASSESSMENT — MIFFLIN-ST. JEOR: SCORE: 1510.24

## 2021-06-07 ASSESSMENT — PAIN SCALES - GENERAL: PAINLEVEL: MODERATE PAIN (4)

## 2021-06-07 NOTE — NURSING NOTE
"Chief Complaint   Patient presents with     Knee Pain     right knee pain, ongoing for 2 years, pain 4/10, no known injury     Patient presents to clinic today for right knee pain that has been ongoing for 2 years and worsening over the past year. Pain 4/10. No known injury. He denies any past knee surgeries or injections. He has been to physical therapy in the past for his right knee about 1 year ago and he feels like that \"helped\".       Initial /70 (BP Location: Right arm, Patient Position: Sitting, Cuff Size: Adult Regular)   Pulse 72   Temp 96.3  F (35.7  C) (Tympanic)   Resp 18   Ht 1.74 m (5' 8.5\")   Wt 86.3 kg (190 lb 3.2 oz)   SpO2 98%   BMI 28.50 kg/m   Estimated body mass index is 28.5 kg/m  as calculated from the following:    Height as of this encounter: 1.74 m (5' 8.5\").    Weight as of this encounter: 86.3 kg (190 lb 3.2 oz).         Medication Reconciliation: Complete      Mary Ann Harrington LPN   "

## 2021-06-07 NOTE — Clinical Note
Hi Dr. Dahl,  I saw this patient in the office today for his knee pain.  I have referred him to PT.  I discussed possible injection, he did not elect to proceed at this time.  Please let me know if you have questions.  Thanks.    Alex

## 2021-06-07 NOTE — PROGRESS NOTES
"HPI:  89-year-old male with previous CVA affecting the right side of his body coming in for evaluation of right knee pain.  Patient has significant aches/pains affecting many different joints of the body.  He is coming in today to have his right knee evaluated.  Patient feels like he has pain on the medial aspect of his right knee.  He feels this has been present for 2 years.  No significant change in character of the pain.  He characterizes the pain as dull.  He rates his pain as 4/10.  He has difficulty with standing.  Following his CVA he underwent approximately 1 month of physical therapy which improved right-sided pain symptoms of his lower extremity.  He still currently endorses back/hip pain involving the right side of his body with radicular symptoms that go down his leg.  He is hoping to be able to get back to walking and more of a pain-free manner.      EXAM:  /70 (BP Location: Right arm, Patient Position: Sitting, Cuff Size: Adult Regular)   Pulse 72   Temp 96.3  F (35.7  C) (Tympanic)   Resp 18   Ht 1.74 m (5' 8.5\")   Wt 86.3 kg (190 lb 3.2 oz)   SpO2 98%   BMI 28.50 kg/m    MUSCULOSKELETAL EXAM:  RIGHT KNEE  Inspection:  -No gross deformity  -No bruising or soft tissue swelling  -Scars:  None    Tenderness to palpation of the:  -Quadriceps musculature:  Negative  -Quadriceps tendon:  Negative  -Patella:  Negative  -Medial patellar facet: Mild pain  -Lateral patellar facet: Mild pain  -Inferior pole of the patella:  Negative  -Patellar tendon:  Negative  -Tibial tuberosity:  Negative  -Medial joint line: Mild pain  -Medial collateral ligament:  Negative  -Medial hamstring tendons:  Negative  -Medial femoral condyle:  Negative  -Medial tibial plateau:  Negative  -Pes anserine bursa:  Negative  -Lateral joint line: Mild pain  -Distal IT band:  Negative  -Gerdy's tubercle:  Negative  -Lateral collateral ligament:  Negative  -Lateral hamstring tendons:  Negative  -Lateral femoral condyle:  " Negative  -Lateral tibial plateau:  Negative  -Posterior lateral corner:  Negative  -Popliteal fossa:  Negative    Range of Motion:  -Passive flexion:  120  -Passive extension:  0    Strength:  -Extension:  5/5  -Flexion:  5/5    Special Tests:  -Effusion:  Absent  -Medial patellar glide:  Negative  -Lateral patellar glide:  Negative  -Patellar apprehension:  Negative  -Medial Amilcar's: Mild pain without mechanical symptoms  -Lateral Amilcar's:  Negative  -Valgus stress:  Negative  -Varus stress:  Negative  -Lachman test:  Negative  -Anterior drawer:  Negative  -Posterior drawer:  Negative    Other:  -Intact sensation to light touch distally.  -No signs of cyanosis. Normal skin temperature of the lower extremity.  -Foot/ankle:  No gross deformity. Full range of motion.  -Left knee:  No gross deformity. No palpable tenderness. Normal strength and ROM.      IMAGIN2021: 4 view right knee x-ray  -Moderate-severe osteoarthritis of the medial tibiofemoral compartment.  Mild degeneration of the patellofemoral compartment with osteophytosis.  Severe osteoarthritis of the medial tibiofemoral compartment of the left knee.      ASSESSMENT/PLAN:  Diagnoses and all orders for this visit:  Primary osteoarthritis of right knee  -     XR Knee Standing 2v  Bilateral & 2v Right  -     Orthopedic & Spine  Referral  -     PHYSICAL THERAPY REFERRAL; Future    89-year-old male with right knee pain consistent with osteoarthritis.  4 view right knee x-ray was performed in the office today and personally reviewed by me with the findings as demonstrated above by my interpretation.  Due to age and other health comorbidities, this patient has slightly less treatment options then would normally be available.  We discussed treatment options for this condition to include APAP, Voltaren gel, physical therapy, or injection therapy.  I do not suspect this patient would be a surgical candidate given his previous history of stroke.   I think some of his leg pain may be a functional problem of his lower extremity due to muscle imbalance from previous CVA.  With this, I think therapy needs to be a significant component of his treatment strategy.  -Recommend aerobic activity  -Recommend physical therapy, referral placed  -Discussed the importance of weight management  -Discussed possible injection therapy, patient not sure if he would like to proceed at this time  -Follow-up as needed  -Patient can return at any time if he decides he would like to proceed with CSI      Alex Irving MD  6/7/2021  9:01 AM    Total time spent with this patient was 34 minutes which included chart review, visualization and interpretation of images, time spent with the patient, and documentation.

## 2021-06-18 ENCOUNTER — HOSPITAL ENCOUNTER (OUTPATIENT)
Dept: PHYSICAL THERAPY | Facility: OTHER | Age: 86
Setting detail: THERAPIES SERIES
End: 2021-06-18
Attending: FAMILY MEDICINE
Payer: MEDICARE

## 2021-06-18 DIAGNOSIS — M17.11 PRIMARY OSTEOARTHRITIS OF RIGHT KNEE: ICD-10-CM

## 2021-06-18 PROCEDURE — 97161 PT EVAL LOW COMPLEX 20 MIN: CPT | Mod: GP

## 2021-06-18 PROCEDURE — 97110 THERAPEUTIC EXERCISES: CPT | Mod: GP

## 2021-06-21 NOTE — PROGRESS NOTES
"   06/18/21 1300   General Information   Type of Visit Initial OP Ortho PT Evaluation   Start of Care Date 06/18/21   Referring Physician Alex Blevins MD   Patient/Family Goals Statement To be able to walk w/ less pain   Orders Evaluate and Treat   Date of Order 06/07/21   Certification Required? Yes   Medical Diagnosis Primary osteoarthritis of right knee M17.11    Surgical/Medical history reviewed Yes   Precautions/Limitations no known precautions/limitations   Weight-Bearing Status - RLE full weight-bearing   Special Instructions None   Body Part(s)   Body Part(s) Knee   Presentation and Etiology   Pertinent history of current problem (include personal factors and/or comorbidities that impact the POC) Patient is a 88 y/o male whom presents to PT with a several  month history of right knee pain. Recent x-ray imagery reveals significant OA. He has a recent history of right sided CVA. He feels his knee pain has been worse since his stroke. Also has history of a herniated disk. He treated with two spinal epidural injections that lasted for \"about a month\". His main complaint is of right knee pain when walking and ambulating up and down stairs.    Impairments A. Pain;D. Decreased ROM;E. Decreased flexibility;F. Decreased strength and endurance;H. Impaired gait;J. Burning   Functional Limitations perform activities of daily living;perform desired leisure / sports activities   Symptom Location Right latweral hip to right ankle and the bottom of his right foot. , Right knee.    How/Where did it occur From Degenerative Joint Disease;From insidious onset   Onset date of current episode/exacerbation   (several month history of Knee pain, several years LBP )   Chronicity Chronic   Pain rating (0-10 point scale) Best (/10);Worst (/10)   Best (/10) 0/10 LOw back 2-3/10 right knee   Worst (/10) 4-5/10 intermittent right leg , knee pain   Pain quality B. Dull;C. Aching;D. Burning   Frequency of pain/symptoms C. With activity "   Pain/symptoms are: Worse during the night  (When laying on right side)   Pain/symptoms exacerbated by B. Walking   Pain/symptoms eased by C. Rest;I. OTC medication(s);H. Cold   Progression of symptoms since onset: Unchanged   Current / Previous Interventions   Diagnostic Tests: X-ray   X-ray Results Results   X-ray results Moderate tricompartmental OA B knees   Prior Level of Function   Prior Level of Function-Mobility Intermittent periods of limited mobility due to back and knee pain.   Prior Level of Function-ADLs NML   Current Level of Function   Current Community Support Family/friend caregiver   Patient role/employment history F. Retired   Living environment House/[facility]   Home/community accessibility NML   Fall Risk Screen   Fall screen completed by PT   Have you fallen 2 or more times in the past year? No   Have you fallen and had an injury in the past year? No   Is patient a fall risk? No   Abuse Screen (yes response referral indicated)   Feels Unsafe at Home or Work/School no   Feels Threatened by Someone no   Does Anyone Try to Keep You From Having Contact with Others or Doing Things Outside Your Home? no   Physical Signs of Abuse Present no   Knee Objective Findings   Side (if bilateral, select both right and left) Right   Observation Limited mobiity   Integumentary  NML   Posture NML   Gait/Locomotion Slow with decrease knee extension /flexion   Balance/Proprioception (Single Leg Stance) Poor in the right, poor plus on the left   Foot Position In Standing Slightly abducted right   Knee ROM Comment Slight pain at end range of flexion   Knee/Hip Strength Comments Left side flexion, extension and hip abduction are all grade 5/5   Lachmans Test Negative   Anterior Drawer Test Negative   Posterior Drawer Test Negative   Varus Stress Test Negative   Valgus Stress Test Negative   Amilcar's Test Mild positive right   Apley's Test Positive right   External Rotation Test Positive right   Apprehension Test  Negative   Palpation Noted (2/4) tenderness both medial and lateral right knee joint lines   Right Knee Extension AROM 0   Right Knee Extension PROM 0   Right Knee Flexion AROM 120   Right Knee Flexion PROM 125   Right Knee Flexion Strength 4-/5   Right Knee Extension Strength 4-/5   Right Hip Abduction Strength 4-/5   Right Quad Set Strength 4/5   R VMO Strength 4/5   Right Gastrocnemius Flexibility -5 degrees right to left   Right Hamstring Flexibility Equal right to left   Right Hip Flexor Flexibility -10 degrees right compared to left   Right Quadricep Flexibility -10 degrees right compared to left   Right ITB Flexibility Not tested   Planned Therapy Interventions   Planned Therapy Interventions joint mobilization;manual therapy;gait training;neuromuscular re-education;ROM;strengthening;stretching   Planned Modality Interventions   Planned Modality Interventions Hot packs;Cryotherapy   Planned Modality Interventions Comments Use as an adjunct to the intended course of exercise based and manual physical therapy   Clinical Impression   Criteria for Skilled Therapeutic Interventions Met yes, treatment indicated   PT Diagnosis Impaired mobility the right lower extremity due to advanced right knee osteoarthritis and intermittent radicular symptoms due to lumbar spinal dysfunction   Influenced by the following impairments Pain, immobility, weakness   Functional limitations due to impairments Difficulty with standing endurance, limited walking endurance, difficulty walking up and down stairs, difficulty with lifting and carrying normal household weights of up to 30 pounds.  Difficulty getting up from chairs.   Clinical Presentation Stable/Uncomplicated   Clinical Decision Making (Complexity) Low complexity   Therapy Frequency 2 times/Week   Predicted Duration of Therapy Intervention (days/wks) 12  weeks   Risk & Benefits of therapy have been explained Yes   Patient, Family & other staff in agreement with plan of care  Yes   Education Assessment   Preferred Learning Style Listening;Reading;Demonstration;Pictures/video   Barriers to Learning No barriers   ORTHO GOALS   PT Ortho Eval Goals 1;2;3   Ortho Goal 1   Goal Identifier Pain   Goal Description Patient will report that he is able to walk up to 20 minutes for to 5 days/week without limitation due to right knee pain in excess of 2/10 VAS in 8 to 12 weeks.  Patient will report that he is able to walk up to 20 minutes/day 4 to 5 days/week without limitation due to low back and/or right radicular pain in excess of 2-3 of 10   Target Date 09/18/21   Ortho Goal 2   Goal Identifier Mobility   Goal Description Patient will demonstrate restoration of full right knee and right hip mobility of 135 degrees of knee flexion, 115 degrees of hip flexion, 15 degrees of hip extension, 30 degrees hip internal rotation and 45 degrees hip external rotation supporting normal gait on level and uneven surfaces and for walking up and down stairs several times per day.  Patient will be able to bend at the waist and knee low enough to allow picking weights of 20 to 30 pounds up off the floor and carrying them for up to 30 feet 2-4 times per day 4 days/week without limitation due to knee stiffness.   Target Date 09/18/21   Ortho Goal 3   Goal Identifier Strength   Goal Description Patient will demonstrate improvement in right lower extremity strength to grade 5 of 5 for knee flexion and extension, grade 5/5 for hip abduction and both internal and external rotation.  Hip extension will be improved to at least grade 4/5 hip flexion to grade 5/5.  This will provide support to the lower extremity to allow ambulating up and down stairs several times per day without limitation, and the ability to bend and squat positions and lift items up to 30 pounds and carry them for up to 30 to 50 feet for completion of home and gardening tasks 2-3 times 3 to 4 days/week in 8 to 12 weeks.   Target Date 09/18/21   Total  Evaluation Time   PT Eval, Low Complexity Minutes ([id number]) 35   Therapy Certification   Certification date from 06/18/21   Certification date to 09/18/21   Medical Diagnosis Primary osteoarthritis of right knee M17.11

## 2021-06-21 NOTE — PROGRESS NOTES
[facility]          OUTPATIENT PHYSICAL THERAPY ORTHOPEDIC EVALUATION  PLAN OF TREATMENT FOR OUTPATIENT REHABILITATION  (COMPLETE FOR INITIAL CLAIMS ONLY)  Patient's Last Name, First Name, M.I.  YOB: 1932  Ilia Ferguson    Provider s Name:  [facility]   Medical Record No.  [id number]   Start of Care Date:  06/18/21   Onset Date:  (several month history of Knee pain, several years LBP )   Type:     _X__PT   ___OT   ___SLP Medical Diagnosis:  Primary osteoarthritis of right knee M17.11      PT Diagnosis:  Impaired mobility the right lower extremity due to advanced right knee osteoarthritis and intermittent radicular symptoms due to lumbar spinal dysfunction   Visits from SOC:  1      _________________________________________________________________________________  Plan of Treatment/Functional Goals:  joint mobilization, manual therapy, gait training, neuromuscular re-education, ROM, strengthening, stretching     Hot packs, Cryotherapy  Use as an adjunct to the intended course of exercise based and manual physical therapy  Goals  Goal Identifier: Pain  Goal Description: Patient will report that he is able to walk up to 20 minutes for to 5 days/week without limitation due to right knee pain in excess of 2/10 VAS in 8 to 12 weeks.  Patient will report that he is able to walk up to 20 minutes/day 4 to 5 days/week without limitation due to low back and/or right radicular pain in excess of 2-3 of 10  Target Date: 09/18/21    Goal Identifier: Mobility  Goal Description: Patient will demonstrate restoration of full right knee and right hip mobility of 135 degrees of knee flexion, 115 degrees of hip flexion, 15 degrees of hip extension, 30 degrees hip internal rotation and 45 degrees hip external rotation supporting normal gait on level and uneven surfaces and for walking up and down stairs several times per day.  Patient will be able to bend at the  waist and knee low enough to allow picking weights of 20 to 30 pounds up off the floor and carrying them for up to 30 feet 2-4 times per day 4 days/week without limitation due to knee stiffness.  Target Date: 09/18/21    Goal Identifier: Strength  Goal Description: Patient will demonstrate improvement in right lower extremity strength to grade 5 of 5 for knee flexion and extension, grade 5/5 for hip abduction and both internal and external rotation.  Hip extension will be improved to at least grade 4/5 hip flexion to grade 5/5.  This will provide support to the lower extremity to allow ambulating up and down stairs several times per day without limitation, and the ability to bend and squat positions and lift items up to 30 pounds and carry them for up to 30 to 50 feet for completion of home and gardening tasks 2-3 times 3 to 4 days/week in 8 to 12 weeks.  Target Date: 09/18/21                                                           Therapy Frequency:  2 times/Week  Predicted Duration of Therapy Intervention:  12  weeks    Evert Hernandes, PT                 I CERTIFY THE NEED FOR THESE SERVICES FURNISHED UNDER        THIS PLAN OF TREATMENT AND WHILE UNDER MY CARE     (Physician co-signature of this document indicates review and certification of the therapy plan).                       Certification Date From:  06/18/21   Certification Date To:  09/18/21    Referring Provider:  Alex Blevins MD    Initial Assessment        See Epic Evaluation Start of Care Date: 06/18/21

## 2021-06-22 ENCOUNTER — TELEPHONE (OUTPATIENT)
Dept: CARDIOLOGY | Facility: OTHER | Age: 86
End: 2021-06-22

## 2021-06-22 NOTE — TELEPHONE ENCOUNTER
Left message re:stress test instructions  Emphasis on no caffeine for 12 hours prior to testing  Left call back number for questions or concerns

## 2021-06-23 ENCOUNTER — HOSPITAL ENCOUNTER (OUTPATIENT)
Dept: PHYSICAL THERAPY | Facility: OTHER | Age: 86
Setting detail: THERAPIES SERIES
End: 2021-06-23
Attending: FAMILY MEDICINE
Payer: MEDICARE

## 2021-06-23 PROCEDURE — 97110 THERAPEUTIC EXERCISES: CPT | Mod: GP,CQ

## 2021-06-24 ENCOUNTER — HOSPITAL ENCOUNTER (OUTPATIENT)
Dept: NUCLEAR MEDICINE | Facility: OTHER | Age: 86
End: 2021-06-24
Attending: INTERNAL MEDICINE
Payer: MEDICARE

## 2021-06-24 DIAGNOSIS — R06.09 DOE (DYSPNEA ON EXERTION): ICD-10-CM

## 2021-06-24 PROCEDURE — 343N000001 HC RX 343: Performed by: INTERNAL MEDICINE

## 2021-06-24 PROCEDURE — 93016 CV STRESS TEST SUPVJ ONLY: CPT | Performed by: INTERNAL MEDICINE

## 2021-06-24 PROCEDURE — A9500 TC99M SESTAMIBI: HCPCS | Performed by: INTERNAL MEDICINE

## 2021-06-24 PROCEDURE — 250N000011 HC RX IP 250 OP 636: Performed by: INTERNAL MEDICINE

## 2021-06-24 PROCEDURE — 78452 HT MUSCLE IMAGE SPECT MULT: CPT

## 2021-06-24 PROCEDURE — 93018 CV STRESS TEST I&R ONLY: CPT | Performed by: INTERNAL MEDICINE

## 2021-06-24 PROCEDURE — 93017 CV STRESS TEST TRACING ONLY: CPT

## 2021-06-24 RX ORDER — REGADENOSON 0.08 MG/ML
0.4 INJECTION, SOLUTION INTRAVENOUS ONCE
Status: COMPLETED | OUTPATIENT
Start: 2021-06-24 | End: 2021-06-24

## 2021-06-24 RX ORDER — AMINOPHYLLINE 25 MG/ML
50-200 INJECTION, SOLUTION INTRAVENOUS
Status: DISCONTINUED | OUTPATIENT
Start: 2021-06-24 | End: 2021-06-25 | Stop reason: HOSPADM

## 2021-06-24 RX ADMIN — KIT FOR THE PREPARATION OF TECHNETIUM TC99M SESTAMIBI 30.7 MILLICURIE: 1 INJECTION, POWDER, LYOPHILIZED, FOR SOLUTION PARENTERAL at 12:50

## 2021-06-24 RX ADMIN — KIT FOR THE PREPARATION OF TECHNETIUM TC99M SESTAMIBI 8.6 MILLICURIE: 1 INJECTION, POWDER, LYOPHILIZED, FOR SOLUTION PARENTERAL at 11:15

## 2021-06-24 RX ADMIN — REGADENOSON 0.4 MG: 0.08 INJECTION, SOLUTION INTRAVENOUS at 12:57

## 2021-06-24 NOTE — PROGRESS NOTES
1100 The patient arrived for a Lexiscan Cardiolite stress test.  The procedure, risks, and benefits were discussed with the patient and daughter, and the consent was signed.  A saline lock was started,and the Cardiolite was injected by Nuclear Medicine.  The patient was taken to the waiting area, to await resting images at 1120.  1240The patient returned from Nuclear Medicine and was prepped for the stress test. Dr. Dahl arrived, and the patient was administered the Lexiscan per procedure.  The patient tolerated the procedure. He was given a snack and taken to Nuclear Medicine in stable condition for stress images.  The saline lock will be removed by Nuclear Medicine for proper disposal.  The patient was instructed that the ordering MD will call with results in one to two days.  Please see the chart for the complete test results.

## 2021-06-25 ENCOUNTER — HOSPITAL ENCOUNTER (OUTPATIENT)
Dept: PHYSICAL THERAPY | Facility: OTHER | Age: 86
Setting detail: THERAPIES SERIES
End: 2021-06-25
Attending: FAMILY MEDICINE
Payer: MEDICARE

## 2021-06-25 LAB
CV BLOOD PRESSURE: 69 MMHG
CV STRESS MAX HR HE: 96
NUC STRESS EJECTION FRACTION: 64 %
RATE PRESSURE PRODUCT: NORMAL
STRESS ECHO BASELINE DIASTOLIC HE: 77
STRESS ECHO BASELINE HR: 59
STRESS ECHO BASELINE SYSTOLIC BP: 153
STRESS ECHO CALCULATED PERCENT HR: 73 %
STRESS ECHO LAST STRESS DIASTOLIC BP: 77
STRESS ECHO LAST STRESS SYSTOLIC BP: 145
STRESS ECHO POST ESTIMATED WORKLOAD: 1 METS
STRESS ECHO TARGET HR: 131

## 2021-06-25 PROCEDURE — 97140 MANUAL THERAPY 1/> REGIONS: CPT | Mod: GP

## 2021-06-29 ENCOUNTER — HOSPITAL ENCOUNTER (OUTPATIENT)
Dept: PHYSICAL THERAPY | Facility: OTHER | Age: 86
Setting detail: THERAPIES SERIES
End: 2021-06-29
Attending: FAMILY MEDICINE
Payer: MEDICARE

## 2021-06-29 PROCEDURE — 97140 MANUAL THERAPY 1/> REGIONS: CPT | Mod: GP

## 2021-07-01 ENCOUNTER — HOSPITAL ENCOUNTER (OUTPATIENT)
Dept: PHYSICAL THERAPY | Facility: OTHER | Age: 86
Setting detail: THERAPIES SERIES
End: 2021-07-01
Attending: FAMILY MEDICINE
Payer: MEDICARE

## 2021-07-01 PROCEDURE — 97140 MANUAL THERAPY 1/> REGIONS: CPT | Mod: GP

## 2021-07-06 ENCOUNTER — HOSPITAL ENCOUNTER (OUTPATIENT)
Dept: PHYSICAL THERAPY | Facility: OTHER | Age: 86
Setting detail: THERAPIES SERIES
End: 2021-07-06
Attending: FAMILY MEDICINE
Payer: MEDICARE

## 2021-07-06 PROCEDURE — 97140 MANUAL THERAPY 1/> REGIONS: CPT | Mod: GP

## 2021-07-16 DIAGNOSIS — I63.312 CEREBROVASCULAR ACCIDENT (CVA) DUE TO THROMBOSIS OF LEFT MIDDLE CEREBRAL ARTERY (H): ICD-10-CM

## 2021-07-16 DIAGNOSIS — E11.8 CONTROLLED TYPE 2 DIABETES MELLITUS WITH COMPLICATION, WITHOUT LONG-TERM CURRENT USE OF INSULIN (H): ICD-10-CM

## 2021-07-16 RX ORDER — ASPIRIN 81 MG/1
TABLET, DELAYED RELEASE ORAL
Qty: 90 TABLET | Refills: 3 | OUTPATIENT
Start: 2021-07-16

## 2021-07-16 NOTE — TELEPHONE ENCOUNTER
Refill request for Aspirin from [facility], the chart shows this was discontinued by provider on 4/21/2021.  Refusing at this time.      aspirin (ASA) 81 MG EC tablet (Discontinued) 90 tablet 3 3/2/2020 4/21/2020 No   Sig - Route: Take 1 tablet (81 mg) by mouth daily - Oral   Sent to pharmacy as: aspirin (ASA) 81 MG EC tablet   Class: E-Prescribe   Order: [id number]   E-Prescribing Status: Receipt confirmed by pharmacy (3/2/2020  8:17 AM CST)     Unable to complete prescription refill per RN Medication Refill Policy. Mayra Rodriguez RN 7/16/2021 1:58 PM

## 2021-10-09 ENCOUNTER — HEALTH MAINTENANCE LETTER (OUTPATIENT)
Age: 86
End: 2021-10-09

## 2021-11-16 NOTE — PROGRESS NOTES
"Outpatient Physical Therapy Discharge Note     Patient: Ilia Ferguson  : 1932    Beginning/End Dates of Reporting Period:  2021    Referring Provider: Alex Blevins MD    Therapy Diagnosis: Right knee pain with intermittent right LE radiculopathy     Client Self Report: Reports he has no back pain at this time. His right knee is feeling better as well but remains\"stiff\". Reports minor N/T in the right ankle with stiffness as well.     Objective Measurements:  Objective Measure: Pain/palpation  Details: Minor right knee pain with full flexion   Objective Measure: Strength  Details: Knee flexion 5-/5, knee ext 4+/5, R hip abd 4+/5  Objective Measure: Mobility and gait  Details: Right knee. Flexion improved to 129, ext improved to +5 deg                              Goals:  Goal Identifier Pain   Goal Description Patient will report that he is able to walk up to 20 minutes for to 5 days/week without limitation due to right knee pain in excess of 2/10 VAS in 8 to 12 weeks.  Patient will report that he is able to walk up to 20 minutes/day 4 to 5 days/week without limitation due to low back and/or right radicular pain in excess of 2-3 of 10   Target Date 21   Date Met      Progress (detail required for progress note):  Unplanned discharge. Patient did not return for further scheduled appointments     Goal Identifier Mobility   Goal Description Patient will demonstrate restoration of full right knee and right hip mobility of 135 degrees of knee flexion, 115 degrees of hip flexion, 15 degrees of hip extension, 30 degrees hip internal rotation and 45 degrees hip external rotation supporting normal gait on level and uneven surfaces and for walking up and down stairs several times per day.  Patient will be able to bend at the waist and knee low enough to allow picking weights of 20 to 30 pounds up off the floor and carrying them for up to 30 feet 2-4 times per day 4 days/week without limitation due to knee " stiffness.   Target Date 09/18/21   Date Met      Progress (detail required for progress note):   Unplanned discharge. Patient did not return for further scheduled appointments     Goal Identifier Strength   Goal Description Patient will demonstrate improvement in right lower extremity strength to grade 5 of 5 for knee flexion and extension, grade 5/5 for hip abduction and both internal and external rotation.  Hip extension will be improved to at least grade 4/5 hip flexion to grade 5/5.  This will provide support to the lower extremity to allow ambulating up and down stairs several times per day without limitation, and the ability to bend and squat positions and lift items up to 30 pounds and carry them for up to 30 to 50 feet for completion of home and gardening tasks 2-3 times 3 to 4 days/week in 8 to 12 weeks.   Target Date 09/18/21   Date Met      Progress (detail required for progress note):   Unplanned discharge. Patient did not return for further scheduled appointments                 Plan:  Discharge from therapy.    Discharge:    Reason for Discharge: Patient chooses to discontinue therapy.    Equipment Issued: None    Discharge Plan: Patient is encouraged to continue home program.

## 2021-12-04 ENCOUNTER — HEALTH MAINTENANCE LETTER (OUTPATIENT)
Age: 86
End: 2021-12-04

## 2022-05-23 DIAGNOSIS — E11.8 CONTROLLED TYPE 2 DIABETES MELLITUS WITH COMPLICATION, WITHOUT LONG-TERM CURRENT USE OF INSULIN (H): ICD-10-CM

## 2022-05-23 DIAGNOSIS — I10 ESSENTIAL HYPERTENSION: ICD-10-CM

## 2022-05-24 DIAGNOSIS — I63.312 CEREBROVASCULAR ACCIDENT (CVA) DUE TO THROMBOSIS OF LEFT MIDDLE CEREBRAL ARTERY (H): ICD-10-CM

## 2022-05-25 RX ORDER — ENALAPRIL MALEATE 20 MG/1
40 TABLET ORAL DAILY
Qty: 180 TABLET | Refills: 0 | Status: SHIPPED | OUTPATIENT
Start: 2022-05-25 | End: 2022-06-21

## 2022-05-25 NOTE — TELEPHONE ENCOUNTER
[id number] sent Rx request for the following:      Requested Prescriptions   Pending Prescriptions Disp Refills     metFORMIN (GLUCOPHAGE) 500 MG tablet [Pharmacy Med Name: METFORMIN 500MG TABLET] 180 tablet 3     Sig: TAKE 1 TABLET (500 MG) BY MOUTH 2 TIMES DAILY (WITH MEALS)   Last Prescription Date:   5/28/21  Last Fill Qty/Refills:         180, R-3      Biguanide Agents Failed - 5/25/2022  1:20 PM        Failed - Patient has documented A1c within the specified period of time.     If HgbA1C is 8 or greater, it needs to be on file within the past 3 months.  If less than 8, must be on file within the past 6 months.     Recent Labs   Lab Test 05/28/21  0946 02/14/18  0412 07/26/17  1504   A1C 6.7*   < >  --    [id number]  --   --  6.2    < > = values in this interval not displayed.           Failed - Recent (6 mo) or future (30 days) visit within the authorizing provider's specialty        enalapril (VASOTEC) 20 MG tablet [Pharmacy Med Name: ENALAPRIL 20MG TABLET] 180 tablet 3     Sig: TAKE 2 TABLETS (40 MG) BY MOUTH DAILY   Last Prescription Date:   5/28/21  Last Fill Qty/Refills:         180, R-3      Last Office Visit:              5/28/21   Future Office visit:           None    Pt due for annual exam after 5/28/22. Routing to provider for refill consideration. Routing to  OUTREACH APPT REQUESTS pool, to assist Pt in scheduling appointment.     In clinical absence of patient's primary, Trino Dahl, patient is requesting that this message be sent to the covering provider for consideration please.    Sulema Gamino RN .............. 5/25/2022  1:21 PM

## 2022-05-26 RX ORDER — CLOPIDOGREL BISULFATE 75 MG/1
75 TABLET ORAL DAILY
Qty: 90 TABLET | Refills: 0 | Status: SHIPPED | OUTPATIENT
Start: 2022-05-26 | End: 2022-06-21

## 2022-05-26 NOTE — TELEPHONE ENCOUNTER
[id number] [place] sent Rx request for the following:      Requested Prescriptions   Pending Prescriptions Disp Refills     clopidogrel (PLAVIX) 75 MG tablet [Pharmacy Med Name: CLOPIDOGREL 75MG TABLET] 90 tablet 3     Sig: TAKE 1 TABLET (75 MG) BY MOUTH DAILY       Plavix Failed - 5/26/2022  2:20 PM        Failed - Normal HGB on file in past 12 months     Recent Labs   Lab Test 02/14/18  0412   HGB 12.1*           Failed - Normal Platelets on file in past 12 months     Recent Labs   Lab Test 02/14/18 0412           Last Prescription Date:   5/28/21  Last Fill Qty/Refills:         90, R-3    Last Office Visit:              5/28/21   Future Office visit:             Next 5 appointments (look out 90 days)    Jun 21, 2022  8:20 AM  PHYSICAL with Trino Dahl MD  [facility] [address]  [place] [address]  [phone]        In clinical absence of patient's primary, Trino Dahl, patient is requesting that this message be sent to the covering provider for consideration please.    Sulema Gamino RN .............. 5/26/2022  2:22 PM

## 2022-06-21 ENCOUNTER — OFFICE VISIT (OUTPATIENT)
Dept: PEDIATRICS | Facility: OTHER | Age: 87
End: 2022-06-21
Attending: INTERNAL MEDICINE
Payer: COMMERCIAL

## 2022-06-21 VITALS
RESPIRATION RATE: 18 BRPM | OXYGEN SATURATION: 95 % | BODY MASS INDEX: 29.01 KG/M2 | HEART RATE: 74 BPM | DIASTOLIC BLOOD PRESSURE: 72 MMHG | TEMPERATURE: 97.3 F | SYSTOLIC BLOOD PRESSURE: 118 MMHG | WEIGHT: 193.6 LBS

## 2022-06-21 DIAGNOSIS — E11.8 CONTROLLED TYPE 2 DIABETES MELLITUS WITH COMPLICATION, WITHOUT LONG-TERM CURRENT USE OF INSULIN (H): Chronic | ICD-10-CM

## 2022-06-21 DIAGNOSIS — Z13.220 SCREENING FOR HYPERLIPIDEMIA: Primary | ICD-10-CM

## 2022-06-21 DIAGNOSIS — F11.90 CHRONIC, CONTINUOUS USE OF OPIOIDS: ICD-10-CM

## 2022-06-21 DIAGNOSIS — R97.20 ELEVATED PROSTATE SPECIFIC ANTIGEN (PSA): ICD-10-CM

## 2022-06-21 DIAGNOSIS — Z00.00 ENCOUNTER FOR MEDICARE ANNUAL WELLNESS EXAM: ICD-10-CM

## 2022-06-21 DIAGNOSIS — I10 ESSENTIAL HYPERTENSION: ICD-10-CM

## 2022-06-21 DIAGNOSIS — N40.0 BENIGN PROSTATIC HYPERPLASIA WITHOUT LOWER URINARY TRACT SYMPTOMS: ICD-10-CM

## 2022-06-21 DIAGNOSIS — I63.312 CEREBROVASCULAR ACCIDENT (CVA) DUE TO THROMBOSIS OF LEFT MIDDLE CEREBRAL ARTERY (H): ICD-10-CM

## 2022-06-21 DIAGNOSIS — M17.10 PRIMARY OSTEOARTHRITIS OF ONE KNEE: ICD-10-CM

## 2022-06-21 LAB
ANION GAP SERPL CALCULATED.3IONS-SCNC: 7 MMOL/L (ref 3–14)
BUN SERPL-MCNC: 21 MG/DL (ref 7–25)
CALCIUM SERPL-MCNC: 9.4 MG/DL (ref 8.6–10.3)
CHLORIDE BLD-SCNC: 106 MMOL/L (ref 98–107)
CHOLEST SERPL-MCNC: 126 MG/DL
CO2 SERPL-SCNC: 27 MMOL/L (ref 21–31)
CREAT SERPL-MCNC: 0.9 MG/DL (ref 0.7–1.3)
FASTING STATUS PATIENT QL REPORTED: YES
GFR SERPL CREATININE-BSD FRML MDRD: 81 ML/MIN/1.73M2
GLUCOSE BLD-MCNC: 141 MG/DL (ref 70–105)
HBA1C MFR BLD: 6.9 % (ref 4–6.2)
HDLC SERPL-MCNC: 46 MG/DL (ref 23–92)
LDLC SERPL CALC-MCNC: 61 MG/DL
NONHDLC SERPL-MCNC: 80 MG/DL
POTASSIUM BLD-SCNC: 4.2 MMOL/L (ref 3.5–5.1)
SODIUM SERPL-SCNC: 140 MMOL/L (ref 134–144)
TRIGL SERPL-MCNC: 93 MG/DL

## 2022-06-21 PROCEDURE — 80061 LIPID PANEL: CPT | Mod: ZL | Performed by: INTERNAL MEDICINE

## 2022-06-21 PROCEDURE — G0439 PPPS, SUBSEQ VISIT: HCPCS | Performed by: INTERNAL MEDICINE

## 2022-06-21 PROCEDURE — 80048 BASIC METABOLIC PNL TOTAL CA: CPT | Mod: ZL | Performed by: INTERNAL MEDICINE

## 2022-06-21 PROCEDURE — 83036 HEMOGLOBIN GLYCOSYLATED A1C: CPT | Mod: ZL | Performed by: INTERNAL MEDICINE

## 2022-06-21 PROCEDURE — 36415 COLL VENOUS BLD VENIPUNCTURE: CPT | Mod: ZL | Performed by: INTERNAL MEDICINE

## 2022-06-21 RX ORDER — ATORVASTATIN CALCIUM 40 MG/1
40 TABLET, FILM COATED ORAL DAILY
Qty: 90 TABLET | Refills: 3 | Status: SHIPPED | OUTPATIENT
Start: 2022-06-21 | End: 2023-08-17

## 2022-06-21 RX ORDER — CLOPIDOGREL BISULFATE 75 MG/1
75 TABLET ORAL DAILY
Qty: 90 TABLET | Refills: 0 | Status: SHIPPED | OUTPATIENT
Start: 2022-06-21 | End: 2022-11-25

## 2022-06-21 RX ORDER — TAMSULOSIN HYDROCHLORIDE 0.4 MG/1
0.8 CAPSULE ORAL DAILY
Qty: 180 CAPSULE | Refills: 3 | Status: SHIPPED | OUTPATIENT
Start: 2022-06-21 | End: 2023-09-06

## 2022-06-21 RX ORDER — ENALAPRIL MALEATE 20 MG/1
40 TABLET ORAL DAILY
Qty: 180 TABLET | Refills: 0 | Status: SHIPPED | OUTPATIENT
Start: 2022-06-21 | End: 2022-09-01

## 2022-06-21 ASSESSMENT — ANXIETY QUESTIONNAIRES
2. NOT BEING ABLE TO STOP OR CONTROL WORRYING: NOT AT ALL
7. FEELING AFRAID AS IF SOMETHING AWFUL MIGHT HAPPEN: NOT AT ALL
GAD7 TOTAL SCORE: 0
3. WORRYING TOO MUCH ABOUT DIFFERENT THINGS: NOT AT ALL
GAD7 TOTAL SCORE: 0
6. BECOMING EASILY ANNOYED OR IRRITABLE: NOT AT ALL
1. FEELING NERVOUS, ANXIOUS, OR ON EDGE: NOT AT ALL
5. BEING SO RESTLESS THAT IT IS HARD TO SIT STILL: NOT AT ALL

## 2022-06-21 ASSESSMENT — ENCOUNTER SYMPTOMS
SORE THROAT: 0
DIZZINESS: 0
NAUSEA: 0
EYE PAIN: 0
DYSURIA: 0
COUGH: 0
SHORTNESS OF BREATH: 1
FEVER: 0
WEAKNESS: 0
DIARRHEA: 0
HEMATURIA: 0
ABDOMINAL PAIN: 0
FREQUENCY: 1
PARESTHESIAS: 0
HEMATOCHEZIA: 0
HEARTBURN: 0
PALPITATIONS: 0
MYALGIAS: 0
CHILLS: 0
CONSTIPATION: 0
HEADACHES: 0
JOINT SWELLING: 1
ARTHRALGIAS: 1
NERVOUS/ANXIOUS: 0

## 2022-06-21 ASSESSMENT — PATIENT HEALTH QUESTIONNAIRE - PHQ9
SUM OF ALL RESPONSES TO PHQ QUESTIONS 1-9: 0
5. POOR APPETITE OR OVEREATING: NOT AT ALL

## 2022-06-21 ASSESSMENT — ACTIVITIES OF DAILY LIVING (ADL): CURRENT_FUNCTION: NO ASSISTANCE NEEDED

## 2022-06-21 ASSESSMENT — PAIN SCALES - GENERAL: PAINLEVEL: MILD PAIN (3)

## 2022-06-21 NOTE — LETTER
Opioid / Opioid Plus Controlled Substance Agreement    This is an agreement between you and your provider about the safe and appropriate use of controlled substance/opioids prescribed by your care team. Controlled substances are medicines that can cause physical and mental dependence (abuse).    There are strict laws about having and using these medicines. We here at [facility] are committing to working with you in your efforts to get better. To support you in this work, we ll help you schedule regular office appointments for medicine refills. If we must cancel or change your appointment for any reason, we ll make sure you have enough medicine to last until your next appointment.     As a Provider, I will:    Listen carefully to your concerns and treat you with respect.     Recommend a treatment plan that I believe is in your best interest. This plan may involve therapies other than opioid pain medication.     Talk with you often about the possible benefits, and the risk of harm of any medicine that we prescribe for you.     Provide a plan on how to taper (discontinue or go off) using this medicine if the decision is made to stop its use.    As a Patient, I understand that opioid(s):     Are a controlled substance prescribed by my care team to help me function or work and manage my condition(s).     Are strong medicines and can cause serious side effects such as:    Drowsiness, which can seriously affect my driving ability    A lower breathing rate, enough to cause death    Harm to my thinking ability     Depression     Abuse of and addiction to this medicine    Need to be taken exactly as prescribed. Combining opioids with certain medicines or chemicals (such as illegal drugs, sedatives, sleeping pills, and benzodiazepines) can be dangerous or even fatal. If I stop opioids suddenly, I may have severe withdrawal symptoms.    Do not work for all types of pain nor for all patients. If they re not helpful, I may  be asked to stop them.        The risks, benefits and side effects of these medicine(s) were explained to me. I agree that:  1. I will take part in other treatments as advised by my care team. This may be psychiatry or counseling, physical therapy, behavioral therapy, group treatment or a referral to a specialist.     2. I will keep all my appointments. I understand that this is part of the monitoring of opioids. My care team may require an office visit for EVERY opioid/controlled substance refill. If I miss appointments or don t follow instructions, my care team may stop my medicine.    3. I will take my medicines as prescribed. I will not change the dose or schedule unless my care team tells me to. There will be no refills if I run out early.     4. I may be asked to come to the clinic and complete a urine drug test or complete a pill count at any time. If I don t give a urine sample or participate in a pill count, the care team may stop my medicine.    5. I will only receive prescriptions from this clinic for chronic pain. If I am treated by another provider for acute pain issues, I will tell them that I am taking opioid pain medication for chronic pain and that I have a treatment agreement with this provider. I will inform my [facility] care team within one business day if I am given a prescription for any pain medication by another healthcare provider. My [facility] care team can contact other providers and pharmacists about my use of any medicines.    6. It is up to me to make sure that I don t run out of my medicines on weekends or holidays. If my care team is willing to refill my opioid prescription without a visit, I must request refills only during office hours. Refills may take up to 3 business days to process. I will use one pharmacy to fill all my opioid and other controlled substance prescriptions. I will notify the clinic about any changes to my insurance or medication  availability.    7. I am responsible for my prescriptions. If the medicine/prescription is lost, stolen or destroyed, it will not be replaced. I also agree not to share controlled substance medicines with anyone.    8. I am aware I should not use any illegal or recreational drugs. I agree not to drink alcohol unless my care team says I can.       9. If I enroll in the [place] [facility] program, I will tell my care team prior to my next refill.     10. I will tell my care team right away if I become pregnant, have a new medical problem treated outside of my regular clinic, or have a change in my medications.    11. I understand that this medicine can affect my thinking, judgment and reaction time. Alcohol and drugs affect the brain and body, which can affect the safety of my driving. Being under the influence of alcohol or drugs can affect my decision-making, behaviors, personal safety, and the safety of others. Driving while impaired (DWI) can occur if a person is driving, operating, or in physical control of a car, motorcycle, boat, snowmobile, ATV, motorbike, off-road vehicle, or any other motor vehicle ([place] Statute 169A.20). I understand the risk if I choose to drive or operate any vehicle or machinery.    I understand that if I do not follow any of the conditions above, my prescriptions or treatment may be stopped or changed.          Opioids  What You Need to Know    What are opioids?   Opioids are pain medicines that must be prescribed by a doctor. They are also known as narcotics.     Examples are:   1. morphine (MS Contin, Margo)  2. oxycodone (Oxycontin)  3. oxycodone and acetaminophen (Percocet)  4. hydrocodone and acetaminophen (Vicodin, Norco)   5. fentanyl patch (Duragesic)   6. hydromorphone (Dilaudid)   7. methadone  8. codeine (Tylenol #3)     What do opioids do well?   Opioids are best for severe short-term pain such as after a surgery or injury. They may work well for cancer pain. They may  help some people with long-lasting (chronic) pain.     What do opioids NOT do well?   Opioids never get rid of pain entirely, and they don t work well for most patients with chronic pain. Opioids don t reduce swelling, one of the causes of pain.                                    Other ways to manage chronic pain and improve function include:       Treat the health problem that may be causing pain    Anti-inflammation medicines, which reduce swelling and tenderness, such as ibuprofen (Advil, Motrin) or naproxen (Aleve)    Acetaminophen (Tylenol)    Antidepressants and anti-seizure medicines, especially for nerve pain    Topical treatments such as patches or creams    Injections or nerve blocks    Chiropractic or osteopathic treatment    Acupuncture, massage, deep breathing, meditation, visual imagery, aromatherapy    Use heat or ice at the pain site    Physical therapy     Exercise    Stop smoking    Take part in therapy       Risks and side effects     Talk to your doctor before you start or decide to keep taking opioids. Possible side effects include:      Lowering your breathing rate enough to cause death    Overdose, including death, especially if taking higher than prescribed doses    Worse depression symptoms; less pleasure in things you usually enjoy    Feeling tired or sluggish    Slower thoughts or cloudy thinking    Being more sensitive to pain over time; pain is harder to control    Trouble sleeping or restless sleep    Changes in hormone levels (for example, less testosterone)    Changes in sex drive or ability to have sex    Constipation    Unsafe driving    Itching and sweating    Dizziness    Nausea, throwing up and dry mouth    What else should I know about opioids?    Opioids may lead to dependence, tolerance, or addiction.      Dependence means that if you stop or reduce the medicine too quickly, you will have withdrawal symptoms. These include loose poop (diarrhea), jitters, flu-like symptoms,  nervousness and tremors. Dependence is not the same as addiction.                       Tolerance means needing higher doses over time to get the same effect. This may increase the chance of serious side effects.      Addiction is when people improperly use a substance that harms their body, their mind or their relations with others. Use of opiates can cause a relapse of addiction if you have a history of drug or alcohol abuse.      People who have used opioids for a long time may have a lower quality of life, worse depression, higher levels of pain and more visits to doctors.    You can overdose on opioids. Take these steps to lower your risk of overdose:    1. Recognize the signs:  Signs of overdose include decrease or loss of consciousness (blackout), slowed breathing, trouble waking up and blue lips. If someone is worried about overdose, they should call 911.    2. Talk to your doctor about Narcan (naloxone).   If you are at risk for overdose, you may be given a prescription for Narcan. This medicine very quickly reverses the effects of opioids.   If you overdose, a friend or family member can give you Narcan while waiting for the ambulance. They need to know the signs of overdose and how to give Narcan.     3. Don't use alcohol or street drugs.   Taking them with opioids can cause death.    4. Do not take any of these medicines unless your doctor says it s OK. Taking these with opioids can cause death:    Benzodiazepines, such as lorazepam (Ativan), alprazolam (Xanax) or diazepam (Valium)    Muscle relaxers, such as cyclobenzaprine (Flexeril)    Sleeping pills like zolpidem (Ambien)     Other opioids      How to keep you and other people safe while taking opioids:    1. Never share your opioids with others.  Opioid medicines are regulated by the Drug Enforcement Agency (SUSI). Selling or sharing medications is a criminal act.    2. Be sure to store opioids in a secure place, locked up if possible. Young children  can easily swallow them and overdose.    3. When you are traveling with your medicines, keep them in the original bottles. If you use a pill box, be sure you also carry a copy of your medicine list from your clinic or pharmacy.    4. Safe disposal of opioids    Most pharmacies have places to get rid of medicine, called disposal kiosks. Medicine disposal options are also available in every [facility]. Search your county and  medication disposal  to find more options. You can find more details at:  [email or web address]     I agree that my provider, clinic care team, and pharmacy may work with any city, state or federal law enforcement agency that investigates the misuse, sale, or other diversion of my controlled medicine. I will allow my provider to discuss my care with, or share a copy of, this agreement with any other treating provider, pharmacy or emergency room where I receive care.    I have read this agreement and have asked questions about anything I did not understand.    _______________________________________________________  Patient Signature - Ilia Ferguson _____________________                   Date     _______________________________________________________  Provider Signature - Trino Dahl MD   _____________________                   Date     _______________________________________________________  Witness Signature (required if provider not present while patient signing)   _____________________                   Date

## 2022-06-21 NOTE — PATIENT INSTRUCTIONS
Patient Education   Personalized Prevention Plan  You are due for the preventive services outlined below.  Your care team is available to assist you in scheduling these services.  If you have already completed any of these items, please share that information with your care team to update in your medical record.  Health Maintenance Due   Topic Date Due     Kidney Microalbumin Urine Test  Never done     Zoster (Shingles) Vaccine (1 of 2) Never done     Eye Exam  02/11/2017     A1C Lab  11/28/2021     COVID-19 Vaccine (4 - Booster for Pfizer series) 03/05/2022     Basic Metabolic Panel  05/28/2022     Cholesterol Lab  05/28/2022     Diabetic Foot Exam  06/07/2022

## 2022-06-21 NOTE — NURSING NOTE
Patient here for medicare wellness visit. Medication Reconciliation: complete.    Xena López LPN  6/21/2022 8:34 AM

## 2022-06-22 ENCOUNTER — DOCUMENTATION ONLY (OUTPATIENT)
Dept: OTHER | Facility: CLINIC | Age: 87
End: 2022-06-22

## 2022-06-23 NOTE — PROGRESS NOTES
Assessment & Plan   1. Screening for hyperlipidemia  - Lipid Profile; Future  - Lipid Profile    2. Encounter for Medicare annual wellness exam      3. Controlled type 2 diabetes mellitus with complication, without long-term current use of insulin (H)  At goal, no change.  - HEMOGLOBIN A1C; Future  - BASIC METABOLIC PANEL; Future  - metFORMIN (GLUCOPHAGE) 500 MG tablet; Take 1 tablet (500 mg) by mouth 2 times daily (with meals)  Dispense: 180 tablet; Refill: 0  - BASIC METABOLIC PANEL  - HEMOGLOBIN A1C    4. Cerebrovascular accident (CVA) due to thrombosis of left middle cerebral artery (H)  At goal, no change.  - atorvastatin (LIPITOR) 40 MG tablet; Take 1 tablet (40 mg) by mouth daily  Dispense: 90 tablet; Refill: 3  - clopidogrel (PLAVIX) 75 MG tablet; Take 1 tablet (75 mg) by mouth daily  Dispense: 90 tablet; Refill: 0    5. Essential hypertension  At goal, no change.  - enalapril (VASOTEC) 20 MG tablet; Take 2 tablets (40 mg) by mouth daily  Dispense: 180 tablet; Refill: 0    6. Benign prostatic hyperplasia without lower urinary tract symptoms  At goal, no change.  - tamsulosin (FLOMAX) 0.4 MG capsule; Take 2 capsules (0.8 mg) by mouth daily  Dispense: 180 capsule; Refill: 3    7. Elevated prostate specific antigen (PSA)  - tamsulosin (FLOMAX) 0.4 MG capsule; Take 2 capsules (0.8 mg) by mouth daily  Dispense: 180 capsule; Refill: 3    8. Primary osteoarthritis of one knee  9. Chronic, continuous use of opioids  I did refill his tramadol today.  We printed the contract letter but forgot to have him sign it.  I have no concerns about addiction in his case.  We will sign it next time he comes into the office.      Patient Instructions       Patient Education   Personalized Prevention Plan  You are due for the preventive services outlined below.  Your care team is available to assist you in scheduling these services.  If you have already completed any of these items, please share that information with your care  team to update in your medical record.  Health Maintenance Due   Topic Date Due     Kidney Microalbumin Urine Test  Never done     Zoster (Shingles) Vaccine (1 of 2) Never done     Eye Exam  02/11/2017     A1C Lab  11/28/2021     COVID-19 Vaccine (4 - Booster for Pfizer series) 03/05/2022     Basic Metabolic Panel  05/28/2022     Cholesterol Lab  05/28/2022     Diabetic Foot Exam  06/07/2022            Return in about 53 weeks (around 6/27/2023) for Annual Wellness Visit.    Signed, Trino Dahl MD, FAAP, FACP  Internal Medicine & Pediatrics    Subjective   Ilia Ferguson is a 89 year old male who presents for a care wellness visit.  He also has a lot of stress related to his wife.  She is in poor health.  He wants to know what else can be done for her.  He feels fine.    Objective   Vitals: /72   Pulse 74   Temp 97.3  F (36.3  C)   Resp 18   Wt 87.8 kg (193 lb 9.6 oz)   SpO2 95%   BMI 29.01 kg/m      General: well appearing  CV: Regular rate and rhythm, no murmur, rub or gallop  Pulm: Clear to auscultation bilaterally, no wheezing, rales or rhonchi  Neuro: Grossly intact  Musculoskeletal: No lower extremity edema  Skin: No rash  Psychiatry: Normal affect and insight.    Review and Analysis of Data   I personally reviewed the following:  External notes: No  Results: Yes Lab work from last year is reviewed  Use of an independent historian: No  Independent review of a test performed by another physician: No  Discussion of management with another physician: No  Moderate risk of morbidity from additional diagnostic testing and/or treatment.

## 2022-07-12 DIAGNOSIS — M47.9 OSTEOARTHRITIS OF SPINE, UNSPECIFIED SPINAL OSTEOARTHRITIS COMPLICATION STATUS, UNSPECIFIED SPINAL REGION: ICD-10-CM

## 2022-07-12 RX ORDER — NAPROXEN SODIUM 220 MG
220 TABLET ORAL DAILY PRN
Qty: 270 TABLET | Refills: 11 | Status: SHIPPED | OUTPATIENT
Start: 2022-07-12 | End: 2023-07-26

## 2022-07-12 NOTE — TELEPHONE ENCOUNTER
Naproxen  Last Prescription Date: 5/28/21  Last Qty/Refills: 90 / 3  Last Office Visit: 6/21/22  Future Office Visit: NONE    Radha Hernandez RN on 7/12/2022 at 4:14 PM

## 2022-07-13 RX ORDER — NAPROXEN SODIUM 220 MG
TABLET ORAL
Qty: 270 TABLET | Refills: 0 | OUTPATIENT
Start: 2022-07-13

## 2022-07-13 NOTE — TELEPHONE ENCOUNTER
naproxen sodium (ANAPROX) 220 MG tablet 270 tablet 11 7/12/2022  No   Sig - Route: Take 1 tablet (220 mg) by mouth daily as needed (leg pain) - Oral     To [facility]     Should have refills  Laurie Albright RN on 7/13/2022 at 2:20 PM

## 2022-07-19 DIAGNOSIS — G62.9 NEUROPATHY: ICD-10-CM

## 2022-07-20 NOTE — TELEPHONE ENCOUNTER
Last Prescription Date: 5/28/21  Last Qty/Refills: 90 / 3  Last Office Visit: 6/21/22  Future Office Visit: None     Requested Prescriptions   Pending Prescriptions Disp Refills     gabapentin (NEURONTIN) 100 MG capsule [Pharmacy Med Name: GABAPENTIN 100MG CAPSULE] 90 capsule 3     Sig: TAKE 1 CAPSULE (100 MG) BY MOUTH AT BEDTIME       There is no refill protocol information for this order

## 2022-07-21 RX ORDER — GABAPENTIN 100 MG/1
100 CAPSULE ORAL AT BEDTIME
Qty: 90 CAPSULE | Refills: 3 | Status: SHIPPED | OUTPATIENT
Start: 2022-07-21 | End: 2023-07-26

## 2022-08-10 ENCOUNTER — OFFICE VISIT (OUTPATIENT)
Dept: FAMILY MEDICINE | Facility: OTHER | Age: 87
End: 2022-08-10
Attending: NURSE PRACTITIONER
Payer: COMMERCIAL

## 2022-08-10 VITALS
OXYGEN SATURATION: 96 % | TEMPERATURE: 97.7 F | SYSTOLIC BLOOD PRESSURE: 122 MMHG | HEART RATE: 79 BPM | BODY MASS INDEX: 28.71 KG/M2 | RESPIRATION RATE: 18 BRPM | DIASTOLIC BLOOD PRESSURE: 72 MMHG | WEIGHT: 191.6 LBS

## 2022-08-10 DIAGNOSIS — N30.00 ACUTE CYSTITIS WITHOUT HEMATURIA: ICD-10-CM

## 2022-08-10 DIAGNOSIS — R39.89 URINARY PROBLEM: Primary | ICD-10-CM

## 2022-08-10 LAB
ALBUMIN UR-MCNC: NEGATIVE MG/DL
APPEARANCE UR: CLEAR
BILIRUB UR QL STRIP: NEGATIVE
COLOR UR AUTO: YELLOW
GLUCOSE UR STRIP-MCNC: NEGATIVE MG/DL
HGB UR QL STRIP: ABNORMAL
KETONES UR STRIP-MCNC: NEGATIVE MG/DL
LEUKOCYTE ESTERASE UR QL STRIP: NEGATIVE
MUCOUS THREADS #/AREA URNS LPF: PRESENT /LPF
NITRATE UR QL: NEGATIVE
PH UR STRIP: 5 [PH] (ref 5–9)
RBC URINE: 2 /HPF
SP GR UR STRIP: 1.01 (ref 1–1.03)
UROBILINOGEN UR STRIP-MCNC: NORMAL MG/DL
WBC URINE: <1 /HPF

## 2022-08-10 PROCEDURE — 99213 OFFICE O/P EST LOW 20 MIN: CPT | Performed by: PHYSICIAN ASSISTANT

## 2022-08-10 PROCEDURE — 87086 URINE CULTURE/COLONY COUNT: CPT | Mod: ZL | Performed by: PHYSICIAN ASSISTANT

## 2022-08-10 PROCEDURE — G0463 HOSPITAL OUTPT CLINIC VISIT: HCPCS

## 2022-08-10 PROCEDURE — 81001 URINALYSIS AUTO W/SCOPE: CPT | Mod: ZL | Performed by: PHYSICIAN ASSISTANT

## 2022-08-10 RX ORDER — CEPHALEXIN 500 MG/1
500 CAPSULE ORAL 2 TIMES DAILY
Status: CANCELLED | OUTPATIENT
Start: 2022-08-10

## 2022-08-10 RX ORDER — AMOXICILLIN 875 MG
875 TABLET ORAL 2 TIMES DAILY
Qty: 20 TABLET | Refills: 0 | Status: SHIPPED | OUTPATIENT
Start: 2022-08-10 | End: 2022-08-20

## 2022-08-10 RX ORDER — AMOXICILLIN 400 MG/5ML
50 POWDER, FOR SUSPENSION ORAL 2 TIMES DAILY
Status: CANCELLED | OUTPATIENT
Start: 2022-08-10

## 2022-08-10 ASSESSMENT — PAIN SCALES - GENERAL: PAINLEVEL: MODERATE PAIN (5)

## 2022-08-10 NOTE — NURSING NOTE
Patient presents to clinic today for possible UTI    Medication Reconciliation: complete      FOOD SECURITY SCREENING QUESTIONS:    The next two questions are to help us understand your food security.  If you are feeling you need any assistance in this area, we have resources available to support you today.    Hunger Vital Signs:  Within the past 12 months we worried whether our food would run out before we got money to buy more. Never  Within the past 12 months the food we bought just didn't last and we didn't have money to get more. Never        Do you have an advance care directive on file? Yes      Camille Vasques LPN.......8/10/800805:25 PM
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
## 2022-08-10 NOTE — PATIENT INSTRUCTIONS
You were prescribed an antibiotic, please take into consideration the following information:  - Take entire course of antibiotic even if you start to feel better.  - Antibiotics can cause stomach upset including nausea and diarrhea. Read your bottle or ask the pharmacist if antibiotic can be taken with food to help prevent nausea. If you have symptoms of diarrhea you can take an over-the-counter probiotic and/or increase foods with probiotics such as yogurt, Carlos, sauerkraut.  -Use caution in sunlight as can lead to increased risk of sunburn while on ABX (antibiotics).     Please refer to your AVS for follow up and pain/symptoms management recommendations (I.e.: medications, helpful conservative treatment modalities, appropriate follow up if need to a specialist or family practice, etc.). Please return to urgent care if your symptoms change or worsen.     Discharge instructions:  -Follow up with persistent symptoms with primary care (or ER if severe worsening)  -If you were prescribed a medication(s), please take this as prescribed/directed  -Monitor your symptoms, if changing/worsening, return to UC/ER or PCP for follow up    Urinary Tract Infection (UTI):  -If you were prescribed an antibiotic, please take this as directed and until completion.     -For pain control (if applicable), alternating Tylenol and Ibuprofen is recommended  -Alternate every 4 hours as needed. I.e.: Ibuprofen at 8am, Tylenol 12pm, Ibuprofen 4pm   -Daily maximum of Tylenol is 4000mg (recommend staying under 3000mg)  -Daily maximum of Ibuprofen is 3200 mg    -A warm heating pad may help as well.     -Rest/relaxation and keeping hydrated with clear liquids (ie: water or cranberry juice - do not do cranberry juice if on Coumadin/Warfarin).     -Some urine samples are sent out for culture - if a change to your antibiotics is needed based off your urine culture, a member of the urgent care team will call you and inform you of this.     -Empty  your bladder when you feel the urge versus holding urine, after urinating you can bear-down to empty bladder completely, after peeing wipe front to back to avoid introducing bacteria towards vaginal area.     -AZO/Pyridium - may take up to 3 times a day, note that this may turn your urine orange    -Avoid sexual intercourse until you have completed your medication.     -Return to ER if any of the following occur: Worsening of symptoms. Fever over 101 F (38.3 C), No improvement by the third day of treatment, Increasing back or abdominal pain, vomiting, unable to keep fluids or medicine down, weakness, dizziness, or fainting, vaginal discharge, pain, redness, or swelling of the vaginal area

## 2022-08-12 LAB — BACTERIA UR CULT: NORMAL

## 2022-08-31 DIAGNOSIS — I10 ESSENTIAL HYPERTENSION: ICD-10-CM

## 2022-09-01 NOTE — TELEPHONE ENCOUNTER
Routing refill request to provider for review/approval because:    LOV: 6/21/22  Dr. Dahl out of office will route to covering provider for review and consideration.    Laurie Albright RN on 9/1/2022 at 4:09 PM

## 2022-09-02 RX ORDER — ENALAPRIL MALEATE 20 MG/1
40 TABLET ORAL DAILY
Qty: 180 TABLET | Refills: 3 | Status: SHIPPED | OUTPATIENT
Start: 2022-09-02 | End: 2023-08-29

## 2022-09-06 DIAGNOSIS — N30.00 ACUTE CYSTITIS WITHOUT HEMATURIA: ICD-10-CM

## 2022-09-07 NOTE — TELEPHONE ENCOUNTER
[id number] sent Rx request for the following:      Requested Prescriptions   Pending Prescriptions Disp Refills     amoxicillin (AMOXIL) 875 MG tablet 20 tablet 0     Sig: Take 1 tablet (875 mg) by mouth 2 times daily       Oral Acne/Rosacea Medications Protocol Failed - 9/6/2022  3:00 PM        Failed - Confirmation of diagnosis is required     Please confirm diagnosis is acne or rosacea.     If NOT acne or rosacea; refer request to provider for further evaluation.    If diagnosis IS acne or rosacea, OK to refill BASED ON PREVIOUS REFILL CLINICAL NOTE RECOMMENDATION.          Failed - Medication is active on med list             Last Prescription Date:  Historical  Last Fill Qty/Refills:         N/A, R-N/A  Last Office Visit:              08/10/2022  Future Office visit:           None      Sandhya's PCP is out of the office. Routing to covering provider.    Dwayne Chambers RN  ....................  9/7/2022   10:17 AM

## 2022-09-09 RX ORDER — AMOXICILLIN 875 MG
875 TABLET ORAL 2 TIMES DAILY
Qty: 20 TABLET | Refills: 0 | OUTPATIENT
Start: 2022-09-09

## 2022-09-09 NOTE — TELEPHONE ENCOUNTER
Rx was for acute infection - if additional treatment is needed, appointment or phone call are needed with patient/care taker

## 2022-09-09 NOTE — TELEPHONE ENCOUNTER
Called pt and he is not in need of this refill, denies any urinary symptoms at this time.  Instructed to drink plenty of fluids along with cranberry juice.  Instructed to call back if symptoms arise and if it is after hours or on a weekend to see tx at the rapid clinic.  Pt verbalized understanding.  Rhonda Goldman RN on 9/9/2022 at 8:32 AM

## 2022-09-17 ENCOUNTER — HEALTH MAINTENANCE LETTER (OUTPATIENT)
Age: 87
End: 2022-09-17

## 2022-10-10 ENCOUNTER — ALLIED HEALTH/NURSE VISIT (OUTPATIENT)
Dept: FAMILY MEDICINE | Facility: OTHER | Age: 87
End: 2022-10-10
Attending: FAMILY MEDICINE
Payer: MEDICARE

## 2022-10-10 DIAGNOSIS — R51.9 HEADACHE: ICD-10-CM

## 2022-10-10 DIAGNOSIS — R68.83 CHILLS: ICD-10-CM

## 2022-10-10 DIAGNOSIS — R09.89 CHEST CONGESTION: ICD-10-CM

## 2022-10-10 DIAGNOSIS — Z20.822 EXPOSURE TO 2019 NOVEL CORONAVIRUS: Primary | ICD-10-CM

## 2022-10-10 PROCEDURE — C9803 HOPD COVID-19 SPEC COLLECT: HCPCS

## 2022-10-10 PROCEDURE — U0005 INFEC AGEN DETEC AMPLI PROBE: HCPCS | Mod: ZL

## 2022-10-10 NOTE — NURSING NOTE
Patient swabbed for COVID-19 testing.  Jesenia Denson LPN on 10/10/2022 at 11:01 AM    Symptomatic exposure, headache, chills, congestion

## 2022-10-11 ENCOUNTER — TELEPHONE (OUTPATIENT)
Dept: NURSING | Facility: CLINIC | Age: 87
End: 2022-10-11

## 2022-10-11 ENCOUNTER — TELEPHONE (OUTPATIENT)
Dept: SCHEDULING | Facility: CLINIC | Age: 87
End: 2022-10-11

## 2022-10-11 ENCOUNTER — VIRTUAL VISIT (OUTPATIENT)
Dept: FAMILY MEDICINE | Facility: OTHER | Age: 87
End: 2022-10-11
Attending: PHYSICIAN ASSISTANT
Payer: MEDICARE

## 2022-10-11 VITALS — TEMPERATURE: 97.2 F | HEIGHT: 71 IN | BODY MASS INDEX: 26.6 KG/M2 | WEIGHT: 190 LBS

## 2022-10-11 DIAGNOSIS — U07.1 INFECTION DUE TO 2019 NOVEL CORONAVIRUS: Primary | ICD-10-CM

## 2022-10-11 LAB — SARS-COV-2 RNA RESP QL NAA+PROBE: POSITIVE

## 2022-10-11 PROCEDURE — 99212 OFFICE O/P EST SF 10 MIN: CPT | Mod: 95 | Performed by: PHYSICIAN ASSISTANT

## 2022-10-11 ASSESSMENT — PAIN SCALES - GENERAL: PAINLEVEL: NO PAIN (0)

## 2022-10-11 NOTE — TELEPHONE ENCOUNTER
Patient classified as COVID treatment eligible by Epic high risk algorithm:  yes     Coronavirus (COVID-19) Notification    Reason for call  Notify of POSITIVE COVID-19 lab result, assess symptoms,  review [facility] recommendations    Lab Result   Lab test for 2019-nCoV rRt-PCR or SARS-COV-2 PCR  Oropharyngeal AND/OR nasopharyngeal swabs were POSITIVE for 2019-nCoV RNA [OR] SARS-COV-2 RNA (COVID-19) RNA     We have been unable to reach patient by phone at this time to notify of their Positive COVID-19 result.    Left voicemail message requesting a call back to [phone] [facility] for results.        A Positive COVID-19 letter will be sent via [employer] or the mail.    Deborah Gregory

## 2022-10-11 NOTE — NURSING NOTE
Patient presents to the clinic for request for Covid anti viral. He states his wife tested positive for Covid on Sunday. He reports headache, chills, weakness and stuffy nose that started Saturday. He has taken tylenol for treatment.        FOOD SECURITY SCREENING QUESTIONS  Hunger Vital Signs:  Within the past 12 months we worried whether our food would run out before we got money to buy more. Never  Within the past 12 months the food we bought just didn't last and we didn't have money to get more. Never  Medication Reconciliation: complete  Yvette Hu CMA 10/11/2022 2:37 PM

## 2022-10-11 NOTE — PROGRESS NOTES
Ilia is a 89 year old who is being evaluated via a billable telephone visit.      What phone number would you like to be contacted at? [phone]  How would you like to obtain your AVS? Mail a copy    1. Infection due to 2019 novel coronavirus  - nirmatrelvir and ritonavir (PAXLOVID) therapy pack; (Take 2 Nirmatrelvir tablets and 1 Ritonavir tablet twice daily for 5 days)  Dispense: 30 each; Refill: 0    - You tested positive for COVID (regardless of vaccination status): stay home for 5 days. If you have no symptoms or symptoms are resolving after 5 days, you may leave the house per CDC guidelines. Continue to wear a mask around others for 5 days.     Continue symptomatic remedies such as salt water gargles, increase hydration, rest, alternating Tylenol and ibuprofen if able/needed, Vicks Vapor rub on the chest and other symptomatic remedies.    Paxlovid: goal of Paxlovid to reduce hospitalization risk.   - We discussed risks, benefits, renal dosing, medication interactions for Paxlovid. Patient opted accepted Paxlovid prescription.     Carolynn Darden PA-C    Mary Morillo is a 89 year old, presenting for the following health issues:  No chief complaint on file.    HPI     COVID-19 Symptom Review  How many days ago did these symptoms start? 10/9/22    Are any of the following symptoms significant for you?    New or worsening difficulty breathing? No    Worsening cough? Yes, I am coughing up mucus.    Fever or chills? No    Headache: No    Sore throat: No    Chest pain: No    Diarrhea: No    Body aches? No    What treatments has patient tried? Acetaminophen   Does patient live in a nursing home, group home, or shelter? No  Does patient have a way to get food/medications during quarantined? Yes, I have a friend or family member who can help me.    Review of Systems   Constitutional, HEENT, cardiovascular, pulmonary, GI, , musculoskeletal, neuro, skin, endocrine and psych systems are negative, except as  otherwise noted.      Objective    Vitals - Patient Reported  Pain Score: No Pain (0)    Vitals:  No vitals were obtained today due to virtual visit.    Physical Exam   GEN: healthy, alert and no distress  PSYCH: Alert and oriented times 3; coherent speech, normal   rate and volume, able to articulate logical thoughts, able   to abstract reason, no tangential thoughts, no hallucinations   or delusions  His affect is normal  RESP: No cough, no audible wheezing, able to talk in full sentences  Remainder of exam unable to be completed due to telephone visits    BMP: GFR 81 and Cr 0.90 from 6/21/22    Phone call duration: 6 minutes

## 2022-10-11 NOTE — PROGRESS NOTES
"Ilia is a 89 year old who is being evaluated via a billable telephone visit.      What phone number would you like to be contacted at? [phone]  How would you like to obtain your AVS? Mail a copy    {PROVIDER CHARTING PREFERENCE:096720}    Subjective   Ilia is a 89 year old{ACCOMPANIED BY STATEMENT (Optional):510149}, presenting for the following health issues:  No chief complaint on file.      HPI     {SUPERLIST (Optional):592051}  {additonal problems for provider to add (Optional):849333}    Review of Systems   {ROS COMP (Optional):931644}      Objective           Vitals:  No vitals were obtained today due to virtual visit.    Physical Exam   {GENERAL APPEARANCE:50::\"healthy\",\"alert\",\"no distress\"}  PSYCH: Alert and oriented times 3; coherent speech, normal   rate and volume, able to articulate logical thoughts, able   to abstract reason, no tangential thoughts, no hallucinations   or delusions  His affect is { :1318153::\"normal\"}  RESP: No cough, no audible wheezing, able to talk in full sentences  Remainder of exam unable to be completed due to telephone visits    {Diagnostic Test Results (Optional):580331}    {AMBULATORY ATTESTATION (Optional):608534}        Phone call duration: *** minutes    "

## 2022-10-11 NOTE — PATIENT INSTRUCTIONS
- You tested positive for COVID (regardless of vaccination status): stay home for 5 days. If you have no symptoms or symptoms are resolving after 5 days, you may leave the house per CDC guidelines. Continue to wear a mask around others for 5 days.     Continue symptomatic remedies such as salt water gargles, increase hydration, rest, alternating Tylenol and ibuprofen if able/needed, Vicks Vapor rub on the chest and other symptomatic remedies.    Paxlovid: goal of Paxlovid to reduce hospitalization risk.   - We discussed risks, benefits, renal dosing, medication interactions for Paxlovid. Patient opted accepted Paxlovid prescription.     Stop Lipitor for a total of 8 days (the entire 5 days you are on Paxlovid PLUS an additional 3 days beyond this for a total of 8 days).

## 2022-10-11 NOTE — TELEPHONE ENCOUNTER
Coronavirus (COVID-19) Notification    Caller Name (Patient, parent, daughter/son, grandparent, etc)  Patient    Reason for call  Notify of Positive Coronavirus (COVID-19) lab results, assess symptoms,  review [facility] recommendations    Lab Result    Lab test:  2019-nCoV rRt-PCR or SARS-CoV-2 PCR    Oropharyngeal AND/OR nasopharyngeal swabs is POSITIVE for 2019-nCoV RNA/SARS-COV-2 PCR (COVID-19 virus)      Gather patient reported symptoms   Assessment   Current Symptoms at time of phone call, reported by patient: (if no symptoms, document: No symptoms] Headache,chills, weak,    Date of symptom(s) onset (if applicable) 10/09/22     If at time of call, Patients symptoms have worsened, the Patient should contact 911 or have someone drive them to Emergency Dept promptly:      If Patient calling 911, inform 911 personal that you have tested positive for the Coronavirus (COVID-19).  Place mask on and await 911 to arrive.    If Emergency Dept, If possible, please have another adult drive you to the Emergency Dept but you need to wear mask when in contact with other people.      Treatment Options:   Patient classified as COVID treatment eligible by Epic high risk algorithm: Yes  Is the patient symptomatic at the time of result notification? Yes. Was the onset of symptoms within the last 5 days? Yes.   There are now oral medications available for the treatment of COVID-19.  Taking one of these medications within the first five days of symptoms (when people may not yet feel severely ill) has been shown to make people feel better, prevent them from getting sicker, and preventing hospitalization and death.   Does the patient agree to have a visit with a provider to discuss treatment options? Yes. Is the patient seen at [facility]?  No: Warm transfer caller to [phone] to be scheduled with a virtual urgent provider.  During transfer, instruct  on appropriate time frame for visit     Review  information with Patient    Your result was positive. This means you have COVID-19 (coronavirus).    How can I protect others?    These guidelines are for isolating before returning to work, school or .    If you DO have symptoms    Stay home and away from others     For at least 5 days after your symptoms started, AND    You are fever free for 24 hours (with no medicine that reduces fever), AND    Your other symptoms are better    Wear a mask for 10 full days anytime you are around others    If you DON'T have symptoms    Stay home and away from others for at least 5 days after your positive test    Wear a mask for 10 full days anytime you are around others    There may be different guidelines for healthcare facilities.  Please check with the specific sites before arriving.    If you have been told by a doctor that you were severely ill with COVID-19 or are immunocompromised, you should isolate for at least 10 days.    You should not go back to work until you meet the guidelines above for ending your home isolation. You don't need to be retested for COVID-19 before going back to work--studies show that you won't spread the virus if it's been at least 10 days since your symptoms started (or 20 days, if you have a weak immune system).    Employers, schools, and daycares: This is an official notice for this person's medical guidelines for returning in-person.  They must meet the above guidelines before going back to work, school or  in person.    You will receive a positive COVID-19 letter via [employer] or the mail soon with additional self-care information.    Would you like me to review some of that information with you now?  No    If you were tested for an upcoming procedure, please contact your provider for next steps.    Joy Fisher

## 2022-11-23 DIAGNOSIS — I63.312 CEREBROVASCULAR ACCIDENT (CVA) DUE TO THROMBOSIS OF LEFT MIDDLE CEREBRAL ARTERY (H): ICD-10-CM

## 2022-11-23 DIAGNOSIS — E11.8 CONTROLLED TYPE 2 DIABETES MELLITUS WITH COMPLICATION, WITHOUT LONG-TERM CURRENT USE OF INSULIN (H): Chronic | ICD-10-CM

## 2022-11-25 NOTE — TELEPHONE ENCOUNTER
[id number] sent Rx request for the following:      Requested Prescriptions   Pending Prescriptions Disp Refills     clopidogrel (PLAVIX) 75 MG tablet [Pharmacy Med Name: CLOPIDOGREL 75MG TABLET] 90 tablet 0     Sig: TAKE 1 TABLET (75 MG) BY MOUTH DAILY   Last Prescription Date:   6/21/22  Last Fill Qty/Refills:         90, R-0      Plavix Failed - 11/25/2022  4:11 PM        Failed - Normal HGB on file in past 12 months     Recent Labs   Lab Test 02/14/18 0412   HGB 12.1*           Failed - Normal Platelets on file in past 12 months     Recent Labs   Lab Test 02/14/18 0412              metFORMIN (GLUCOPHAGE) 500 MG tablet [Pharmacy Med Name: METFORMIN 500MG TABLET] 180 tablet 0     Sig: TAKE 1 TABLET (500 MG) BY MOUTH 2 TIMES DAILY (WITH MEALS)   Last Prescription Date:   6/21/22  Last Fill Qty/Refills:         180, R-0      Last Office Visit:              6/21/22  Future Office visit:           None    Per LOV note:  Return in about 53 weeks (around 6/27/2023) for Annual Wellness Visit.    In clinical absence of patient's primary, Trino Dahl, patient is requesting that this message be sent to the covering provider for consideration please.    Sulema Gamino RN .............. 11/25/2022  4:24 PM

## 2022-11-28 RX ORDER — CLOPIDOGREL BISULFATE 75 MG/1
75 TABLET ORAL DAILY
Qty: 90 TABLET | Refills: 1 | Status: SHIPPED | OUTPATIENT
Start: 2022-11-28 | End: 2023-06-01

## 2023-01-23 ENCOUNTER — HEALTH MAINTENANCE LETTER (OUTPATIENT)
Age: 88
End: 2023-01-23

## 2023-05-25 DIAGNOSIS — I63.312 CEREBROVASCULAR ACCIDENT (CVA) DUE TO THROMBOSIS OF LEFT MIDDLE CEREBRAL ARTERY (H): ICD-10-CM

## 2023-06-01 RX ORDER — CLOPIDOGREL BISULFATE 75 MG/1
75 TABLET ORAL DAILY
Qty: 90 TABLET | Refills: 1 | Status: SHIPPED | OUTPATIENT
Start: 2023-06-01 | End: 2023-09-18

## 2023-06-01 NOTE — TELEPHONE ENCOUNTER
[facility] sent Rx request for the following:    CLOPIDOGREL 75MG TABLET  Last Prescription Date:   11/28/22  Last Fill Qty/Refills:         90, R-1    Last Office Visit:              6/21/22   Future Office visit:           None      In clinical absence of patient's primary, Trino Dahl, patient is requesting that this message be sent to the covering provider for consideration please.  Carolynn Bonilla RN on 6/1/2023 at 8:00 AM

## 2023-07-26 DIAGNOSIS — G62.9 NEUROPATHY: ICD-10-CM

## 2023-07-26 DIAGNOSIS — M47.9 OSTEOARTHRITIS OF SPINE, UNSPECIFIED SPINAL OSTEOARTHRITIS COMPLICATION STATUS, UNSPECIFIED SPINAL REGION: ICD-10-CM

## 2023-07-26 RX ORDER — GABAPENTIN 100 MG/1
100 CAPSULE ORAL AT BEDTIME
Qty: 30 CAPSULE | Refills: 0 | Status: SHIPPED | OUTPATIENT
Start: 2023-07-26 | End: 2023-08-29

## 2023-07-26 RX ORDER — NAPROXEN SODIUM 220 MG
220 TABLET ORAL DAILY PRN
Qty: 270 TABLET | Refills: 0 | Status: SHIPPED | OUTPATIENT
Start: 2023-07-26 | End: 2023-09-18

## 2023-07-26 NOTE — TELEPHONE ENCOUNTER
[id number] sent Rx request for the following:      Requested Prescriptions   Pending Prescriptions Disp Refills    gabapentin (NEURONTIN) 100 MG capsule [Pharmacy Med Name: GABAPENTIN 100MG CAPSULE] 90 capsule 3     Sig: TAKE 1 CAPSULE (100 MG) BY MOUTH AT BEDTIME     Last Prescription Date:   7/21/2022  Last Fill Qty/Refills:         90, R-3       naproxen sodium (ANAPROX) 220 MG tablet [Pharmacy Med Name: NAPROXEN SODIUM 220MG TABLET] 270 tablet 11     Sig: TAKE 1 TABLET (220 MG) BY MOUTH DAILY AS NEEDED (LEG PAIN)       There is no refill protocol information for this order        Last Prescription Date:   7/12/2022  Last Fill Qty/Refills:         270, R-11        Last Office Visit:              6/21/2022 Dahl   Future Office visit:           None        Per LOV note: Return in about 53 weeks (around 6/27/2023) for Annual Wellness Visit.     Patient has not scheduled. Will route to scheduling to set this up. Teed up for 1 month.Please edit/sign if appropriate.    Heather Kingsley RN on 7/26/2023 at 2:49 PM

## 2023-07-29 ENCOUNTER — HEALTH MAINTENANCE LETTER (OUTPATIENT)
Age: 88
End: 2023-07-29

## 2023-08-03 ENCOUNTER — DOCUMENTATION ONLY (OUTPATIENT)
Dept: OTHER | Facility: CLINIC | Age: 88
End: 2023-08-03
Payer: COMMERCIAL

## 2023-08-16 DIAGNOSIS — I63.312 CEREBROVASCULAR ACCIDENT (CVA) DUE TO THROMBOSIS OF LEFT MIDDLE CEREBRAL ARTERY (H): ICD-10-CM

## 2023-08-17 RX ORDER — ATORVASTATIN CALCIUM 40 MG/1
40 TABLET, FILM COATED ORAL DAILY
Qty: 90 TABLET | Refills: 4 | Status: SHIPPED | OUTPATIENT
Start: 2023-08-17 | End: 2023-09-18

## 2023-08-17 NOTE — TELEPHONE ENCOUNTER
[facility] sent Rx request for the following:    ATORVASTATIN 40MG TABLET   Last Prescription Date:   6/21/22  Last Fill Qty/Refills:         90, R-3    Last Office Visit:              6/21/22   Future Office visit:           9/18/23    Carolynn Bonilla RN on 8/17/2023 at 2:16 PM

## 2023-08-21 DIAGNOSIS — E11.8 CONTROLLED TYPE 2 DIABETES MELLITUS WITH COMPLICATION, WITHOUT LONG-TERM CURRENT USE OF INSULIN (H): Chronic | ICD-10-CM

## 2023-08-23 DIAGNOSIS — G62.9 NEUROPATHY: ICD-10-CM

## 2023-08-23 DIAGNOSIS — I10 ESSENTIAL HYPERTENSION: ICD-10-CM

## 2023-08-24 NOTE — TELEPHONE ENCOUNTER
Last Prescription Date: 3/01/2023  Last Qty/Refills: 180 / R-1  Last Office Visit: 8/10/2022  Future Office Visit: 9/18/2023    Buffy Archuleta RN on 8/24/2023 at 9:18 AM     Requested Prescriptions   Pending Prescriptions Disp Refills    metFORMIN (GLUCOPHAGE) 500 MG tablet [Pharmacy Med Name: METFORMIN 500MG TABLET] 180 tablet 1     Sig: TAKE 1 TABLET (500 MG) BY MOUTH 2 TIMES DAILY (WITH MEALS)       Biguanide Agents Failed - 8/21/2023 11:20 AM        Failed - Patient has documented A1c within the specified period of time.     If HgbA1C is 8 or greater, it needs to be on file within the past 3 months.  If less than 8, must be on file within the past 6 months.     Recent Labs   Lab Test 06/21/22 0918 02/14/18  0412 07/26/17  1504   A1C 6.9*   < >  --    [id number]  --   --  6.2    < > = values in this interval not displayed.             Failed - Patient's CR is NOT>1.4 OR Patient's EGFR is NOT<45 within past 12 mos.     Recent Labs   Lab Test 06/21/22 0918 05/28/21  0946   GFRESTIMATED 81 71   GFRESTBLACK  --  86       Recent Labs   Lab Test 06/21/22 0918   CR 0.90

## 2023-08-29 RX ORDER — ENALAPRIL MALEATE 20 MG/1
40 TABLET ORAL DAILY
Qty: 180 TABLET | Refills: 1 | Status: SHIPPED | OUTPATIENT
Start: 2023-08-29 | End: 2023-09-18

## 2023-08-29 RX ORDER — GABAPENTIN 100 MG/1
100 CAPSULE ORAL AT BEDTIME
Qty: 90 CAPSULE | Refills: 1 | Status: SHIPPED | OUTPATIENT
Start: 2023-08-29 | End: 2023-09-18

## 2023-08-29 NOTE — TELEPHONE ENCOUNTER
[facility] [place] sent Rx request for the following:      Requested Prescriptions   Pending Prescriptions Disp Refills    gabapentin (NEURONTIN) 100 MG capsule [Pharmacy Med Name: GABAPENTIN 100MG CAPSULE] 30 capsule 0     Sig: TAKE 1 CAPSULE (100 MG) BY MOUTH AT BEDTIME   Last Prescription Date:   7/26/23  Last Fill Qty/Refills:         30, R-0    Last Office Visit:              10/11/22 (VV-COVID)   Future Office visit:           Next 5 appointments (look out 90 days)      Sep 18, 2023 11:00 AM  PHYSICAL with Trino Dahl MD  [facility] ([facility] ) [address]  [place] [id number] [phone]          Sulema Gamino RN .............. 8/29/2023  4:37 PM

## 2023-08-29 NOTE — TELEPHONE ENCOUNTER
[facility] [place] sent Rx request for the following:      Requested Prescriptions   Pending Prescriptions Disp Refills    enalapril (VASOTEC) 20 MG tablet [Pharmacy Med Name: ENALAPRIL 20MG TABLET] 180 tablet 3     Sig: TAKE 2 TABLETS (40 MG) BY MOUTH DAILY       ACE Inhibitors (Including Combos) Protocol Failed - 8/29/2023  4:38 PM        Failed - Blood pressure under 140/90 in past 12 months     BP Readings from Last 3 Encounters:   08/10/22 122/72   06/21/22 118/72   06/07/21 122/70           Failed - Recent (12 mo) or future (30 days) visit within the authorizing provider's specialty        Failed - Normal serum creatinine on file in past 12 months     Recent Labs   Lab Test 06/21/22  0918   CR 0.90           Failed - Normal serum potassium on file in past 12 months     Recent Labs   Lab Test 06/21/22  0918   POTASSIUM 4.2        Last Prescription Date:   9/2/22  Last Fill Qty/Refills:         180, R-3    Last Office Visit:              10/11/22 (VV-COVID)   Future Office visit:           Next 5 appointments (look out 90 days)       Sep 18, 2023 11:00 AM  PHYSICAL with Trino Dahl MD  [facility] ([facility] ) [address]  [place] [id number] [phone]      Sulema Gamino RN .............. 8/29/2023  4:38 PM

## 2023-09-06 DIAGNOSIS — N40.0 BENIGN PROSTATIC HYPERPLASIA WITHOUT LOWER URINARY TRACT SYMPTOMS: ICD-10-CM

## 2023-09-06 DIAGNOSIS — R97.20 ELEVATED PROSTATE SPECIFIC ANTIGEN (PSA): ICD-10-CM

## 2023-09-06 RX ORDER — TAMSULOSIN HYDROCHLORIDE 0.4 MG/1
CAPSULE ORAL
Qty: 180 CAPSULE | Refills: 3 | Status: SHIPPED | OUTPATIENT
Start: 2023-09-06 | End: 2023-09-18

## 2023-09-06 NOTE — TELEPHONE ENCOUNTER
[facility] sent Rx request for the following:    TAMSULOSIN 0.4MG CAPSULE   Last Prescription Date:   6/21/22  Last Fill Qty/Refills:         180, R-3    Last Office Visit:              6/21/22   Future Office visit:           9/18/23    Carolynn Bonilla RN on 9/6/2023 at 4:04 PM

## 2023-09-18 ENCOUNTER — OFFICE VISIT (OUTPATIENT)
Dept: PEDIATRICS | Facility: OTHER | Age: 88
End: 2023-09-18
Attending: INTERNAL MEDICINE
Payer: COMMERCIAL

## 2023-09-18 VITALS
OXYGEN SATURATION: 96 % | SYSTOLIC BLOOD PRESSURE: 122 MMHG | HEIGHT: 68 IN | BODY MASS INDEX: 30.16 KG/M2 | HEART RATE: 56 BPM | RESPIRATION RATE: 16 BRPM | WEIGHT: 199 LBS | TEMPERATURE: 97.4 F | DIASTOLIC BLOOD PRESSURE: 70 MMHG

## 2023-09-18 DIAGNOSIS — M25.512 CHRONIC LEFT SHOULDER PAIN: ICD-10-CM

## 2023-09-18 DIAGNOSIS — Z23 NEED FOR VACCINATION: ICD-10-CM

## 2023-09-18 DIAGNOSIS — Z00.00 ENCOUNTER FOR MEDICARE ANNUAL WELLNESS EXAM: Primary | ICD-10-CM

## 2023-09-18 DIAGNOSIS — G89.29 CHRONIC LEFT SHOULDER PAIN: ICD-10-CM

## 2023-09-18 DIAGNOSIS — E11.8 CONTROLLED TYPE 2 DIABETES MELLITUS WITH COMPLICATION, WITHOUT LONG-TERM CURRENT USE OF INSULIN (H): Chronic | ICD-10-CM

## 2023-09-18 DIAGNOSIS — G62.9 NEUROPATHY: ICD-10-CM

## 2023-09-18 DIAGNOSIS — E78.2 MIXED HYPERLIPIDEMIA: Chronic | ICD-10-CM

## 2023-09-18 DIAGNOSIS — Z13.0 SCREENING, ANEMIA, DEFICIENCY, IRON: ICD-10-CM

## 2023-09-18 DIAGNOSIS — I87.8 VENOUS STASIS OF BOTH LOWER EXTREMITIES: ICD-10-CM

## 2023-09-18 DIAGNOSIS — I63.312 CEREBROVASCULAR ACCIDENT (CVA) DUE TO THROMBOSIS OF LEFT MIDDLE CEREBRAL ARTERY (H): ICD-10-CM

## 2023-09-18 DIAGNOSIS — G60.9 HEREDITARY AND IDIOPATHIC PERIPHERAL NEUROPATHY: ICD-10-CM

## 2023-09-18 DIAGNOSIS — I10 ESSENTIAL HYPERTENSION: ICD-10-CM

## 2023-09-18 DIAGNOSIS — N40.0 BENIGN PROSTATIC HYPERPLASIA WITHOUT LOWER URINARY TRACT SYMPTOMS: ICD-10-CM

## 2023-09-18 DIAGNOSIS — G57.01 PIRIFORMIS SYNDROME, RIGHT: ICD-10-CM

## 2023-09-18 DIAGNOSIS — M47.9 OSTEOARTHRITIS OF SPINE, UNSPECIFIED SPINAL OSTEOARTHRITIS COMPLICATION STATUS, UNSPECIFIED SPINAL REGION: ICD-10-CM

## 2023-09-18 DIAGNOSIS — R97.20 ELEVATED PROSTATE SPECIFIC ANTIGEN (PSA): ICD-10-CM

## 2023-09-18 LAB
ANION GAP SERPL CALCULATED.3IONS-SCNC: 9 MMOL/L (ref 7–15)
BUN SERPL-MCNC: 17.6 MG/DL (ref 8–23)
CALCIUM SERPL-MCNC: 9.4 MG/DL (ref 8.2–9.6)
CHLORIDE SERPL-SCNC: 107 MMOL/L (ref 98–107)
CHOLEST SERPL-MCNC: 118 MG/DL
CREAT SERPL-MCNC: 0.85 MG/DL (ref 0.67–1.17)
CREAT UR-MCNC: 149.9 MG/DL
DEPRECATED HCO3 PLAS-SCNC: 25 MMOL/L (ref 22–29)
EGFRCR SERPLBLD CKD-EPI 2021: 82 ML/MIN/1.73M2
ERYTHROCYTE [DISTWIDTH] IN BLOOD BY AUTOMATED COUNT: 13.2 % (ref 10–15)
GLUCOSE SERPL-MCNC: 141 MG/DL (ref 70–99)
HBA1C MFR BLD: 7.2 % (ref 4–6.2)
HCT VFR BLD AUTO: 38.1 % (ref 40–53)
HDLC SERPL-MCNC: 52 MG/DL
HGB BLD-MCNC: 13.1 G/DL (ref 13.3–17.7)
LDLC SERPL CALC-MCNC: 51 MG/DL
MCH RBC QN AUTO: 31.3 PG (ref 26.5–33)
MCHC RBC AUTO-ENTMCNC: 34.4 G/DL (ref 31.5–36.5)
MCV RBC AUTO: 91 FL (ref 78–100)
MICROALBUMIN UR-MCNC: 71.7 MG/L
MICROALBUMIN/CREAT UR: 47.83 MG/G CR (ref 0–17)
NONHDLC SERPL-MCNC: 66 MG/DL
NT-PROBNP SERPL-MCNC: 224 PG/ML (ref 0–1800)
PLATELET # BLD AUTO: 164 10E3/UL (ref 150–450)
POTASSIUM SERPL-SCNC: 4.4 MMOL/L (ref 3.4–5.3)
RBC # BLD AUTO: 4.18 10E6/UL (ref 4.4–5.9)
SODIUM SERPL-SCNC: 141 MMOL/L (ref 136–145)
TRIGL SERPL-MCNC: 73 MG/DL
WBC # BLD AUTO: 5.9 10E3/UL (ref 4–11)

## 2023-09-18 PROCEDURE — 80061 LIPID PANEL: CPT | Mod: ZL | Performed by: INTERNAL MEDICINE

## 2023-09-18 PROCEDURE — 90662 IIV NO PRSV INCREASED AG IM: CPT

## 2023-09-18 PROCEDURE — 82570 ASSAY OF URINE CREATININE: CPT | Mod: ZL | Performed by: INTERNAL MEDICINE

## 2023-09-18 PROCEDURE — 80048 BASIC METABOLIC PNL TOTAL CA: CPT | Mod: ZL | Performed by: INTERNAL MEDICINE

## 2023-09-18 PROCEDURE — 36415 COLL VENOUS BLD VENIPUNCTURE: CPT | Mod: ZL | Performed by: INTERNAL MEDICINE

## 2023-09-18 PROCEDURE — 83036 HEMOGLOBIN GLYCOSYLATED A1C: CPT | Mod: ZL | Performed by: INTERNAL MEDICINE

## 2023-09-18 PROCEDURE — 99207 PR FOOT EXAM NO CHARGE: CPT | Performed by: INTERNAL MEDICINE

## 2023-09-18 PROCEDURE — G0439 PPPS, SUBSEQ VISIT: HCPCS | Performed by: INTERNAL MEDICINE

## 2023-09-18 PROCEDURE — 83880 ASSAY OF NATRIURETIC PEPTIDE: CPT | Mod: ZL | Performed by: INTERNAL MEDICINE

## 2023-09-18 PROCEDURE — G0008 ADMIN INFLUENZA VIRUS VAC: HCPCS

## 2023-09-18 PROCEDURE — 85027 COMPLETE CBC AUTOMATED: CPT | Mod: ZL | Performed by: INTERNAL MEDICINE

## 2023-09-18 RX ORDER — ENALAPRIL MALEATE 20 MG/1
40 TABLET ORAL DAILY
Qty: 180 TABLET | Refills: 3 | Status: SHIPPED | OUTPATIENT
Start: 2023-09-18 | End: 2024-08-23

## 2023-09-18 RX ORDER — NAPROXEN SODIUM 220 MG
220 TABLET ORAL DAILY PRN
Qty: 270 TABLET | Refills: 3 | Status: ON HOLD | OUTPATIENT
Start: 2023-09-18 | End: 2023-10-12

## 2023-09-18 RX ORDER — TAMSULOSIN HYDROCHLORIDE 0.4 MG/1
0.8 CAPSULE ORAL DAILY
Qty: 180 CAPSULE | Refills: 3 | Status: SHIPPED | OUTPATIENT
Start: 2023-09-18

## 2023-09-18 RX ORDER — CLOPIDOGREL BISULFATE 75 MG/1
75 TABLET ORAL DAILY
Qty: 90 TABLET | Refills: 3 | Status: SHIPPED | OUTPATIENT
Start: 2023-09-18

## 2023-09-18 RX ORDER — GABAPENTIN 100 MG/1
100 CAPSULE ORAL 2 TIMES DAILY
Qty: 180 CAPSULE | Refills: 3 | Status: SHIPPED | OUTPATIENT
Start: 2023-09-18 | End: 2023-12-18

## 2023-09-18 RX ORDER — ATORVASTATIN CALCIUM 40 MG/1
40 TABLET, FILM COATED ORAL DAILY
Qty: 90 TABLET | Refills: 4 | Status: SHIPPED | OUTPATIENT
Start: 2023-09-18

## 2023-09-18 ASSESSMENT — PAIN SCALES - GENERAL: PAINLEVEL: NO PAIN (0)

## 2023-09-18 ASSESSMENT — ENCOUNTER SYMPTOMS
EYE PAIN: 0
HEMATURIA: 0
MYALGIAS: 0
NERVOUS/ANXIOUS: 0
HEARTBURN: 0
SORE THROAT: 0
CONSTIPATION: 0
HEMATOCHEZIA: 0
FREQUENCY: 1
JOINT SWELLING: 1
PARESTHESIAS: 1
PALPITATIONS: 0
ABDOMINAL PAIN: 0
NAUSEA: 0
CHILLS: 1
FEVER: 0
SHORTNESS OF BREATH: 1
DIARRHEA: 0
DIZZINESS: 0
DYSURIA: 0
ARTHRALGIAS: 1
COUGH: 0
WEAKNESS: 0
HEADACHES: 0

## 2023-09-18 ASSESSMENT — ACTIVITIES OF DAILY LIVING (ADL): CURRENT_FUNCTION: LAUNDRY REQUIRES ASSISTANCE

## 2023-09-18 NOTE — PATIENT INSTRUCTIONS
-- Low salt diet, under 2000 mg/day   -- Fluid restrict, under 2000 mL/day aka 8.5 cups/day   -- Eat healthy protein   -- Learn about DASH Diet for dietary ways to reduce blood pressure  Google: NIH DASH diet  [facility] site: https://www.nhlbi.nih.gov/health-topics/dash-eating-plan  PDF from [facility]: https://www.nhlbi.nih.gov/files/docs/public/heart/new_dash.pdf   -- Elevate feet above your hips for 20-30 minutes, 4 times per day   -- Compression stockings from morning to night   -- Consider echocardiogram      Patient Education   Personalized Prevention Plan  You are due for the preventive services outlined below.  Your care team is available to assist you in scheduling these services.  If you have already completed any of these items, please share that information with your care team to update in your medical record.  Health Maintenance Due   Topic Date Due    Kidney Microalbumin Urine Test  Never done    Zoster (Shingles) Vaccine (1 of 2) Never done    Eye Exam  02/11/2017    COVID-19 Vaccine (4 - Pfizer series) 12/31/2021    Diabetic Foot Exam  06/07/2022    A1C Lab  12/21/2022    Basic Metabolic Panel  06/21/2023    Cholesterol Lab  06/21/2023    Flu Vaccine (1) 09/01/2023     Your Health Risk Assessment indicates you feel you are not in good health    A healthy lifestyle helps keep the body fit and the mind alert. It helps protect you from disease, helps you fight disease, and helps prevent chronic disease (disease that doesn't go away) from getting worse. This is important as you get older and begin to notice twinges in muscles and joints and a decline in the strength and stamina you once took for granted. A healthy lifestyle includes good healthcare, good nutrition, weight control, recreation, and regular exercise. Avoid harmful substances and do what you can to keep safe. Another part of a healthy lifestyle is stay mentally active and socially involved.    Good healthcare   Have a wellness visit every year.   If you  have new symptoms, let us know right away. Don't wait until the next checkup.   Take medicines exactly as prescribed and keep your medicines in a safe place. Tell us if your medicine causes problems.   Healthy diet and weight control   Eat 3 or 4 small, nutritious, low-fat, high-fiber meals a day. Include a variety of fruits, vegetables, and whole-grain foods.   Make sure you get enough calcium in your diet. Calcium, vitamin D, and exercise help prevent osteoporosis (bone thinning).   If you live alone, try eating with others when you can. That way you get a good meal and have company while you eat it.   Try to keep a healthy weight. If you eat more calories than your body uses for energy, it will be stored as fat and you will gain weight.     Recreation   Recreation is not limited to sports and team events. It includes any activity that provides relaxation, interest, enjoyment, and exercise. Recreation provides an outlet for physical, mental, and social energy. It can give a sense of worth and achievement. It can help you stay healthy.    Mental Exercise and Social Involvement  Mental and emotional health is as important as physical health. Keep in touch with friends and family. Stay as active as possible. Continue to learn and challenge yourself.   Things you can do to stay mentally active are:  Learn something new, like a foreign language or musical instrument.   Play SCRABBLE or do crossword puzzles. If you cannot find people to play these games with you at home, you can play them with others on your computer through the Internet.   Join a games club--anything from card games to chess or checkers or lawn bowling.   Start a new hobby.   Go back to school.   Volunteer.   Read.   Keep up with world events.  Learning About Being Physically Active  What is physical activity?     Being physically active means doing any kind of activity that gets your body moving.  The types of physical activity that can help you get  "fit and stay healthy include:  Aerobic or \"cardio\" activities. These make your heart beat faster and make you breathe harder, such as brisk walking, riding a bike, or running. They strengthen your heart and lungs and build up your endurance.  Strength training activities. These make your muscles work against, or \"resist,\" something. Examples include lifting weights or doing push-ups. These activities help tone and strengthen your muscles and bones.  Stretches. These let you move your joints and muscles through their full range of motion. Stretching helps you be more flexible.  Reaching a balance between these three types of physical activity is important because each one contributes to your overall fitness.  What are the benefits of being active?  Being active is one of the best things you can do for your health. It helps you to:  Feel stronger and have more energy to do all the things you like to do.  Focus better at school or work.  Feel, think, and sleep better.  Reach and stay at a healthy weight.  Lose fat and build lean muscle.  Lower your risk for serious health problems, including diabetes, heart attack, high blood pressure, and some cancers.  Keep your heart, lungs, bones, muscles, and joints strong and healthy.  How can you make being active part of your life?  Start slowly. Make it your long-term goal to get at least 30 minutes of exercise on most days of the week. Walking is a good choice. You also may want to do other activities, such as running, swimming, cycling, or playing tennis or team sports.  Pick activities that you like--ones that make your heart beat faster, your muscles stronger, and your muscles and joints more flexible. If you find more than one thing you like doing, do them all. You don't have to do the same thing every day.  Get your heart pumping every day. Any activity that makes your heart beat faster and keeps it at that rate for a while counts.  Here are some great ways to get your " "heart beating faster:  Go for a brisk walk, run, or bike ride.  Go for a hike or swim.  Go in-line skating.  Play a game of touch football, basketball, or soccer.  Ride a bike.  Play tennis or racquetball.  Climb stairs.  Even some household chores can be aerobic--just do them at a faster pace. Vacuuming, raking or mowing the lawn, sweeping the garage, and washing and waxing the car all can help get your heart rate up.  Strengthen your muscles during the week. You don't have to lift heavy weights or grow big, bulky muscles to get stronger. Doing a few simple activities that make your muscles work against, or \"resist,\" something can help you get stronger.  For example, you can:  Do push-ups or sit-ups, which use your own body weight as resistance.  Lift weights or dumbbells or use stretch bands at home or in a gym or community center.  Stretch your muscles often. Stretching will help you as you become more active. It can help you stay flexible, loosen tight muscles, and avoid injury. It can also help improve your balance and posture and can be a great way to relax.  Be sure to stretch the muscles you'll be using when you work out. It's best to warm your muscles slightly before you stretch them. Walk or do some other light aerobic activity for a few minutes, and then start stretching.  When you stretch your muscles:  Do it slowly. Stretching is not about going fast or making sudden movements.  Don't push or bounce during a stretch.  Hold each stretch for at least 15 to 30 seconds, if you can. You should feel a stretch in the muscle, but not pain.  Breathe out as you do the stretch. Then breathe in as you hold the stretch. Don't hold your breath.  If you're worried about how more activity might affect your health, have a checkup before you start. Follow any special advice your doctor gives you for getting a smart start.  Where can you learn more?  Go to https://www.[employer].net/patiented  Enter W332 in the search box " "to learn more about \"Learning About Being Physically Active.\"  Current as of: October 10, 2022               Content Version: 13.7    1838-7943 [employer].   Care instructions adapted under license by your healthcare professional. If you have questions about a medical condition or this instruction, always ask your healthcare professional. [employer] disclaims any warranty or liability for your use of this information.      Learning About Dietary Guidelines  What are the Dietary Guidelines for Americans?     Dietary Guidelines for Americans provide tips for eating well and staying healthy. This helps reduce the risk for long-term (chronic) diseases.  These guidelines recommend that you:  Eat and drink the right amount for you. The [place] government's food guide is called MyPlate. It can help you make your own well-balanced eating plan.  Try to balance your eating with your activity. This helps you stay at a healthy weight.  Drink alcohol in moderation, if at all.  Limit foods high in salt, saturated fat, trans fat, and added sugar.  These guidelines are from the U.[place]. Department of Agriculture and the U.S. Department of Health and Human Services. They are updated every 5 years.  What is MyPlate?  MyPlate is the [place] government's food guide. It can help you make your own well-balanced eating plan. A balanced eating plan means that you eat enough, but not too much, and that your food gives you the nutrients you need to stay healthy.  MyPlate focuses on eating plenty of whole grains, fruits, and vegetables, and on limiting fat and sugar. It is available online at www.ChooseMyPlate.gov.  How can you get started?  If you're trying to eat healthier, you can slowly change your eating habits over time. You don't have to make big changes all at once. Start by adding one or two healthy foods to your meals each day.  Grains  Choose whole-grain breads and cereals and whole-wheat pasta and whole-grain " "crackers.  Vegetables  Eat a variety of vegetables every day. They have lots of nutrients and are part of a heart-healthy diet.  Fruits  Eat a variety of fruits every day. Fruits contain lots of nutrients. Choose fresh fruit instead of fruit juice.  Protein foods  Choose fish and lean poultry more often. Eat red meat and fried meats less often. Dried beans, tofu, and nuts are also good sources of protein.  Dairy  Choose low-fat or fat-free products from this food group. If you have problems digesting milk, try eating cheese or yogurt instead.  Fats and oils  Limit fats and oils if you're trying to cut calories. Choose healthy fats when you cook. These include canola oil and olive oil.  Where can you learn more?  Go to https://www.[employer].net/patiented  Enter D676 in the search box to learn more about \"Learning About Dietary Guidelines.\"  Current as of: March 1, 2023               Content Version: 13.7    5286-2773 [employer].   Care instructions adapted under license by your healthcare professional. If you have questions about a medical condition or this instruction, always ask your healthcare professional. [employer] disclaims any warranty or liability for your use of this information.      Activities of Daily Living    Your Health Risk Assessment indicates you have difficulties with activities of daily living such as housework, bathing, preparing meals, taking medication, etc. Please make a follow up appointment for us to address this issue in more detail.  Hearing Loss: Care Instructions  Overview     Hearing loss is a sudden or slow decrease in how well you hear. It can range from slight to profound. Permanent hearing loss can occur with aging. It also can happen when you are exposed long-term to loud noise. Examples include listening to loud music, riding motorcycles, or being around other loud machines.  Hearing loss can affect your work and home life. It can make you feel lonely " or depressed. You may feel that you have lost your independence. But hearing aids and other devices can help you hear better and feel connected to others.  Follow-up care is a key part of your treatment and safety. Be sure to make and go to all appointments, and call your doctor if you are having problems. It's also a good idea to know your test results and keep a list of the medicines you take.  How can you care for yourself at home?  Avoid loud noises whenever possible. This helps keep your hearing from getting worse.  Always wear hearing protection around loud noises.  Wear a hearing aid as directed.  A professional can help you pick a hearing aid that will work best for you.  You can also get hearing aids over the counter for mild to moderate hearing loss.  Have hearing tests as your doctor suggests. They can show whether your hearing has changed. Your hearing aid may need to be adjusted.  Use other devices as needed. These may include:  Telephone amplifiers and hearing aids that can connect to a television, stereo, radio, or microphone.  Devices that use lights or vibrations. These alert you to the doorbell, a ringing telephone, or a baby monitor.  Television closed-captioning. This shows the words at the bottom of the screen. Most new TVs can do this.  TTY (text telephone). This lets you type messages back and forth on the telephone instead of talking or listening. These devices are also called TDD. When messages are typed on the keyboard, they are sent over the phone line to a receiving TTY. The message is shown on a monitor.  Use text messaging, social media, and email if it is hard for you to communicate by telephone.  Try to learn a listening technique called speechreading. It is not lipreading. You pay attention to people's gestures, expressions, posture, and tone of voice. These clues can help you understand what a person is saying. Face the person you are talking to, and have them face you. Make sure  "the lighting is good. You need to see the other person's face clearly.  Think about counseling if you need help to adjust to your hearing loss.  When should you call for help?  Watch closely for changes in your health, and be sure to contact your doctor if:    You think your hearing is getting worse.     You have new symptoms, such as dizziness or nausea.   Where can you learn more?  Go to https://www.[employer].net/patiented  Enter R798 in the search box to learn more about \"Hearing Loss: Care Instructions.\"  Current as of: March 1, 2023               Content Version: 13.7    5893-2600 [employer].   Care instructions adapted under license by your healthcare professional. If you have questions about a medical condition or this instruction, always ask your healthcare professional. [employer] disclaims any warranty or liability for your use of this information.         "

## 2023-09-18 NOTE — PROGRESS NOTES
"SUBJECTIVE:   Ilia is a 89 year old who presents for Preventive Visit.      9/18/2023    10:39 AM   Additional Questions   Accompanied by daughter- Soledad       He wonders if he could have shingles in his legs.  He has a couple of spots.  They do not itch.  He gets more swelling in his feet worse throughout the day and better in the morning although it never goes away.  No PND orthopnea.  No dyspnea on exertion.  He has to limit his ambulation due to foot pain.  He has some discomfort in the right hip.  It hurts in the buttocks area.  He can only sleep on his right side so long and then he switches over but then his left shoulder will hurt.    Are you in the first 12 months of your Medicare coverage?  No    Healthy Habits:     In general, how would you rate your overall health?  Fair    Frequency of exercise:  None    Do you usually eat at least 4 servings of fruit and vegetables a day, include whole grains    & fiber and avoid regularly eating high fat or \"junk\" foods?  No    Taking medications regularly:  Yes    Medication side effects:  None    Ability to successfully perform activities of daily living:  Laundry requires assistance    Home Safety:  No safety concerns identified    Hearing Impairment:  Difficulty following a conversation in a noisy restaurant or crowded room, feel that people are mumbling or not speaking clearly, need to ask people to speak up or repeat themselves and difficulty understanding soft or whispered speech    In the past 6 months, have you been bothered by leaking of urine?  No    In general, how would you rate your overall mental or emotional health?  Good    Additional concerns today:  Yes        Have you ever done Advance Care Planning? (For example, a Health Directive, POLST, or a discussion with a medical provider or your loved ones about your wishes): Yes, advance care planning is on file.       Fall risk  Fallen 2 or more times in the past year?: No  Any fall with injury in the " past year?: No    Cognitive Screening   1) Repeat 3 items (Leader, Season, Table)    2) Clock draw: NORMAL  3) 3 item recall: Recalls 1 object   Results: NORMAL clock, 1-2 items recalled: COGNITIVE IMPAIRMENT LESS LIKELY    Mini-CogTM Copyright JA Goodwin. Licensed by the author for use in [facility]; reprinted with permission (naomy@[facility]). All rights reserved.      Do you have sleep apnea, excessive snoring or daytime drowsiness? : no    Reviewed and updated as needed this visit by clinical staff   Tobacco  Allergies  Meds  Problems  Med Hx  Surg Hx  Fam Hx  Soc   Hx        Reviewed and updated as needed this visit by Provider    Allergies  Meds  Problems            Social History     Tobacco Use    Smoking status: Never    Smokeless tobacco: Never   Substance Use Topics    Alcohol use: Yes     Alcohol/week: 7.0 standard drinks of alcohol     Types: 7 Cans of beer per week     Comment: 1 beer a day             9/18/2023    10:37 AM   Alcohol Use   Prescreen: >3 drinks/day or >7 drinks/week? No          No data to display              Do you have a current opioid prescription? No  Do you use any other controlled substances or medications that are not prescribed by a provider? None              Current providers sharing in care for this patient include:   Patient Care Team:  Trino Dahl MD as PCP - General (Pediatrics)  Trino Dahl MD as Assigned PCP  [facility]    The following health maintenance items are reviewed in Epic and correct as of today:  Health Maintenance   Topic Date Due    ZOSTER IMMUNIZATION (1 of 2) Never done    EYE EXAM  02/11/2017    COVID-19 Vaccine (4 - Pfizer series) 12/31/2021    A1C  12/21/2022    MEDICARE ANNUAL WELLNESS VISIT  09/18/2024    BMP  09/18/2024    LIPID  09/18/2024    MICROALBUMIN  09/18/2024    DIABETIC FOOT EXAM  09/18/2024    FALL RISK ASSESSMENT  09/18/2024    ADVANCE CARE PLANNING  09/18/2028    PHQ-2 (once per calendar  "year)  Completed    INFLUENZA VACCINE  Completed    Pneumococcal Vaccine: 65+ Years  Completed    IPV IMMUNIZATION  Aged Out    HPV IMMUNIZATION  Aged Out    MENINGITIS IMMUNIZATION  Aged Out    DTAP/TDAP/TD IMMUNIZATION  Discontinued     Labs reviewed in EPIC          Review of Systems   Constitutional:  Positive for chills. Negative for fever.   HENT:  Positive for hearing loss. Negative for congestion, ear pain and sore throat.    Eyes:  Positive for visual disturbance. Negative for pain.   Respiratory:  Positive for shortness of breath. Negative for cough.    Cardiovascular:  Positive for peripheral edema. Negative for chest pain and palpitations.   Gastrointestinal:  Negative for abdominal pain, constipation, diarrhea, heartburn, hematochezia and nausea.   Genitourinary:  Positive for frequency, impotence and urgency. Negative for dysuria, genital sores, hematuria and penile discharge.   Musculoskeletal:  Positive for arthralgias and joint swelling. Negative for myalgias.   Skin:  Negative for rash.   Neurological:  Positive for paresthesias. Negative for dizziness, weakness and headaches.   Psychiatric/Behavioral:  Negative for mood changes. The patient is not nervous/anxious.          OBJECTIVE:   /70   Pulse 56   Temp 97.4  F (36.3  C) (Tympanic)   Resp 16   Ht 1.727 m (5' 8\")   Wt 90.3 kg (199 lb)   SpO2 96%   BMI 30.26 kg/m   Estimated body mass index is 30.26 kg/m  as calculated from the following:    Height as of this encounter: 1.727 m (5' 8\").    Weight as of this encounter: 90.3 kg (199 lb).  Physical Exam  Gen: Alert, NAD.  Neck: No carotid bruits  CV: RRR no m/r/g  Pulm: CTAB, no w/r/r. No increased work of breathing  Msk: 2+ pitting edema lower extremities bilaterally negative can sign bilaterally.  No bony tenderness to palpation throughout the left shoulder or neck.  No pain with axial loading of neck.  Negative Neer's and Shukla left shoulder.  Neuro: Grossly intact  Skin: No " concerning lesions.  Psychiatric: Normal affect and insight. Does not appear anxious or depressed.      Diagnostic Test Results:  Labs reviewed in Epic    ASSESSMENT / PLAN:       ICD-10-CM    1. Encounter for Medicare annual wellness exam  Z00.00       2. Controlled type 2 diabetes mellitus with complication, without long-term current use of insulin (H)  E11.8 metFORMIN (GLUCOPHAGE) 500 MG tablet     Hemoglobin A1c     Basic metabolic panel     Albumin Random Urine Quantitative with Creat Ratio     FOOT EXAM     Hemoglobin A1c     Basic metabolic panel     Albumin Random Urine Quantitative with Creat Ratio      3. Osteoarthritis of spine, unspecified spinal osteoarthritis complication status, unspecified spinal region  M47.9 naproxen sodium (ANAPROX) 220 MG tablet      4. Cerebrovascular accident (CVA) due to thrombosis of left middle cerebral artery (H)  I63.312 atorvastatin (LIPITOR) 40 MG tablet     clopidogrel (PLAVIX) 75 MG tablet      5. Essential hypertension  I10 enalapril (VASOTEC) 20 MG tablet      6. Neuropathy  G62.9 gabapentin (NEURONTIN) 100 MG capsule      7. Benign prostatic hyperplasia without lower urinary tract symptoms  N40.0 tamsulosin (FLOMAX) 0.4 MG capsule      8. Elevated prostate specific antigen (PSA)  R97.20 tamsulosin (FLOMAX) 0.4 MG capsule      9. Piriformis syndrome, right  G57.01 Physical Therapy Referral      10. Chronic left shoulder pain  M25.512 Physical Therapy Referral    G89.29       11. Need for vaccination  Z23       12. Venous stasis of both lower extremities  I87.8 Miscellaneous Order for DME - ONLY FOR DME     N terminal pro BNP outpatient     N terminal pro BNP outpatient      13. Screening, anemia, deficiency, iron  Z13.0 CBC with platelets     CBC with platelets      14. Mixed hyperlipidemia  E78.2 Lipid Profile     Lipid Profile      15. Hereditary and idiopathic peripheral neuropathy  G60.9         His leg symptoms appear to be venous stasis changes with chronic  "venous stasis however differential diagnosis does include systolic or diastolic heart failure.  Recommend compression stockings and reduce sodium intake.    With regards to his hip he is pointing to the piriformis area.  Differential diagnosis also includes lumbar foraminal stenoses, lumbar facet arthropathy, osteoarthritis of hip, others.  Trial of physical therapy recommended first.    With regards to left shoulder I believe glenohumeral arthritis is the most likely culprit however differential diagnosis also includes impingement syndrome, rotator cuff tendinopathy, others.  Recommend trial of physical therapy first.          COUNSELING:  Reviewed preventive health counseling, as reflected in patient instructions      BMI:   Estimated body mass index is 30.26 kg/m  as calculated from the following:    Height as of this encounter: 1.727 m (5' 8\").    Weight as of this encounter: 90.3 kg (199 lb).   Weight management plan: Discussed healthy diet and exercise guidelines      He reports that he has never smoked. He has never used smokeless tobacco.      Appropriate preventive services were discussed with this patient, including applicable screening as appropriate for cardiovascular disease, diabetes, osteopenia/osteoporosis, and glaucoma.  As appropriate for age/gender, discussed screening for colorectal cancer, prostate cancer, breast cancer, and cervical cancer. Checklist reviewing preventive services available has been given to the patient.    Reviewed patients plan of care and provided an AVS. The Basic Care Plan (routine screening as documented in Health Maintenance) for Ilia meets the Care Plan requirement. This Care Plan has been established and reviewed with the Patient.          Trino Dahl MD  [facility]    Identified Health Risks:  I have reviewed Opioid Use Disorder and Substance Use Disorder risk factors and made any needed referrals.   "

## 2023-09-18 NOTE — PROGRESS NOTES
"The patient was provided with suggestions to help him develop a healthy physical lifestyle.  He is at risk for lack of exercise and has been provided with information to increase physical activity for the benefit of his well-being.  The patient was counseled and encouraged to consider modifying their diet and eating habits. He was provided with information on recommended healthy diet options.  The patient reports that he has difficulty with activities of daily living. I have asked that the patient make a follow up appointment in  weeks where this issue will be further evaluated and addressed.  The patient was provided with written information regarding signs of hearing loss.Answers submitted by the patient for this visit:  Annual Preventive Visit (Submitted on 9/18/2023)  Chief Complaint: Annual Exam:  In general, how would you rate your overall physical health?: fair  Frequency of exercise:: None  Do you usually eat at least 4 servings of fruit and vegetables a day, include whole grains & fiber, and avoid regularly eating high fat or \"junk\" foods? : No  Taking medications regularly:: Yes  Medication side effects:: None  Activities of Daily Living: laundry requires assistance  Home safety: no safety concerns identified  Hearing Impairment:: difficulty following a conversation in a noisy restaurant or crowded room, feel that people are mumbling or not speaking clearly, need to ask people to speak up or repeat themselves, difficulty understanding soft or whispered speech  In the past 6 months, have you been bothered by leaking of urine?: No  abdominal pain: No  Blood in stool: No  Blood in urine: No  chest pain: No  chills: Yes  congestion: No  constipation: No  cough: No  diarrhea: No  dizziness: No  ear pain: No  eye pain: No  nervous/anxious: No  fever: No  frequency: Yes  genital sores: No  headaches: No  hearing loss: Yes  heartburn: No  arthralgias: Yes  joint swelling: Yes  peripheral edema: Yes  mood changes: " No  myalgias: No  nausea: No  dysuria: No  palpitations: No  Skin sensation changes: Yes  sore throat: No  urgency: Yes  rash: No  shortness of breath: Yes  visual disturbance: Yes  weakness: No  impotence: Yes  penile discharge: No  In general, how would you rate your overall mental or emotional health?: good  Additional concerns today:: Yes

## 2023-09-18 NOTE — NURSING NOTE
"Chief Complaint   Patient presents with    Medicare Visit     annual         Initial /70   Pulse 56   Temp 97.4  F (36.3  C) (Tympanic)   Resp 16   Ht 1.727 m (5' 8\")   Wt 90.3 kg (199 lb)   SpO2 96%   BMI 30.26 kg/m   Estimated body mass index is 30.26 kg/m  as calculated from the following:    Height as of this encounter: 1.727 m (5' 8\").    Weight as of this encounter: 90.3 kg (199 lb).         Norma J. Gosselin, QUINTIN     " [phone]

## 2023-10-04 ENCOUNTER — MYC MEDICAL ADVICE (OUTPATIENT)
Dept: PEDIATRICS | Facility: OTHER | Age: 88
End: 2023-10-04
Payer: COMMERCIAL

## 2023-10-07 ENCOUNTER — APPOINTMENT (OUTPATIENT)
Dept: GENERAL RADIOLOGY | Facility: OTHER | Age: 88
DRG: 291 | End: 2023-10-07
Attending: PHYSICIAN ASSISTANT
Payer: MEDICARE

## 2023-10-07 ENCOUNTER — HOSPITAL ENCOUNTER (INPATIENT)
Facility: OTHER | Age: 88
LOS: 4 days | Discharge: HOME | DRG: 291 | End: 2023-10-12
Attending: PHYSICIAN ASSISTANT | Admitting: FAMILY MEDICINE
Payer: MEDICARE

## 2023-10-07 DIAGNOSIS — Z20.822 ENCOUNTER BY TELEHEALTH FOR SUSPECTED SEVERE ACUTE RESPIRATORY SYNDROME CORONAVIRUS 2 (SARS-COV-2) INFECTION: ICD-10-CM

## 2023-10-07 DIAGNOSIS — N30.00 ACUTE CYSTITIS WITHOUT HEMATURIA: ICD-10-CM

## 2023-10-07 DIAGNOSIS — Z79.02 ENCOUNTER FOR LONG-TERM (CURRENT) USE OF ANTIPLATELETS/ANTITHROMBOTICS: ICD-10-CM

## 2023-10-07 DIAGNOSIS — I50.9 ACUTE CONGESTIVE HEART FAILURE, UNSPECIFIED HEART FAILURE TYPE (H): ICD-10-CM

## 2023-10-07 DIAGNOSIS — R91.8 ABNORMAL X-RAY OF LUNG: Primary | ICD-10-CM

## 2023-10-07 DIAGNOSIS — I48.4 ATYPICAL ATRIAL FLUTTER (H): ICD-10-CM

## 2023-10-07 DIAGNOSIS — J18.9 PNEUMONIA OF LEFT UPPER LOBE DUE TO INFECTIOUS ORGANISM: ICD-10-CM

## 2023-10-07 LAB
ALBUMIN SERPL BCG-MCNC: 4.1 G/DL (ref 3.5–5.2)
ALP SERPL-CCNC: 118 U/L (ref 40–129)
ALT SERPL W P-5'-P-CCNC: 24 U/L (ref 0–70)
ANION GAP SERPL CALCULATED.3IONS-SCNC: 12 MMOL/L (ref 7–15)
AST SERPL W P-5'-P-CCNC: 25 U/L (ref 0–45)
BASO+EOS+MONOS # BLD AUTO: ABNORMAL 10*3/UL
BASO+EOS+MONOS NFR BLD AUTO: ABNORMAL %
BASOPHILS # BLD AUTO: 0 10E3/UL (ref 0–0.2)
BASOPHILS NFR BLD AUTO: 0 %
BILIRUB SERPL-MCNC: 0.8 MG/DL
BUN SERPL-MCNC: 18.8 MG/DL (ref 8–23)
CALCIUM SERPL-MCNC: 9.6 MG/DL (ref 8.2–9.6)
CHLORIDE SERPL-SCNC: 107 MMOL/L (ref 98–107)
CREAT SERPL-MCNC: 0.85 MG/DL (ref 0.67–1.17)
DEPRECATED HCO3 PLAS-SCNC: 22 MMOL/L (ref 22–29)
EGFRCR SERPLBLD CKD-EPI 2021: 82 ML/MIN/1.73M2
EOSINOPHIL # BLD AUTO: 0 10E3/UL (ref 0–0.7)
EOSINOPHIL NFR BLD AUTO: 0 %
ERYTHROCYTE [DISTWIDTH] IN BLOOD BY AUTOMATED COUNT: 13.6 % (ref 10–15)
GLUCOSE BLDC GLUCOMTR-MCNC: 161 MG/DL (ref 70–99)
GLUCOSE SERPL-MCNC: 237 MG/DL (ref 70–99)
HCT VFR BLD AUTO: 40.2 % (ref 40–53)
HGB BLD-MCNC: 13.7 G/DL (ref 13.3–17.7)
HOLD SPECIMEN: NORMAL
IMM GRANULOCYTES # BLD: 0 10E3/UL
IMM GRANULOCYTES NFR BLD: 1 %
LYMPHOCYTES # BLD AUTO: 1.9 10E3/UL (ref 0.8–5.3)
LYMPHOCYTES NFR BLD AUTO: 25 %
MAGNESIUM SERPL-MCNC: 1.8 MG/DL (ref 1.7–2.3)
MCH RBC QN AUTO: 31.8 PG (ref 26.5–33)
MCHC RBC AUTO-ENTMCNC: 34.1 G/DL (ref 31.5–36.5)
MCV RBC AUTO: 93 FL (ref 78–100)
MONOCYTES # BLD AUTO: 0.5 10E3/UL (ref 0–1.3)
MONOCYTES NFR BLD AUTO: 6 %
NEUTROPHILS # BLD AUTO: 5.3 10E3/UL (ref 1.6–8.3)
NEUTROPHILS NFR BLD AUTO: 68 %
NRBC # BLD AUTO: 0 10E3/UL
NRBC BLD AUTO-RTO: 0 /100
NT-PROBNP SERPL-MCNC: 2412 PG/ML (ref 0–1800)
PLATELET # BLD AUTO: 174 10E3/UL (ref 150–450)
POTASSIUM SERPL-SCNC: 4.1 MMOL/L (ref 3.4–5.3)
PROCALCITONIN SERPL IA-MCNC: 0.05 NG/ML
PROT SERPL-MCNC: 6.6 G/DL (ref 6.4–8.3)
RBC # BLD AUTO: 4.31 10E6/UL (ref 4.4–5.9)
SARS-COV-2 RNA RESP QL NAA+PROBE: NEGATIVE
SODIUM SERPL-SCNC: 141 MMOL/L (ref 135–145)
TROPONIN T SERPL HS-MCNC: 31 NG/L
TROPONIN T SERPL HS-MCNC: 33 NG/L
TROPONIN T SERPL HS-MCNC: 37 NG/L
WBC # BLD AUTO: 7.8 10E3/UL (ref 4–11)

## 2023-10-07 PROCEDURE — 250N000013 HC RX MED GY IP 250 OP 250 PS 637: Performed by: INTERNAL MEDICINE

## 2023-10-07 PROCEDURE — 99284 EMERGENCY DEPT VISIT MOD MDM: CPT | Performed by: PHYSICIAN ASSISTANT

## 2023-10-07 PROCEDURE — 82962 GLUCOSE BLOOD TEST: CPT

## 2023-10-07 PROCEDURE — C9803 HOPD COVID-19 SPEC COLLECT: HCPCS | Performed by: PHYSICIAN ASSISTANT

## 2023-10-07 PROCEDURE — 84484 ASSAY OF TROPONIN QUANT: CPT | Performed by: PHYSICIAN ASSISTANT

## 2023-10-07 PROCEDURE — 93005 ELECTROCARDIOGRAM TRACING: CPT | Performed by: PHYSICIAN ASSISTANT

## 2023-10-07 PROCEDURE — 84484 ASSAY OF TROPONIN QUANT: CPT | Performed by: INTERNAL MEDICINE

## 2023-10-07 PROCEDURE — 84145 PROCALCITONIN (PCT): CPT | Performed by: PHYSICIAN ASSISTANT

## 2023-10-07 PROCEDURE — 96375 TX/PRO/DX INJ NEW DRUG ADDON: CPT | Performed by: PHYSICIAN ASSISTANT

## 2023-10-07 PROCEDURE — 96365 THER/PROPH/DIAG IV INF INIT: CPT | Performed by: PHYSICIAN ASSISTANT

## 2023-10-07 PROCEDURE — 93010 ELECTROCARDIOGRAM REPORT: CPT | Performed by: INTERNAL MEDICINE

## 2023-10-07 PROCEDURE — 71045 X-RAY EXAM CHEST 1 VIEW: CPT

## 2023-10-07 PROCEDURE — 36415 COLL VENOUS BLD VENIPUNCTURE: CPT | Performed by: PHYSICIAN ASSISTANT

## 2023-10-07 PROCEDURE — 83735 ASSAY OF MAGNESIUM: CPT | Performed by: PHYSICIAN ASSISTANT

## 2023-10-07 PROCEDURE — 250N000011 HC RX IP 250 OP 636: Mod: JZ | Performed by: PHYSICIAN ASSISTANT

## 2023-10-07 PROCEDURE — 99285 EMERGENCY DEPT VISIT HI MDM: CPT | Mod: 25 | Performed by: PHYSICIAN ASSISTANT

## 2023-10-07 PROCEDURE — G0378 HOSPITAL OBSERVATION PER HR: HCPCS

## 2023-10-07 PROCEDURE — 87635 SARS-COV-2 COVID-19 AMP PRB: CPT | Performed by: PHYSICIAN ASSISTANT

## 2023-10-07 PROCEDURE — 36415 COLL VENOUS BLD VENIPUNCTURE: CPT | Performed by: INTERNAL MEDICINE

## 2023-10-07 PROCEDURE — 85025 COMPLETE CBC W/AUTO DIFF WBC: CPT | Performed by: PHYSICIAN ASSISTANT

## 2023-10-07 PROCEDURE — 80053 COMPREHEN METABOLIC PANEL: CPT | Performed by: PHYSICIAN ASSISTANT

## 2023-10-07 PROCEDURE — 83880 ASSAY OF NATRIURETIC PEPTIDE: CPT | Performed by: PHYSICIAN ASSISTANT

## 2023-10-07 PROCEDURE — 250N000013 HC RX MED GY IP 250 OP 250 PS 637: Performed by: PHYSICIAN ASSISTANT

## 2023-10-07 RX ORDER — FUROSEMIDE 10 MG/ML
20 INJECTION INTRAMUSCULAR; INTRAVENOUS
Status: DISCONTINUED | OUTPATIENT
Start: 2023-10-08 | End: 2023-10-11

## 2023-10-07 RX ORDER — AZITHROMYCIN 500 MG/5ML
500 INJECTION, POWDER, LYOPHILIZED, FOR SOLUTION INTRAVENOUS EVERY 24 HOURS
Status: DISCONTINUED | OUTPATIENT
Start: 2023-10-08 | End: 2023-10-09

## 2023-10-07 RX ORDER — ATORVASTATIN CALCIUM 40 MG/1
40 TABLET, FILM COATED ORAL DAILY
Status: DISCONTINUED | OUTPATIENT
Start: 2023-10-08 | End: 2023-10-12 | Stop reason: HOSPADM

## 2023-10-07 RX ORDER — CEFTRIAXONE 1 G/1
1 INJECTION, POWDER, FOR SOLUTION INTRAMUSCULAR; INTRAVENOUS ONCE
Status: COMPLETED | OUTPATIENT
Start: 2023-10-07 | End: 2023-10-07

## 2023-10-07 RX ORDER — DEXTROSE MONOHYDRATE 25 G/50ML
25-50 INJECTION, SOLUTION INTRAVENOUS
Status: DISCONTINUED | OUTPATIENT
Start: 2023-10-07 | End: 2023-10-12 | Stop reason: HOSPADM

## 2023-10-07 RX ORDER — FUROSEMIDE 10 MG/ML
40 INJECTION INTRAMUSCULAR; INTRAVENOUS ONCE
Status: COMPLETED | OUTPATIENT
Start: 2023-10-07 | End: 2023-10-07

## 2023-10-07 RX ORDER — CEFTRIAXONE 1 G/1
1 INJECTION, POWDER, FOR SOLUTION INTRAMUSCULAR; INTRAVENOUS EVERY 24 HOURS
Status: DISCONTINUED | OUTPATIENT
Start: 2023-10-08 | End: 2023-10-11

## 2023-10-07 RX ORDER — GABAPENTIN 100 MG/1
100 CAPSULE ORAL 2 TIMES DAILY
Status: DISCONTINUED | OUTPATIENT
Start: 2023-10-07 | End: 2023-10-12 | Stop reason: HOSPADM

## 2023-10-07 RX ORDER — AZITHROMYCIN 250 MG/1
500 TABLET, FILM COATED ORAL ONCE
Status: COMPLETED | OUTPATIENT
Start: 2023-10-07 | End: 2023-10-07

## 2023-10-07 RX ORDER — TAMSULOSIN HYDROCHLORIDE 0.4 MG/1
0.8 CAPSULE ORAL DAILY
Status: DISCONTINUED | OUTPATIENT
Start: 2023-10-08 | End: 2023-10-12 | Stop reason: HOSPADM

## 2023-10-07 RX ORDER — HYDROCODONE BITARTRATE AND ACETAMINOPHEN 5; 325 MG/1; MG/1
1 TABLET ORAL ONCE
Status: COMPLETED | OUTPATIENT
Start: 2023-10-07 | End: 2023-10-07

## 2023-10-07 RX ORDER — CLOPIDOGREL BISULFATE 75 MG/1
75 TABLET ORAL DAILY
Status: DISCONTINUED | OUTPATIENT
Start: 2023-10-08 | End: 2023-10-12 | Stop reason: HOSPADM

## 2023-10-07 RX ORDER — LIDOCAINE 40 MG/G
CREAM TOPICAL
Status: DISCONTINUED | OUTPATIENT
Start: 2023-10-07 | End: 2023-10-12 | Stop reason: HOSPADM

## 2023-10-07 RX ORDER — NICOTINE POLACRILEX 4 MG
15-30 LOZENGE BUCCAL
Status: DISCONTINUED | OUTPATIENT
Start: 2023-10-07 | End: 2023-10-12 | Stop reason: HOSPADM

## 2023-10-07 RX ORDER — LISINOPRIL 40 MG/1
40 TABLET ORAL DAILY
Status: DISCONTINUED | OUTPATIENT
Start: 2023-10-08 | End: 2023-10-12 | Stop reason: HOSPADM

## 2023-10-07 RX ADMIN — CEFTRIAXONE SODIUM 1 G: 1 INJECTION, POWDER, FOR SOLUTION INTRAMUSCULAR; INTRAVENOUS at 19:34

## 2023-10-07 RX ADMIN — FUROSEMIDE 40 MG: 10 INJECTION, SOLUTION INTRAMUSCULAR; INTRAVENOUS at 19:17

## 2023-10-07 RX ADMIN — HYDROCODONE BITARTRATE AND ACETAMINOPHEN 1 TABLET: 5; 325 TABLET ORAL at 21:09

## 2023-10-07 RX ADMIN — GABAPENTIN 100 MG: 100 CAPSULE ORAL at 23:08

## 2023-10-07 RX ADMIN — AZITHROMYCIN 500 MG: 250 TABLET, FILM COATED ORAL at 19:35

## 2023-10-07 ASSESSMENT — ACTIVITIES OF DAILY LIVING (ADL)
ADLS_ACUITY_SCORE: 26
ADLS_ACUITY_SCORE: 39
ADLS_ACUITY_SCORE: 39

## 2023-10-07 NOTE — ED TRIAGE NOTES
Pt arrives via private vehicle with c/o SOB and Chest pain. Pt states that it started yesterday and has worsened today. Denies any respiratory or cardiac history.      Triage Assessment       Row Name 10/07/23 0681       Triage Assessment (Adult)    Airway WDL WDL       Respiratory WDL    Respiratory WDL X;rhythm/pattern    Rhythm/Pattern, Respiratory shortness of breath       Skin Circulation/Temperature WDL    Skin Circulation/Temperature WDL WDL       Cardiac WDL    Cardiac WDL WDL
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
## 2023-10-08 PROBLEM — I48.4 ATYPICAL ATRIAL FLUTTER (H): Status: ACTIVE | Noted: 2023-10-08

## 2023-10-08 PROBLEM — N40.1 BENIGN PROSTATIC HYPERPLASIA WITH URINARY RETENTION: Status: ACTIVE | Noted: 2018-02-16

## 2023-10-08 PROBLEM — R33.8 BENIGN PROSTATIC HYPERPLASIA WITH URINARY RETENTION: Status: ACTIVE | Noted: 2018-02-16

## 2023-10-08 LAB
ALBUMIN SERPL BCG-MCNC: 4.1 G/DL (ref 3.5–5.2)
ALP SERPL-CCNC: 118 U/L (ref 40–129)
ALT SERPL W P-5'-P-CCNC: 22 U/L (ref 0–70)
ANION GAP SERPL CALCULATED.3IONS-SCNC: 13 MMOL/L (ref 7–15)
AST SERPL W P-5'-P-CCNC: 22 U/L (ref 0–45)
ATRIAL RATE - MUSE: 258 BPM
BILIRUB SERPL-MCNC: 1 MG/DL
BUN SERPL-MCNC: 20.1 MG/DL (ref 8–23)
CALCIUM SERPL-MCNC: 9.5 MG/DL (ref 8.2–9.6)
CHLORIDE SERPL-SCNC: 105 MMOL/L (ref 98–107)
CREAT SERPL-MCNC: 0.98 MG/DL (ref 0.67–1.17)
DEPRECATED HCO3 PLAS-SCNC: 24 MMOL/L (ref 22–29)
DIASTOLIC BLOOD PRESSURE - MUSE: NORMAL MMHG
EGFRCR SERPLBLD CKD-EPI 2021: 73 ML/MIN/1.73M2
ERYTHROCYTE [DISTWIDTH] IN BLOOD BY AUTOMATED COUNT: 13.8 % (ref 10–15)
GLUCOSE BLDC GLUCOMTR-MCNC: 154 MG/DL (ref 70–99)
GLUCOSE BLDC GLUCOMTR-MCNC: 216 MG/DL (ref 70–99)
GLUCOSE BLDC GLUCOMTR-MCNC: 291 MG/DL (ref 70–99)
GLUCOSE BLDC GLUCOMTR-MCNC: 398 MG/DL (ref 70–99)
GLUCOSE SERPL-MCNC: 186 MG/DL (ref 70–99)
HCT VFR BLD AUTO: 40.6 % (ref 40–53)
HGB BLD-MCNC: 13.7 G/DL (ref 13.3–17.7)
HOLD SPECIMEN: NORMAL
HOLD SPECIMEN: NORMAL
INTERPRETATION ECG - MUSE: NORMAL
L PNEUMO1 AG UR QL IA: NEGATIVE
MCH RBC QN AUTO: 31.3 PG (ref 26.5–33)
MCHC RBC AUTO-ENTMCNC: 33.7 G/DL (ref 31.5–36.5)
MCV RBC AUTO: 93 FL (ref 78–100)
P AXIS - MUSE: NORMAL DEGREES
PLATELET # BLD AUTO: 195 10E3/UL (ref 150–450)
POTASSIUM SERPL-SCNC: 4.1 MMOL/L (ref 3.4–5.3)
PR INTERVAL - MUSE: NORMAL MS
PROT SERPL-MCNC: 6.7 G/DL (ref 6.4–8.3)
QRS DURATION - MUSE: 86 MS
QT - MUSE: 400 MS
QTC - MUSE: 400 MS
R AXIS - MUSE: -45 DEGREES
RBC # BLD AUTO: 4.38 10E6/UL (ref 4.4–5.9)
S PNEUM AG SPEC QL: NEGATIVE
SODIUM SERPL-SCNC: 142 MMOL/L (ref 135–145)
SYSTOLIC BLOOD PRESSURE - MUSE: NORMAL MMHG
T AXIS - MUSE: 10 DEGREES
TROPONIN T SERPL HS-MCNC: 37 NG/L
TSH SERPL DL<=0.005 MIU/L-ACNC: 1.94 UIU/ML (ref 0.3–4.2)
VENTRICULAR RATE- MUSE: 60 BPM
WBC # BLD AUTO: 11.1 10E3/UL (ref 4–11)

## 2023-10-08 PROCEDURE — 250N000012 HC RX MED GY IP 250 OP 636 PS 637: Performed by: INTERNAL MEDICINE

## 2023-10-08 PROCEDURE — 250N000013 HC RX MED GY IP 250 OP 250 PS 637: Performed by: INTERNAL MEDICINE

## 2023-10-08 PROCEDURE — 87899 AGENT NOS ASSAY W/OPTIC: CPT | Mod: 91 | Performed by: INTERNAL MEDICINE

## 2023-10-08 PROCEDURE — 80053 COMPREHEN METABOLIC PANEL: CPT | Performed by: INTERNAL MEDICINE

## 2023-10-08 PROCEDURE — 120N000001 HC R&B MED SURG/OB

## 2023-10-08 PROCEDURE — 85027 COMPLETE CBC AUTOMATED: CPT | Performed by: INTERNAL MEDICINE

## 2023-10-08 PROCEDURE — 84484 ASSAY OF TROPONIN QUANT: CPT | Performed by: INTERNAL MEDICINE

## 2023-10-08 PROCEDURE — 36415 COLL VENOUS BLD VENIPUNCTURE: CPT | Performed by: INTERNAL MEDICINE

## 2023-10-08 PROCEDURE — 84443 ASSAY THYROID STIM HORMONE: CPT | Performed by: FAMILY MEDICINE

## 2023-10-08 PROCEDURE — 82962 GLUCOSE BLOOD TEST: CPT

## 2023-10-08 PROCEDURE — 87899 AGENT NOS ASSAY W/OPTIC: CPT | Performed by: FAMILY MEDICINE

## 2023-10-08 PROCEDURE — 99233 SBSQ HOSP IP/OBS HIGH 50: CPT | Performed by: FAMILY MEDICINE

## 2023-10-08 PROCEDURE — G0378 HOSPITAL OBSERVATION PER HR: HCPCS

## 2023-10-08 PROCEDURE — 258N000003 HC RX IP 258 OP 636: Performed by: INTERNAL MEDICINE

## 2023-10-08 PROCEDURE — 81001 URINALYSIS AUTO W/SCOPE: CPT | Performed by: INTERNAL MEDICINE

## 2023-10-08 PROCEDURE — 250N000011 HC RX IP 250 OP 636: Mod: JZ | Performed by: INTERNAL MEDICINE

## 2023-10-08 PROCEDURE — 250N000013 HC RX MED GY IP 250 OP 250 PS 637: Performed by: FAMILY MEDICINE

## 2023-10-08 RX ORDER — LIDOCAINE 4 G/G
1 PATCH TOPICAL ONCE
Status: COMPLETED | OUTPATIENT
Start: 2023-10-08 | End: 2023-10-08

## 2023-10-08 RX ORDER — ACETAMINOPHEN 500 MG
1000 TABLET ORAL 3 TIMES DAILY
Status: DISCONTINUED | OUTPATIENT
Start: 2023-10-08 | End: 2023-10-12 | Stop reason: HOSPADM

## 2023-10-08 RX ADMIN — APIXABAN 2.5 MG: 2.5 TABLET, FILM COATED ORAL at 21:26

## 2023-10-08 RX ADMIN — INSULIN ASPART 1 UNITS: 100 INJECTION, SOLUTION INTRAVENOUS; SUBCUTANEOUS at 21:26

## 2023-10-08 RX ADMIN — ATORVASTATIN CALCIUM 40 MG: 40 TABLET, FILM COATED ORAL at 11:21

## 2023-10-08 RX ADMIN — FUROSEMIDE 20 MG: 10 INJECTION, SOLUTION INTRAVENOUS at 07:34

## 2023-10-08 RX ADMIN — LIDOCAINE 1 PATCH: 4 PATCH TOPICAL at 08:10

## 2023-10-08 RX ADMIN — GABAPENTIN 100 MG: 100 CAPSULE ORAL at 21:26

## 2023-10-08 RX ADMIN — APIXABAN 2.5 MG: 2.5 TABLET, FILM COATED ORAL at 14:00

## 2023-10-08 RX ADMIN — TAMSULOSIN HYDROCHLORIDE 0.8 MG: 0.4 CAPSULE ORAL at 11:21

## 2023-10-08 RX ADMIN — ACETAMINOPHEN 1000 MG: 500 TABLET, FILM COATED ORAL at 21:26

## 2023-10-08 RX ADMIN — CLOPIDOGREL BISULFATE 75 MG: 75 TABLET, FILM COATED ORAL at 11:21

## 2023-10-08 RX ADMIN — ACETAMINOPHEN 1000 MG: 500 TABLET, FILM COATED ORAL at 14:00

## 2023-10-08 RX ADMIN — ACETAMINOPHEN 1000 MG: 500 TABLET, FILM COATED ORAL at 08:11

## 2023-10-08 RX ADMIN — LISINOPRIL 40 MG: 40 TABLET ORAL at 11:21

## 2023-10-08 RX ADMIN — AZITHROMYCIN MONOHYDRATE 500 MG: 500 INJECTION, POWDER, LYOPHILIZED, FOR SOLUTION INTRAVENOUS at 17:50

## 2023-10-08 RX ADMIN — FUROSEMIDE 20 MG: 10 INJECTION, SOLUTION INTRAVENOUS at 14:00

## 2023-10-08 RX ADMIN — GABAPENTIN 100 MG: 100 CAPSULE ORAL at 11:21

## 2023-10-08 RX ADMIN — CEFTRIAXONE SODIUM 1 G: 1 INJECTION, POWDER, FOR SOLUTION INTRAMUSCULAR; INTRAVENOUS at 17:11

## 2023-10-08 ASSESSMENT — ACTIVITIES OF DAILY LIVING (ADL)
ADLS_ACUITY_SCORE: 31
ADLS_ACUITY_SCORE: 26
ADLS_ACUITY_SCORE: 31
ADLS_ACUITY_SCORE: 26

## 2023-10-08 NOTE — PROGRESS NOTES
" ADMISSION NOTE    Patient admitted to room 311 at approximately 2119 via wheel chair from emergency room. Patient was accompanied by staff.    Verbal SBAR report received from Mary Ann HERNANDEZ prior to patient arrival.     Patient ambulated to bed with stand-by assist. Patient alert and oriented X 3. The patient is not having any pain.  . Admission vital signs: Blood pressure (!) 158/82, pulse 54, temperature 97.9  F (36.6  C), temperature source Tympanic, resp. rate 20, height 1.791 m (5' 10.5\"), weight 89.5 kg (197 lb 4.8 oz), SpO2 95 %. Patient was oriented to plan of care, call light, bed controls, tv, telephone, bathroom, and visiting hours.     Risk Assessment    The following safety risks were identified during admission: fall. Yellow risk band applied: YES.     Skin Initial Assessment    This writer admitted this patient and completed a full skin assessment and Sudhakar score in the Adult PCS flowsheet. Appropriate interventions initiated as needed.       Sudhakar Risk Assessment  Sensory Perception: 4-->no impairment  Moisture: 4-->rarely moist  Activity: 3-->walks occasionally  Mobility: 4-->no limitation  Nutrition: 3-->adequate  Friction and Shear: 3-->no apparent problem  Sudhakar Score: 21  Mattress: Standard gel/foam mattress (IsoFlex, Atmos Air, etc.)  Bed Frame: Standard width and length    Education    Patient has a [place] to Observation order: Yes  Observation education completed and documented: Yes      Radha Zimmerman RN      "

## 2023-10-08 NOTE — ED NOTES
Health Maintenance Due   Topic Date Due   • Hepatitis B Vaccine (1 of 3 - 3-dose primary series) 2020   • IPV Vaccine (1 of 4 - 4-dose series) 02/01/2021   • HIB Vaccine (1 of 4 - Standard series) 02/01/2021   • Rotavirus Vaccine (1 of 3 - 3-dose series) 02/01/2021   • DTaP/Tdap/Td Vaccine (1 - DTaP) 02/01/2021   • Well Child Exam 2 Months  02/01/2021   • Pneumococcal Vaccine 0-64 (1 of 4) 02/01/2021       Patient is due for topics as listed above but is not proceeding with Immunization(s) Dtap/Tdap/Td, Hep B, HIB, IPV, Pneumococcal and Rotavirus at this time. Mom does not want to do vaccines with patient today, immunization declination was signed and will be scanned to chart         Patient up to bathroom with assist of 1. Patient tolerated well.

## 2023-10-08 NOTE — PROGRESS NOTES
[facility]    Medicine Progress Note - Hospitalist Service    Date of Admission:  10/7/2023    Assessment & Plan   Principal Problem:    Acute congestive heart failure, unspecified heart failure type (H)  Presenting with dyspnea, more with exertion, mild orthopnea, increased leg edema.  Edema present for at least a month, shortness of breath for 1 week.  BNP was normal 3 weeks ago, elevated at 2412 on admit.  Last echocardiogram February 2018 with a normal EF of 60 to 65%.  Indeterminate diastolic function.  Has not been on diuretics.  Received 40 mg of furosemide in the ED and is receiving 20 mg IV twice daily  Monitor I's and O's, daily weights, creatinine  Echocardiogram tomorrow  Home enalapril substituted to lisinopril 40 mg daily while hospitalized  Blood pressure significantly elevated in ED with SBP of 224. Down to 160s since admit. This should continue to improve with diuresis    Active Problems:    Benign prostatic hyperplasia with urinary retention  Chronic. On tamsulosin 0.8 mg daily at home  Bladder scanned for over 750 mL.  Cedeno catheter placed due to retention      Cerebrovascular accident (CVA) due to thrombosis of left middle cerebral artery (H)  History of lacunar stroke in 2018.  He was discharged from the hospital to acute rehab at [facility].  It is unclear whether he had cardiac monitoring to assess for atrial fibrillation at that time.  Past EKGs have shown sinus rhythm.  In 2018 he was on aspirin, started on clopidogrel 75 mg daily along with aspirin.  Given new anticoagulation, stop aspirin.  Continue clopidogrel for now.      Controlled type 2 diabetes mellitus with complication, without long-term current use of insulin (H)  Last A1c 7.2%.  Hold metformin while hospitalized.      Pneumonia of left upper lobe due to infectious organism  No cough.  Not productive of sputum.  No fever.  Infiltrate seen on chest x-ray.  Procalcitonin 0.05.  Empirically started on  "ceftriaxone and azithromycin, day 1.      Atypical atrial flutter (H)  New finding.  Last EKG 6/24/2021 showed sinus rhythm.  [id number] score of 7 for age, hypertension, CHF, diabetes, past stroke.  This gives him an 11 to 15% chance for stroke/TIA.  He has 1 near fall at home, but no other falls.  Has bled score is 4, giving him an 8.9% risk for bleeding.  Benefit versus risk favors anticoagulation.  Started on low-dose Eliquis 2.5 mg twice daily reduced due to age and combination clopidogrel use.  Placed on telemetry       Diet: Combination Diet Low Saturated Fat Na <2400mg Diet, No Caffeine Diet    DVT Prophylaxis: DOAC  Cedeno Catheter: Not present  Lines: None     Cardiac Monitoring: ACTIVE order. Indication: Acute decompensated heart failure (48 hours)  Code Status: No CPR- Do NOT Intubate      Clinically Significant Risk Factors Present on Admission                # Drug Induced Platelet Defect: home medication list includes an antiplatelet medication   # Hypertension: Noted on problem list     # DMII: A1C = 7.2 % (Ref range: 4.0 - 6.2 %) within past 6 months    # Overweight: Estimated body mass index is 27.91 kg/m  as calculated from the following:    Height as of this encounter: 1.791 m (5' 10.5\").    Weight as of this encounter: 89.5 kg (197 lb 4.8 oz).              Disposition Plan      Expected Discharge Date: 10/10/2023                  Frandy Hernandez MD  Hospitalist Service  [facility]  Securely message with [employer] (more info)  Text page via [facility] Paging/Directory   ______________________________________________________________________    Interval History   Presenting for SOB for a week. No cough or fever. LIMA. Questionable chest discomfort.  Legs swollen for at least a month  Has been urinating frequently all night, but only small amounts  Uncomfortable due to back pain      Physical Exam   Vital Signs: Temp: 98.6  F (37  C) Temp src: Tympanic BP: (!) 180/65 Pulse: 66   " Resp: 16 SpO2: 90 % O2 Device: None (Room air)    Weight: 197 lbs 4.8 oz    General Appearance: Alert. No acute distress  Chest/Respiratory Exam: Clear to auscultation bilaterally  Cardiovascular Exam: Irregular rhythm. S1, S2, no murmur, gallop, or rubs.  Gastrointestinal Exam: Soft, nontender  Musculoskeletal: tender in inferior lumbar region and upper sacral region  Extremities: 2+ pitting lower extremity edema.  Psychiatric: Normal affect and mentation      Medical Decision Making             Data   ------------------------- PAST 24 HR DATA REVIEWED -----------------------------------------------    I have personally reviewed the following data over the past 24 hrs:    11.1 (H)  \   13.7   / 195     142 105 20.1 /  154 (H)   4.1 24 0.98 \     ALT: 22 AST: 22 AP: 118 TBILI: 1.0   ALB: 4.1 TOT PROTEIN: 6.7 LIPASE: N/A     Trop: 37 (H) BNP: 2,412 (H)     Procal: 0.05 (H) CRP: N/A Lactic Acid: N/A         Imaging results reviewed over the past 24 hrs:   Recent Results (from the past 24 hour(s))   XR Chest Port 1 View    Narrative    PROCEDURE:  XR CHEST PORT 1 VIEW    HISTORY:  CP/ SOB.     COMPARISON:  None.    FINDINGS:   There is elevation left hemidiaphragm. There is some abnormal areas of  increased density in the left lung both in the upper and lower  portions. These opacities are consistent with atelectasis or  pneumonia. There is interstitial thickening is seen in the right lung  which may be acute or chronic. The heart is mildly enlarged. There is  irregular distribution of pulmonary vascularity suspicious for  emphysema.      Impression    IMPRESSION:  Left lung opacity consistent with atelectasis or  pneumonia.    Interstitial thickening in both lungs which may be acute or chronic      DARIEN JENNINGS MD         SYSTEM ID:  [id number]

## 2023-10-08 NOTE — PLAN OF CARE
"PRIMARY DIAGNOSIS: \"GENERIC\" NURSING  OUTPATIENT/OBSERVATION GOALS TO BE MET BEFORE DISCHARGE:  ADLs back to baseline: No    Activity and level of assistance: Up with standby assistance.    Pain status: Pain free.    Return to near baseline physical activity: Yes                          "

## 2023-10-08 NOTE — PLAN OF CARE
"PRIMARY DIAGNOSIS: \"GENERIC\" NURSING  OUTPATIENT/OBSERVATION GOALS TO BE MET BEFORE DISCHARGE:  ADLs back to baseline: No    Activity and level of assistance: Up with standby assistance.    Pain status: Pain free.    Return to near baseline physical activity: No           AO, makes needs known. Lung sounds are diminished.HR irregular, bradycardia noted. Up with stand by assist due to dizziness and occasional unsteadiness. Bed Alarm On for safety. Complains of SOB with exertion,encouraged coughing and deep breathing. vitals are stable, bilateral extremities with +2 edema. Urine is yellow/straw colored, no odor. 450 ml out.                "

## 2023-10-08 NOTE — PROVIDER NOTIFICATION
10/07/23 2119   Initial Information   Patient Belongings remains with patient   Patient Belongings Remaining with Patient shoes;clothing   Did you bring any home meds/supplements to the hospital?  No     [facility] will make every effort per our policy to help keep your items safe while in the hospital.  If you choose to keep any items at the bedside, we cannot be held responsible for any items that are lost or broken.      List items sent to safe: none    I have reviewed my belongings list on admission and verify that it is correct.     Patient signature_______________________________  Date/Time_____________________    2nd Staff person if patient unable to sign __________________________  Date/Time ______________________      I have received all my belongings noted above at discharge.    Patient signature________________________________  Date/Time  __________________________

## 2023-10-08 NOTE — PHARMACY-ADMISSION MEDICATION HISTORY
Pharmacy Intern Admission Medication History    Admission medication history is complete. The information provided in this note is only as accurate as the sources available at the time of the update.    Information Source(s): Patient and CareEverywhere/SureScripts via in-person    Pertinent Information: Patient reports taking all medications yesterday except naproxen    Changes made to PTA medication list:  Added: None  Deleted: None  Changed: Reports taking both tamsulosin and enalapril 1 tab twice a day NOT 2 tabs Daily    Medication Affordability: N/A        Allergies reviewed with patient and updates made in EHR: yes    Medication History Completed By: Ayden Bowling [facility] 10/8/2023 11:23 AM    PTA Med List   Medication Sig Note Last Dose    atorvastatin (LIPITOR) 40 MG tablet Take 1 tablet (40 mg) by mouth daily  10/7/2023    clopidogrel (PLAVIX) 75 MG tablet Take 1 tablet (75 mg) by mouth daily  10/7/2023 at AM    enalapril (VASOTEC) 20 MG tablet Take 2 tablets (40 mg) by mouth daily 10/8/2023: Patient reports taking 1 tab twice a day   10/7/2023 at PM    gabapentin (NEURONTIN) 100 MG capsule Take 1 capsule (100 mg) by mouth 2 times daily  10/7/2023 at PM    metFORMIN (GLUCOPHAGE) 500 MG tablet Take 1 tablet (500 mg) by mouth 2 times daily (with meals)  10/7/2023 at PM    Multiple Vitamin (MULTI-VITAMINS) TABS Take 1 tablet by mouth daily  10/7/2023 at AM    naproxen sodium (ANAPROX) 220 MG tablet Take 1 tablet (220 mg) by mouth daily as needed (leg pain)  10/7/2023 at Noon    tamsulosin (FLOMAX) 0.4 MG capsule Take 2 capsules (0.8 mg) by mouth daily 10/8/2023: Patient reports taking 1 tab twice a day  10/7/2023 at PM
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
## 2023-10-08 NOTE — PROGRESS NOTES
SAFETY CHECKLIST  ID Bands and Risk clasps correct and in place (DNR, Fall risk, Allergy, Latex, Limb):  Yes  All Lines Reconciled and labeled correctly: Yes  Whiteboard updated:Yes  Environmental interventions: Yes  Verify Tele #:  1    Lavinia Mcdaniel RN on 10/8/2023 at 7:50 AM

## 2023-10-08 NOTE — PLAN OF CARE
"Patient is A&Ox4.  He uses his call light appropriately to alert staff to his needs.  Presently he is resting in his bed.  Tele monitor indicates that he is bradycardia with a heart rate of 42.  MD updated and indicated at this time we will continue watch patient.  When asked patient reports that he feels fine.  Pt had discomfort this am in his back he reports that he is no longer having pain.    /77 (BP Location: Right arm, Patient Position: Semi-Braswell's, Cuff Size: Adult Regular)   Pulse 66   Temp 98.2  F (36.8  C) (Tympanic)   Resp 18   Ht 1.791 m (5' 10.5\")   Wt 89.5 kg (197 lb 4.8 oz)   SpO2 94%   BMI 27.91 kg/m       Goal Outcome Evaluation:      Plan of Care Reviewed With: patient    Lavinia Mcdaniel RN on 10/8/2023 at 5:31 PM                "

## 2023-10-09 ENCOUNTER — APPOINTMENT (OUTPATIENT)
Dept: CARDIOLOGY | Facility: OTHER | Age: 88
DRG: 291 | End: 2023-10-09
Attending: INTERNAL MEDICINE
Payer: MEDICARE

## 2023-10-09 ENCOUNTER — APPOINTMENT (OUTPATIENT)
Dept: PHYSICAL THERAPY | Facility: OTHER | Age: 88
DRG: 291 | End: 2023-10-09
Attending: INTERNAL MEDICINE
Payer: MEDICARE

## 2023-10-09 ENCOUNTER — APPOINTMENT (OUTPATIENT)
Dept: OCCUPATIONAL THERAPY | Facility: OTHER | Age: 88
DRG: 291 | End: 2023-10-09
Attending: FAMILY MEDICINE
Payer: MEDICARE

## 2023-10-09 LAB
ALBUMIN UR-MCNC: 50 MG/DL
ALBUMIN UR-MCNC: NEGATIVE MG/DL
ANION GAP SERPL CALCULATED.3IONS-SCNC: 9 MMOL/L (ref 7–15)
APPEARANCE UR: ABNORMAL
APPEARANCE UR: CLEAR
BILIRUB UR QL STRIP: NEGATIVE
BILIRUB UR QL STRIP: NEGATIVE
BUN SERPL-MCNC: 24.8 MG/DL (ref 8–23)
CALCIUM SERPL-MCNC: 9 MG/DL (ref 8.2–9.6)
CHLORIDE SERPL-SCNC: 104 MMOL/L (ref 98–107)
COLOR UR AUTO: ABNORMAL
COLOR UR AUTO: YELLOW
CREAT SERPL-MCNC: 0.97 MG/DL (ref 0.67–1.17)
DEPRECATED HCO3 PLAS-SCNC: 25 MMOL/L (ref 22–29)
EGFRCR SERPLBLD CKD-EPI 2021: 74 ML/MIN/1.73M2
ERYTHROCYTE [DISTWIDTH] IN BLOOD BY AUTOMATED COUNT: 13.7 % (ref 10–15)
GLUCOSE BLDC GLUCOMTR-MCNC: 163 MG/DL (ref 70–99)
GLUCOSE BLDC GLUCOMTR-MCNC: 246 MG/DL (ref 70–99)
GLUCOSE BLDC GLUCOMTR-MCNC: 249 MG/DL (ref 70–99)
GLUCOSE BLDC GLUCOMTR-MCNC: 321 MG/DL (ref 70–99)
GLUCOSE SERPL-MCNC: 166 MG/DL (ref 70–99)
GLUCOSE UR STRIP-MCNC: 300 MG/DL
GLUCOSE UR STRIP-MCNC: 500 MG/DL
HCT VFR BLD AUTO: 36.5 % (ref 40–53)
HGB BLD-MCNC: 12.3 G/DL (ref 13.3–17.7)
HGB UR QL STRIP: ABNORMAL
HGB UR QL STRIP: ABNORMAL
HOLD SPECIMEN: NORMAL
HOLD SPECIMEN: NORMAL
HYALINE CASTS: 1 /LPF
HYALINE CASTS: 3 /LPF
KETONES UR STRIP-MCNC: NEGATIVE MG/DL
KETONES UR STRIP-MCNC: NEGATIVE MG/DL
LEUKOCYTE ESTERASE UR QL STRIP: NEGATIVE
LEUKOCYTE ESTERASE UR QL STRIP: NEGATIVE
LVEF ECHO: NORMAL
MCH RBC QN AUTO: 31.2 PG (ref 26.5–33)
MCHC RBC AUTO-ENTMCNC: 33.7 G/DL (ref 31.5–36.5)
MCV RBC AUTO: 93 FL (ref 78–100)
MUCOUS THREADS #/AREA URNS LPF: PRESENT /LPF
MUCOUS THREADS #/AREA URNS LPF: PRESENT /LPF
NITRATE UR QL: NEGATIVE
NITRATE UR QL: NEGATIVE
PH UR STRIP: 5 [PH] (ref 5–7)
PH UR STRIP: 5.5 [PH] (ref 5–9)
PLATELET # BLD AUTO: 166 10E3/UL (ref 150–450)
POTASSIUM SERPL-SCNC: 4.1 MMOL/L (ref 3.4–5.3)
PROCALCITONIN SERPL IA-MCNC: 0.12 NG/ML
RBC # BLD AUTO: 3.94 10E6/UL (ref 4.4–5.9)
RBC URINE: 137 /HPF
RBC URINE: 139 /HPF
SODIUM SERPL-SCNC: 138 MMOL/L (ref 135–145)
SP GR UR STRIP: 1.01 (ref 1–1.03)
SP GR UR STRIP: 1.01 (ref 1–1.03)
SQUAMOUS EPITHELIAL: <1 /HPF
TROPONIN T SERPL HS-MCNC: 56 NG/L
UROBILINOGEN UR STRIP-MCNC: NORMAL MG/DL
UROBILINOGEN UR STRIP-MCNC: NORMAL MG/DL
WBC # BLD AUTO: 9.7 10E3/UL (ref 4–11)
WBC URINE: 15 /HPF
WBC URINE: 4 /HPF

## 2023-10-09 PROCEDURE — 250N000013 HC RX MED GY IP 250 OP 250 PS 637: Performed by: INTERNAL MEDICINE

## 2023-10-09 PROCEDURE — 36415 COLL VENOUS BLD VENIPUNCTURE: CPT | Performed by: INTERNAL MEDICINE

## 2023-10-09 PROCEDURE — 85027 COMPLETE CBC AUTOMATED: CPT | Performed by: INTERNAL MEDICINE

## 2023-10-09 PROCEDURE — 80048 BASIC METABOLIC PNL TOTAL CA: CPT | Performed by: INTERNAL MEDICINE

## 2023-10-09 PROCEDURE — 97116 GAIT TRAINING THERAPY: CPT | Mod: GP

## 2023-10-09 PROCEDURE — 87086 URINE CULTURE/COLONY COUNT: CPT | Performed by: INTERNAL MEDICINE

## 2023-10-09 PROCEDURE — 97165 OT EVAL LOW COMPLEX 30 MIN: CPT | Mod: GO | Performed by: OCCUPATIONAL THERAPIST

## 2023-10-09 PROCEDURE — 81001 URINALYSIS AUTO W/SCOPE: CPT | Performed by: INTERNAL MEDICINE

## 2023-10-09 PROCEDURE — 97530 THERAPEUTIC ACTIVITIES: CPT | Mod: GO | Performed by: OCCUPATIONAL THERAPIST

## 2023-10-09 PROCEDURE — 250N000013 HC RX MED GY IP 250 OP 250 PS 637: Performed by: FAMILY MEDICINE

## 2023-10-09 PROCEDURE — 97535 SELF CARE MNGMENT TRAINING: CPT | Mod: GO | Performed by: OCCUPATIONAL THERAPIST

## 2023-10-09 PROCEDURE — 84145 PROCALCITONIN (PCT): CPT | Performed by: INTERNAL MEDICINE

## 2023-10-09 PROCEDURE — 97161 PT EVAL LOW COMPLEX 20 MIN: CPT | Mod: GP

## 2023-10-09 PROCEDURE — 84484 ASSAY OF TROPONIN QUANT: CPT | Performed by: INTERNAL MEDICINE

## 2023-10-09 PROCEDURE — 250N000011 HC RX IP 250 OP 636: Mod: JZ | Performed by: INTERNAL MEDICINE

## 2023-10-09 PROCEDURE — 99233 SBSQ HOSP IP/OBS HIGH 50: CPT | Performed by: INTERNAL MEDICINE

## 2023-10-09 PROCEDURE — 97530 THERAPEUTIC ACTIVITIES: CPT | Mod: GP

## 2023-10-09 PROCEDURE — 93306 TTE W/DOPPLER COMPLETE: CPT

## 2023-10-09 PROCEDURE — 120N000001 HC R&B MED SURG/OB

## 2023-10-09 PROCEDURE — 93306 TTE W/DOPPLER COMPLETE: CPT | Mod: 26 | Performed by: INTERNAL MEDICINE

## 2023-10-09 RX ADMIN — ACETAMINOPHEN 1000 MG: 500 TABLET, FILM COATED ORAL at 21:11

## 2023-10-09 RX ADMIN — FUROSEMIDE 20 MG: 10 INJECTION, SOLUTION INTRAVENOUS at 08:01

## 2023-10-09 RX ADMIN — GABAPENTIN 100 MG: 100 CAPSULE ORAL at 21:11

## 2023-10-09 RX ADMIN — ACETAMINOPHEN 1000 MG: 500 TABLET, FILM COATED ORAL at 05:12

## 2023-10-09 RX ADMIN — CLOPIDOGREL BISULFATE 75 MG: 75 TABLET, FILM COATED ORAL at 10:14

## 2023-10-09 RX ADMIN — GABAPENTIN 100 MG: 100 CAPSULE ORAL at 10:14

## 2023-10-09 RX ADMIN — CEFTRIAXONE SODIUM 1 G: 1 INJECTION, POWDER, FOR SOLUTION INTRAMUSCULAR; INTRAVENOUS at 18:10

## 2023-10-09 RX ADMIN — APIXABAN 2.5 MG: 2.5 TABLET, FILM COATED ORAL at 10:14

## 2023-10-09 RX ADMIN — ACETAMINOPHEN 1000 MG: 500 TABLET, FILM COATED ORAL at 14:41

## 2023-10-09 RX ADMIN — ATORVASTATIN CALCIUM 40 MG: 40 TABLET, FILM COATED ORAL at 10:14

## 2023-10-09 RX ADMIN — LISINOPRIL 40 MG: 40 TABLET ORAL at 10:14

## 2023-10-09 RX ADMIN — INSULIN ASPART 1 UNITS: 100 INJECTION, SOLUTION INTRAVENOUS; SUBCUTANEOUS at 21:34

## 2023-10-09 RX ADMIN — TAMSULOSIN HYDROCHLORIDE 0.8 MG: 0.4 CAPSULE ORAL at 10:14

## 2023-10-09 RX ADMIN — APIXABAN 2.5 MG: 2.5 TABLET, FILM COATED ORAL at 21:11

## 2023-10-09 RX ADMIN — FUROSEMIDE 20 MG: 10 INJECTION, SOLUTION INTRAVENOUS at 14:41

## 2023-10-09 ASSESSMENT — ACTIVITIES OF DAILY LIVING (ADL)
ADLS_ACUITY_SCORE: 31
ADLS_ACUITY_SCORE: 36
ADLS_ACUITY_SCORE: 31
ADLS_ACUITY_SCORE: 31
ADLS_ACUITY_SCORE: 32
ADLS_ACUITY_SCORE: 31
ADLS_ACUITY_SCORE: 32
ADLS_ACUITY_SCORE: 31

## 2023-10-09 NOTE — PROGRESS NOTES
Nutrition instruction:   Visited with pt and his family today about heart healthy nutrition therapy. He lives home alone with excellent family support. They do a fair amount of the cooking for him, mostly from scratch. He typically does not snack much, does not use the salt shaker. Discussed weighing himself daily to monitor fluid retention. Provided with handout and explanation. They had no questions at this time. Do expect compliance with recommendations. Has excellent family support.   Sammie Moore RD on 10/9/2023 at 3:33 PM

## 2023-10-09 NOTE — PROGRESS NOTES
:    Patient comes from home alone with a supportive family. Per discharge planning patient is anticipated to discharge in a couple of days. It was mentioned that patient may benefit from home care services at discharge.     SANDIE Scherer on 10/9/2023 at 1:35 PM

## 2023-10-09 NOTE — PROGRESS NOTES
10/09/23 1254   Appointment Info   Signing Clinician's Name / Credentials (PT) Lyndon Jose MPT   Living Environment   People in Home alone   Current Living Arrangements house   Home Accessibility no concerns   Transportation Anticipated family or friend will provide;car, drives self   Self-Care   Usual Activity Tolerance moderate   Current Activity Tolerance fair   Equipment Currently Used at Home none   Fall history within last six months yes   Number of times patient has fallen within last six months 2   General Information   Referring Physician David   Patient/Family Therapy Goals Statement (PT) return home   Existing Precautions/Restrictions fall;other (see comments)  (Cedeno catheter)   Weight-Bearing Status - LLE full weight-bearing   Weight-Bearing Status - RLE full weight-bearing   Cognition   Affect/Mental Status (Cognition) WFL   Orientation Status (Cognition) oriented x 4   Follows Commands (Cognition) WFL   Pain Assessment   Patient Currently in Pain No   Integumentary/Edema   Integumentary/Edema Comments mild edema noted lower legs   Posture    Posture Not impaired   Range of Motion (ROM)   Range of Motion ROM is WFL   Strength (Manual Muscle Testing)   Strength (Manual Muscle Testing) strength is WFL   Strength Comments however, patient fatigues with activity   Bed Mobility   Comment, (Bed Mobility) SBA   Transfers   Impairments Contributing to Impaired Transfers decreased strength   Comment, (Transfers) presently using Fww for energy conservation   Gait/Stairs (Locomotion)   [place] (Gait) contact guard   Assistive Device (Gait) walker, front-wheeled   Distance in Feet 175   Pattern (Gait) step-through   Balance   Balance Comments good with Fww   Sensory Examination   Sensory Perception WFL   Coordination   Coordination no deficits were identified   Muscle Tone   Muscle Tone no deficits were identified   Clinical Impression   Criteria for Skilled Therapeutic Intervention Yes, treatment  indicated   PT Diagnosis (PT) impaired mobility   Influenced by the following impairments fatigue and SOB   Functional limitations due to impairments activity/gait tolerance   Clinical Presentation (PT Evaluation Complexity) Stable/Uncomplicated   Clinical Decision Making (Complexity) low complexity   Planned Therapy Interventions (PT) gait training;thermotherapy   Anticipated Equipment Needs at Discharge (PT) walker, rolling;cane, straight   Risk & Benefits of therapy have been explained evaluation/treatment results reviewed;patient   PT Total Evaluation Time   PT Eval, Low Complexity Minutes (28709) 15   Physical Therapy Goals   PT Frequency Daily   PT Predicted Duration/Target Date for Goal Attainment 10/12/23   PT Goals Transfers;Gait   PT: Transfers Supervision/stand-by assist   PT: Gait Supervision/stand-by assist;Assistive device;Greater than 200 feet   PT Discharge Planning   PT Plan continue PT   PT Discharge Recommendation (DC Rec) home with assist;home with home care physical therapy   PT Rationale for DC Rec to promote return to patient's prior level of independent and functional mobility   PT Brief overview of current status CGA for safety with gait activities; fatigue limiting gait tolerance; he will benefit from continued PT

## 2023-10-09 NOTE — PROGRESS NOTES
SAFETY CHECKLIST  ID Bands and Risk clasps correct and in place (DNR, Fall risk, Allergy, Latex, Limb):  Yes  All Lines Reconciled and labeled correctly: Yes  Whiteboard updated:Yes  Environmental interventions: Yes  Verify Tele #:  1    IV infiltrated after receiving Azithromycin, pharmacy contacted and instructed to use warm compress. Compress applied for 20 minutes. Site redness and swelling improved.

## 2023-10-09 NOTE — PLAN OF CARE
Patient complains of SOB, O2 saturation 87%, started 2LPM nasal cannula, saturation improved to 92%, Expiratory wheezing throughout. Continues to be bradycardic, Urine is dark mahad with clots, good output, +3 edema bilateral extremities    Problem: Plan of Care - These are the overarching goals to be used throughout the patient stay.    Goal: Plan of Care Review  Description: The Plan of Care Review/Shift note should be completed every shift.  The Outcome Evaluation is a brief statement about your assessment that the patient is improving, declining, or no change.  This information will be displayed automatically on your shift note.  10/9/2023 0539 by Radha Zimmerman, RN  Outcome: Progressing  Flowsheets (Taken 10/9/2023 0539)  Plan of Care Reviewed With: patient  Overall Patient Progress: no change  10/9/2023 0536 by Radha Zimmerman, RN  Outcome: Progressing  Flowsheets (Taken 10/9/2023 0536)  Plan of Care Reviewed With: patient  Overall Patient Progress: no change

## 2023-10-09 NOTE — PROGRESS NOTES
"Notified by tele monitor that patient's HR 39, patient is asymptomatic, AO, no complaints of pain, Vitals are stable  MD notified  /61 (BP Location: Left arm, Patient Position: Right side, Cuff Size: Adult Regular)   Pulse (!) 44   Temp 98.2  F (36.8  C) (Tympanic)   Resp 16   Ht 1.791 m (5' 10.5\")   Wt 89.5 kg (197 lb 4.8 oz)   SpO2 90%   BMI 27.91 kg/m      "

## 2023-10-09 NOTE — PROGRESS NOTES
Interdisciplinary Discharge Planning Note    Anticipated Discharge Date: 10/11-10/12    Anticipated Discharge Location: Home    Clinical Needs Before Discharge:  stable cardiac status (angina, heart failure, arrhythmia) and Echo results     Treatment Needs After Discharge:  homecare    Potential Barriers to Discharge: Lives home alone but has a supportive family    SANDIE Scherer  10/9/2023,  1:11 PM

## 2023-10-09 NOTE — PROGRESS NOTES
[facility]    Medicine Progress Note - Hospitalist Service    Date of Admission:  10/7/2023    Assessment & Plan   Principal Problem:    Acute diastolic congestive heart failure exacerbation.  POA.  Improving with IV diuresis but still with a persistent supplemental oxygen requirement with acute hypoxic respiratory failure present on admission likely due to pleural effusions.  Presenting with dyspnea, more with exertion, mild orthopnea, increased leg edema.  Edema present for at least a month, shortness of breath for 1 week and elevated BNP clinically consistent.  Preserved EF, has not been maintained on diuretics at home.  L  -Continue ACE  -Lasix 20 mg IV twice daily  -Strict I's and O's and daily weights  -Monitor BMP daily  -Consider SGLT2 as an outpatient  -Check orthostatics    Active Problems:    Benign prostatic hyperplasia with urinary retention  On admit, Cedeno placed due to retention.  Bladder scanned for over 750 mL.  Hospital acute cystitis without hematuria present on admission.  -Ceftriaxone day 2  -Continue home Flomax  -Avoid in a.m.      Cerebrovascular accident (CVA) due to thrombosis of left middle cerebral artery (H)  History of lacunar stroke in 2018.  Dual antiplatelet therapy since that time.    -Continue home Plavix and statin  -Discontinue aspirin given increased bleeding risk for atrial fibrillation as described below      Atypical atrial fibrillation/flutter (H)  New finding and POA.  Rate controlled with intermittent bradycardia without evidence of more prolonged pauses. [id number] score of 7 for age, hypertension, CHF, diabetes, past stroke.  This gives him an 11 to 15% chance for stroke/TIA.  He has 1 near fall at home, but no other falls.  Has bled score is 4, giving him an 8.9% risk for bleeding.  Per Dr. Pritchard, discussed risk versus benefit of starting anticoagulation for secondary stroke prevention and patient would like to pursue this.    -Continue  "telemetry  -Continue Eliquis  Discontinue aspirin as above-      Controlled type 2 diabetes mellitus with complication, without long-term current use of insulin (H)  Last A1c 7.2%.  Hold metformin while hospitalized.      Pneumonia of left upper lobe due to infectious organism  Ruled out.  No cough.  Not productive of sputum.  No fever.  Infiltrate seen on chest x-ray.    -Monitor     Diet: Combination Diet Low Saturated Fat Na <2400mg Diet, No Caffeine Diet    DVT Prophylaxis: DOAC  Cedeno Catheter: PRESENT, indication: Retention;Strict 1-2 Hour I&O  Lines: None     Cardiac Monitoring: ACTIVE order. Indication: Acute decompensated heart failure (48 hours)  Code Status: No CPR- Do NOT Intubate      Clinically Significant Risk Factors                      # Hypertension: Noted on problem list  # Acute heart failure with preserved ejection fraction: heart failure noted on problem list, last echo with EF >50%, and receiving IV diuretics      # DMII: A1C = 7.2 % (Ref range: 4.0 - 6.2 %) within past 6 months  , PRESENT ON ADMISSION    # Overweight: Estimated body mass index is 27.91 kg/m  as calculated from the following:    Height as of this encounter: 1.791 m (5' 10.5\").    Weight as of this encounter: 89.5 kg (197 lb 4.8 oz)., PRESENT ON ADMISSION            Disposition Plan     Expected Discharge Date: 10/10/2023                  Albino Neff MD  Hospitalist Service  [facility]  Securely message with [employer] (more info)  Text page via [facility] Paging/Directory   ______________________________________________________________________    Interval History   Overnight no acute events and afebrile, no productive cough and his orthopnea is improving, lower extremity edema is also better, Cedeno catheter is comfortable, no chest pain palpitations leg he has had some lightheadedness with standing, no nausea vomiting diarrhea constipation diarrhea or other complaints.    Physical Exam   Vital Signs: Temp: 97.5 "  F (36.4  C) Temp src: Tympanic BP: (!) 168/95 Pulse: 51   Resp: 24 SpO2: 90 % O2 Device: Nasal cannula Oxygen Delivery: 1.5 LPM  Weight: 197 lbs 4.8 oz    GENERAL: Talkative, sitting up in bed, pleasant and appropriate, in no apparent distress.  CARDIOVASCULAR: Irregularly irregular rate and rhythm, no murmurs, rubs, or gallops. Normal S1/S2.  1+ bilateral pitting and symmetric lower extremity edema without spread beyond the calves.  No other dependent edema.    RESPIRATORY: Scant bibasilar crackles, no significant wheezing, no accessory muscle use or evidence of respiratory distress.    GI: soft, non-tender, non-distended, normoactive bowel sounds.  : Cedeno catheter in place with clear yellow urine.  MUSCULOSKELETAL: warm and well perfused, 2+ dorsalis pedis pulses bilaterally.    SKIN: no pallor,jaundice, or rashes    Medical Decision Making             Data   ------------------------- PAST 24 HR DATA REVIEWED -----------------------------------------------    I have personally reviewed the following data over the past 24 hrs:    9.7  \   12.3 (L)   / 166     138 104 24.8 (H) /  321 (H)   4.1 25 0.97 \     Trop: 56 (H) BNP: N/A     TSH: N/A T4: N/A A1C: N/A     Procal: 0.12 (H) CRP: N/A Lactic Acid: N/A         Imaging results reviewed over the past 24 hrs:   Recent Results (from the past 24 hour(s))   Echocardiogram Complete   Result Value    LVEF  55-60%    Narrative    [id number]  [id number]  [id number]  [id number] [facility]  [address] [place] [address]     Name: AMINA STEELE  MRN: [id number]  : 1932  Study Date: 10/09/2023 08:34 AM  Age: 89 yrs  Gender: Male  Patient Location: [facility]  Reason For Study: Heart Failure  Ordering Physician: SISTER, AP  Performed By: GILMAR Loza, RDCS, RVT     BSA: 2.1 m2  Height: 70 in  Weight: 197 lb  HR: 69  BP: 168/95  mmHg  ______________________________________________________________________________  Procedure  Complete Portable Echo Adult.  ______________________________________________________________________________  Interpretation Summary  Rhythm: AFlutter.  Global and regional left ventricular function is normal with an EF of 55-60%.  Right ventricular function, chamber size, wall motion, and thickness are  normal.  The right ventricular systolic pressure is 58mmHg above the right atrial  pressure.  Pulmonary artery systolic pressure is normal.  Ascending aorta 4.2 cm.  Mild dilatation of the aorta is present.  IVC diameter and respiratory changes fall into an intermediate range  suggesting an RA pressure of 8 mmHg.  No pericardial effusion is present.  ______________________________________________________________________________  Left Ventricle  Rhythm: AFlutter. Global and regional left ventricular function is normal with  an EF of 55-60%. Left ventricular size is normal. Relative wall thickness is  increased consistent with concentric remodeling. Diastolic function not  assessed due to arrhythmia. No regional wall motion abnormalities are seen.     Right Ventricle  Right ventricular function, chamber size, wall motion, and thickness are  normal.     Atria  The right atria appears normal. Mild left atrial enlargement is present. The  atrial septum is intact as assessed by color Doppler .     Mitral Valve  The mitral valve is normal. Mild mitral insufficiency is present.     Aortic Valve  Aortic valve sclerosis is present. Trace to mild aortic insufficiency is  present. The mean AoV pressure gradient is 10.0 mmHg. The calculated aortic  valve are is 2.3 cm^2.     Tricuspid Valve  The tricuspid valve is normal. Mild tricuspid insufficiency is present. The  right ventricular systolic pressure is 58mmHg above the right atrial pressure.  Pulmonary artery systolic pressure is normal.     Pulmonic Valve  The pulmonic valve is  normal.     Vessels  The pulmonary artery is normal. Ascending aorta 4.2 cm. Mild dilatation of the  aorta is present. IVC diameter and respiratory changes fall into an  intermediate range suggesting an RA pressure of 8 mmHg.     Pericardium  No pericardial effusion is present.     ______________________________________________________________________________  MMode/2D Measurements & Calculations  IVSd: 1.1 cm  LVIDd: 4.5 cm  LVIDs: 2.8 cm  LVPWd: 1.0 cm  FS: 37.1 %  LV mass(C)d: 165.4 grams  LV mass(C)dI: 79.7 grams/m2  Ao root diam: 3.8 cm  asc Aorta Diam: 4.2 cm     LVOT diam: 2.3 cm  LVOT area: 4.2 cm2  Ao root diam index Ht(cm/m): 2.1  Ao root diam index BSA (cm/m2): 1.8  Asc Ao diam index BSA (cm/m2): 2.0  Asc Ao diam index Ht(cm/m): 2.3  LA Volume (BP): 87.3 ml  LA Volume Index (BP): 42.2 ml/m2  RWT: 0.46  TAPSE: 2.4 cm     Doppler Measurements & Calculations  MV E max donaldo: 122.0 cm/sec  MV A max donaldo: 42.3 cm/sec  MV E/A: 2.9  MV dec time: 0.19 sec  Ao V2 max: 213.8 cm/sec  Ao max P.0 mmHg  Ao V2 mean: 142.0 cm/sec  Ao mean PG: 10.0 mmHg  Ao V2 VTI: 43.6 cm  STEVEN(I,D): 2.3 cm2  STEVEN(V,D): 2.2 cm2  AI P1/2t: 731.9 msec  LV V1 max P.2 mmHg  LV V1 max: 114.3 cm/sec  LV V1 VTI: 24.6 cm  SV(LVOT): 102.4 ml  SI(LVOT): 49.4 ml/m2  PA acc time: 0.08 sec  TR max donaldo: 366.6 cm/sec  TR max P.8 mmHg  AV Donaldo Ratio (DI): 0.53  STEVEN Index (cm2/m2): 1.1  RV S Donaldo: 15.5 cm/sec     ______________________________________________________________________________  Report approved by: Richmond Gregg 10/09/2023 11:14 AM

## 2023-10-09 NOTE — PROGRESS NOTES
SAFETY CHECKLIST  ID Bands and Risk clasps correct and in place (DNR, Fall risk, Allergy, Latex, Limb):  Yes  All Lines Reconciled and labeled correctly: Yes  Whiteboard updated:Yes  Environmental interventions: Yes  Verify Tele #: 1     Mary Ann Harrington RN .......  10/9/2023  7:51 AM

## 2023-10-09 NOTE — PLAN OF CARE
Pt admitted for 10/7/23 for congestive heart failure and pneumonia. Pt is alert and oriented x 4 and stand by assist in his room. Pt does note some dizziness intermittently that he unsure of what is causing this. Pt denies pain. He is mildly febrile on shift today. Pt has polanco catheter in place with dark mahad urine with clots. Pt denies pain. He is getting IV antibiotics.     Mary Ann Harrington RN .......  10/9/2023  7:21 PM      Goal Outcome Evaluation:      Plan of Care Reviewed With: patient    Overall Patient Progress: improving      Problem: Plan of Care - These are the overarching goals to be used throughout the patient stay.    Goal: Plan of Care Review  Description: The Plan of Care Review/Shift note should be completed every shift.  The Outcome Evaluation is a brief statement about your assessment that the patient is improving, declining, or no change.  This information will be displayed automatically on your shift note.  Outcome: Progressing  Flowsheets (Taken 10/9/2023 0306)  Outcome Evaluation: pt alert and oriented x 4, febrile, VS stable, on RA with O2 sats in the low 90's  Plan of Care Reviewed With: patient  Overall Patient Progress: improving

## 2023-10-09 NOTE — PROGRESS NOTES
10/09/23 6586   Appointment Info   Signing Clinician's Name / Credentials (OT) Sulema Unger OTR/L   Living Environment   People in Home alone   Current Living Arrangements house   Home Accessibility no concerns   Transportation Anticipated family or friend will provide;car, drives self   Self-Care   Usual Activity Tolerance moderate   Current Activity Tolerance fair   Equipment Currently Used at Home none   Cognitive Status Examination   Orientation Status orientation to person, place and time   Visual Perception   Visual Impairment/Limitations WFL   Pain Assessment   Patient Currently in Pain No   Range of Motion Comprehensive   General Range of Motion no range of motion deficits identified   Coordination   Upper Extremity Coordination No deficits were identified   Transfers   Transfers bed-chair transfer;toilet transfer   Transfer Skill: Bed to Chair/Chair to Bed   Bed-Chair [place] (Transfers) contact guard   Assistive Device (Bed-Chair Transfers) rolling walker   Toilet Transfer   [place] Level (Toilet Transfer) contact guard   Activities of Daily Living   BADL Assessment/Intervention lower body dressing;upper body dressing;bathing;grooming   Bathing Assessment/Intervention   [place] Level (Bathing) supervision   Upper Body Dressing Assessment/Training   [place] Level (Upper Body Dressing) supervision   Lower Body Dressing Assessment/Training   [place] Level (Lower Body Dressing) moderate assist (50% patient effort)   Grooming Assessment/Training   [place] Level (Grooming) supervision   Clinical Impression   Criteria for Skilled Therapeutic Interventions Met (OT) Yes, treatment indicated   OT Diagnosis CHF   OT Problem List-Impairments impacting ADL activity tolerance impaired   Assessment of Occupational Performance 1-3 Performance Deficits   Identified Performance Deficits mobility and self care   Planned Therapy Interventions (OT) ADL retraining;progressive activity/exercise    Clinical Decision Making Complexity (OT) low complexity   Risk & Benefits of therapy have been explained risks/benefits reviewed   OT Total Evaluation Time   OT Eval, Low Complexity Minutes (96411) 15   OT Goals   Therapy Frequency (OT) Daily   OT Predicted Duration/Target Date for Goal Attainment 10/11/23   OT Goals Upper Body Dressing;Lower Body Dressing;Upper Body Bathing;Lower Body Bathing;Toilet Transfer/Toileting   OT: Upper Body Dressing Modified independent   OT: Lower Body Dressing Modified independent   OT: Upper Body Bathing Modified independent   OT: Lower Body Bathing Modified independent   OT: Toilet Transfer/Toileting Modified independent   Interventions   Interventions Quick Adds Self-Care/Home Management;Therapeutic Activity   Self-Care/Home Management   Self-Care/Home Mgmt/ADL, Compensatory, Meal Prep Minutes (90879) 30   [place] Level (Bathing Training) stand-by assist   [place] Level (Upper Body Dressing Training) stand-by assist   Lower Body Dressing Training Assistance maximum assist (25% patient effort)   Therapeutic Activities   Therapeutic Activity Minutes (06354) 15   Symptoms noted during/after treatment fatigue   Treatment Detail/Skilled Intervention Pt ambulated in hallway and room with CGA using FWW   OT Discharge Planning   OT Plan cont OT   OT Discharge Recommendation (DC Rec) home with assist   OT Rationale for DC Rec will continue to assess   OT Brief overview of current status Pt progressing well, ambulating in room and hallway with CGA, completed self cares with set up and mod to max assist with L/B cares   Total Session Time   Timed Code Treatment Minutes 45   Total Session Time (sum of timed and untimed services) 60

## 2023-10-10 ENCOUNTER — APPOINTMENT (OUTPATIENT)
Dept: OCCUPATIONAL THERAPY | Facility: OTHER | Age: 88
DRG: 291 | End: 2023-10-10
Payer: MEDICARE

## 2023-10-10 ENCOUNTER — APPOINTMENT (OUTPATIENT)
Dept: PHYSICAL THERAPY | Facility: OTHER | Age: 88
DRG: 291 | End: 2023-10-10
Payer: MEDICARE

## 2023-10-10 LAB
ANION GAP SERPL CALCULATED.3IONS-SCNC: 10 MMOL/L (ref 7–15)
BUN SERPL-MCNC: 25.5 MG/DL (ref 8–23)
CALCIUM SERPL-MCNC: 9 MG/DL (ref 8.2–9.6)
CHLORIDE SERPL-SCNC: 106 MMOL/L (ref 98–107)
CREAT SERPL-MCNC: 0.94 MG/DL (ref 0.67–1.17)
DEPRECATED HCO3 PLAS-SCNC: 26 MMOL/L (ref 22–29)
EGFRCR SERPLBLD CKD-EPI 2021: 77 ML/MIN/1.73M2
ERYTHROCYTE [DISTWIDTH] IN BLOOD BY AUTOMATED COUNT: 13.6 % (ref 10–15)
GLUCOSE BLDC GLUCOMTR-MCNC: 171 MG/DL (ref 70–99)
GLUCOSE BLDC GLUCOMTR-MCNC: 178 MG/DL (ref 70–99)
GLUCOSE BLDC GLUCOMTR-MCNC: 202 MG/DL (ref 70–99)
GLUCOSE BLDC GLUCOMTR-MCNC: 262 MG/DL (ref 70–99)
GLUCOSE BLDC GLUCOMTR-MCNC: 340 MG/DL (ref 70–99)
GLUCOSE SERPL-MCNC: 194 MG/DL (ref 70–99)
HCT VFR BLD AUTO: 36.1 % (ref 40–53)
HGB BLD-MCNC: 12.5 G/DL (ref 13.3–17.7)
HOLD SPECIMEN: NORMAL
HOLD SPECIMEN: NORMAL
MCH RBC QN AUTO: 31.8 PG (ref 26.5–33)
MCHC RBC AUTO-ENTMCNC: 34.6 G/DL (ref 31.5–36.5)
MCV RBC AUTO: 92 FL (ref 78–100)
PLATELET # BLD AUTO: 177 10E3/UL (ref 150–450)
POTASSIUM SERPL-SCNC: 3.9 MMOL/L (ref 3.4–5.3)
PROCALCITONIN SERPL IA-MCNC: 0.1 NG/ML
RBC # BLD AUTO: 3.93 10E6/UL (ref 4.4–5.9)
SODIUM SERPL-SCNC: 142 MMOL/L (ref 135–145)
WBC # BLD AUTO: 9.7 10E3/UL (ref 4–11)

## 2023-10-10 PROCEDURE — 250N000013 HC RX MED GY IP 250 OP 250 PS 637: Performed by: INTERNAL MEDICINE

## 2023-10-10 PROCEDURE — 250N000011 HC RX IP 250 OP 636: Mod: JZ | Performed by: INTERNAL MEDICINE

## 2023-10-10 PROCEDURE — 84145 PROCALCITONIN (PCT): CPT | Performed by: INTERNAL MEDICINE

## 2023-10-10 PROCEDURE — 97530 THERAPEUTIC ACTIVITIES: CPT | Mod: GP

## 2023-10-10 PROCEDURE — 97530 THERAPEUTIC ACTIVITIES: CPT | Mod: GO | Performed by: OCCUPATIONAL THERAPIST

## 2023-10-10 PROCEDURE — 85027 COMPLETE CBC AUTOMATED: CPT | Performed by: INTERNAL MEDICINE

## 2023-10-10 PROCEDURE — 99233 SBSQ HOSP IP/OBS HIGH 50: CPT | Performed by: INTERNAL MEDICINE

## 2023-10-10 PROCEDURE — 97535 SELF CARE MNGMENT TRAINING: CPT | Mod: GO | Performed by: OCCUPATIONAL THERAPIST

## 2023-10-10 PROCEDURE — 120N000001 HC R&B MED SURG/OB

## 2023-10-10 PROCEDURE — 36415 COLL VENOUS BLD VENIPUNCTURE: CPT | Performed by: INTERNAL MEDICINE

## 2023-10-10 PROCEDURE — 80048 BASIC METABOLIC PNL TOTAL CA: CPT | Performed by: INTERNAL MEDICINE

## 2023-10-10 PROCEDURE — 97116 GAIT TRAINING THERAPY: CPT | Mod: GP

## 2023-10-10 PROCEDURE — 250N000013 HC RX MED GY IP 250 OP 250 PS 637: Performed by: FAMILY MEDICINE

## 2023-10-10 RX ADMIN — ACETAMINOPHEN 1000 MG: 500 TABLET, FILM COATED ORAL at 14:41

## 2023-10-10 RX ADMIN — APIXABAN 2.5 MG: 2.5 TABLET, FILM COATED ORAL at 21:43

## 2023-10-10 RX ADMIN — GABAPENTIN 100 MG: 100 CAPSULE ORAL at 21:43

## 2023-10-10 RX ADMIN — LISINOPRIL 40 MG: 40 TABLET ORAL at 10:27

## 2023-10-10 RX ADMIN — ACETAMINOPHEN 1000 MG: 500 TABLET, FILM COATED ORAL at 21:43

## 2023-10-10 RX ADMIN — ACETAMINOPHEN 1000 MG: 500 TABLET, FILM COATED ORAL at 06:06

## 2023-10-10 RX ADMIN — FUROSEMIDE 20 MG: 10 INJECTION, SOLUTION INTRAVENOUS at 14:42

## 2023-10-10 RX ADMIN — INSULIN ASPART 1 UNITS: 100 INJECTION, SOLUTION INTRAVENOUS; SUBCUTANEOUS at 21:44

## 2023-10-10 RX ADMIN — ATORVASTATIN CALCIUM 40 MG: 40 TABLET, FILM COATED ORAL at 10:27

## 2023-10-10 RX ADMIN — TAMSULOSIN HYDROCHLORIDE 0.8 MG: 0.4 CAPSULE ORAL at 10:27

## 2023-10-10 RX ADMIN — GABAPENTIN 100 MG: 100 CAPSULE ORAL at 10:27

## 2023-10-10 RX ADMIN — CLOPIDOGREL BISULFATE 75 MG: 75 TABLET, FILM COATED ORAL at 10:27

## 2023-10-10 RX ADMIN — FUROSEMIDE 20 MG: 10 INJECTION, SOLUTION INTRAVENOUS at 08:18

## 2023-10-10 RX ADMIN — APIXABAN 2.5 MG: 2.5 TABLET, FILM COATED ORAL at 10:27

## 2023-10-10 RX ADMIN — CEFTRIAXONE SODIUM 1 G: 1 INJECTION, POWDER, FOR SOLUTION INTRAMUSCULAR; INTRAVENOUS at 17:54

## 2023-10-10 ASSESSMENT — ACTIVITIES OF DAILY LIVING (ADL)
ADLS_ACUITY_SCORE: 32
ADLS_ACUITY_SCORE: 29
ADLS_ACUITY_SCORE: 32
ADLS_ACUITY_SCORE: 29

## 2023-10-10 NOTE — PROGRESS NOTES
SAFETY CHECKLIST  ID Bands and Risk clasps correct and in place (DNR, Fall risk, Allergy, Latex, Limb):  Yes  All Lines Reconciled and labeled correctly: Yes  Whiteboard updated:Yes  Environmental interventions: Yes  Verify Tele #: N/A    Mary Ann Harrington RN .......  10/10/2023  7:38 AM

## 2023-10-10 NOTE — PROGRESS NOTES
Shift Summary    Pt started shift on 2 LPM and now is currently on Room air. Sp02 92-94%. No further interventions at this time.    Allyson Menendez, RT

## 2023-10-10 NOTE — PROGRESS NOTES
Interdisciplinary Discharge Planning Note    Anticipated Discharge Date: 10/12    Anticipated Discharge Location: Home    Clinical Needs Before Discharge:  stable functional status and void trial tomorrow     Treatment Needs After Discharge:   Home care vs Outpatient therapy.     Potential Barriers to Discharge: Lives home alone but has a supportive family    SANDIE Scherer  10/10/2023,  2:40 PM

## 2023-10-10 NOTE — PROGRESS NOTES
[facility]    Medicine Progress Note - Hospitalist Service    Date of Admission:  10/7/2023    Assessment & Plan   Principal Problem:    Acute diastolic congestive heart failure exacerbation.  POA.  Improving with ongoing IV diuresis.  Acute hypoxic respiratory failure on admit now resolved and likely due to pleural effusions. Preserved EF, has not been maintained on diuretics at home.    -Continue ACE  -Lasix 20 mg IV twice daily  -Strict I's and O's and daily weights  -Monitor BMP daily  -Consider SGLT2 as an outpatient  -Orthostatics negative      Benign prostatic hyperplasia with urinary retention  On admit, Cedeno placed due to retention.  Bladder scanned for over 750 mL.  Hospital acute cystitis without hematuria present on admission.  -Ceftriaxone day 3  -Continue home Flomax  -After discussion with patient will pursue trial of void in a.m.  -Follow-up urine culture      Cerebrovascular accident (CVA) due to thrombosis of left middle cerebral artery (H)  History of lacunar stroke in 2018.  Dual antiplatelet therapy since that time.    -Continue home Plavix and statin  -Discontinue aspirin given increased bleeding risk for atrial fibrillation as described below      Atypical atrial fibrillation/flutter (H)  New finding and POA.  Rate controlled with intermittent bradycardia without evidence of more prolonged pauses. [id number] score of 7 for age, hypertension, CHF, diabetes, past stroke.  This gives him an 11 to 15% chance for stroke/TIA.  He has 1 near fall at home, but no other falls.  Has bled score is 4, giving him an 8.9% risk for bleeding.  Per Dr. Pritchard, discussed risk versus benefit of starting anticoagulation for secondary stroke prevention and patient would like to pursue this.    -Continue telemetry  -Continue Eliquis  -Discontinue aspirin as above      Controlled type 2 diabetes mellitus with complication, without long-term current use of insulin (H)  Last A1c 7.2%.   "  -Hold metformin   -ISS ACHS      Pneumonia of left upper lobe due to infectious organism  Ruled out.  No cough.  Not productive of sputum.  No fever.  Infiltrate seen on chest x-ray.    -Monitor     Diet: Combination Diet Low Saturated Fat Na <2400mg Diet, No Caffeine Diet    DVT Prophylaxis: DOAC  Cedeno Catheter: PRESENT, indication: Retention;Strict 1-2 Hour I&O  Lines: None     Cardiac Monitoring: None  Code Status: No CPR- Do NOT Intubate      Clinically Significant Risk Factors                      # Hypertension: Noted on problem list  # Acute heart failure with preserved ejection fraction: heart failure noted on problem list, last echo with EF >50%, and receiving IV diuretics        # DMII: A1C = 7.2 % (Ref range: 4.0 - 6.2 %) within past 6 months  , PRESENT ON ADMISSION    # Overweight: Estimated body mass index is 27.24 kg/m  as calculated from the following:    Height as of this encounter: 1.791 m (5' 10.5\").    Weight as of this encounter: 87.4 kg (192 lb 9.6 oz)., PRESENT ON ADMISSION            Disposition Plan     Expected Discharge Date: 10/10/2023                  Albino Neff MD  Hospitalist Service  [facility]  Securely message with [employer] (more info)  Text page via [facility] Paging/Directory   ______________________________________________________________________    Interval History   Overnight no acute events and afebrile, breathing feels better, lower extremity edema is also improved, Cedeno catheter is comfortable he like to keep it in for 1 more day given ongoing IV diuretic need, no significant orthopnea palpitations lightheadedness with standing, no nausea vomiting, tolerating regular consistency diet, no new complaints.  He would consider home care at least in the short-term.    Physical Exam   Vital Signs: Temp: 97.7  F (36.5  C) Temp src: Tympanic BP: (!) 165/71 Pulse: 53   Resp: 20 SpO2: 92 % O2 Device: None (Room air)    Weight: 192 lbs 9.6 oz    GENERAL: Talkative, " sitting up in bed, pleasant and appropriate, in no apparent distress.  CARDIOVASCULAR: Irregularly irregular rate and rhythm, no murmurs, rubs, or gallops. Normal S1/S2.  Trace bilateral pitting and symmetric lower extremity edema without spread beyond the calves.  No other dependent edema.    RESPIRATORY: Clear to auscultation bilaterally in all fields.    GI: soft, non-tender, non-distended, normoactive bowel sounds.  : Cedeno catheter in place with concentrated yellow urine.  MUSCULOSKELETAL: warm and well perfused, 2+ dorsalis pedis pulses bilaterally.    SKIN: no pallor,jaundice, or rashes    Medical Decision Making         Personally updated family on 2 separate occasions as well as a separate patient encounter, discussed home care versus outpatient rehab options and daily plan as outlined above, all questions answered to the best of ability and very appreciative of care.    Data   ------------------------- PAST 24 HR DATA REVIEWED -----------------------------------------------    I have personally reviewed the following data over the past 24 hrs:    9.7  \   12.5 (L)   / 177     142 106 25.5 (H) /  171 (H)   3.9 26 0.94 \     Procal: 0.10 (H) CRP: N/A Lactic Acid: N/A         Imaging results reviewed over the past 24 hrs:   No results found for this or any previous visit (from the past 24 hour(s)).

## 2023-10-10 NOTE — PROGRESS NOTES
:     Met with patient and his daughters in the room to discuss discharge planning. Discussed home care services or continuing with his outpatient therapy appointments. Patient is unsure if he is agreeable to being homebound for home care because he does not want to lose his freedom and socialization. At this time patient would like to continue with his outpatient.      will check in with patient tomorrow to make sure this is still the plan for discharge.     SANDIE Scherer on 10/10/2023 at 11:10 AM

## 2023-10-10 NOTE — PROGRESS NOTES
10/10/23 1610   Appointment Info   Signing Clinician's Name / Credentials (PT) Lyndon Jose MPT   Gait/Stairs (Locomotion)   Distance in Feet (Gait) 300   Interventions   Interventions Quick Adds Gait Training;Therapeutic Activity   Therapeutic Activity   Treatment Detail/Skilled Intervention bed mobilities with minimal assist to SBA; transfers with CGA with and without use of Fww   Gait Training   Symptoms Noted During/After Treatment (Gait Training) fatigue   Treatment Detail/Skilled Intervention ambulatino in hallway   [place] Level (Gait Training) contact guard   Physical Assistance Level (Gait Training) supervision   Weight Bearing (Gait Training) full weight-bearing   Assistive Device (Gait Training) rolling walker   Pattern Analysis (Gait Training) swing-through gait   Gait Analysis Deviations decreased moriah   Impairments (Gait Analysis/Training) strength decreased   PT Discharge Planning   PT Plan continue PT   PT Discharge Recommendation (DC Rec) home with assist;home with outpatient physical therapy   PT Rationale for DC Rec to promote return to patient's prior level of independent and functional mobility   PT Brief overview of current status improved activity/gait tolerance; patient motivated to return home; he will benefit from continued PT

## 2023-10-10 NOTE — PROGRESS NOTES
SAFETY CHECKLIST  ID Bands and Risk clasps correct and in place (DNR, Fall risk, Allergy, Latex, Limb):  Yes  All Lines Reconciled and labeled correctly: Yes  Whiteboard updated:Yes  Environmental interventions: Yes  Verify Tele #:  1

## 2023-10-10 NOTE — PLAN OF CARE
"Goal Outcome Evaluation:  Pt A&OX4. BP has been high. HR yung high 30s-mid 40s. Afebrile. LS crackles in RLL. Dim/expiratory wheezes throughout. O2 stable on room air. LIMA. Denies pain or dizziness. +1-+2 edema to bilateral feet. Cedeno patent. Draining adequate amounts of urine. Urine is dark mahad in color with sediment and small clots. Scant sanguinous drainage from penis. Up with AX1.       Plan of Care Reviewed With: patient    Overall Patient Progress: improving         Problem: Plan of Care - These are the overarching goals to be used throughout the patient stay.    Goal: Plan of Care Review  Description: The Plan of Care Review/Shift note should be completed every shift.  The Outcome Evaluation is a brief statement about your assessment that the patient is improving, declining, or no change.  This information will be displayed automatically on your shift note.  Outcome: Progressing  Flowsheets (Taken 10/10/2023 0024)  Plan of Care Reviewed With: patient  Overall Patient Progress: improving  Goal: Patient-Specific Goal (Individualized)  Description: You can add care plan individualizations to a care plan. Examples of Individualization might be:  \"Parent requests to be called daily at 9am for status\", \"I have a hard time hearing out of my right ear\", or \"Do not touch me to wake me up as it startles me\".  Outcome: Progressing  Flowsheets (Taken 10/10/2023 0024)  Individualized Care Needs: Cath care, I&O, lasix  Goal: Absence of Hospital-Acquired Illness or Injury  Outcome: Progressing  Intervention: Identify and Manage Fall Risk  Recent Flowsheet Documentation  Taken 10/9/2023 2109 by Vivien Means, RN  Safety Promotion/Fall Prevention:   safety round/check completed   room organization consistent   nonskid shoes/slippers when out of bed   clutter free environment maintained   assistive device/personal items within reach   activity supervised  Intervention: Prevent and Manage VTE (Venous Thromboembolism) " Risk  Recent Flowsheet Documentation  Taken 10/9/2023 2109 by Vivien Means, RN  VTE Prevention/Management: SCDs (sequential compression devices) on  Goal: Optimal Comfort and Wellbeing  Outcome: Progressing  Intervention: Provide Person-Centered Care  Recent Flowsheet Documentation  Taken 10/9/2023 2109 by Vivien Means, RN  Trust Relationship/Rapport:   care explained   thoughts/feelings acknowledged  Goal: Readiness for Transition of Care  Outcome: Progressing

## 2023-10-11 ENCOUNTER — APPOINTMENT (OUTPATIENT)
Dept: PHYSICAL THERAPY | Facility: OTHER | Age: 88
DRG: 291 | End: 2023-10-11
Payer: MEDICARE

## 2023-10-11 ENCOUNTER — APPOINTMENT (OUTPATIENT)
Dept: OCCUPATIONAL THERAPY | Facility: OTHER | Age: 88
DRG: 291 | End: 2023-10-11
Payer: MEDICARE

## 2023-10-11 LAB
ANION GAP SERPL CALCULATED.3IONS-SCNC: 10 MMOL/L (ref 7–15)
BACTERIA UR CULT: NO GROWTH
BUN SERPL-MCNC: 23.6 MG/DL (ref 8–23)
CALCIUM SERPL-MCNC: 9 MG/DL (ref 8.2–9.6)
CHLORIDE SERPL-SCNC: 104 MMOL/L (ref 98–107)
CREAT SERPL-MCNC: 0.82 MG/DL (ref 0.67–1.17)
DEPRECATED HCO3 PLAS-SCNC: 27 MMOL/L (ref 22–29)
EGFRCR SERPLBLD CKD-EPI 2021: 83 ML/MIN/1.73M2
GLUCOSE BLDC GLUCOMTR-MCNC: 166 MG/DL (ref 70–99)
GLUCOSE BLDC GLUCOMTR-MCNC: 175 MG/DL (ref 70–99)
GLUCOSE BLDC GLUCOMTR-MCNC: 250 MG/DL (ref 70–99)
GLUCOSE BLDC GLUCOMTR-MCNC: 295 MG/DL (ref 70–99)
GLUCOSE BLDC GLUCOMTR-MCNC: 314 MG/DL (ref 70–99)
GLUCOSE SERPL-MCNC: 200 MG/DL (ref 70–99)
HOLD SPECIMEN: NORMAL
POTASSIUM SERPL-SCNC: 3.5 MMOL/L (ref 3.4–5.3)
SODIUM SERPL-SCNC: 141 MMOL/L (ref 135–145)

## 2023-10-11 PROCEDURE — 250N000013 HC RX MED GY IP 250 OP 250 PS 637: Performed by: FAMILY MEDICINE

## 2023-10-11 PROCEDURE — 120N000001 HC R&B MED SURG/OB

## 2023-10-11 PROCEDURE — 250N000013 HC RX MED GY IP 250 OP 250 PS 637: Performed by: INTERNAL MEDICINE

## 2023-10-11 PROCEDURE — 97116 GAIT TRAINING THERAPY: CPT | Mod: GP

## 2023-10-11 PROCEDURE — 36415 COLL VENOUS BLD VENIPUNCTURE: CPT | Performed by: INTERNAL MEDICINE

## 2023-10-11 PROCEDURE — 97535 SELF CARE MNGMENT TRAINING: CPT | Mod: GO | Performed by: OCCUPATIONAL THERAPIST

## 2023-10-11 PROCEDURE — 97530 THERAPEUTIC ACTIVITIES: CPT | Mod: GP

## 2023-10-11 PROCEDURE — 80048 BASIC METABOLIC PNL TOTAL CA: CPT | Performed by: INTERNAL MEDICINE

## 2023-10-11 PROCEDURE — 99232 SBSQ HOSP IP/OBS MODERATE 35: CPT | Performed by: INTERNAL MEDICINE

## 2023-10-11 RX ORDER — FUROSEMIDE 40 MG
40 TABLET ORAL DAILY
Status: DISCONTINUED | OUTPATIENT
Start: 2023-10-11 | End: 2023-10-12 | Stop reason: HOSPADM

## 2023-10-11 RX ORDER — CEFUROXIME AXETIL 250 MG/1
500 TABLET ORAL EVERY 12 HOURS SCHEDULED
Status: DISCONTINUED | OUTPATIENT
Start: 2023-10-11 | End: 2023-10-12 | Stop reason: HOSPADM

## 2023-10-11 RX ADMIN — GABAPENTIN 100 MG: 100 CAPSULE ORAL at 21:36

## 2023-10-11 RX ADMIN — APIXABAN 2.5 MG: 2.5 TABLET, FILM COATED ORAL at 21:36

## 2023-10-11 RX ADMIN — LISINOPRIL 40 MG: 40 TABLET ORAL at 10:29

## 2023-10-11 RX ADMIN — TAMSULOSIN HYDROCHLORIDE 0.8 MG: 0.4 CAPSULE ORAL at 10:29

## 2023-10-11 RX ADMIN — ATORVASTATIN CALCIUM 40 MG: 40 TABLET, FILM COATED ORAL at 10:29

## 2023-10-11 RX ADMIN — INSULIN ASPART 1 UNITS: 100 INJECTION, SOLUTION INTRAVENOUS; SUBCUTANEOUS at 21:36

## 2023-10-11 RX ADMIN — GABAPENTIN 100 MG: 100 CAPSULE ORAL at 10:29

## 2023-10-11 RX ADMIN — FUROSEMIDE 40 MG: 40 TABLET ORAL at 10:29

## 2023-10-11 RX ADMIN — CLOPIDOGREL BISULFATE 75 MG: 75 TABLET, FILM COATED ORAL at 10:29

## 2023-10-11 RX ADMIN — CEFUROXIME AXETIL 500 MG: 250 TABLET, FILM COATED ORAL at 20:03

## 2023-10-11 RX ADMIN — ACETAMINOPHEN 1000 MG: 500 TABLET, FILM COATED ORAL at 14:43

## 2023-10-11 RX ADMIN — ACETAMINOPHEN 1000 MG: 500 TABLET, FILM COATED ORAL at 21:36

## 2023-10-11 RX ADMIN — APIXABAN 2.5 MG: 2.5 TABLET, FILM COATED ORAL at 10:29

## 2023-10-11 RX ADMIN — ACETAMINOPHEN 1000 MG: 500 TABLET, FILM COATED ORAL at 06:21

## 2023-10-11 RX ADMIN — CEFUROXIME AXETIL 500 MG: 250 TABLET, FILM COATED ORAL at 08:26

## 2023-10-11 ASSESSMENT — ACTIVITIES OF DAILY LIVING (ADL)
ADLS_ACUITY_SCORE: 29

## 2023-10-11 NOTE — PROGRESS NOTES
10/11/23 1500   Appointment Info   Signing Clinician's Name / Credentials (OT) Sulema Unger OTR/L   Self-Care/Home Management   Self-Care/Home Mgmt/ADL, Compensatory, Meal Prep Minutes (96476) 45   [place] Level (Grooming Training) stand-by assist   [place] Level (Bathing Training) stand-by assist   [place] Level (Upper Body Dressing Training) stand-by assist   Lower Body Dressing Training Assistance minimum assist (75% patient effort)   [place] Level (Toilet Training) stand-by assist   OT Discharge Planning   OT Plan cont OT   OT Discharge Recommendation (DC Rec) home with assist   OT Rationale for DC Rec Pt will likely have no further OT needs at time of D/C but will continue to assess   OT Brief overview of current status see above for details   OT Equipment Needed at Discharge shower chair   Total Session Time   Timed Code Treatment Minutes 45   Total Session Time (sum of timed and untimed services) 45

## 2023-10-11 NOTE — PROGRESS NOTES
10/10/23 1231   Appointment Info   Signing Clinician's Name / Credentials (OT) Sulema Unger, OTR/L   Self-Care/Home Management   Self-Care/Home Mgmt/ADL, Compensatory, Meal Prep Minutes (05942) 45   Symptoms Noted During/After Treatment (Meal Preparation/Planning Training) fatigue   [place] Level (Grooming Training) stand-by assist   [place] Level (Bathing Training) minimum assist (75% patient effort)   Assistance (Bathing Training) 1 person assist   [place] Level (Upper Body Dressing Training) stand-by assist   Assistance (Upper Body Dressing Training) 1 person assist   Lower Body Dressing Training Assistance moderate assist (50% patient effort)   Lower Body Dressing Training Assistance 1 person assist   Therapeutic Activities   Therapeutic Activity Minutes (80116) 15   Symptoms noted during/after treatment fatigue   Treatment Detail/Skilled Intervention Pt ambulated in hallway with FWW and CGA with some fatgue and mild SOB   OT Discharge Planning   OT Plan cont OT   OT Discharge Recommendation (DC Rec) home with assist   OT Rationale for DC Rec Pt will likely have no further OT needs at time of D/C but will continue to assess   OT Brief overview of current status Pt completed full shower today moslty seated, with moderate assist overall, L/B dressing with mod to max assist   OT Equipment Needed at Discharge shower chair  (for upstairs shower)   Total Session Time   Timed Code Treatment Minutes 60   Total Session Time (sum of timed and untimed services) 60

## 2023-10-11 NOTE — PLAN OF CARE
Pt alert and oriented x 4. VS stable with heart rate in the 40-50's. Pt has patent polanco catheter in place with red, mahad colored urine output with clots and sediment. Pt ambulated out of his room today down all three hallways on two different occurences. Pt receiving IV antibiotics. Pt is receiving IV lasix. LS diminished with fine crackles in the right lobes. Pt stand by assist with walker in his room.     Mary Ann Harrington RN .......  10/10/2023  7:33 PM      Problem: Plan of Care - These are the overarching goals to be used throughout the patient stay.    Goal: Plan of Care Review  Description: The Plan of Care Review/Shift note should be completed every shift.  The Outcome Evaluation is a brief statement about your assessment that the patient is improving, declining, or no change.  This information will be displayed automatically on your shift note.  Outcome: Progressing

## 2023-10-11 NOTE — PROGRESS NOTES
[facility]    Medicine Progress Note - Hospitalist Service    Date of Admission:  10/7/2023    Assessment & Plan       Acute diastolic congestive heart failure exacerbation.  POA.  Improving with ongoing IV diuresis.  Acute hypoxic respiratory failure on admit now resolved and likely due to pleural effusions. Preserved EF, has not been maintained on diuretics at home.    -Continue ACE  -Lasix 20 mg IV twice daily-> Lasix 40 mg daily  -Strict I's and O's and daily weights  -Monitor BMP daily  -Consider SGLT2 as an outpatient  -Orthostatics negative      Benign prostatic hyperplasia with urinary retention  On admit, Cedeno placed due to retention.  Bladder scanned for over 750 mL.  Hospital acute cystitis without hematuria present on admission.  -Ceftriaxone day 3-> Ceftin day 4  -Continue home Flomax  -Trial of void today      Cerebrovascular accident (CVA) due to thrombosis of left middle cerebral artery (H)  History of lacunar stroke in 2018.  Dual antiplatelet therapy since that time.    -Continue home Plavix and statin  -Discontinue aspirin given increased bleeding risk for atrial fibrillation as described below      Atypical atrial fibrillation/flutter (H)  New finding and POA.  Rate controlled with intermittent bradycardia without evidence of more prolonged pauses. [id number] score of 7 for age, hypertension, CHF, diabetes, past stroke.  This gives him an 11 to 15% chance for stroke/TIA.  He has 1 near fall at home, but no other falls.  Has bled score is 4, giving him an 8.9% risk for bleeding.  Per Dr. Pritchard, discussed risk versus benefit of starting anticoagulation for secondary stroke prevention and patient would like to pursue this.    -Continue telemetry  -Continue Eliquis  -Discontinue aspirin as above      Controlled type 2 diabetes mellitus with complication, without long-term current use of insulin (H)  Last A1c 7.2%.    -Hold metformin   -ISS ACHS      Pneumonia of left upper  "lobe due to infectious organism  Ruled out.  No cough.  Not productive of sputum.  No fever.  Infiltrate seen on chest x-ray.    -Monitor     Diet: Regular Diet Adult    DVT Prophylaxis: DOAC  Cedeno Catheter: Not present  Lines: None     Cardiac Monitoring: None  Code Status: No CPR- Do NOT Intubate      Clinically Significant Risk Factors                      # Hypertension: Noted on problem list  # Acute heart failure with preserved ejection fraction: heart failure noted on problem list, last echo with EF >50%, and receiving IV diuretics        # DMII: A1C = 7.2 % (Ref range: 4.0 - 6.2 %) within past 6 months  , PRESENT ON ADMISSION    # Overweight: Estimated body mass index is 26.91 kg/m  as calculated from the following:    Height as of this encounter: 1.791 m (5' 10.5\").    Weight as of this encounter: 86.3 kg (190 lb 3.2 oz)., PRESENT ON ADMISSION            Disposition Plan           Albino Neff MD  Hospitalist Service  [facility]  Securely message with [employer] (more info)  Text page via [employer] Paging/Directory   ______________________________________________________________________    Interval History   Overnight no acute events and afebrile, feeling back to near his baseline, glad to have his Cedeno catheter out, breathing normally, no orthopnea or extremity edema resolving, no nausea vomiting diarrhea, no other new complaints.  He does not want to consider home care on discharge as he will not be homebound.    Physical Exam   Vital Signs: Temp: 97.1  F (36.2  C) Temp src: Tympanic BP: (!) 184/74 Pulse: 56   Resp: 16 SpO2: 95 % O2 Device: None (Room air)    Weight: 190 lbs 3.2 oz    GENERAL: Talkative, sitting up in bed, pleasant and appropriate, in no apparent distress.  CARDIOVASCULAR: Irregularly irregular rate and rhythm, no murmurs, rubs, or gallops. Normal S1/S2.   now resolved lower extremity edema.    RESPIRATORY: Clear to auscultation bilaterally in all fields.    GI: soft, " non-tender, non-distended, normoactive bowel sounds.  : Cedeno out.  MUSCULOSKELETAL: warm and well perfused, 2+ dorsalis pedis pulses bilaterally.    SKIN: no pallor,jaundice, or rashes    Medical Decision Making           Data   ------------------------- PAST 24 HR DATA REVIEWED -----------------------------------------------    I have personally reviewed the following data over the past 24 hrs:    N/A  \   N/A   / N/A     141 104 23.6 (H) /  166 (H)   3.5 27 0.82 \       Imaging results reviewed over the past 24 hrs:   No results found for this or any previous visit (from the past 24 hour(s)).

## 2023-10-11 NOTE — PROGRESS NOTES
:     Spoke with patient today about further discharge planning. Patient has opted to continue his outpatient therapy rather then home care. Patient is anticipated to discharge tomorrow.     No further needs from  at this time.     SANDIE Scherer on 10/11/2023 at 12:56 PM

## 2023-10-11 NOTE — PROGRESS NOTES
Interdisciplinary Discharge Planning Note    Anticipated Discharge Date: 10/12    Anticipated Discharge Location: Home    Clinical Needs Before Discharge:  stable functional status and Continued medical stability     Treatment Needs After Discharge:   Outpatient therapy    Potential Barriers to Discharge: None identified, supportive family    SANDIE Scherer  10/11/2023,  12:56 PM

## 2023-10-11 NOTE — PLAN OF CARE
VSS, afebrile, denies pain.  Cedeno removed today and pt is urinating without issue, with adequate PVR's.  Per Dr Neff no longer need to bladder scan after urination. Urine is clearing, but is still a little cloudy and pink.  Pt denies burning or pain with urination.  Up and walking halls.  Steady on feet and now IND in room - due to lasix, pt is grateful for this. He does understand however, if he feels weak or dizzy or lightheaded he is to use his call light and wait for help before getting up.  Plan to discharge tomorrow.  Sulema Vargas RN............................ 10/11/2023 4:46 PM    Problem: Plan of Care - These are the overarching goals to be used throughout the patient stay.    Goal: Optimal Comfort and Wellbeing  Outcome: Progressing     Problem: Pneumonia  Goal: Fluid Balance  Outcome: Progressing     Problem: Pneumonia  Goal: Resolution of Infection Signs and Symptoms  Outcome: Progressing

## 2023-10-11 NOTE — PLAN OF CARE
"Goal Outcome Evaluation:  Pt admitted 10/7 for acute CHF. Pt A&OX4. BP remains elevated. HR yung 40s-50s. Afebrile. Pt reports SOB improving. LS dim with fine crackles in RLL. O2 stable on room air. Cedeno patent. Draining adequate amounts of urine. [place] in color with small clots and sediment. SBA with transfers. Denies dizziness or nausea. +1-+2 edema in feet/ankles.       Plan of Care Reviewed With: patient    Overall Patient Progress: improving       Problem: Plan of Care - These are the overarching goals to be used throughout the patient stay.    Goal: Plan of Care Review  Description: The Plan of Care Review/Shift note should be completed every shift.  The Outcome Evaluation is a brief statement about your assessment that the patient is improving, declining, or no change.  This information will be displayed automatically on your shift note.  Outcome: Progressing  Flowsheets (Taken 10/11/2023 0028)  Plan of Care Reviewed With: patient  Overall Patient Progress: improving  Goal: Patient-Specific Goal (Individualized)  Description: You can add care plan individualizations to a care plan. Examples of Individualization might be:  \"Parent requests to be called daily at 9am for status\", \"I have a hard time hearing out of my right ear\", or \"Do not touch me to wake me up as it startles me\".  Outcome: Progressing  Goal: Absence of Hospital-Acquired Illness or Injury  Outcome: Progressing  Intervention: Identify and Manage Fall Risk  Recent Flowsheet Documentation  Taken 10/10/2023 2000 by Vivien Means, RN  Safety Promotion/Fall Prevention: safety round/check completed  Intervention: Prevent and Manage VTE (Venous Thromboembolism) Risk  Recent Flowsheet Documentation  Taken 10/10/2023 2000 by Vivien Means, RN  VTE Prevention/Management:   SCDs (sequential compression devices) off   patient refused intervention  Goal: Optimal Comfort and Wellbeing  Outcome: Progressing  Intervention: Monitor Pain and Promote " Comfort  Recent Flowsheet Documentation  Taken 10/10/2023 2000 by Vivien Means, RN  Pain Management Interventions: declines  Intervention: Provide Person-Centered Care  Recent Flowsheet Documentation  Taken 10/10/2023 2000 by Vivien Means, RN  Trust Relationship/Rapport:   care explained   questions answered   questions encouraged   thoughts/feelings acknowledged  Goal: Readiness for Transition of Care  Outcome: Progressing

## 2023-10-11 NOTE — PROGRESS NOTES
10/11/23 0151   Appointment Info   Signing Clinician's Name / Credentials (PT) Lyndon Jose MPT   Therapeutic Activity   Symptoms Noted During/After Treatment Fatigue   Treatment Detail/Skilled Intervention bed mobilities with SBA; transfers with SBA with and without use of Fww   Gait Training   Symptoms Noted During/After Treatment (Gait Training) fatigue   Treatment Detail/Skilled Intervention ambulation in hallway   [place] Level (Gait Training) stand-by assist   Physical Assistance Level (Gait Training) supervision   Weight Bearing (Gait Training) full weight-bearing   Assistive Device (Gait Training) rolling walker   Pattern Analysis (Gait Training) swing-through gait   Gait Analysis Deviations decreased moriah   Impairments (Gait Analysis/Training) strength decreased   PT Discharge Planning   PT Plan continue PT   PT Discharge Recommendation (DC Rec) home with assist;home with outpatient physical therapy   PT Rationale for DC Rec to promote return to patient's prior level of independent and functional mobility   PT Brief overview of current status increased activity/gait tolerance today; he will benefit from continued PT in outpatient setting   PT Equipment Needed at Discharge cane, straight

## 2023-10-12 VITALS
DIASTOLIC BLOOD PRESSURE: 82 MMHG | HEIGHT: 71 IN | OXYGEN SATURATION: 95 % | SYSTOLIC BLOOD PRESSURE: 169 MMHG | WEIGHT: 193.1 LBS | BODY MASS INDEX: 27.03 KG/M2 | HEART RATE: 55 BPM | RESPIRATION RATE: 18 BRPM | TEMPERATURE: 97.9 F

## 2023-10-12 LAB
ANION GAP SERPL CALCULATED.3IONS-SCNC: 10 MMOL/L (ref 7–15)
BUN SERPL-MCNC: 26.7 MG/DL (ref 8–23)
CALCIUM SERPL-MCNC: 8.6 MG/DL (ref 8.2–9.6)
CHLORIDE SERPL-SCNC: 106 MMOL/L (ref 98–107)
CREAT SERPL-MCNC: 0.95 MG/DL (ref 0.67–1.17)
DEPRECATED HCO3 PLAS-SCNC: 25 MMOL/L (ref 22–29)
EGFRCR SERPLBLD CKD-EPI 2021: 76 ML/MIN/1.73M2
GLUCOSE BLDC GLUCOMTR-MCNC: 203 MG/DL (ref 70–99)
GLUCOSE SERPL-MCNC: 208 MG/DL (ref 70–99)
HOLD SPECIMEN: NORMAL
POTASSIUM SERPL-SCNC: 3.9 MMOL/L (ref 3.4–5.3)
SODIUM SERPL-SCNC: 141 MMOL/L (ref 135–145)

## 2023-10-12 PROCEDURE — 250N000013 HC RX MED GY IP 250 OP 250 PS 637: Performed by: INTERNAL MEDICINE

## 2023-10-12 PROCEDURE — 99239 HOSP IP/OBS DSCHRG MGMT >30: CPT | Performed by: INTERNAL MEDICINE

## 2023-10-12 PROCEDURE — 80048 BASIC METABOLIC PNL TOTAL CA: CPT | Performed by: INTERNAL MEDICINE

## 2023-10-12 PROCEDURE — 36415 COLL VENOUS BLD VENIPUNCTURE: CPT | Performed by: INTERNAL MEDICINE

## 2023-10-12 PROCEDURE — 250N000013 HC RX MED GY IP 250 OP 250 PS 637: Performed by: FAMILY MEDICINE

## 2023-10-12 RX ORDER — FUROSEMIDE 40 MG
40 TABLET ORAL DAILY
Qty: 30 TABLET | Refills: 3 | Status: SHIPPED | OUTPATIENT
Start: 2023-10-12 | End: 2024-02-05

## 2023-10-12 RX ORDER — CEFUROXIME AXETIL 500 MG/1
500 TABLET ORAL EVERY 12 HOURS
Qty: 6 TABLET | Refills: 0 | Status: SHIPPED | OUTPATIENT
Start: 2023-10-12 | End: 2023-10-15

## 2023-10-12 RX ADMIN — ACETAMINOPHEN 1000 MG: 500 TABLET, FILM COATED ORAL at 05:25

## 2023-10-12 RX ADMIN — APIXABAN 2.5 MG: 2.5 TABLET, FILM COATED ORAL at 09:03

## 2023-10-12 RX ADMIN — GABAPENTIN 100 MG: 100 CAPSULE ORAL at 09:03

## 2023-10-12 RX ADMIN — LISINOPRIL 40 MG: 40 TABLET ORAL at 09:03

## 2023-10-12 RX ADMIN — ATORVASTATIN CALCIUM 40 MG: 40 TABLET, FILM COATED ORAL at 09:03

## 2023-10-12 RX ADMIN — TAMSULOSIN HYDROCHLORIDE 0.8 MG: 0.4 CAPSULE ORAL at 09:03

## 2023-10-12 RX ADMIN — FUROSEMIDE 40 MG: 40 TABLET ORAL at 09:03

## 2023-10-12 RX ADMIN — CLOPIDOGREL BISULFATE 75 MG: 75 TABLET, FILM COATED ORAL at 09:03

## 2023-10-12 RX ADMIN — CEFUROXIME AXETIL 500 MG: 250 TABLET, FILM COATED ORAL at 08:58

## 2023-10-12 ASSESSMENT — ACTIVITIES OF DAILY LIVING (ADL)
ADLS_ACUITY_SCORE: 26
ADLS_ACUITY_SCORE: 29

## 2023-10-12 NOTE — PHARMACY
[facility]  Part of [address] [address]    October 12, 2023    Dear Pharmacist,    Your customer, Ilia Ferguson, born on 2/20/1932, was recently discharged from [facility].  We have updated his medication list and want to alert you to the following:       Review of your medicines        START taking        Dose / Directions   apixaban ANTICOAGULANT 2.5 MG tablet  Commonly known as: ELIQUIS  Indication: Atrial Fibrillation Not Caused By A Heart Valve Problem      Dose: 2.5 mg  Take 1 tablet (2.5 mg) by mouth 2 times daily  Quantity: 180 tablet  Refills: 0     cefuroxime 500 MG tablet  Commonly known as: CEFTIN  Indication: Urinary Tract Infection  Used for: Acute cystitis without hematuria      Dose: 500 mg  Take 1 tablet (500 mg) by mouth every 12 hours for 3 days  Quantity: 6 tablet  Refills: 0     furosemide 40 MG tablet  Commonly known as: LASIX      Dose: 40 mg  Take 1 tablet (40 mg) by mouth daily for 120 days  Quantity: 30 tablet  Refills: 3            CONTINUE these medicines which have NOT CHANGED        Dose / Directions   atorvastatin 40 MG tablet  Commonly known as: LIPITOR  Used for: Cerebrovascular accident (CVA) due to thrombosis of left middle cerebral artery (H)      Dose: 40 mg  Take 1 tablet (40 mg) by mouth daily  Quantity: 90 tablet  Refills: 4     clopidogrel 75 MG tablet  Commonly known as: PLAVIX  Used for: Cerebrovascular accident (CVA) due to thrombosis of left middle cerebral artery (H)      Dose: 75 mg  Take 1 tablet (75 mg) by mouth daily  Quantity: 90 tablet  Refills: 3     enalapril 20 MG tablet  Commonly known as: VASOTEC  Used for: Essential hypertension      Dose: 40 mg  Take 2 tablets (40 mg) by mouth daily  Quantity: 180 tablet  Refills: 3     gabapentin 100 MG capsule  Commonly known as: NEURONTIN  Used for: Neuropathy      Dose: 100 mg  Take 1 capsule (100 mg) by mouth 2 times daily  Quantity: 180  capsule  Refills: 3     metFORMIN 500 MG tablet  Commonly known as: GLUCOPHAGE  Used for: Controlled type 2 diabetes mellitus with complication, without long-term current use of insulin (H)      Dose: 500 mg  Take 1 tablet (500 mg) by mouth 2 times daily (with meals)  Quantity: 90 tablet  Refills: 3     Multi-Vitamins Tabs      Dose: 1 tablet  Take 1 tablet by mouth daily  Refills: 0     tamsulosin 0.4 MG capsule  Commonly known as: FLOMAX  Used for: Benign prostatic hyperplasia without lower urinary tract symptoms, Elevated prostate specific antigen (PSA)      Dose: 0.8 mg  Take 2 capsules (0.8 mg) by mouth daily  Quantity: 180 capsule  Refills: 3            STOP taking      naproxen sodium 220 MG tablet  Commonly known as: ANAPROX                  Where to get your medicines        These medications were sent to [facility] - [place], [place] - [address]  [address] [facility] [address]      Phone: [phone]   apixaban ANTICOAGULANT 2.5 MG tablet       These medications were sent to [id number] - [place], [place] - [address]  [address] [facility] [address]      Phone: [phone]   cefuroxime 500 MG tablet  furosemide 40 MG tablet         We also reviewed Ilia Ferguson's allergy list and updated it as needed:  Allergies: Sulfa antibiotics    Thank you for continuing to care for Ilia Ferguson.  We look forward to working together with you in the future.    Sincerely,  Ayden Bowling, [facility] and [facility]

## 2023-10-12 NOTE — PROGRESS NOTES
SAFETY CHECKLIST  ID Bands and Risk clasps correct and in place (DNR, Fall risk, Allergy, Latex, Limb):  Yes  All Lines Reconciled and labeled correctly: Yes  Whiteboard updated:Yes  Environmental interventions: Yes  Verify Tele #:  NA

## 2023-10-12 NOTE — DISCHARGE SUMMARY
[facility]    Discharge Summary  Hospitalist    Date of Admission:  10/7/2023  Date of Discharge:  10/12/2023 10:31 AM  Discharging Provider: Albino Neff MD  Date of Service (when I saw the patient): 10/12/23    Discharge Diagnoses   Principal Problem:    Acute congestive heart failure, diastolic heart failure type (H) (10/7/2023)  Active Problems:    Benign prostatic hyperplasia with urinary retention (2/16/2018)    Cerebrovascular accident (CVA) due to thrombosis of left middle cerebral artery (H) (4/26/2018)    Controlled type 2 diabetes mellitus with complication, without long-term current use of insulin (H) (4/26/2018)    Atypical atrial flutter (H) (10/8/2023)    History of Present Illness   Ilia Ferguson is an 89 year old male history of diastolic CHF who presented with acute diastolic CHF exacerbation, acute cystitis without hematuria and new A-fib/flutter.    Hospital Course   Ilia Ferguson was admitted on 10/7/2023.  The following problems were addressed during his hospitalization:    Acute diastolic congestive heart failure exacerbation: Present on admission, he was diuresed with Lasix 20 mg IV twice daily, his initial acute hypoxic respiratory failure on admit resolved with diuresis.  Repeat TTE with preserved EF, orthostatics negative.  He was transition to Lasix 40 mg daily 24 hours prior to discharge.  With this renal function remained stable, he will continue with this dose until follow-up with PCP need a repeat BMP at that time.  He will continue his ACE, can consider SGLT2 addition as an outpatient.       Acute cystitis without hematuria: History of BPH with urinary retention in the past.  Initial bladder scan with over 750 mils, Cedeno catheter placed, urinary retention likely secondary to acute infection.  Initially started on IV ceftriaxone, transition to oral Ceftin, continued on Flomax.  Cedeno catheter removed 24 hours prior to discharge with no further evidence of retention  he will complete a full 7-day course of antibiotics.        Cerebrovascular accident (CVA) due to thrombosis of left middle cerebral artery (H)  History of lacunar stroke in 2018.  Dual antiplatelet therapy since that time.  He will complete continue his home Plavix and statin.  Aspirin is discontinued given new onset of atrial fibrillation and addition of DOAC.       Atypical atrial fibrillation/flutter (H): New finding and present on admit.  Persistently rate controlled with intermittent bradycardia without evidence of more prolonged pauses. [id number]-DDIq score of 7 for age, hypertension, CHF, diabetes, past stroke.  This gives him an 11 to 15% chance for stroke/TIA.  He has 1 near fall at home, but no other falls.  Has bled score is 4, giving him an 8.9% risk for bleeding.  Per Dr. Pritchard, discussed risk versus benefit of starting anticoagulation for secondary stroke prevention and patient would like to pursue this.  He will continue Eliquis indefinitely.       Controlled type 2 diabetes mellitus with complication, without long-term current use of insulin (H): Stable control he will continue his home metformin.    Albino Neff MD    Significant Results and Procedures   none    Pending Results   These results will be followed up by PCP  Unresulted Labs Ordered in the Past 30 Days of this Admission       No orders found from 9/7/2023 to 10/8/2023.            Code Status   DNR / DNI       Primary Care Physician   Trino Dahl    Physical Exam   Temp: 97.9  F (36.6  C) Temp src: Tympanic BP: (!) 169/82 Pulse: 55   Resp: 18 SpO2: 95 % O2 Device: None (Room air)    Vitals:    10/10/23 0608 10/11/23 0600 10/12/23 0528   Weight: 87.4 kg (192 lb 9.6 oz) 86.3 kg (190 lb 3.2 oz) 87.6 kg (193 lb 1.6 oz)     Vital Signs with Ranges  Temp:  [97.2  F (36.2  C)-97.9  F (36.6  C)] 97.9  F (36.6  C)  Pulse:  [51-63] 55  Resp:  [16-18] 18  BP: (111-172)/(67-82) 169/82  SpO2:  [93 %-96 %] 95 %  I/O last 3 completed  shifts:  In: 370 [P.O.:360; I.V.:10]  Out: 500 [Urine:500]    GENERAL: Talkative, standing in the room getting dressed, in no apparent distress.  CARDIOVASCULAR: Irregularly irregular rate and rhythm, no murmurs, rubs, or gallops. Normal S1/S2. No lower extremity edema.   RESPIRATORY: clear to auscultation bilaterally, no wheezes, no crackles.   GI: soft, non-tender, non-distended, normoactive bowel sounds.  MUSCULOSKELETAL: warm and well perfused, 2+ dorsalis pedis pulses bilaterally.    SKIN: no pallor,jaundice, or rashes      Discharge Disposition   Discharged to home: Patient did not want to consider addition of home care services but will continue with ambulatory PT as already scheduled.  Condition at discharge: Stable    Consultations This Hospital Stay   CARE MANAGEMENT / SOCIAL WORK IP CONSULT  NUTRITION SERVICES ADULT IP CONSULT  PHYSICAL THERAPY ADULT IP CONSULT  CARDIOLOGY IP CONSULT  OCCUPATIONAL THERAPY ADULT IP CONSULT  SOCIAL WORK IP CONSULT  PHYSICAL THERAPY ADULT IP CONSULT  OCCUPATIONAL THERAPY ADULT IP CONSULT    Time Spent on this Encounter   I, Albino Neff MD, personally saw the patient today and spent greater than 30 minutes discharging this patient.    Discharge Orders      Reason for your hospital stay    Bladder infection and fluid buildup from congestive heart failure     Follow-up and recommended labs and tests     Follow-up apt on 10/19 at 8:20 with Dr. Dahl     Activity    Your activity upon discharge: activity as tolerated     Discharge Instructions    - Stop taking your aspirin and only use Tylenol instead of ibuprofen for mild aches and pains  -Start taking Eliquis to prevent strokes from your irregular heartbeat.  This medication can increase your risk for bleeding especially when taken with Plavix is important to again minimize aspirin and ibuprofen use.  -Start taking Lasix 40 mg daily to prevent fluid from re accumulating  -Take the antibiotic Ceftin until it is gone to continue  to clear up your bladder infection     Diet    Follow this diet upon discharge: Orders Placed This Encounter      Regular Diet Adult     Discharge Medications   Discharge Medication List as of 10/12/2023  9:33 AM        START taking these medications    Details   cefuroxime (CEFTIN) 500 MG tablet Take 1 tablet (500 mg) by mouth every 12 hours for 3 days, Disp-6 tablet, R-0, E-Prescribe      furosemide (LASIX) 40 MG tablet Take 1 tablet (40 mg) by mouth daily for 120 days, Disp-30 tablet, R-3, E-Prescribe           CONTINUE these medications which have CHANGED    Details   apixaban ANTICOAGULANT (ELIQUIS) 2.5 MG tablet Take 1 tablet (2.5 mg) by mouth 2 times daily, Disp-180 tablet, R-0, [facility]           CONTINUE these medications which have NOT CHANGED    Details   atorvastatin (LIPITOR) 40 MG tablet Take 1 tablet (40 mg) by mouth daily, Disp-90 tablet, R-4, E-Prescribe      clopidogrel (PLAVIX) 75 MG tablet Take 1 tablet (75 mg) by mouth daily, Disp-90 tablet, R-3, E-Prescribe      enalapril (VASOTEC) 20 MG tablet Take 2 tablets (40 mg) by mouth daily, Disp-180 tablet, R-3, E-Prescribe      gabapentin (NEURONTIN) 100 MG capsule Take 1 capsule (100 mg) by mouth 2 times daily, Disp-180 capsule, R-3, E-Prescribe      metFORMIN (GLUCOPHAGE) 500 MG tablet Take 1 tablet (500 mg) by mouth 2 times daily (with meals), Disp-90 tablet, R-3, E-Prescribe      Multiple Vitamin (MULTI-VITAMINS) TABS Take 1 tablet by mouth daily, Historical      tamsulosin (FLOMAX) 0.4 MG capsule Take 2 capsules (0.8 mg) by mouth daily, Disp-180 capsule, R-3, E-Prescribe           STOP taking these medications       naproxen sodium (ANAPROX) 220 MG tablet Comments:   Reason for Stopping:             Allergies   Allergies   Allergen Reactions    Sulfa Antibiotics Hives     Data   Most Recent 3 CBC's:  Recent Labs   Lab Test 10/10/23  0615 10/09/23  0812 10/08/23  0626   WBC 9.7 9.7 11.1*   HGB 12.5* 12.3* 13.7    MCV 92 93 93    166 195      Most Recent 3 BMP's:  Recent Labs   Lab Test 10/12/23  0800 10/12/23  0619 10/11/23  2031 10/11/23  0753 10/11/23  0630 10/10/23  0723 10/10/23  0615   NA  --  141  --   --  141  --  142   POTASSIUM  --  3.9  --   --  3.5  --  3.9   CHLORIDE  --  106  --   --  104  --  106   CO2  --  25  --   --  27  --  26   BUN  --  26.7*  --   --  23.6*  --  25.5*   CR  --  0.95  --   --  0.82  --  0.94   ANIONGAP  --  10  --   --  10  --  10   VON  --  8.6  --   --  9.0  --  9.0   * 208* 295*   < > 200*   < > 194*    < > = values in this interval not displayed.     Most Recent 2 LFT's:  Recent Labs   Lab Test 10/08/23  0626 10/07/23  1811   AST 22 25   ALT 22 24   ALKPHOS 118 118   BILITOTAL 1.0 0.8     Most Recent INR's and Anticoagulation Dosing History:  Anticoagulation Dose History          Latest Ref Rng & Units 7/13/2017 2/13/2018   Recent Dosing and Labs   INR 0 - 1.3 1.1  0.98      Most Recent 3 Troponin's:  Recent Labs   Lab Test 02/14/18  0412 02/13/18  2040 02/13/18  1710   TROPI <0.030 <0.030 <0.030     Most Recent Cholesterol Panel:  Recent Labs   Lab Test 09/18/23  1117   CHOL 118   LDL 51   HDL 52   TRIG 73     Most Recent 6 Bacteria Isolates From Any Culture (See EPIC Reports for Culture Details):No lab results found.  Most Recent TSH, T4 and A1c Labs:  Recent Labs   Lab Test 10/08/23  0626 09/18/23  1117   TSH 1.94  --    A1C  --  7.2*     Results for orders placed or performed during the hospital encounter of 10/07/23   XR Chest Port 1 View    Narrative    PROCEDURE:  XR CHEST PORT 1 VIEW    HISTORY:  CP/ SOB.     COMPARISON:  None.    FINDINGS:   There is elevation left hemidiaphragm. There is some abnormal areas of  increased density in the left lung both in the upper and lower  portions. These opacities are consistent with atelectasis or  pneumonia. There is interstitial thickening is seen in the right lung  which may be acute or chronic. The heart is mildly  enlarged. There is  irregular distribution of pulmonary vascularity suspicious for  emphysema.      Impression    IMPRESSION:  Left lung opacity consistent with atelectasis or  pneumonia.    Interstitial thickening in both lungs which may be acute or chronic      DARIEN JENNINGS MD         SYSTEM ID:  [id number]   Echocardiogram Complete     Value    LVEF  55-60%    Narrative    [id number]  [id number]  [id number]  [id number]^SISTER^AP     [facility]  [address] [place] [address]     Name: AMINA STEELE  MRN: [id number]  : 1932  Study Date: 10/09/2023 08:34 AM  Age: 89 yrs  Gender: Male  Patient Location: [facility]  Reason For Study: Heart Failure  Ordering Physician: SISTERAP  Performed By: GILMAR Loza, RDCS, RVT     BSA: 2.1 m2  Height: 70 in  Weight: 197 lb  HR: 69  BP: 168/95 mmHg  ______________________________________________________________________________  Procedure  Complete Portable Echo Adult.  ______________________________________________________________________________  Interpretation Summary  Rhythm: AFlutter.  Global and regional left ventricular function is normal with an EF of 55-60%.  Right ventricular function, chamber size, wall motion, and thickness are  normal.  The right ventricular systolic pressure is 58mmHg above the right atrial  pressure.  Pulmonary artery systolic pressure is normal.  Ascending aorta 4.2 cm.  Mild dilatation of the aorta is present.  IVC diameter and respiratory changes fall into an intermediate range  suggesting an RA pressure of 8 mmHg.  No pericardial effusion is present.  ______________________________________________________________________________  Left Ventricle  Rhythm: AFlutter. Global and regional left ventricular function is normal with  an EF of 55-60%. Left ventricular size is normal. Relative wall thickness is  increased consistent with concentric remodeling. Diastolic function not  assessed due to arrhythmia. No  regional wall motion abnormalities are seen.     Right Ventricle  Right ventricular function, chamber size, wall motion, and thickness are  normal.     Atria  The right atria appears normal. Mild left atrial enlargement is present. The  atrial septum is intact as assessed by color Doppler .     Mitral Valve  The mitral valve is normal. Mild mitral insufficiency is present.     Aortic Valve  Aortic valve sclerosis is present. Trace to mild aortic insufficiency is  present. The mean AoV pressure gradient is 10.0 mmHg. The calculated aortic  valve are is 2.3 cm^2.     Tricuspid Valve  The tricuspid valve is normal. Mild tricuspid insufficiency is present. The  right ventricular systolic pressure is 58mmHg above the right atrial pressure.  Pulmonary artery systolic pressure is normal.     Pulmonic Valve  The pulmonic valve is normal.     Vessels  The pulmonary artery is normal. Ascending aorta 4.2 cm. Mild dilatation of the  aorta is present. IVC diameter and respiratory changes fall into an  intermediate range suggesting an RA pressure of 8 mmHg.     Pericardium  No pericardial effusion is present.     ______________________________________________________________________________  MMode/2D Measurements & Calculations  IVSd: 1.1 cm  LVIDd: 4.5 cm  LVIDs: 2.8 cm  LVPWd: 1.0 cm  FS: 37.1 %  LV mass(C)d: 165.4 grams  LV mass(C)dI: 79.7 grams/m2  Ao root diam: 3.8 cm  asc Aorta Diam: 4.2 cm     LVOT diam: 2.3 cm  LVOT area: 4.2 cm2  Ao root diam index Ht(cm/m): 2.1  Ao root diam index BSA (cm/m2): 1.8  Asc Ao diam index BSA (cm/m2): 2.0  Asc Ao diam index Ht(cm/m): 2.3  LA Volume (BP): 87.3 ml  LA Volume Index (BP): 42.2 ml/m2  RWT: 0.46  TAPSE: 2.4 cm     Doppler Measurements & Calculations  MV E max stephanie: 122.0 cm/sec  MV A max stephanie: 42.3 cm/sec  MV E/A: 2.9  MV dec time: 0.19 sec  Ao V2 max: 213.8 cm/sec  Ao max P.0 mmHg  Ao V2 mean: 142.0 cm/sec  Ao mean PG: 10.0 mmHg  Ao V2 VTI: 43.6 cm  STEVEN(I,D): 2.3 cm2  STEVEN(V,D):  2.2 cm2  AI P1/2t: 731.9 msec  LV V1 max P.2 mmHg  LV V1 max: 114.3 cm/sec  LV V1 VTI: 24.6 cm  SV(LVOT): 102.4 ml  SI(LVOT): 49.4 ml/m2  PA acc time: 0.08 sec  TR max donaldo: 366.6 cm/sec  TR max P.8 mmHg  AV Donaldo Ratio (DI): 0.53  STEVEN Index (cm2/m2): 1.1  RV S Donaldo: 15.5 cm/sec     ______________________________________________________________________________  Report approved by: Richmond Gregg 10/09/2023 11:14 AM

## 2023-10-12 NOTE — PHARMACY - DISCHARGE MEDICATION RECONCILIATION AND EDUCATION
Pharmacy:  Discharge Counseling and Medication Reconciliation    Ilia Ferguson  [address]  [place], [place] [facility] [address] [phone] (home)   89 year old male  PCP: Trino Dahl    Allergies: Sulfa antibiotics    Discharge Counseling:    Pharmacist met with patient today to review the medication portion of the After Visit Summary (with an emphasis on NEW medications) and to address patient's questions/concerns.    Summary of Education: Instructed patient on directions for use of all medications   Cefuroxime: Take as close to 12 hr apart as possible, Take w/food for GI upset, start tonight for 3 day course   Furosemide: May increase urine frequency, may cause dizziness   Apixaban: Monitor for s/s of bleeding, Take as close to 12 hr apart. Do not double up on missed doses.   STOP: naproxen and aspirin along with all other NSAIDs recommended patient to use APAP instead.     Materials Provided:  MedCounselor sheets printed from Clinical Pharmacology on: YES     Discharge Medication Reconciliation:    It has been determined that the patient has an adequate supply of medications available or which can be obtained from the patient's preferred pharmacy, which HE/SHE has confirmed as: [id number]. Filling Apixaban as [place] through Rx outreach program per patient request.    Thank you for the consult.    Ayden Bowling [facility]........October 12, 2023 9:03 AM

## 2023-10-12 NOTE — PLAN OF CARE
"Patient met all goals able to discharge to home with daughter transporting. All questions asked and answered. Rhonda Gee RN on 10/12/2023 at 10:30 AM  Problem: Plan of Care - These are the overarching goals to be used throughout the patient stay.    Goal: Plan of Care Review  Description: The Plan of Care Review/Shift note should be completed every shift.  The Outcome Evaluation is a brief statement about your assessment that the patient is improving, declining, or no change.  This information will be displayed automatically on your shift note.  Outcome: Met  Goal: Patient-Specific Goal (Individualized)  Description: You can add care plan individualizations to a care plan. Examples of Individualization might be:  \"Parent requests to be called daily at 9am for status\", \"I have a hard time hearing out of my right ear\", or \"Do not touch me to wake me up as it startles me\".  Outcome: Met  Goal: Absence of Hospital-Acquired Illness or Injury  Outcome: Met  Intervention: Identify and Manage Fall Risk  Recent Flowsheet Documentation  Taken 10/12/2023 0800 by Rhonda Gee RN  Safety Promotion/Fall Prevention:   safety round/check completed   room organization consistent   room near nurse's station   room door open   nonskid shoes/slippers when out of bed   clutter free environment maintained   assistive device/personal items within reach   activity supervised  Intervention: Prevent Skin Injury  Recent Flowsheet Documentation  Taken 10/12/2023 0800 by Rhonda Gee RN  Body Position: position changed independently  Taken 10/12/2023 0700 by Rhonda Gee RN  Body Position: position changed independently  Intervention: Prevent and Manage VTE (Venous Thromboembolism) Risk  Recent Flowsheet Documentation  Taken 10/12/2023 0800 by Rhonda Gee RN  VTE Prevention/Management:   SCDs (sequential compression devices) off   patient refused intervention  Goal: Optimal Comfort and Wellbeing  Outcome: " Met  Intervention: Monitor Pain and Promote Comfort  Recent Flowsheet Documentation  Taken 10/12/2023 0700 by Rhonda Gee RN  Pain Management Interventions: declines  Intervention: Provide Person-Centered Care  Recent Flowsheet Documentation  Taken 10/12/2023 0800 by Rhonda Gee RN  Trust Relationship/Rapport:   care explained   choices provided  Goal: Readiness for Transition of Care  Outcome: Met     Problem: Pneumonia  Goal: Fluid Balance  Outcome: Met  Goal: Resolution of Infection Signs and Symptoms  Outcome: Met  Goal: Effective Oxygenation and Ventilation  Outcome: Met  Intervention: Promote Airway Secretion Clearance  Recent Flowsheet Documentation  Taken 10/12/2023 0800 by Rhonda Gee RN  Breathing Techniques/Airway Clearance: deep/controlled cough encouraged  Cough And Deep Breathing: done independently per patient  Intervention: Optimize Oxygenation and Ventilation  Recent Flowsheet Documentation  Taken 10/12/2023 0800 by Rhonda Gee RN  Airway/Ventilation Management: calming measures promoted  Head of Bed (HOB) Positioning: HOB at 60-90 degrees  Taken 10/12/2023 0700 by Rhonda Gee RN  Head of Bed (HOB) Positioning: HOB at 60-90 degrees   Goal Outcome Evaluation:

## 2023-10-12 NOTE — PLAN OF CARE
"  Patient A&Ox4. Afebrile. VSS, with episodes of hypertension. 02 93-95% on RA. LS clear and dim. Denies chest pain/SOB. Patient up ambulating IND in room. No complaints of dizziness. Denies pain. States he is ready to go home. Pleasant and cooperative. Cedeno removed 10/11. Up to void x3 t/o the NOC per patient. C/o dribbling at this time.     Problem: Plan of Care - These are the overarching goals to be used throughout the patient stay.    Goal: Plan of Care Review  Description: The Plan of Care Review/Shift note should be completed every shift.  The Outcome Evaluation is a brief statement about your assessment that the patient is improving, declining, or no change.  This information will be displayed automatically on your shift note.  Outcome: Progressing  Flowsheets (Taken 10/12/2023 0224)  Outcome Evaluation: pt A&Ox4, afebrile. Room air. VSS. No acute changes.  Plan of Care Reviewed With: patient  Overall Patient Progress: improving  Goal: Patient-Specific Goal (Individualized)  Description: You can add care plan individualizations to a care plan. Examples of Individualization might be:  \"Parent requests to be called daily at 9am for status\", \"I have a hard time hearing out of my right ear\", or \"Do not touch me to wake me up as it startles me\".  Outcome: Progressing  Flowsheets (Taken 10/12/2023 0224)  Individualized Care Needs: I&O, lasix, bedside urinal  Patient/Family-Specific Goals (Include Timeframe): maintain adequate urine output, absent retention. Maintain adequate 02 sats above 90% on RA.  Goal: Absence of Hospital-Acquired Illness or Injury  Outcome: Progressing  Intervention: Identify and Manage Fall Risk  Recent Flowsheet Documentation  Taken 10/11/2023 2004 by Madonna Craven RN  Safety Promotion/Fall Prevention:   safety round/check completed   room organization consistent   room near nurse's station   room door open   nonskid shoes/slippers when out of bed   clutter free environment " maintained   assistive device/personal items within reach   activity supervised  Intervention: Prevent and Manage VTE (Venous Thromboembolism) Risk  Recent Flowsheet Documentation  Taken 10/11/2023 2004 by Madonna Craven RN  VTE Prevention/Management:   SCDs (sequential compression devices) off   patient refused intervention  Goal: Optimal Comfort and Wellbeing  Outcome: Progressing  Intervention: Provide Person-Centered Care  Recent Flowsheet Documentation  Taken 10/11/2023 2004 by Madonna Craven RN  Trust Relationship/Rapport:   care explained   choices provided  Goal: Readiness for Transition of Care  Outcome: Progressing  Flowsheets (Taken 10/12/2023 0224)  Anticipated Changes Related to Illness: none  Transportation Anticipated: car, drives self  Concerns to be Addressed: denies needs/concerns at this time  Barriers to Discharge: None noted  Intervention: Mutually Develop Transition Plan  Recent Flowsheet Documentation  Taken 10/12/2023 0224 by Madonna Craven RN  Anticipated Changes Related to Illness: none  Transportation Anticipated: car, drives self  Concerns to be Addressed: denies needs/concerns at this time     Problem: Pneumonia  Goal: Fluid Balance  Outcome: Progressing  Goal: Resolution of Infection Signs and Symptoms  Outcome: Progressing  Goal: Effective Oxygenation and Ventilation  Outcome: Progressing  Intervention: Promote Airway Secretion Clearance  Recent Flowsheet Documentation  Taken 10/11/2023 2004 by Madonna Craven RN  Cough And Deep Breathing: done independently per patient   Goal Outcome Evaluation:      Plan of Care Reviewed With: patient    Overall Patient Progress: improving  Overall Patient Progress: improving    Outcome Evaluation: pt A&Ox4, afebrile. Room air. VSS. No acute changes.

## 2023-10-13 ENCOUNTER — TELEPHONE (OUTPATIENT)
Dept: PEDIATRICS | Facility: OTHER | Age: 88
End: 2023-10-13
Payer: COMMERCIAL

## 2023-10-13 NOTE — TELEPHONE ENCOUNTER
[facility] Management phone call attempted at 8:38 AM 10/13/2023.    Carolynn Bonilla RN on 10/13/2023 at 8:38 AM

## 2023-10-13 NOTE — TELEPHONE ENCOUNTER
Transitional Care Management Phone Call    Summary of hospitalization:  [facility] discharge summary reviewed  DISCHARGE DIAGNOSIS: Acute congestive heart failure  DATE OF DISCHARGE: 10/12/23    Diagnostic Tests/Treatments reviewed.  Follow up needed: none    Post Discharge Medication Reconciliation: discharge medications reconciled, continue medications without change.  Medications reviewed by: by myself    Problems taking medications regularly:  None  Problems adhering to non-medication therapy:  None    Other Healthcare Providers Involved in Patient s Care:         None  Update since discharge: improved.   Plan of care communicated with patient  Just a friendly reminder that you appointment is   Next 5 appointments (look out 90 days)      Oct 19, 2023  8:20 AM  Office Visit with Trino Dahl MD  [facility] ([facility] ) [address]  [place] [address] [phone]        .  We encourage you to keep this appointment.  Please remember to bring all of your pills in their bottles (including any vitamins or over the counter pills) with you to your appointment.   The patient indicates understanding of these issues and agrees with the plan of care.   Yes    Was the patient contacted within the 2 business days or other approved timeframe?  Yes  Was the Medication reconciliation and management done since the patient was discharged? Yes    - Stop taking your aspirin and only use Tylenol instead of ibuprofen for mild aches and pains  -Start taking Eliquis to prevent strokes from your irregular heartbeat.  This medication can increase your risk for bleeding especially when taken with Plavix is important to again minimize aspirin and ibuprofen use.  -Start taking Lasix 40 mg daily to prevent fluid from re accumulating  -Take the antibiotic Ceftin until it is gone to continue to clear up your bladder  infection    Carolynn Bonilla RN  10/13/2023 1:37 PM

## 2023-10-16 NOTE — PROGRESS NOTES
SAFETY CHECKLIST  ID Bands and Risk clasps correct and in place (DNR, Fall risk, Allergy, Latex, Limb):  Yes  All Lines Reconciled and labeled correctly: Yes  Whiteboard updated:Yes  Environmental interventions: Yes  Verify Tele #:     Patient's mother returned call to clinic to discuss procedure with Dr. Wright on 10/20. Due to illness in the family, they request to postpone procedure until 11/8. Will route message to scheduling. Discussed and reinforced need for pre-op prior to procedure. Leticia states she has PAC number and will arrange visit.     Patient is feeling well. No concerns at this time.     Marilu Levine RN Care Coordinator

## 2023-10-17 ENCOUNTER — THERAPY VISIT (OUTPATIENT)
Dept: PHYSICAL THERAPY | Facility: OTHER | Age: 88
End: 2023-10-17
Attending: INTERNAL MEDICINE
Payer: MEDICARE

## 2023-10-17 DIAGNOSIS — G89.29 CHRONIC LEFT SHOULDER PAIN: ICD-10-CM

## 2023-10-17 DIAGNOSIS — G57.01 PIRIFORMIS SYNDROME, RIGHT: ICD-10-CM

## 2023-10-17 DIAGNOSIS — M25.512 CHRONIC LEFT SHOULDER PAIN: ICD-10-CM

## 2023-10-17 PROCEDURE — 97110 THERAPEUTIC EXERCISES: CPT | Mod: GP

## 2023-10-17 PROCEDURE — 97161 PT EVAL LOW COMPLEX 20 MIN: CPT | Mod: GP

## 2023-10-17 NOTE — PROGRESS NOTES
PHYSICAL THERAPY EVALUATION  Type of Visit: Evaluation    See electronic medical record for Abuse and Falls Screening details.    Subjective       Presenting condition or subjective complaint: Right piriformis syndrome, let shoulder pain.  Date of onset: 23 (Date of referral)    Relevant medical history:     Dates & types of surgery:      Prior diagnostic imaging/testing results:       Prior therapy history for the same diagnosis, illness or injury:        Prior Level of Function  Transfers: Independent  Ambulation: Independent  ADL: Independent  IADL: Independent    Living Environment  Social support:     Type of home:     Stairs to enter the home:         Ramp:     Stairs inside the home:         Help at home:    Equipment owned:       Employment:      Hobbies/Interests:      Patient goals for therapy: Difficulty with walking    Pain assessment: Pain present  Location: right hip, right ankle right knee/Ratin-5/10     Objective   HIP EVALUATION  PAIN: Pain Level at Rest: 0/10  Pain Level with Use: 0/10 up to 5/10  INTEGUMENTARY (edema, incisions): WFL  POSTURE: WFL  GAIT:   Weightbearing Status: WBAT  Assistive Device(s): None  Gait Deviations: Base of support decreased  Stance time decreased  BALANCE/PROPRIOCEPTION:  Unable to SLS balance on either lE  WEIGHTBEARING ALIGNMENT: WFL  NON-WEIGHTBEARING ALIGNMENT: WFL   ROM: Right Knee flexion of 130 deg, ext of +4 deg. Right hip flexion to 105, IROT 29 deg, EROT 39 deg. Left LE mobility is all WFL    PELVIC/SI SCREEN: Neg  STRENGTH: Right LE strength is as follows hip flexion 3+/5, knee ext 4-/5, knee flexion 4-/5. Hip abd 4-/5. All left LE strength is at 4= to 5/5 MMT  LE FLEXIBILITY: See AROM measures above  SPECIAL TESTS: Not indicated  FUNCTIONAL TESTS: Not indicated  PALPATION: Noted tenderness of at least 2-3/4 with palpation of the right piriformis with 2/4 tenderness of the proximal IT band.  1-2/tenderness with proximal palpation of the greater  trochanter bursa.  JOINT MOBILITY: See AROM above    Assessment & Plan   CLINICAL IMPRESSIONS  Medical Diagnosis: Right piriformis syndrome, chronic left shoulder pain    Treatment Diagnosis: Impaired mobility of the right lower extremity due to hip knee and ankle pain and piriformis syndrome.  The left upper extremity   Impression/Assessment:  Patient is a 89-year-old male who presents to physical therapy with complaint of right hip knee and ankle pain.  Reports he has difficulty sleeping on his right side.  He was recently hospitalized for 6 nights with pneumonia and right leg edema.  Prior to that he had also developed some right ankle pain although this occurred after the referral was made to physical therapy for treatment of the right hip.  His referral also states left shoulder pain although that is not bothering him significantly at this time.  He is trying to remain as active as possible, he lives at home independently completes most all of his own cooking and cleaning.  He does continue to drive although currently he is being transported by his daughter.  He does get substantial help from his children for heavier tasks including lawn care, snow removal etc.  They do quite a bit of cooking for him so mostly he is reheating food independently.  He would like to remain as independent as possible for as long as possible.  Reports currently his greatest impairment is inability to walk is much as he would like.    Clinical Decision Making (Complexity):  Clinical Presentation: Stable/Uncomplicated  Clinical Presentation Rationale: based on medical and personal factors listed in PT evaluation  Clinical Decision Making (Complexity): Low complexity    PLAN OF CARE  Treatment Interventions:  Modalities: Cryotherapy, Hot Pack  Interventions: Manual Therapy, Therapeutic Activity, Therapeutic Exercise    Long Term Goals     PT Goal 1  Goal Identifier: Pain  Goal Description: Patient will report that he is able to walk  for distances of up to 400 feet within his home without limitation due to right knee or hip pain within 8 to 10 weeks.  Patient will report he is able to sleep at least 5 of 7 nights per week without limitation due to right hip pain.  Rationale: to maximize safety and independence within the home;to maximize safety and independence within the community;to maximize safety and independence with self cares;to maximize safety and independence with performance of ADLs and functional tasks  Target Date: 01/03/24  PT Goal 2  Goal Identifier: Strength  Goal Description: Patient will demonstrate right lower extremity strength in the hip knee and ankle muscle group of at least 4-4+/5.  Improved strength through the lower extremity biomechanical chain will provide increased support for the hip knee and ankle during gait allowing patient to walk as desired without limitation and significant impairment.  He will be able to remain living independently with the ability to ambulate within his home, up and down stairs as required several times per day without limitation due to weakness in the right lower extremity.  He will achieve this goal within 10 to 12 weeks  Rationale: to maximize safety and independence within the home;to maximize safety and independence within the community;to maximize safety and independence with performance of ADLs and functional tasks;to maximize safety and independence with self cares  Target Date: 01/17/24  PT Goal 3  Goal Identifier: Gait  Goal Description: Patient will be able to walk on level flat ground without significant functional impairment.  He will be able to ambulate for distance of up to 1/4 mile which allows him to get outside his home and attend community events.  He will be able to ambulate for distance which allow attendance at [employer] and other weekly engagements without limitation 8 to 10 weeks  Rationale: to maximize safety and independence within the community;to maximize safety and  independence within the home;to maximize safety and independence with transportation;to maximize safety and independence with self cares;to maximize safety and independence with performance of ADLs and functional tasks  Target Date: 01/14/24      Frequency of Treatment: 2 times per week  Duration of Treatment: Up to 12 weeks    Recommended Referrals to Other Professionals: No additional referrals indicated  Education Assessment:   Learner/Method: Patient;Family;Caregiver    Risks and benefits of evaluation/treatment have been explained.   Patient/Family/caregiver agrees with Plan of Care.     Evaluation Time:     PT Eval, Low Complexity Minutes (39825): 25       Signing Clinician: MATT Hardin [facility]                                                                                   OUTPATIENT PHYSICAL THERAPY      PLAN OF TREATMENT FOR OUTPATIENT REHABILITATION   Patient's Last Name, First Name, KOBYTAMI SernayIlia YOB: 1932   Provider's Name   [facility]   Medical Record No.  [id number]     Onset Date: 09/19/23 (Date of referral)  Start of Care Date: 10/17/23     Medical Diagnosis:  Right piriformis syndrome, chronic left shoulder pain      PT Treatment Diagnosis:  Impaired mobility of the right lower extremity due to hip knee and ankle pain and piriformis syndrome.  The left upper extremity Plan of Treatment  Frequency/Duration: 2 times per week/ Up to 12 weeks    Certification date from 10/17/23 to 01/17/24         See note for plan of treatment details and functional goals     Evert Hernandes, PT                         I CERTIFY THE NEED FOR THESE SERVICES FURNISHED UNDER        THIS PLAN OF TREATMENT AND WHILE UNDER MY CARE     (Physician attestation of this document indicates review and certification of the therapy plan).                Referring Provider:  Trino Dahl      Initial Assessment  See Epic Evaluation-  Start of Care Date: 10/17/23

## 2023-10-19 ENCOUNTER — THERAPY VISIT (OUTPATIENT)
Dept: PHYSICAL THERAPY | Facility: OTHER | Age: 88
End: 2023-10-19
Attending: INTERNAL MEDICINE
Payer: MEDICARE

## 2023-10-19 ENCOUNTER — OFFICE VISIT (OUTPATIENT)
Dept: PEDIATRICS | Facility: OTHER | Age: 88
End: 2023-10-19
Attending: INTERNAL MEDICINE
Payer: MEDICARE

## 2023-10-19 VITALS
BODY MASS INDEX: 26.92 KG/M2 | HEART RATE: 67 BPM | DIASTOLIC BLOOD PRESSURE: 70 MMHG | HEIGHT: 71 IN | RESPIRATION RATE: 16 BRPM | WEIGHT: 192.3 LBS | OXYGEN SATURATION: 93 % | SYSTOLIC BLOOD PRESSURE: 130 MMHG | TEMPERATURE: 97.9 F

## 2023-10-19 DIAGNOSIS — M54.16 LUMBAR RADICULOPATHY, RIGHT: ICD-10-CM

## 2023-10-19 DIAGNOSIS — G57.01 PIRIFORMIS SYNDROME, RIGHT: Primary | ICD-10-CM

## 2023-10-19 DIAGNOSIS — Z09 HOSPITAL DISCHARGE FOLLOW-UP: Primary | ICD-10-CM

## 2023-10-19 DIAGNOSIS — I48.4 ATYPICAL ATRIAL FLUTTER (H): ICD-10-CM

## 2023-10-19 DIAGNOSIS — I50.33 ACUTE ON CHRONIC HEART FAILURE WITH PRESERVED EJECTION FRACTION (H): ICD-10-CM

## 2023-10-19 DIAGNOSIS — R31.9 HEMATURIA, UNSPECIFIED TYPE: ICD-10-CM

## 2023-10-19 DIAGNOSIS — R33.9 URINARY RETENTION: ICD-10-CM

## 2023-10-19 DIAGNOSIS — N30.01 ACUTE CYSTITIS WITH HEMATURIA: ICD-10-CM

## 2023-10-19 PROCEDURE — 97110 THERAPEUTIC EXERCISES: CPT | Mod: GP

## 2023-10-19 PROCEDURE — 99495 TRANSJ CARE MGMT MOD F2F 14D: CPT | Performed by: INTERNAL MEDICINE

## 2023-10-19 PROCEDURE — 97116 GAIT TRAINING THERAPY: CPT | Mod: GP

## 2023-10-19 PROCEDURE — 91320 SARSCV2 VAC 30MCG TRS-SUC IM: CPT

## 2023-10-19 PROCEDURE — G0463 HOSPITAL OUTPT CLINIC VISIT: HCPCS

## 2023-10-19 ASSESSMENT — PAIN SCALES - GENERAL: PAINLEVEL: NO PAIN (0)

## 2023-10-19 NOTE — PATIENT INSTRUCTIONS
-- PT for back and shoulder   -- Continue Eliquis   -- Return if you are getting more fatigued     -- Low salt diet, under 2000 mg/day   -- Fluid restrict, under 2000 mL/day aka 8.5 cups/day   -- Eat healthy protein   -- Learn about DASH Diet for dietary ways to reduce blood pressure  Google: [facility] DASH diet  NIH site: https://www.nhlbi.nih.gov/health-topics/dash-eating-plan  PDF from [facility]: https://www.nhlbi.nih.gov/files/docs/public/heart/new_dash.pdf   -- Elevate feet above your hips for 20-30 minutes, 4 times per day   -- Compression stockings from morning to night

## 2023-10-19 NOTE — NURSING NOTE
"Chief Complaint   Patient presents with    Hospital F/U       Initial /70   Pulse 67   Temp 97.9  F (36.6  C) (Tympanic)   Resp 16   Ht 1.791 m (5' 10.5\")   Wt 87.2 kg (192 lb 4.8 oz)   SpO2 93%   BMI 27.20 kg/m   Estimated body mass index is 27.2 kg/m  as calculated from the following:    Height as of this encounter: 1.791 m (5' 10.5\").    Weight as of this encounter: 87.2 kg (192 lb 4.8 oz).  Medication Review: complete    The next two questions are to help us understand your food security.  If you are feeling you need any assistance in this area, we have resources available to support you today.           No data to display                  Health Care Directive:  Patient has a Health Care Directive on file      Norma J. Gosselin, LPN      "

## 2023-10-19 NOTE — PROGRESS NOTES
Assessment & Plan   1. Hospital discharge follow-up    2. Atypical atrial flutter (H)  We discussed atrial fib and flutter today.  We discussed expected clinical course, possible complications.  We discussed the difference between rate and rhythm control.  We decided upon rate control at this time.  He is anticoagulated with Eliquis.  We discussed the risks of anticoagulants particularly with the use of Plavix.  We discussed possible future interventions including synchronized cardioversion and electrophysiology ablation.  - apixaban ANTICOAGULANT (ELIQUIS) 2.5 MG tablet; Take 1 tablet (2.5 mg) by mouth 2 times daily  Dispense: 180 tablet; Refill: 4    3. Acute cystitis with hematuria  He has had hematuria in the past.  He has had cystoscopies with no specific findings.  We discussed the possibility of a return to urology and the patient declines at this time.    4. Urinary retention  Resolved prior to discharge.  Encouraged monitoring symptoms.  May need intermittent straight cathing    5. Acute on chronic heart failure with preserved ejection fraction (H)  At goal, no change.    6. Lumbar radiculopathy, right  Continues to have right-sided lumbar radiculopathy in the L5 or S1 area.  Recommend PT.  - Physical Therapy Referral; Future    7. Hematuria, unspecified type      Mr. Ferguson was recently hospitalized for heart failure, appears to be doing well since discharge.  Discharge summary and recommendations for outpatient provider are reviewed. Based on what occurred in the visit today:  Previous medication(s) were discontinued or altered? No  Previous medication(s) were suspended pending consultation? No  New medication(s) started? No     -- Medication reconciliation and management was performed today    Patient Instructions    -- PT for back and shoulder   -- Continue Eliquis   -- Return if you are getting more fatigued     -- Low salt diet, under 2000 mg/day   -- Fluid restrict, under 2000 mL/day aka 8.5 cups/day    "-- Eat healthy protein   -- Learn about DASH Diet for dietary ways to reduce blood pressure  Google: [facility] DASH diet  [facility] site: https://www.nhlbi.nih.gov/health-topics/dash-eating-plan  PDF from [facility]: https://www.nhlbi.nih.gov/files/docs/public/heart/new_dash.pdf   -- Elevate feet above your hips for 20-30 minutes, 4 times per day   -- Compression stockings from morning to night        Return if symptoms worsen or fail to improve.    Signed, Trino Dahl MD, FAAP, FACP  Internal Medicine & Pediatrics    Subjective   Ilia Ferguson is a 89 year old male who presents for hospital discharge follow-up.    Hospital: [facility]  Date of discharge: 10/12/23  Date of post discharge contact: 10/13/23    Rei was recently admitted to our hospital because he was short of breath.  He was found to be in heart failure with preserved ejection fraction as well as he had a UTI with hematuria.  With loop diuretics his oxygenation improved.  They were able to remove his Cedeno catheter without retention.  He still feels tired.  His daughter-in-law passed away.  His atrial flutter is new this admission.  No palpitations.    Objective   Vitals: /70   Pulse 67   Temp 97.9  F (36.6  C) (Tympanic)   Resp 16   Ht 1.791 m (5' 10.5\")   Wt 87.2 kg (192 lb 4.8 oz)   SpO2 93%   BMI 27.20 kg/m      Cardiovascular: Regular rate and rhythm, no murmur  Pulmonary: Clear    Review and Analysis of Data   I personally reviewed the following:  External notes: No  Results: Yes most recent lumbar MRI report reviewed.  Most recent echocardiogram report reviewed.  Hospital laboratory data reviewed.  Use of an independent historian: Yes spoke with daughter  Independent review of a test performed by another physician: No  Discussion of management with another physician: No  Moderate risk of morbidity from additional diagnostic testing and/or treatment.    Billing:   Type of Medical Decision Making Face-to-Face Visit within 7 Days Face-to-Face Visit within 14 " days   Moderate Complexity [id number]   High Complexity [id number]

## 2023-10-24 ENCOUNTER — THERAPY VISIT (OUTPATIENT)
Dept: PHYSICAL THERAPY | Facility: OTHER | Age: 88
End: 2023-10-24
Attending: INTERNAL MEDICINE
Payer: MEDICARE

## 2023-10-24 DIAGNOSIS — G57.01 PIRIFORMIS SYNDROME, RIGHT: Primary | ICD-10-CM

## 2023-10-24 PROCEDURE — 97140 MANUAL THERAPY 1/> REGIONS: CPT | Mod: GP

## 2023-10-24 PROCEDURE — 97110 THERAPEUTIC EXERCISES: CPT | Mod: GP

## 2023-10-26 ENCOUNTER — THERAPY VISIT (OUTPATIENT)
Dept: PHYSICAL THERAPY | Facility: OTHER | Age: 88
End: 2023-10-26
Attending: INTERNAL MEDICINE
Payer: MEDICARE

## 2023-10-26 DIAGNOSIS — G57.01 PIRIFORMIS SYNDROME, RIGHT: Primary | ICD-10-CM

## 2023-10-26 DIAGNOSIS — M54.16 LUMBAR RADICULOPATHY, RIGHT: ICD-10-CM

## 2023-10-26 PROCEDURE — 97140 MANUAL THERAPY 1/> REGIONS: CPT | Mod: GP

## 2023-10-30 ENCOUNTER — MYC MEDICAL ADVICE (OUTPATIENT)
Dept: PEDIATRICS | Facility: OTHER | Age: 88
End: 2023-10-30
Payer: COMMERCIAL

## 2023-10-31 ENCOUNTER — THERAPY VISIT (OUTPATIENT)
Dept: PHYSICAL THERAPY | Facility: OTHER | Age: 88
End: 2023-10-31
Attending: INTERNAL MEDICINE
Payer: MEDICARE

## 2023-10-31 DIAGNOSIS — G57.01 PIRIFORMIS SYNDROME, RIGHT: Primary | ICD-10-CM

## 2023-10-31 PROCEDURE — 97140 MANUAL THERAPY 1/> REGIONS: CPT | Mod: GP

## 2023-10-31 PROCEDURE — 97110 THERAPEUTIC EXERCISES: CPT | Mod: GP

## 2023-10-31 NOTE — TELEPHONE ENCOUNTER
Looks like venous stasis. Could be petechiae.  Ask about signs of bleeding.  Stick with low salt, high protein, fluid restrict.  He should be seen if concerns.    Signed, Trino Dahl MD, FAAP, FACP  Internal Medicine & Pediatrics

## 2023-11-02 ENCOUNTER — THERAPY VISIT (OUTPATIENT)
Dept: PHYSICAL THERAPY | Facility: OTHER | Age: 88
End: 2023-11-02
Attending: INTERNAL MEDICINE
Payer: MEDICARE

## 2023-11-02 DIAGNOSIS — G57.01 PIRIFORMIS SYNDROME, RIGHT: Primary | ICD-10-CM

## 2023-11-02 PROCEDURE — 97140 MANUAL THERAPY 1/> REGIONS: CPT | Mod: GP

## 2023-11-07 ENCOUNTER — THERAPY VISIT (OUTPATIENT)
Dept: PHYSICAL THERAPY | Facility: OTHER | Age: 88
End: 2023-11-07
Attending: INTERNAL MEDICINE
Payer: MEDICARE

## 2023-11-07 DIAGNOSIS — G57.01 PIRIFORMIS SYNDROME, RIGHT: Primary | ICD-10-CM

## 2023-11-07 PROCEDURE — 97140 MANUAL THERAPY 1/> REGIONS: CPT | Mod: GP

## 2023-11-07 PROCEDURE — 97110 THERAPEUTIC EXERCISES: CPT | Mod: GP

## 2023-11-09 ENCOUNTER — THERAPY VISIT (OUTPATIENT)
Dept: PHYSICAL THERAPY | Facility: OTHER | Age: 88
End: 2023-11-09
Attending: INTERNAL MEDICINE
Payer: MEDICARE

## 2023-11-09 DIAGNOSIS — G57.01 PIRIFORMIS SYNDROME, RIGHT: Primary | ICD-10-CM

## 2023-11-09 PROCEDURE — 97116 GAIT TRAINING THERAPY: CPT | Mod: GP

## 2023-11-09 PROCEDURE — 97140 MANUAL THERAPY 1/> REGIONS: CPT | Mod: GP

## 2023-11-17 ENCOUNTER — THERAPY VISIT (OUTPATIENT)
Dept: PHYSICAL THERAPY | Facility: OTHER | Age: 88
End: 2023-11-17
Attending: INTERNAL MEDICINE
Payer: MEDICARE

## 2023-11-17 DIAGNOSIS — G57.01 PIRIFORMIS SYNDROME, RIGHT: Primary | ICD-10-CM

## 2023-11-17 PROCEDURE — 97110 THERAPEUTIC EXERCISES: CPT | Mod: GP,CQ

## 2023-11-17 PROCEDURE — 97140 MANUAL THERAPY 1/> REGIONS: CPT | Mod: GP,CQ

## 2023-11-21 ENCOUNTER — THERAPY VISIT (OUTPATIENT)
Dept: PHYSICAL THERAPY | Facility: OTHER | Age: 88
End: 2023-11-21
Attending: INTERNAL MEDICINE
Payer: MEDICARE

## 2023-11-21 DIAGNOSIS — G57.01 PIRIFORMIS SYNDROME, RIGHT: Primary | ICD-10-CM

## 2023-11-21 PROCEDURE — 97110 THERAPEUTIC EXERCISES: CPT | Mod: GP,CQ

## 2023-11-28 ENCOUNTER — THERAPY VISIT (OUTPATIENT)
Dept: PHYSICAL THERAPY | Facility: OTHER | Age: 88
End: 2023-11-28
Attending: INTERNAL MEDICINE
Payer: MEDICARE

## 2023-11-28 DIAGNOSIS — G57.01 PIRIFORMIS SYNDROME, RIGHT: Primary | ICD-10-CM

## 2023-11-28 PROCEDURE — 97140 MANUAL THERAPY 1/> REGIONS: CPT | Mod: GP

## 2023-11-28 PROCEDURE — 97110 THERAPEUTIC EXERCISES: CPT | Mod: GP

## 2023-11-28 NOTE — PROGRESS NOTES
11/28/23 1106   Appointment Info   Signing clinician's name / credentials Evert Greta PT   Total/Authorized Visits 10   Visits Used 9   Medical Diagnosis Right piriformis syndrome, chronic left shoulder pain. Add lumbar radiculopathy ( new referral).   PT Tx Diagnosis Impaired mobility of the right lower extremity due to hip knee and ankle pain and piriformis syndrome.  The left upper extremity   Precautions/Limitations None   Other pertinent information 1/10   Quick Adds Certification   Progress Note/Certification   Start of Care Date 10/17/23   Onset of illness/injury or Date of Surgery 09/19/23  (Date of referral)   Therapy Frequency 2 times per week   Predicted Duration Up to 12 weeks   Certification date from 10/17/23   Certification date to 01/17/24   Progress Note Due Date 11/22/23   Supervision   PT Assistant Visit Number 2       Present No   GOALS   PT Goals 2;3   PT Goal 1   Goal Identifier Pain   Goal Description Patient will report that he is able to walk for distances of up to 400 feet within his home without limitation due to right knee or hip pain within 8 to 10 weeks.  Patient will report he is able to sleep at least 5 of 7 nights per week without limitation due to right hip pain.   Rationale to maximize safety and independence within the home;to maximize safety and independence within the community;to maximize safety and independence with self cares;to maximize safety and independence with performance of ADLs and functional tasks   Goal Progress Patient is no longer limited due to hip pain. His balance limitations and B foot pain (neuropathy) are more limiting at this time. No c/o re. sleep   Target Date 01/03/24   PT Goal 2   Goal Identifier Strength   Goal Description Patient will demonstrate right lower extremity strength in the hip knee and ankle muscle group of at least 4-4+/5.  Improved strength through the lower extremity biomechanical chain will provide increased  support for the hip knee and ankle during gait allowing patient to walk as desired without limitation and significant impairment.  He will be able to remain living independently with the ability to ambulate within his home, up and down stairs as required several times per day without limitation due to weakness in the right lower extremity.  He will achieve this goal within 10 to 12 weeks   Rationale to maximize safety and independence within the home;to maximize safety and independence within the community;to maximize safety and independence with performance of ADLs and functional tasks;to maximize safety and independence with self cares   Goal Progress Continues to demo difficulty with functional hip flexion. He remains unable to complete a full set of 10 SLR flexion exercises. He continues to demo balance dysfunction due to functional LE weakness.   Target Date 01/17/24   PT Goal 3   Goal Identifier Gait   Goal Description Patient will be able to walk on level flat ground without significant functional impairment.  He will be able to ambulate for distance of up to 1/4 mile which allows him to get outside his home and attend community events.  He will be able to ambulate for distance which allow attendance at [employer] and other weekly engagements without limitation 8 to 10 weeks   Rationale to maximize safety and independence within the community;to maximize safety and independence within the home;to maximize safety and independence with transportation;to maximize safety and independence with self cares;to maximize safety and independence with performance of ADLs and functional tasks   Goal Progress He is walking as desired within his home without AD. Outdoors he uses a SEC. Feels his B LE weakness is limiting his walking more than pain.   Target Date 01/14/24   Objective Measures   Objective Measures Objective Measure 1;Objective Measure 2   Objective Measure 1   Objective Measure Lower extremity strength   Details  "Very limited quad strength. Patient is unable to complete a SLR with more than 1lb.   Objective Measure 2   Objective Measure Gait   Details Reviewed gait with SEC in right hand   Treatment Interventions (PT)   Interventions Therapeutic Procedure/Exercise;Gait Training;Manual Therapy   Therapeutic Procedure/Exercise   Therapeutic Procedures: strength, endurance, ROM, flexibillity minutes (87130) 30   Ther Proc 1 Instruction/demo of therapeutic exercises   Ther Proc 1 - Details NuStep seat 11 arms 11 x 7' at level 4. Leg press seat:21 120# x15. Returned to 4\" lateral step as Marty had difficulty with the 6\" step.  Required close contact for safety. 6\" anterior step up B increased to x15. On Lifetable foam: marching and hip abd B x15 each, romberg stance with EO 2 x30 second hold - cues for equal WBing (shifting into R LE). Seated: ball squeeze x20, piriformis stretch B x30 second hold, HS stretch B x30 second hold. Sit to stand x10  from standard height chair with hands on thighs. Cues for proper toe/knee/nose alignment.   Skilled Intervention Yes instruction and demonstration   Patient Response/Progress fatigue   Gait Training   Gait 1 Gait training   Gait 1 - Details Gait training with SEC to provide balance safety   Skilled Intervention Verbal and tactile cues for sequencing, improving UE integration   Manual Therapy   Manual Therapy: Mobilization, MFR, MLD, friction massage minutes (42459) 12   Manual Therapy 1 Right hip mobilization   Manual Therapy 1 - Details Continue hip capsular stretch, A/P and lateral glides. D STM to right piriformis. Hip IR/ER stretches, continue manual hip flexor, pirifiormis stretches. Manual right gastroc/soleus stretch. STJ mobilization.   Education   Learner/Method Patient;Family;Caregiver   Plan   Home program As above.  Patient's home exercise program will be reviewed and revised as indicated   Plan for next session Begin CKC strengthening and work on balance.         PLAN  Continue " therapy per current plan of care.Focus on improving LE strength, balance and gait. Continue to treat hip pain.    Beginning/End Dates of Progress Note Reporting Period:    to 11/28/2023    Referring Provider:  Trino Dahl

## 2023-11-28 NOTE — PROGRESS NOTES
Dr Rodriguez reviewed and completed the following home care or hospice form(s) for Ilia Ferguson on 04-2-2018   This covers the certification period effective 3- to 5-. Pauline Bear LPN....................  4/20/2018   8:43 AM         

## 2023-11-30 ENCOUNTER — THERAPY VISIT (OUTPATIENT)
Dept: PHYSICAL THERAPY | Facility: OTHER | Age: 88
End: 2023-11-30
Attending: INTERNAL MEDICINE
Payer: MEDICARE

## 2023-11-30 DIAGNOSIS — G57.01 PIRIFORMIS SYNDROME, RIGHT: Primary | ICD-10-CM

## 2023-11-30 PROCEDURE — 97140 MANUAL THERAPY 1/> REGIONS: CPT | Mod: GP

## 2023-11-30 PROCEDURE — 97110 THERAPEUTIC EXERCISES: CPT | Mod: GP

## 2023-12-05 ENCOUNTER — MYC MEDICAL ADVICE (OUTPATIENT)
Dept: PEDIATRICS | Facility: OTHER | Age: 88
End: 2023-12-05

## 2023-12-05 ENCOUNTER — THERAPY VISIT (OUTPATIENT)
Dept: PHYSICAL THERAPY | Facility: OTHER | Age: 88
End: 2023-12-05
Attending: INTERNAL MEDICINE
Payer: MEDICARE

## 2023-12-05 DIAGNOSIS — G57.01 PIRIFORMIS SYNDROME, RIGHT: Primary | ICD-10-CM

## 2023-12-05 PROCEDURE — 97112 NEUROMUSCULAR REEDUCATION: CPT | Mod: GP

## 2023-12-05 PROCEDURE — 97110 THERAPEUTIC EXERCISES: CPT | Mod: GP

## 2023-12-05 PROCEDURE — 97140 MANUAL THERAPY 1/> REGIONS: CPT | Mod: GP

## 2023-12-07 ENCOUNTER — THERAPY VISIT (OUTPATIENT)
Dept: PHYSICAL THERAPY | Facility: OTHER | Age: 88
End: 2023-12-07
Attending: INTERNAL MEDICINE
Payer: MEDICARE

## 2023-12-07 DIAGNOSIS — G57.01 PIRIFORMIS SYNDROME, RIGHT: Primary | ICD-10-CM

## 2023-12-07 PROCEDURE — 97112 NEUROMUSCULAR REEDUCATION: CPT | Mod: GP

## 2023-12-07 PROCEDURE — 97110 THERAPEUTIC EXERCISES: CPT | Mod: GP

## 2023-12-12 ENCOUNTER — THERAPY VISIT (OUTPATIENT)
Dept: PHYSICAL THERAPY | Facility: OTHER | Age: 88
End: 2023-12-12
Attending: INTERNAL MEDICINE
Payer: MEDICARE

## 2023-12-12 DIAGNOSIS — G57.01 PIRIFORMIS SYNDROME, RIGHT: Primary | ICD-10-CM

## 2023-12-12 PROCEDURE — 97110 THERAPEUTIC EXERCISES: CPT | Mod: GP

## 2023-12-12 PROCEDURE — 97140 MANUAL THERAPY 1/> REGIONS: CPT | Mod: GP

## 2023-12-12 PROCEDURE — 97112 NEUROMUSCULAR REEDUCATION: CPT | Mod: GP

## 2023-12-15 ENCOUNTER — DOCUMENTATION ONLY (OUTPATIENT)
Dept: OTHER | Facility: CLINIC | Age: 88
End: 2023-12-15
Payer: COMMERCIAL

## 2023-12-18 ENCOUNTER — OFFICE VISIT (OUTPATIENT)
Dept: PEDIATRICS | Facility: OTHER | Age: 88
End: 2023-12-18
Payer: COMMERCIAL

## 2023-12-18 VITALS
WEIGHT: 187 LBS | OXYGEN SATURATION: 94 % | DIASTOLIC BLOOD PRESSURE: 80 MMHG | SYSTOLIC BLOOD PRESSURE: 120 MMHG | RESPIRATION RATE: 16 BRPM | TEMPERATURE: 97.5 F | HEART RATE: 65 BPM | HEIGHT: 71 IN | BODY MASS INDEX: 26.18 KG/M2

## 2023-12-18 DIAGNOSIS — E11.42 DIABETIC POLYNEUROPATHY ASSOCIATED WITH TYPE 2 DIABETES MELLITUS (H): ICD-10-CM

## 2023-12-18 DIAGNOSIS — R23.3 PETECHIAE: ICD-10-CM

## 2023-12-18 DIAGNOSIS — G60.9 HEREDITARY AND IDIOPATHIC PERIPHERAL NEUROPATHY: Primary | ICD-10-CM

## 2023-12-18 DIAGNOSIS — G62.9 NEUROPATHY: ICD-10-CM

## 2023-12-18 PROCEDURE — 99214 OFFICE O/P EST MOD 30 MIN: CPT | Performed by: INTERNAL MEDICINE

## 2023-12-18 PROCEDURE — G0463 HOSPITAL OUTPT CLINIC VISIT: HCPCS

## 2023-12-18 RX ORDER — CAPSAICIN 0.025 %
CREAM (GRAM) TOPICAL
Qty: 60 G | Refills: 11 | Status: SHIPPED | OUTPATIENT
Start: 2023-12-18

## 2023-12-18 RX ORDER — GABAPENTIN 100 MG/1
CAPSULE ORAL
Qty: 126 CAPSULE | Refills: 0 | Status: SHIPPED | OUTPATIENT
Start: 2023-12-18 | End: 2024-01-15

## 2023-12-18 RX ORDER — GABAPENTIN 100 MG/1
200 CAPSULE ORAL 3 TIMES DAILY
Qty: 360 CAPSULE | Refills: 4 | Status: SHIPPED | OUTPATIENT
Start: 2024-01-18 | End: 2024-03-01

## 2023-12-18 ASSESSMENT — PAIN SCALES - GENERAL: PAINLEVEL: NO PAIN (0)

## 2023-12-18 NOTE — PROGRESS NOTES
Assessment & Plan   1. Hereditary and idiopathic peripheral neuropathy  We had a lengthy discussion today with regards to peripheral neuropathy including pathophysiology, expected clinical course, possible complications.  We discussed medication options and decided to increase his gabapentin dose a little bit.  We will taper this up over the course of the next month.  He is at a very low dose and could likely tolerate a higher dose.  We discussed the possibility of adverse effects including confusion and falls.  Hopefully the capsaicin cream will also help.  - gabapentin (NEURONTIN) 100 MG capsule; Take 2 capsules (200 mg) by mouth 3 times daily  Dispense: 360 capsule; Refill: 4  - capsaicin (ZOSTRIX) 0.025 % external cream; Apply to areas on feet  TID PRN burning/tingling  Dispense: 60 g; Refill: 11    2. Petechiae  I was concerned about the petechiae at first.  However these have been present for quite a while.  He has no other associated symptoms which would point us towards an autoimmune disease.  Warning signs were discussed and prompt repeat evaluation recommended if symptoms persist or worsen.    3. Neuropathy  - gabapentin (NEURONTIN) 100 MG capsule; Take 1 capsule (100 mg) by mouth 3 times daily for 14 days, THEN 2 capsules (200 mg) 3 times daily for 14 days.  Dispense: 126 capsule; Refill: 0    4. Diabetic polyneuropathy associated with type 2 diabetes mellitus (H)      Patient Instructions    -- Ramp up gabapentin   -- If tolerated but symptoms uncontrolled, could continue to increase   -- Try capsaicin particularly at night   -- Return if concerns or warning signs     -- Metamucil 1 tbsp in at least 8 oz water daily   -- Eat fruits and veggies      Return if symptoms worsen or fail to improve.    Signed, Trino Dahl MD, FAAP, FACP  Internal Medicine & Pediatrics    Subjective   Ilia Ferguson is a 89 year old male who presents for feet are tingling.  It has been coming on for quite a while.  More  "noticeable last 2 or 3 months.  Goes up to be able.  It feels like burning and tingling sensation.  No known alleviating or exacerbating features.  He reports the rash on his legs has been present since he started wearing compression stockings a few months back.  No arthralgias.  No fevers.  No nosebleeds.    Objective   Vitals: /80   Pulse 65   Temp 97.5  F (36.4  C) (Tympanic)   Resp 16   Ht 1.791 m (5' 10.5\")   Wt 84.8 kg (187 lb)   SpO2 94%   BMI 26.45 kg/m      Skin: Skin on his feet without significant erythema or breaks.  Small petechiae are present on the lower extremities from the knees down.    Review and Analysis of Data   I personally reviewed the following:  External notes: No  Results: Yes kidney and diabetic lab work is reviewed  Use of an independent historian: No  Independent review of a test performed by another physician: No  Discussion of management with another physician: No  Moderate risk of morbidity from additional diagnostic testing and/or treatment.    "

## 2023-12-18 NOTE — PATIENT INSTRUCTIONS
-- Ramp up gabapentin   -- If tolerated but symptoms uncontrolled, could continue to increase   -- Try capsaicin particularly at night   -- Return if concerns or warning signs     -- Metamucil 1 tbsp in at least 8 oz water daily   -- Eat fruits and veggies

## 2023-12-18 NOTE — NURSING NOTE
"Chief Complaint   Patient presents with    Diabetes     Feet are tingling       Initial /80   Pulse 65   Temp 97.5  F (36.4  C) (Tympanic)   Resp 16   Ht 1.791 m (5' 10.5\")   Wt 84.8 kg (187 lb)   SpO2 94%   BMI 26.45 kg/m   Estimated body mass index is 26.45 kg/m  as calculated from the following:    Height as of this encounter: 1.791 m (5' 10.5\").    Weight as of this encounter: 84.8 kg (187 lb).  Medication Review: complete    The next two questions are to help us understand your food security.  If you are feeling you need any assistance in this area, we have resources available to support you today.          12/18/2023   SDOH- Food Insecurity   Within the past 12 months, did you worry that your food would run out before you got money to buy more? N   Within the past 12 months, did the food you bought just not last and you didn t have money to get more? N         Health Care Directive:  Patient has a Health Care Directive on file      Norma J. Gosselin, LPN      "

## 2023-12-19 ENCOUNTER — THERAPY VISIT (OUTPATIENT)
Dept: PHYSICAL THERAPY | Facility: OTHER | Age: 88
End: 2023-12-19
Attending: INTERNAL MEDICINE
Payer: MEDICARE

## 2023-12-19 DIAGNOSIS — G57.01 PIRIFORMIS SYNDROME, RIGHT: Primary | ICD-10-CM

## 2023-12-19 PROCEDURE — 97140 MANUAL THERAPY 1/> REGIONS: CPT | Mod: GP

## 2023-12-19 PROCEDURE — 97112 NEUROMUSCULAR REEDUCATION: CPT | Mod: GP

## 2023-12-19 PROCEDURE — 97110 THERAPEUTIC EXERCISES: CPT | Mod: GP

## 2023-12-21 ENCOUNTER — THERAPY VISIT (OUTPATIENT)
Dept: PHYSICAL THERAPY | Facility: OTHER | Age: 88
End: 2023-12-21
Attending: INTERNAL MEDICINE
Payer: MEDICARE

## 2023-12-21 DIAGNOSIS — G57.01 PIRIFORMIS SYNDROME, RIGHT: Primary | ICD-10-CM

## 2023-12-21 PROCEDURE — 97140 MANUAL THERAPY 1/> REGIONS: CPT | Mod: GP

## 2023-12-21 PROCEDURE — 97110 THERAPEUTIC EXERCISES: CPT | Mod: GP

## 2023-12-21 PROCEDURE — 97112 NEUROMUSCULAR REEDUCATION: CPT | Mod: GP

## 2023-12-26 ENCOUNTER — THERAPY VISIT (OUTPATIENT)
Dept: PHYSICAL THERAPY | Facility: OTHER | Age: 88
End: 2023-12-26
Attending: INTERNAL MEDICINE
Payer: MEDICARE

## 2023-12-26 DIAGNOSIS — G57.01 PIRIFORMIS SYNDROME, RIGHT: Primary | ICD-10-CM

## 2023-12-26 PROCEDURE — 97110 THERAPEUTIC EXERCISES: CPT | Mod: GP

## 2023-12-26 PROCEDURE — 97112 NEUROMUSCULAR REEDUCATION: CPT | Mod: GP

## 2023-12-28 ENCOUNTER — THERAPY VISIT (OUTPATIENT)
Dept: PHYSICAL THERAPY | Facility: OTHER | Age: 88
End: 2023-12-28
Attending: INTERNAL MEDICINE
Payer: MEDICARE

## 2023-12-28 DIAGNOSIS — G57.01 PIRIFORMIS SYNDROME, RIGHT: Primary | ICD-10-CM

## 2023-12-28 PROCEDURE — 97112 NEUROMUSCULAR REEDUCATION: CPT | Mod: GP

## 2023-12-28 PROCEDURE — 97110 THERAPEUTIC EXERCISES: CPT | Mod: GP

## 2023-12-28 NOTE — PROGRESS NOTES
12/28/23 1100   Appointment Info   Signing clinician's name / credentials Evert Hernandes PT   Visits Used 17   Medical Diagnosis Right piriformis syndrome, chronic left shoulder pain. Add lumbar radiculopathy ( new referral).   PT Tx Diagnosis Impaired mobility of the right lower extremity due to hip knee and ankle pain and piriformis syndrome.  The left upper extremity   Precautions/Limitations None   Other pertinent information 8/10   Quick Adds Certification   Progress Note/Certification   Start of Care Date 10/17/23   Onset of illness/injury or Date of Surgery 09/19/23  (Date of referral)   Therapy Frequency 2 times per week   Predicted Duration Up to 12 weeks   Certification date from 10/17/23   Certification date to 01/17/24   Progress Note Due Date 12/28/23   Supervision   PT Assistant Visit Number 2       Present No   GOALS   PT Goals 2;3   PT Goal 1   Goal Identifier Pain   Goal Description Patient will report that he is able to walk for distances of up to 400 feet within his home without limitation due to right knee or hip pain within 8 to 10 weeks.  Patient will report he is able to sleep at least 5 of 7 nights per week without limitation due to right hip pain.   Rationale to maximize safety and independence within the home;to maximize safety and independence within the community;to maximize safety and independence with self cares;to maximize safety and independence with performance of ADLs and functional tasks   Goal Progress Ilia feels he is able to now walk as desired w/o limit due to right hip pain, or rigt foot pain. He has no c/o inability to sleep due to pain.   Target Date 01/03/24   Date Met 12/28/23   PT Goal 2   Goal Identifier Strength   Goal Description Patient will demonstrate right lower extremity strength in the hip knee and ankle muscle group of at least 4-4+/5.  Improved strength through the lower extremity biomechanical chain will provide increased support for  "the hip knee and ankle during gait allowing patient to walk as desired without limitation and significant impairment.  He will be able to remain living independently with the ability to ambulate within his home, up and down stairs as required several times per day without limitation due to weakness in the right lower extremity.  He will achieve this goal within 10 to 12 weeks   Rationale to maximize safety and independence within the home;to maximize safety and independence within the community;to maximize safety and independence with performance of ADLs and functional tasks;to maximize safety and independence with self cares   Goal Progress Goal 70% met. Rei demonstrates improved functional ankle strength with notably improved balance. He contiues to demo difficulty with tasks that require full active elevation of the hips ie. stepping over obstacles. He will continue to work with his HEP to improve hip function. His balance is notably improved allowing Tandem stance times in excess of 20\". He has difficulty with SLS balance tasks. He will improve with continuation of his HEP.   Target Date 01/17/24   PT Goal 3   Goal Identifier Gait   Goal Description Patient will be able to walk on level flat ground without significant functional impairment.  He will be able to ambulate for distance of up to 1/4 mile which allows him to get outside his home and attend community events.  He will be able to ambulate for distance which allow attendance at [employer] and other weekly engagements without limitation 8 to 10 weeks   Rationale to maximize safety and independence within the community;to maximize safety and independence within the home;to maximize safety and independence with transportation;to maximize safety and independence with self cares;to maximize safety and independence with performance of ADLs and functional tasks   Goal Progress He is walking as desired within his home without AD. Outdoors he uses a SEC. Feels his B " "LE weakness is limiting his walking more than pain.   Target Date 01/14/24   Date Met 12/28/23   Subjective Report   Subjective Report feels his balance is much better. He is able to walk for longer distances nand longer peiods of time w/o limit due to hip pain.   Objective Measures   Objective Measures Objective Measure 1;Objective Measure 2;Objective Measure 3   Objective Measure 1   Objective Measure Lower extremity strength   Details R LE strength has improved to 4+/5 with exception of hip flexion.  L hip flexion 4/5 right, 4+/5 B  hip abd, ext, adduction. Knee flexion and ext are 4+/5 B.   Objective Measure 2   Objective Measure Gait   Objective Measure 3   Objective Measure Balance   Details Notably improved yet Ilia continues to have difficulty with those tasks that involve stepping over obstacles with height especially when he becomes fatigued.   Treatment Interventions (PT)   Interventions Therapeutic Procedure/Exercise;Gait Training;Manual Therapy;Neuromuscular Re-education   Therapeutic Procedure/Exercise   Therapeutic Procedures: strength, endurance, ROM, flexibillity minutes (42192) 16   Ther Proc 1 Instruction/demo of therapeutic exercises   Ther Proc 1 - Details NuStep seat 11 arms 11 increase to 8' at level 5. Discussed need to continue with his HEP indefinately in order to fully achieve his goals. Med Ex Leg press seat:21 now 140#  now 2x15. Med Ex hip abd continue with 55# 2 x 12. Continue with  4\" lateral step x 15 B as Marty had difficulty with the 6\" step.  Noting improved stability.  6\" anterior step up B  x15. Continue for HEP; Marching and B hip abd B x15 each, romberg stance with EO 2 x30 second hold with equal WBing (Significant improvement in performance noted). Standing hip flexion, abd and ext now with 5# bilaterally. Finger tip touch for balance.   Skilled Intervention Yes instruction and demonstration   Patient Response/Progress fatigue   Neuromuscular Re-education   Neuromuscular re-ed " "of mvmt, balance, coord, kinesthetic sense, posture, proprioception minutes (09057) 25   Neuro Re-ed 1 Balance re-training   Neuro Re-ed 1 - Details Continue standing bilateral stance with reachouts, reach ups to limit if stabillity 10 trials each. Added bend and reach exercises with cone picks off the floor requiring full squat position w/o U/E assist. Continue Tandem stance trials x 15\" 4 X each. Continued improvment in performance. Able to hold each trial for 20\" w/o LOB. 4   Requires few cues and reminders to faciitate use of ankle muscles. Patient is now able to SLS for up to 10\" B. Requires close contact and or finger tip support for safety. Gait with high knee require close contact and cues for moriah, hip flexion. Stepping over 6\" hurdles 8 x 6   hurdles with cues for hip flexion, reducing circumduction. Continues to require cues to increase hip flexion.   Gait Training   Gait 1 Gait training   Gait 1 - Details Gait training with SEC to provide balance safety   Skilled Intervention Verbal and tactile cues for sequencing, improving UE integration   Manual Therapy   Manual Therapy 1 Right hip mobilization   Manual Therapy 1 - Details Continue hip capsular stretch, A/P and lateral glides. D STM to right piriformis. Hip IR/ER stretches, continue manual hip flexor, pirifiormis stretches. Manual right gastroc/soleus stretch. STJ mobilization.   Education   Learner/Method Patient;Family;Caregiver   Plan   Home program As above.  Patient's home exercise program will be reviewed and revised as indicated   Plan for next session Rei will continue with his HEP with focus on strengthening of the B Hips, LEs. Contiue to work on balance exercises safely. He is to call with any questions   Total Session Time   Timed Code Treatment Minutes 41   Total Treatment Time (sum of timed and untimed services) 41         DISCHARGE  Reason for Discharge: Patient has made significant progress towards all goals.    Equipment Issued: " None    Discharge Plan: Patient to continue home program.    Referring Provider:  Trino Dahl

## 2024-01-01 ENCOUNTER — APPOINTMENT (OUTPATIENT)
Dept: PHYSICAL THERAPY | Facility: OTHER | Age: 89
End: 2024-01-01
Payer: MEDICARE

## 2024-01-01 ENCOUNTER — APPOINTMENT (OUTPATIENT)
Dept: CT IMAGING | Facility: OTHER | Age: 89
End: 2024-01-01
Attending: HOSPITALIST
Payer: MEDICARE

## 2024-01-01 ENCOUNTER — APPOINTMENT (OUTPATIENT)
Dept: GENERAL RADIOLOGY | Facility: OTHER | Age: 89
End: 2024-01-01
Attending: INTERNAL MEDICINE
Payer: MEDICARE

## 2024-01-01 ENCOUNTER — APPOINTMENT (OUTPATIENT)
Dept: OCCUPATIONAL THERAPY | Facility: OTHER | Age: 89
End: 2024-01-01
Payer: MEDICARE

## 2024-01-01 ENCOUNTER — APPOINTMENT (OUTPATIENT)
Dept: PHYSICAL THERAPY | Facility: OTHER | Age: 89
End: 2024-01-01
Attending: INTERNAL MEDICINE
Payer: MEDICARE

## 2024-01-01 ENCOUNTER — APPOINTMENT (OUTPATIENT)
Dept: GENERAL RADIOLOGY | Facility: OTHER | Age: 89
End: 2024-01-01
Payer: MEDICARE

## 2024-01-01 PROCEDURE — 71045 X-RAY EXAM CHEST 1 VIEW: CPT | Mod: TC

## 2024-01-01 PROCEDURE — 71260 CT THORAX DX C+: CPT | Mod: MG

## 2024-01-01 PROCEDURE — 71046 X-RAY EXAM CHEST 2 VIEWS: CPT

## 2024-02-01 ENCOUNTER — NURSE TRIAGE (OUTPATIENT)
Dept: PEDIATRICS | Facility: OTHER | Age: 89
End: 2024-02-01
Payer: COMMERCIAL

## 2024-02-01 NOTE — TELEPHONE ENCOUNTER
Patients daughter called and has concerns about her dad's legs. Asked if she can be contacted back sooner rather than later    Keo Mata on 2/1/2024 at 10:27 AM

## 2024-02-02 ENCOUNTER — HOSPITAL ENCOUNTER (OUTPATIENT)
Dept: ULTRASOUND IMAGING | Facility: OTHER | Age: 89
Discharge: HOME | End: 2024-02-02
Payer: MEDICARE

## 2024-02-02 ENCOUNTER — OFFICE VISIT (OUTPATIENT)
Dept: INTERNAL MEDICINE | Facility: OTHER | Age: 89
End: 2024-02-02
Payer: MEDICARE

## 2024-02-02 VITALS
BODY MASS INDEX: 26.18 KG/M2 | WEIGHT: 187 LBS | HEART RATE: 66 BPM | SYSTOLIC BLOOD PRESSURE: 116 MMHG | RESPIRATION RATE: 16 BRPM | HEIGHT: 71 IN | TEMPERATURE: 97.4 F | DIASTOLIC BLOOD PRESSURE: 62 MMHG | OXYGEN SATURATION: 96 %

## 2024-02-02 DIAGNOSIS — R79.1 ABNORMAL COAGULATION PROFILE: ICD-10-CM

## 2024-02-02 DIAGNOSIS — R79.89 ELEVATED D-DIMER: ICD-10-CM

## 2024-02-02 DIAGNOSIS — I87.2 VENOUS STASIS DERMATITIS OF BOTH LOWER EXTREMITIES: ICD-10-CM

## 2024-02-02 DIAGNOSIS — M79.89 REDNESS AND SWELLING OF LOWER LEG: Primary | ICD-10-CM

## 2024-02-02 DIAGNOSIS — R23.8 REDNESS AND SWELLING OF LOWER LEG: Primary | ICD-10-CM

## 2024-02-02 DIAGNOSIS — G62.9 NEUROPATHY: ICD-10-CM

## 2024-02-02 DIAGNOSIS — R23.8 REDNESS AND SWELLING OF LOWER LEG: ICD-10-CM

## 2024-02-02 DIAGNOSIS — M79.89 REDNESS AND SWELLING OF LOWER LEG: ICD-10-CM

## 2024-02-02 LAB
ANION GAP SERPL CALCULATED.3IONS-SCNC: 11 MMOL/L (ref 7–15)
BASOPHILS # BLD AUTO: 0 10E3/UL (ref 0–0.2)
BASOPHILS # BLD AUTO: 0 10E3/UL (ref 0–0.2)
BASOPHILS NFR BLD AUTO: 0 %
BASOPHILS NFR BLD AUTO: 0 %
BUN SERPL-MCNC: 21.8 MG/DL (ref 8–23)
CALCIUM SERPL-MCNC: 9.2 MG/DL (ref 8.2–9.6)
CHLORIDE SERPL-SCNC: 98 MMOL/L (ref 98–107)
CREAT SERPL-MCNC: 1.07 MG/DL (ref 0.67–1.17)
D DIMER PPP FEU-MCNC: >20 UG/ML FEU (ref 0–0.5)
DEPRECATED HCO3 PLAS-SCNC: 28 MMOL/L (ref 22–29)
EGFRCR SERPLBLD CKD-EPI 2021: 66 ML/MIN/1.73M2
EOSINOPHIL # BLD AUTO: 0.1 10E3/UL (ref 0–0.7)
EOSINOPHIL # BLD AUTO: 0.1 10E3/UL (ref 0–0.7)
EOSINOPHIL NFR BLD AUTO: 1 %
EOSINOPHIL NFR BLD AUTO: 1 %
ERYTHROCYTE [DISTWIDTH] IN BLOOD BY AUTOMATED COUNT: 13.2 % (ref 10–15)
ERYTHROCYTE [DISTWIDTH] IN BLOOD BY AUTOMATED COUNT: 13.2 % (ref 10–15)
GLUCOSE SERPL-MCNC: 311 MG/DL (ref 70–99)
HCT VFR BLD AUTO: 34.5 % (ref 40–53)
HCT VFR BLD AUTO: 34.5 % (ref 40–53)
HGB BLD-MCNC: 11.9 G/DL (ref 13.3–17.7)
HGB BLD-MCNC: 12 G/DL (ref 13.3–17.7)
IMM GRANULOCYTES # BLD: 0 10E3/UL
IMM GRANULOCYTES # BLD: 0 10E3/UL
IMM GRANULOCYTES NFR BLD: 0 %
IMM GRANULOCYTES NFR BLD: 0 %
INR PPP: 1.37 (ref 0.85–1.15)
LYMPHOCYTES # BLD AUTO: 1.1 10E3/UL (ref 0.8–5.3)
LYMPHOCYTES # BLD AUTO: 1.1 10E3/UL (ref 0.8–5.3)
LYMPHOCYTES NFR BLD AUTO: 12 %
LYMPHOCYTES NFR BLD AUTO: 12 %
MCH RBC QN AUTO: 31.6 PG (ref 26.5–33)
MCH RBC QN AUTO: 32.1 PG (ref 26.5–33)
MCHC RBC AUTO-ENTMCNC: 34.5 G/DL (ref 31.5–36.5)
MCHC RBC AUTO-ENTMCNC: 34.8 G/DL (ref 31.5–36.5)
MCV RBC AUTO: 92 FL (ref 78–100)
MCV RBC AUTO: 92 FL (ref 78–100)
MONOCYTES # BLD AUTO: 0.7 10E3/UL (ref 0–1.3)
MONOCYTES # BLD AUTO: 0.7 10E3/UL (ref 0–1.3)
MONOCYTES NFR BLD AUTO: 8 %
MONOCYTES NFR BLD AUTO: 8 %
NEUTROPHILS # BLD AUTO: 7 10E3/UL (ref 1.6–8.3)
NEUTROPHILS # BLD AUTO: 7.3 10E3/UL (ref 1.6–8.3)
NEUTROPHILS NFR BLD AUTO: 79 %
NEUTROPHILS NFR BLD AUTO: 79 %
NRBC # BLD AUTO: 0 10E3/UL
NRBC # BLD AUTO: 0 10E3/UL
NRBC BLD AUTO-RTO: 0 /100
NRBC BLD AUTO-RTO: 0 /100
PLATELET # BLD AUTO: 244 10E3/UL (ref 150–450)
PLATELET # BLD AUTO: 256 10E3/UL (ref 150–450)
POTASSIUM SERPL-SCNC: 4.8 MMOL/L (ref 3.4–5.3)
RBC # BLD AUTO: 3.74 10E6/UL (ref 4.4–5.9)
RBC # BLD AUTO: 3.76 10E6/UL (ref 4.4–5.9)
RETICS # AUTO: 0.06 10E6/UL (ref 0.03–0.1)
RETICS/RBC NFR AUTO: 1.6 % (ref 0.5–2)
SODIUM SERPL-SCNC: 137 MMOL/L (ref 135–145)
WBC # BLD AUTO: 9 10E3/UL (ref 4–11)
WBC # BLD AUTO: 9.2 10E3/UL (ref 4–11)

## 2024-02-02 PROCEDURE — 99214 OFFICE O/P EST MOD 30 MIN: CPT

## 2024-02-02 PROCEDURE — 85045 AUTOMATED RETICULOCYTE COUNT: CPT | Mod: ZL

## 2024-02-02 PROCEDURE — G0463 HOSPITAL OUTPT CLINIC VISIT: HCPCS | Mod: 25

## 2024-02-02 PROCEDURE — 85610 PROTHROMBIN TIME: CPT | Mod: ZL

## 2024-02-02 PROCEDURE — 93970 EXTREMITY STUDY: CPT

## 2024-02-02 PROCEDURE — 36415 COLL VENOUS BLD VENIPUNCTURE: CPT | Mod: ZL

## 2024-02-02 PROCEDURE — 85379 FIBRIN DEGRADATION QUANT: CPT | Mod: ZL

## 2024-02-02 PROCEDURE — 82374 ASSAY BLOOD CARBON DIOXIDE: CPT | Mod: ZL

## 2024-02-02 PROCEDURE — 85041 AUTOMATED RBC COUNT: CPT | Mod: ZL

## 2024-02-02 RX ORDER — CEPHALEXIN 500 MG/1
500 CAPSULE ORAL 2 TIMES DAILY
Qty: 14 CAPSULE | Refills: 0 | Status: SHIPPED | OUTPATIENT
Start: 2024-02-02 | End: 2024-02-09

## 2024-02-02 RX ORDER — SENNOSIDES 8.6 MG
650 CAPSULE ORAL EVERY 8 HOURS PRN
Qty: 90 TABLET | Refills: 0 | Status: SHIPPED | OUTPATIENT
Start: 2024-02-02

## 2024-02-02 ASSESSMENT — ENCOUNTER SYMPTOMS
SHORTNESS OF BREATH: 0
WHEEZING: 0
DYSURIA: 0
ABDOMINAL PAIN: 0
FEVER: 0
PALPITATIONS: 0
CHILLS: 0
COUGH: 0
LIGHT-HEADEDNESS: 0
DIZZINESS: 0

## 2024-02-02 ASSESSMENT — PAIN SCALES - GENERAL: PAINLEVEL: WORST PAIN (10)

## 2024-02-02 NOTE — TELEPHONE ENCOUNTER
Called and spoke to Patient's daughter after verifying last name and date of birth. Relayed message from provider: Patient can be evaluated in clinic- I can see him this afternoon if they would like.  Daughter verbalized understanding and stated that today would work for an appointment at 1:00 with covering provider.      Buffy Archuleta RN on 2/2/2024 at 12:01 PM

## 2024-02-02 NOTE — PROGRESS NOTES
Assessment & Plan   Problem List Items Addressed This Visit    None  Visit Diagnoses       Redness and swelling of lower leg    -  Primary    Relevant Medications    acetaminophen (TYLENOL) 650 MG CR tablet    Other Relevant Orders    Basic Metabolic Panel (Completed)    CBC and Differential (Completed)    D dimer quantitative (Completed)    US Lower Extremity Venous Duplex Bilateral (Completed)    Venous stasis dermatitis of both lower extremities        Relevant Medications    cephALEXin (KEFLEX) 500 MG capsule    acetaminophen (TYLENOL) 650 MG CR tablet    Other Relevant Orders    US Lower Extremity Venous Duplex Bilateral (Completed)    Neuropathy        Relevant Medications    acetaminophen (TYLENOL) 650 MG CR tablet    Elevated d-dimer        Relevant Orders    US Lower Extremity Venous Duplex Bilateral (Completed)    Lab Blood Morphology Pathologist Review    Adult Oncology/Hematology  Referral    INR (Completed)    Abnormal coagulation profile        Relevant Orders    INR (Completed)           Physical assessment is consistent with venous stasis dermatitis.  We will increase patient's Lasix by adding an additional 20 mg dose at lunchtime for the next 5 days, encouraged elevating legs above the level of the heart, wear compression stockings.  Suspect there is also an element of diabetic neuropathy.  Patient is only taking gabapentin twice daily, recommend that he increase this to 3 times daily.  Additionally, recommend considering Tylenol twice daily as needed.  We proceeded with lab work which revealed critically elevated D-dimer greater than 20.  We proceeded with venous duplex ultrasound which did not show evidence of DVT.  Patient is not dyspneic or showing any signs of respiratory distress.  Peripheral blood smear added to lab work, referral to hematology for further workup of this was placed.  Discussed possible etiologies of elevated D-dimer such as malignancy, age, medications.  Instructed  "patient and daughters to follow-up if symptoms worsen or do not improve.               No follow-ups on file.      ISABELLA Ortega CNP  [facility]    Review of Systems   Constitutional:  Negative for chills and fever.   Respiratory:  Negative for cough, shortness of breath and wheezing.    Cardiovascular:  Positive for peripheral edema (Bilateral lower legs). Negative for chest pain and palpitations.   Gastrointestinal:  Negative for abdominal pain.   Genitourinary:  Negative for dysuria.   Skin:  Negative for rash.        Slight increased redness of lower legs   Neurological:  Negative for dizziness and light-headedness.          Mary Morillo is a 89 year old, presenting for the following health issues:    Patient presents to clinic with daughters with concern of increased swelling, redness, pain in legs.  Daughters report that his legs felt hot, they had patient elevate legs and is slightly improved.  Patient states that he feels as though his legs are burning.  Denies any fevers, chills or open sores.  Patient is a diabetic.      Wt Readings from Last 5 Encounters:   02/02/24 84.8 kg (187 lb)   12/18/23 84.8 kg (187 lb)   10/19/23 87.2 kg (192 lb 4.8 oz)   10/12/23 87.6 kg (193 lb 1.6 oz)   09/18/23 90.3 kg (199 lb)        RECHECK (Leg weakness)    History of Present Illness       Reason for visit:  Pain in legs  Symptom onset:  1-2 weeks ago    He eats 2-3 servings of fruits and vegetables daily.He consumes 1 sweetened beverage(s) daily.He exercises with enough effort to increase his heart rate 9 or less minutes per day.  He exercises with enough effort to increase his heart rate 3 or less days per week.   He is taking medications regularly.                     Objective    /62 (BP Location: Right arm, Patient Position: Sitting, Cuff Size: Adult Regular)   Pulse 66   Temp 97.4  F (36.3  C) (Temporal)   Resp 16   Ht 1.791 m (5' 10.5\")   Wt 84.8 kg (187 lb)   SpO2 96%  "  BMI 26.45 kg/m    Body mass index is 26.45 kg/m .  Physical Exam  Vitals reviewed.   HENT:      Head: Normocephalic and atraumatic.   Eyes:      General:         Right eye: No discharge.         Left eye: No discharge.      Pupils: Pupils are equal, round, and reactive to light.   Cardiovascular:      Rate and Rhythm: Normal rate.      Pulses: Normal pulses.      Heart sounds: Normal heart sounds. No murmur heard.  Musculoskeletal:      Right lower leg: Edema present.      Left lower leg: Edema present.   Feet:      Right foot:      Skin integrity: Erythema (slight) and dry skin present. No warmth.      Left foot:      Skin integrity: Erythema (slight) and dry skin present. No warmth.      Comments: Pitting foot edema  Skin:     General: Skin is warm and dry.      Comments: Venous stasis dermatitis of lower legs, +1 pitting edema   Neurological:      General: No focal deficit present.      Mental Status: He is alert. Mental status is at baseline.        Results for orders placed or performed during the hospital encounter of 02/02/24   US Lower Extremity Venous Duplex Bilateral     Status: None    Narrative    Exam:US LOWER EXTREMITY VENOUS DUPLEX BILATERAL    History:  89 years Male   : Lower extremity pain and swelling.  Elevated d-dimer.    Comparisons:    Technique: Venous duplex ultrasonography of the bilateral lower  extremities was performed.     Findings: The common femoral veins, superficial femoral veins and  popliteal veins are fully compressible with spontaneous and  augmentable venous flow.           Impression    Impression: No evidence of deep venous thrombosis within the lower  extremities.    SAVANA METZGER MD         SYSTEM ID:  [id number]   Results for orders placed or performed in visit on 02/02/24   Basic Metabolic Panel     Status: Abnormal   Result Value Ref Range    Sodium 137 135 - 145 mmol/L    Potassium 4.8 3.4 - 5.3 mmol/L    Chloride 98 98 - 107 mmol/L    Carbon Dioxide (CO2) 28 22 -  29 mmol/L    Anion Gap 11 7 - 15 mmol/L    Urea Nitrogen 21.8 8.0 - 23.0 mg/dL    Creatinine 1.07 0.67 - 1.17 mg/dL    GFR Estimate 66 >60 mL/min/1.73m2    Calcium 9.2 8.2 - 9.6 mg/dL    Glucose 311 (H) 70 - 99 mg/dL   D dimer quantitative     Status: Abnormal   Result Value Ref Range    D-Dimer Quantitative >20.00 (HH) 0.00 - 0.50 ug/mL FEU    Narrative    This D-dimer assay is intended for use in conjunction with a clinical pretest probability assessment model to exclude pulmonary embolism (PE) and deep venous thrombosis (DVT) in outpatients suspected of PE or DVT. The cut-off value is 0.50 ug/mL FEU.    For patients 50 years of age or older, the application of age-adjusted cut-off values for D-Dimer may increase the specificity without significant effect on sensitivity. The literature suggested calculation age adjusted cut-off in ug/L = age in years x 10 ug/L. The results in this laboratory are reported as ug/mL rather than ug/L. The calculation for age adjusted cut off in ug/mL= age in years x 0.01 ug/mL. For example, the cut off for a 76 year old male is 76 x 0.01 ug/mL = 0.76 ug/mL (760 ug/L).    M Kaya et al. Age adjusted D-dimer cut-off levels to rule out pulmonary embolism: The ADJUST-PE Study. KODAK 2014;311:3906-0671.; HJ Jose M et al. Diagnostic accuracy of conventional or age adjusted D-dimer cutoff values in older patients with suspected venous thromboembolism. Systemic review and meta-analysis. BMJ 2013:346:f2492.   CBC with platelets and differential     Status: Abnormal   Result Value Ref Range    WBC Count 9.0 4.0 - 11.0 10e3/uL    RBC Count 3.76 (L) 4.40 - 5.90 10e6/uL    Hemoglobin 11.9 (L) 13.3 - 17.7 g/dL    Hematocrit 34.5 (L) 40.0 - 53.0 %    MCV 92 78 - 100 fL    MCH 31.6 26.5 - 33.0 pg    MCHC 34.5 31.5 - 36.5 g/dL    RDW 13.2 10.0 - 15.0 %    Platelet Count 244 150 - 450 10e3/uL    % Neutrophils 79 %    % Lymphocytes 12 %    % Monocytes 8 %    % Eosinophils 1 %    % Basophils 0 %    %  Immature Granulocytes 0 %    NRBCs per 100 WBC 0 <1 /100    Absolute Neutrophils 7.0 1.6 - 8.3 10e3/uL    Absolute Lymphocytes 1.1 0.8 - 5.3 10e3/uL    Absolute Monocytes 0.7 0.0 - 1.3 10e3/uL    Absolute Eosinophils 0.1 0.0 - 0.7 10e3/uL    Absolute Basophils 0.0 0.0 - 0.2 10e3/uL    Absolute Immature Granulocytes 0.0 <=0.4 10e3/uL    Absolute NRBCs 0.0 10e3/uL   CBC with platelets and differential     Status: Abnormal   Result Value Ref Range    WBC Count 9.2 4.0 - 11.0 10e3/uL    RBC Count 3.74 (L) 4.40 - 5.90 10e6/uL    Hemoglobin 12.0 (L) 13.3 - 17.7 g/dL    Hematocrit 34.5 (L) 40.0 - 53.0 %    MCV 92 78 - 100 fL    MCH 32.1 26.5 - 33.0 pg    MCHC 34.8 31.5 - 36.5 g/dL    RDW 13.2 10.0 - 15.0 %    Platelet Count 256 150 - 450 10e3/uL    % Neutrophils 79 %    % Lymphocytes 12 %    % Monocytes 8 %    % Eosinophils 1 %    % Basophils 0 %    % Immature Granulocytes 0 %    NRBCs per 100 WBC 0 <1 /100    Absolute Neutrophils 7.3 1.6 - 8.3 10e3/uL    Absolute Lymphocytes 1.1 0.8 - 5.3 10e3/uL    Absolute Monocytes 0.7 0.0 - 1.3 10e3/uL    Absolute Eosinophils 0.1 0.0 - 0.7 10e3/uL    Absolute Basophils 0.0 0.0 - 0.2 10e3/uL    Absolute Immature Granulocytes 0.0 <=0.4 10e3/uL    Absolute NRBCs 0.0 10e3/uL   Reticulocyte count     Status: Normal   Result Value Ref Range    % Reticulocyte 1.6 0.5 - 2.0 %    Absolute Reticulocyte 0.061 0.025 - 0.095 10e6/uL   INR     Status: Abnormal   Result Value Ref Range    INR 1.37 (H) 0.85 - 1.15   CBC and Differential     Status: Abnormal    Narrative    The following orders were created for panel order CBC and Differential.  Procedure                               Abnormality         Status                     ---------                               -----------         ------                     CBC with platelets and d...[[id number]]  Abnormal            Final result                 Please view results for these tests on the individual orders.   Lab Blood Morphology Pathologist  Review     Status: Abnormal (In process)    Narrative    The following orders were created for panel order Lab Blood Morphology Pathologist Review.  Procedure                               Abnormality         Status                     ---------                               -----------         ------                     Bld morphology pathology...[[id number]]                      In process                 CBC with platelets and d...[[id number]]  Abnormal            Final result               Reticulocyte count[[id number]]           Normal              Final result               Morphology Tracking[[id number]]                              Final result                 Please view results for these tests on the individual orders.                     Signed Electronically by: ISABELLA Ortega CNP

## 2024-02-02 NOTE — NURSING NOTE
"Chief Complaint   Patient presents with    RECHECK     Leg weakness     Patient presents to the clinic today for leg weakness. Patient states he has leg leg pain and weakness for the last 2 weeks.Patient rates pain at a 10  Initial /62 (BP Location: Right arm, Patient Position: Sitting, Cuff Size: Adult Regular)   Pulse 66   Temp 97.4  F (36.3  C) (Temporal)   Resp 16   Ht 1.791 m (5' 10.5\")   Wt 84.8 kg (187 lb)   SpO2 96%   BMI 26.45 kg/m   Estimated body mass index is 26.45 kg/m  as calculated from the following:    Height as of this encounter: 1.791 m (5' 10.5\").    Weight as of this encounter: 84.8 kg (187 lb).  Medication Review: complete    The next two questions are to help us understand your food security.  If you are feeling you need any assistance in this area, we have resources available to support you today.          12/18/2023   SDOH- Food Insecurity   Within the past 12 months, did you worry that your food would run out before you got money to buy more? N   Within the past 12 months, did the food you bought just not last and you didn t have money to get more? N         Health Care Directive:  Patient has a Health Care Directive on file      Soledad Patel LPN      "

## 2024-02-02 NOTE — TELEPHONE ENCOUNTER
"S-(situation): Patient's legs are painful to touch and were rock hard, with left foot swelling    B-(background): History of CVA, hereditary and idiopathic peripheral neuropathy.    A-(assessment): Patient's daughter reports patient's right leg was hard as a rock yesterday, and radiating heat and was painful to touch, left calf was also radiating heat, red and painful to touch. Patient reports that it was hard to walk due to leg weakness. Patient is taking eliquis. Patient elevated their legs yesterday for 2 hours and legs were easier to touch, however, still hard. Daughter is going over to talk to patient this morning. When patient is walking they have extreme leg pain.    R-(recommendations): Per protocol patient should present to the ED, patient's daughter requested having this sent to PCP for review before heading to the ED.    Buffy Archuleta RN on 2/2/2024 at 10:38 AM    Reason for Disposition   Thigh or calf pain in only one leg and present > 1 hour    Additional Information   Negative: Looks like a broken bone or dislocated joint (e.g., crooked or deformed)   Negative: Sounds like a life-threatening emergency to the triager   Negative: Followed a hip injury   Negative: Followed a knee injury   Negative: Followed an ankle or foot injury   Negative: Back pain radiating (shooting) into leg(s)   Negative: Foot pain is main symptom   Negative: Ankle pain is main symptom   Negative: Knee pain is main symptom   Negative: Leg swelling is main symptom   Negative: Chest pain   Negative: Difficulty breathing   Negative: Entire foot is cool or blue in comparison to other side   Negative: Unable to walk    Answer Assessment - Initial Assessment Questions  1. ONSET: \"When did the pain start?\"       Left calf muscle pain yesterday  2. LOCATION: \"Where is the pain located?\"       Right and left leg  3. PAIN: \"How bad is the pain?\"    (Scale 1-10; or mild, moderate, severe)    -  MILD (1-3): doesn't interfere with normal " "activities     -  MODERATE (4-7): interferes with normal activities (e.g., work or school) or awakens from sleep, limping     -  SEVERE (8-10): excruciating pain, unable to do any normal activities, unable to walk      Moderate  4. WORK OR EXERCISE: \"Has there been any recent work or exercise that involved this part of the body?\"       No  5. CAUSE: \"What do you think is causing the leg pain?\"      Unsure   6. OTHER SYMPTOMS: \"Do you have any other symptoms?\" (e.g., chest pain, back pain, breathing difficulty, swelling, rash, fever, numbness, weakness)      Weakness, pain, swelling in right foot    Protocols used: Leg Pain-A-[place]    "

## 2024-02-03 DIAGNOSIS — I50.9 ACUTE CONGESTIVE HEART FAILURE, UNSPECIFIED HEART FAILURE TYPE (H): ICD-10-CM

## 2024-02-05 ENCOUNTER — PATIENT OUTREACH (OUTPATIENT)
Dept: ONCOLOGY | Facility: OTHER | Age: 89
End: 2024-02-05
Payer: COMMERCIAL

## 2024-02-05 RX ORDER — FUROSEMIDE 40 MG
40 TABLET ORAL DAILY
Qty: 30 TABLET | Refills: 3 | Status: SHIPPED | OUTPATIENT
Start: 2024-02-05 | End: 2024-06-06

## 2024-02-05 NOTE — PROGRESS NOTES
Oncology/Hematology Care Coordination - Referral Review    Referred by:  Hyun Yap    Diagnosis:  elevated Ddimer    Most recent Imaging:  US lower extremities 2/2/24     Most recent Lab:  2/2/24    Surgery/Biopsy:      Pathology:      Outside Records:      US negative for clot, but d dimer elevated at >20.00    Hilda Love RN on 2/5/2024 at 10:41 AM

## 2024-02-05 NOTE — TELEPHONE ENCOUNTER
Ruddy Zimmerman sent Rx request for the following:      Requested Prescriptions   Pending Prescriptions Disp Refills    furosemide (LASIX) 40 MG tablet [Pharmacy Med Name: FUROSEMIDE 40MG TABLET] 30 tablet 3     Sig: TAKE 1 TABLET (40 MG) BY MOUTH DAILY  DAYS       Diuretics (Including Combos) Protocol Failed - 2/5/2024 12:14 PM        Failed - Recent (12 mo) or future (30 days) visit within the authorizing provider's specialty     The patient must have completed an in-person or virtual visit within the past 12 months or has a future visit scheduled within the next 90 days with the authorizing provider s specialty.  Urgent care and e-visits do not quality as an office visit for this protocol.         Last Prescription Date:   10/12/23  Last Fill Qty/Refills:         30 tab, R-3    Last Office Visit:              2/2/24 [facility]   Future Office visit:           None currently scheduled.     Unable to complete prescription refill per RN Medication Refill Policy.     Christa Mccormick RN on 2/5/2024 at 12:17 PM

## 2024-02-07 LAB — PATH REPORT.FINAL DX SPEC: NORMAL

## 2024-02-19 ENCOUNTER — ONCOLOGY VISIT (OUTPATIENT)
Dept: ONCOLOGY | Facility: OTHER | Age: 89
End: 2024-02-19
Payer: COMMERCIAL

## 2024-02-19 ENCOUNTER — TELEPHONE (OUTPATIENT)
Dept: ONCOLOGY | Facility: OTHER | Age: 89
End: 2024-02-19

## 2024-02-19 VITALS
DIASTOLIC BLOOD PRESSURE: 62 MMHG | OXYGEN SATURATION: 97 % | SYSTOLIC BLOOD PRESSURE: 102 MMHG | WEIGHT: 184.2 LBS | BODY MASS INDEX: 26.06 KG/M2 | TEMPERATURE: 98.1 F | HEART RATE: 70 BPM | RESPIRATION RATE: 14 BRPM

## 2024-02-19 DIAGNOSIS — R79.89 ELEVATED D-DIMER: ICD-10-CM

## 2024-02-19 LAB — D DIMER PPP FEU-MCNC: >20 UG/ML FEU (ref 0–0.5)

## 2024-02-19 PROCEDURE — 99205 OFFICE O/P NEW HI 60 MIN: CPT | Performed by: INTERNAL MEDICINE

## 2024-02-19 PROCEDURE — 85379 FIBRIN DEGRADATION QUANT: CPT | Mod: ZL | Performed by: INTERNAL MEDICINE

## 2024-02-19 PROCEDURE — G0463 HOSPITAL OUTPT CLINIC VISIT: HCPCS

## 2024-02-19 PROCEDURE — 36415 COLL VENOUS BLD VENIPUNCTURE: CPT | Mod: ZL | Performed by: INTERNAL MEDICINE

## 2024-02-19 ASSESSMENT — PAIN SCALES - GENERAL: PAINLEVEL: MODERATE PAIN (5)

## 2024-02-19 NOTE — Clinical Note
Can you please call daughter Soledad [phone]. Let her know that d-dimer is still high. If they decide I can put in a CT chest, abdomen and pelvis

## 2024-02-19 NOTE — TELEPHONE ENCOUNTER
DATE/TIME OF CALL RECEIVED FROM LAB:  02/19/24 at 10:52 AM   LAB TEST:  ddimer  LAB VALUE:  >20  PROVIDER NOTIFIED?: yes  PROVIDER NAME: Dr. Galindo  DATE/TIME LAB VALUE REPORTED TO PROVIDER:  11:00 AM   MECHANISM OF PROVIDER NOTIFICATION: Face-To-Face  PROVIDER RESPONSE: please call daughter Soledad [phone]. Let her know that d-dimer is still high. If they decide I can put in a CT chest, abdomen and pelvis   Hilda Love RN...........2/19/2024 11:00 AM
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
## 2024-02-19 NOTE — NURSING NOTE
"Oncology Rooming Note    February 19, 2024 9:36 AM   Ilia Ferguson is a 89 year old male who presents for:    Chief Complaint   Patient presents with    Hematology     Elevated D-Dimer   LE Ultrasound Negative on 2/2/2024      Initial Vitals: /62 (BP Location: Right arm, Patient Position: Sitting, Cuff Size: Adult Regular)   Pulse 70   Temp 98.1  F (36.7  C) (Temporal)   Resp 14   Wt 83.6 kg (184 lb 3.2 oz)   SpO2 97%   BMI 26.06 kg/m   Estimated body mass index is 26.06 kg/m  as calculated from the following:    Height as of 2/2/24: 1.791 m (5' 10.5\").    Weight as of this encounter: 83.6 kg (184 lb 3.2 oz). Body surface area is 2.04 meters squared.  Moderate Pain (5) Comment: Data Unavailable   No LMP for male patient.  Allergies reviewed: Yes  Medications reviewed: Yes    Medications: Medication refills not needed today.  Pharmacy name entered into EPIC:    [facility] - [place], [place] - [address]  [id number] - [place], [place] - [address]    Frailty Screening:   Is the patient here for a new oncology consult visit in cancer care? 1. Yes. Over the past month, have you experienced difficulty or required a caregiver to assist with:   1. Balance, walking or general mobility (including any falls)? NO  2. Completion of self-care tasks such as bathing, dressing, toileting, grooming/hygiene?  NO  3. Concentration or memory that affects your daily life?  NO       Sofia Guevara LPN                 "
How Severe Is Your Skin Lesion?: moderate
Have Your Skin Lesions Been Treated?: not been treated
Is This A New Presentation, Or A Follow-Up?: Skin Lesions
Additional History: Pain 0/10

## 2024-02-19 NOTE — TELEPHONE ENCOUNTER
Spoke with daughter and Ilia would like scans to be done. Dr. Galindo will place orders.  Hilda Love RN...........2/19/2024 11:05 AM

## 2024-02-26 ENCOUNTER — MYC MEDICAL ADVICE (OUTPATIENT)
Dept: PEDIATRICS | Facility: OTHER | Age: 89
End: 2024-02-26
Payer: COMMERCIAL

## 2024-02-27 ENCOUNTER — HOSPITAL ENCOUNTER (OUTPATIENT)
Dept: CT IMAGING | Facility: OTHER | Age: 89
Discharge: HOME | End: 2024-02-27
Attending: INTERNAL MEDICINE
Payer: MEDICARE

## 2024-02-27 ENCOUNTER — LAB (OUTPATIENT)
Dept: LAB | Facility: OTHER | Age: 89
End: 2024-02-27
Payer: MEDICARE

## 2024-02-27 DIAGNOSIS — R73.03 PRE-DIABETES: ICD-10-CM

## 2024-02-27 DIAGNOSIS — R79.89 ELEVATED D-DIMER: ICD-10-CM

## 2024-02-27 LAB
CREAT SERPL-MCNC: 0.92 MG/DL (ref 0.67–1.17)
EGFRCR SERPLBLD CKD-EPI 2021: 78 ML/MIN/1.73M2

## 2024-02-27 PROCEDURE — 250N000011 HC RX IP 250 OP 636: Performed by: INTERNAL MEDICINE

## 2024-02-27 PROCEDURE — 82565 ASSAY OF CREATININE: CPT | Mod: ZL

## 2024-02-27 PROCEDURE — 36415 COLL VENOUS BLD VENIPUNCTURE: CPT | Mod: ZL

## 2024-02-27 PROCEDURE — G1010 CDSM STANSON: HCPCS

## 2024-02-27 PROCEDURE — 74177 CT ABD & PELVIS W/CONTRAST: CPT | Mod: MG

## 2024-02-27 RX ORDER — IOPAMIDOL 755 MG/ML
105 INJECTION, SOLUTION INTRAVASCULAR ONCE
Status: COMPLETED | OUTPATIENT
Start: 2024-02-27 | End: 2024-02-27

## 2024-02-27 RX ADMIN — IOPAMIDOL 105 ML: 755 INJECTION, SOLUTION INTRAVENOUS at 10:52

## 2024-02-29 NOTE — PATIENT INSTRUCTIONS
Continue home dose Ativan 0.5 mg nightly   Wear compression stockings daily- elevate legs above level of the heart when resting    Use a moisturizer daily.     Take an additional 20 mg of lasix at noon for the next 5 days.     Tylenol in the morning and evening.     Take gabapentin at noon as well as morning and night      Peripheral blood smear ordered due to critically elevated D-dimer-we can also refer you to hematology for further workup of this.    Results for orders placed or performed during the hospital encounter of 02/02/24   US Lower Extremity Venous Duplex Bilateral     Status: None    Narrative    Exam:US LOWER EXTREMITY VENOUS DUPLEX BILATERAL    History:  89 years Male   : Lower extremity pain and swelling.  Elevated d-dimer.    Comparisons:    Technique: Venous duplex ultrasonography of the bilateral lower  extremities was performed.     Findings: The common femoral veins, superficial femoral veins and  popliteal veins are fully compressible with spontaneous and  augmentable venous flow.           Impression    Impression: No evidence of deep venous thrombosis within the lower  extremities.    SAVANA METZGER MD         SYSTEM ID:  [id number]   Results for orders placed or performed in visit on 02/02/24   Basic Metabolic Panel     Status: Abnormal   Result Value Ref Range    Sodium 137 135 - 145 mmol/L    Potassium 4.8 3.4 - 5.3 mmol/L    Chloride 98 98 - 107 mmol/L    Carbon Dioxide (CO2) 28 22 - 29 mmol/L    Anion Gap 11 7 - 15 mmol/L    Urea Nitrogen 21.8 8.0 - 23.0 mg/dL    Creatinine 1.07 0.67 - 1.17 mg/dL    GFR Estimate 66 >60 mL/min/1.73m2    Calcium 9.2 8.2 - 9.6 mg/dL    Glucose 311 (H) 70 - 99 mg/dL   D dimer quantitative     Status: Abnormal   Result Value Ref Range    D-Dimer Quantitative >20.00 (HH) 0.00 - 0.50 ug/mL FEU    Narrative    This D-dimer assay is intended for use in conjunction with a clinical pretest probability assessment model to exclude pulmonary embolism (PE) and deep venous thrombosis (DVT) in outpatients  suspected of PE or DVT. The cut-off value is 0.50 ug/mL FEU.    For patients 50 years of age or older, the application of age-adjusted cut-off values for D-Dimer may increase the specificity without significant effect on sensitivity. The literature suggested calculation age adjusted cut-off in ug/L = age in years x 10 ug/L. The results in this laboratory are reported as ug/mL rather than ug/L. The calculation for age adjusted cut off in ug/mL= age in years x 0.01 ug/mL. For example, the cut off for a 76 year old male is 76 x 0.01 ug/mL = 0.76 ug/mL (760 ug/L).    M Kaya et al. Age adjusted D-dimer cut-off levels to rule out pulmonary embolism: The ADJUST-PE Study. KODAK 2014;311:7608-3645.; HJ Jose M et al. Diagnostic accuracy of conventional or age adjusted D-dimer cutoff values in older patients with suspected venous thromboembolism. Systemic review and meta-analysis. BMJ 2013:346:f2492.   CBC with platelets and differential     Status: Abnormal   Result Value Ref Range    WBC Count 9.0 4.0 - 11.0 10e3/uL    RBC Count 3.76 (L) 4.40 - 5.90 10e6/uL    Hemoglobin 11.9 (L) 13.3 - 17.7 g/dL    Hematocrit 34.5 (L) 40.0 - 53.0 %    MCV 92 78 - 100 fL    MCH 31.6 26.5 - 33.0 pg    MCHC 34.5 31.5 - 36.5 g/dL    RDW 13.2 10.0 - 15.0 %    Platelet Count 244 150 - 450 10e3/uL    % Neutrophils 79 %    % Lymphocytes 12 %    % Monocytes 8 %    % Eosinophils 1 %    % Basophils 0 %    % Immature Granulocytes 0 %    NRBCs per 100 WBC 0 <1 /100    Absolute Neutrophils 7.0 1.6 - 8.3 10e3/uL    Absolute Lymphocytes 1.1 0.8 - 5.3 10e3/uL    Absolute Monocytes 0.7 0.0 - 1.3 10e3/uL    Absolute Eosinophils 0.1 0.0 - 0.7 10e3/uL    Absolute Basophils 0.0 0.0 - 0.2 10e3/uL    Absolute Immature Granulocytes 0.0 <=0.4 10e3/uL    Absolute NRBCs 0.0 10e3/uL   CBC with platelets and differential     Status: Abnormal   Result Value Ref Range    WBC Count 9.2 4.0 - 11.0 10e3/uL    RBC Count 3.74 (L) 4.40 - 5.90 10e6/uL    Hemoglobin 12.0 (L)  13.3 - 17.7 g/dL    Hematocrit 34.5 (L) 40.0 - 53.0 %    MCV 92 78 - 100 fL    MCH 32.1 26.5 - 33.0 pg    MCHC 34.8 31.5 - 36.5 g/dL    RDW 13.2 10.0 - 15.0 %    Platelet Count 256 150 - 450 10e3/uL    % Neutrophils 79 %    % Lymphocytes 12 %    % Monocytes 8 %    % Eosinophils 1 %    % Basophils 0 %    % Immature Granulocytes 0 %    NRBCs per 100 WBC 0 <1 /100    Absolute Neutrophils 7.3 1.6 - 8.3 10e3/uL    Absolute Lymphocytes 1.1 0.8 - 5.3 10e3/uL    Absolute Monocytes 0.7 0.0 - 1.3 10e3/uL    Absolute Eosinophils 0.1 0.0 - 0.7 10e3/uL    Absolute Basophils 0.0 0.0 - 0.2 10e3/uL    Absolute Immature Granulocytes 0.0 <=0.4 10e3/uL    Absolute NRBCs 0.0 10e3/uL   Reticulocyte count     Status: Normal   Result Value Ref Range    % Reticulocyte 1.6 0.5 - 2.0 %    Absolute Reticulocyte 0.061 0.025 - 0.095 10e6/uL   CBC and Differential     Status: Abnormal    Narrative    The following orders were created for panel order CBC and Differential.  Procedure                               Abnormality         Status                     ---------                               -----------         ------                     CBC with platelets and d...[[id number]]  Abnormal            Final result                 Please view results for these tests on the individual orders.   Lab Blood Morphology Pathologist Review     Status: Abnormal (In process)    Narrative    The following orders were created for panel order Lab Blood Morphology Pathologist Review.  Procedure                               Abnormality         Status                     ---------                               -----------         ------                     Bld morphology pathology...[[id number]]                      In process                 CBC with platelets and d...[[id number]]  Abnormal            Final result               Reticulocyte count[[id number]]           Normal              Final result               Morphology Tracking[[id number]]                               Final result                 Please view results for these tests on the individual orders.

## 2024-03-01 ENCOUNTER — OFFICE VISIT (OUTPATIENT)
Dept: PEDIATRICS | Facility: OTHER | Age: 89
End: 2024-03-01
Attending: INTERNAL MEDICINE
Payer: COMMERCIAL

## 2024-03-01 VITALS
DIASTOLIC BLOOD PRESSURE: 60 MMHG | TEMPERATURE: 98 F | OXYGEN SATURATION: 96 % | HEART RATE: 72 BPM | RESPIRATION RATE: 16 BRPM | SYSTOLIC BLOOD PRESSURE: 104 MMHG

## 2024-03-01 DIAGNOSIS — G81.91 RIGHT HEMIPLEGIA (H): ICD-10-CM

## 2024-03-01 DIAGNOSIS — R26.89 BALANCE PROBLEMS: ICD-10-CM

## 2024-03-01 DIAGNOSIS — Z86.73 HISTORY OF CVA (CEREBROVASCULAR ACCIDENT): ICD-10-CM

## 2024-03-01 DIAGNOSIS — E11.42 DIABETIC POLYNEUROPATHY ASSOCIATED WITH TYPE 2 DIABETES MELLITUS (H): ICD-10-CM

## 2024-03-01 DIAGNOSIS — G60.9 HEREDITARY AND IDIOPATHIC PERIPHERAL NEUROPATHY: ICD-10-CM

## 2024-03-01 DIAGNOSIS — I48.4 ATYPICAL ATRIAL FLUTTER (H): ICD-10-CM

## 2024-03-01 DIAGNOSIS — M21.372 LEFT FOOT DROP: Primary | ICD-10-CM

## 2024-03-01 DIAGNOSIS — R53.81 PHYSICAL DECONDITIONING: ICD-10-CM

## 2024-03-01 PROBLEM — I50.9 ACUTE CONGESTIVE HEART FAILURE, UNSPECIFIED HEART FAILURE TYPE (H): Status: RESOLVED | Noted: 2023-10-07 | Resolved: 2024-03-01

## 2024-03-01 PROCEDURE — 99214 OFFICE O/P EST MOD 30 MIN: CPT | Performed by: INTERNAL MEDICINE

## 2024-03-01 PROCEDURE — G0463 HOSPITAL OUTPT CLINIC VISIT: HCPCS

## 2024-03-01 PROCEDURE — G2211 COMPLEX E/M VISIT ADD ON: HCPCS | Performed by: INTERNAL MEDICINE

## 2024-03-01 RX ORDER — GABAPENTIN 100 MG/1
200 CAPSULE ORAL 2 TIMES DAILY
Qty: 360 CAPSULE | Refills: 4 | Status: SHIPPED | OUTPATIENT
Start: 2024-03-01

## 2024-03-01 ASSESSMENT — PAIN SCALES - GENERAL: PAINLEVEL: NO PAIN (0)

## 2024-03-01 NOTE — PATIENT INSTRUCTIONS
-- Reduce gabapentin to twice daily   -- Consider cutting back to just at bedtime if drowsiness persists   -- Home care RN/PT/OT   -- Primary care coordinator referral

## 2024-03-01 NOTE — NURSING NOTE
"Chief Complaint   Patient presents with    Musculoskeletal Problem     Left foot    Fatigue     And weakness       Initial /60   Pulse 72   Temp 98  F (36.7  C) (Tympanic)   Resp 16   SpO2 96%  Estimated body mass index is 26.06 kg/m  as calculated from the following:    Height as of 2/2/24: 1.791 m (5' 10.5\").    Weight as of 2/19/24: 83.6 kg (184 lb 3.2 oz).  Medication Review: complete    The next two questions are to help us understand your food security.  If you are feeling you need any assistance in this area, we have resources available to support you today.          2/2/2024   SDOH- Food Insecurity   Within the past 12 months, did you worry that your food would run out before you got money to buy more? N   Within the past 12 months, did the food you bought just not last and you didn t have money to get more? N         Health Care Directive:  Patient has a Health Care Directive on file      Norma J. Gosselin, LPN      "

## 2024-03-01 NOTE — PROGRESS NOTES
Assessment & Plan       ICD-10-CM    1. Left foot drop  M21.372 Primary Care - Care Coordination Referral     Home Care Referral     Miscellaneous Order for DME - ONLY FOR DME      2. Physical deconditioning  R53.81 Primary Care - Care Coordination Referral     Home Care Referral      3. Balance problems  R26.89 Primary Care - Care Coordination Referral     Home Care Referral      4. History of CVA (cerebrovascular accident)  Z86.73 Primary Care - Care Coordination Referral     Home Care Referral      5. Right hemiplegia (H)  G81.91 Primary Care - Care Coordination Referral     Home Care Referral      6. Atypical atrial flutter (H)  I48.4 Primary Care - Care Coordination Referral     Home Care Referral      7. Diabetic polyneuropathy associated with type 2 diabetes mellitus (H)  E11.42 Primary Care - Care Coordination Referral     Home Care Referral      8. Hereditary and idiopathic peripheral neuropathy  G60.9 Primary Care - Care Coordination Referral     Home Care Referral     gabapentin (NEURONTIN) 100 MG capsule        The development of a new foot drop is concerning.  Differential diagnosis includes tarsal tunnel impingement, lumbosacral radiculopathy, lumbar spinal stenosis, lumbar compression fracture, cerebrovascular accident, others.  The weakness on the right is chronic in nature and is due to his history of a left MCA stroke in the past.  We discussed the possibility of another stroke and whether or not we would want to do any other workup and the patient declined at this time.  He endorses a desire to lean towards comfort rather than prolonging life.  We discussed the possibility of hospice but he is not ready for that at this time.  I believe he would benefit from home care therapy in order to improve his strength and reduce the chances of falls.  It is possible that increasing the gabapentin led to some instability although I think it is unlikely it led to the unilateral foot drop.  I have placed a  primary care coordination referral so that they can assist with local resources and consideration of assisted living.      Patient Instructions    -- Reduce gabapentin to twice daily   -- Consider cutting back to just at bedtime if drowsiness persists   -- Home care RN/PT/OT   -- Primary care coordinator referral      Return if symptoms worsen or fail to improve.    Signed, Trino Dahl MD, FAAP, FACP  Internal Medicine & Pediatrics    Subjective   Ilia Ferguson is a 89 year old male who presents for evaluation of weakness.  He is unable to lift up the left foot.  It drags when he is walking.  Present 2 to 3 weeks.  No major change in his back pain if anything it feels a little bit better.  If he tries to stand up straight he gets unstable on his feet.  No fecal or urinary incontinence.  No saddle anesthesia.  He just feels a generalized feeling of more fatigue and dizziness.  He thinks this occurred when his gabapentin dose was increased.  After our last dose he was taking 200 mg twice daily up until a February 2 appointment.  At that point they realized he was taking a lower dose than was listed in the computer and they increased to 3 times a day at that point.  He has felt weaker since that time.  He receives much of his care from his daughters Soledad and Cira.    Objective   Vitals: /60   Pulse 72   Temp 98  F (36.7  C) (Tympanic)   Resp 16   SpO2 96%     Neuro: Strength at left ankle is 2/5, right ankle 5/5.  Abduction/adduction at the hips is 5/5.  Flexion at the hips is 4/5 on the right and 5/5 on the left.    Review and Analysis of Data   I personally reviewed the following:  External notes: No  Results: Yes most recent imaging and lab work reviewed  Use of an independent historian: Yes I spoke with his daughters  Independent review of a test performed by another physician: No  Discussion of management with another physician: No  Moderate risk of morbidity from additional diagnostic testing and/or  treatment.

## 2024-03-05 ENCOUNTER — PATIENT OUTREACH (OUTPATIENT)
Dept: CARE COORDINATION | Facility: CLINIC | Age: 89
End: 2024-03-05
Payer: COMMERCIAL

## 2024-03-05 PROCEDURE — G0180 MD CERTIFICATION HHA PATIENT: HCPCS | Performed by: INTERNAL MEDICINE

## 2024-03-06 ENCOUNTER — OFFICE VISIT (OUTPATIENT)
Dept: FAMILY MEDICINE | Facility: OTHER | Age: 89
End: 2024-03-06
Attending: STUDENT IN AN ORGANIZED HEALTH CARE EDUCATION/TRAINING PROGRAM
Payer: MEDICARE

## 2024-03-06 ENCOUNTER — HOSPITAL ENCOUNTER (OUTPATIENT)
Dept: ULTRASOUND IMAGING | Facility: OTHER | Age: 89
Discharge: HOME | End: 2024-03-06
Payer: MEDICARE

## 2024-03-06 VITALS
BODY MASS INDEX: 25.9 KG/M2 | HEIGHT: 71 IN | SYSTOLIC BLOOD PRESSURE: 124 MMHG | WEIGHT: 185 LBS | DIASTOLIC BLOOD PRESSURE: 54 MMHG | TEMPERATURE: 98.2 F | HEART RATE: 67 BPM | OXYGEN SATURATION: 98 % | RESPIRATION RATE: 16 BRPM

## 2024-03-06 DIAGNOSIS — R79.89 ELEVATED D-DIMER: ICD-10-CM

## 2024-03-06 DIAGNOSIS — M79.662 PAIN OF LEFT LOWER LEG: ICD-10-CM

## 2024-03-06 DIAGNOSIS — R60.0 EDEMA OF LEFT LOWER EXTREMITY: ICD-10-CM

## 2024-03-06 DIAGNOSIS — M71.22 BAKER'S CYST OF KNEE, LEFT: Primary | ICD-10-CM

## 2024-03-06 LAB
ALBUMIN SERPL BCG-MCNC: 3.5 G/DL (ref 3.5–5.2)
ALP SERPL-CCNC: 134 U/L (ref 40–150)
ALT SERPL W P-5'-P-CCNC: 13 U/L (ref 0–70)
ANION GAP SERPL CALCULATED.3IONS-SCNC: 12 MMOL/L (ref 7–15)
AST SERPL W P-5'-P-CCNC: 13 U/L (ref 0–45)
BASOPHILS # BLD AUTO: 0 10E3/UL (ref 0–0.2)
BASOPHILS NFR BLD AUTO: 0 %
BILIRUB SERPL-MCNC: 0.5 MG/DL
BUN SERPL-MCNC: 20.1 MG/DL (ref 8–23)
CALCIUM SERPL-MCNC: 9.6 MG/DL (ref 8.2–9.6)
CHLORIDE SERPL-SCNC: 96 MMOL/L (ref 98–107)
CREAT SERPL-MCNC: 0.94 MG/DL (ref 0.67–1.17)
D DIMER PPP FEU-MCNC: >20 UG/ML FEU (ref 0–0.5)
DEPRECATED HCO3 PLAS-SCNC: 27 MMOL/L (ref 22–29)
EGFRCR SERPLBLD CKD-EPI 2021: 76 ML/MIN/1.73M2
EOSINOPHIL # BLD AUTO: 0.1 10E3/UL (ref 0–0.7)
EOSINOPHIL NFR BLD AUTO: 1 %
ERYTHROCYTE [DISTWIDTH] IN BLOOD BY AUTOMATED COUNT: 13.4 % (ref 10–15)
GLUCOSE SERPL-MCNC: 427 MG/DL (ref 70–99)
HCT VFR BLD AUTO: 33.8 % (ref 40–53)
HGB BLD-MCNC: 11.7 G/DL (ref 13.3–17.7)
HOLD SPECIMEN: NORMAL
IMM GRANULOCYTES # BLD: 0 10E3/UL
IMM GRANULOCYTES NFR BLD: 0 %
LYMPHOCYTES # BLD AUTO: 1 10E3/UL (ref 0–5.3)
LYMPHOCYTES NFR BLD AUTO: 15 %
MAGNESIUM SERPL-MCNC: 1.8 MG/DL (ref 1.7–2.3)
MCH RBC QN AUTO: 31.2 PG (ref 26.5–33)
MCHC RBC AUTO-ENTMCNC: 34.6 G/DL (ref 31.5–36.5)
MCV RBC AUTO: 90 FL (ref 78–100)
MONOCYTES # BLD AUTO: 0.5 10E3/UL (ref 0–1.3)
MONOCYTES NFR BLD AUTO: 7 %
NEUTROPHILS # BLD AUTO: 5.4 10E3/UL (ref 1.6–8.3)
NEUTROPHILS NFR BLD AUTO: 77 %
NRBC # BLD AUTO: 0 10E3/UL
NRBC BLD AUTO-RTO: 0 /100
PLATELET # BLD AUTO: 282 10E3/UL (ref 150–450)
POTASSIUM SERPL-SCNC: 4.6 MMOL/L (ref 3.4–5.3)
PROT SERPL-MCNC: 6.5 G/DL (ref 6.4–8.3)
RBC # BLD AUTO: 3.75 10E6/UL (ref 4.4–5.9)
SODIUM SERPL-SCNC: 135 MMOL/L (ref 135–145)
WBC # BLD AUTO: 6.9 10E3/UL (ref 4–11)

## 2024-03-06 PROCEDURE — 93971 EXTREMITY STUDY: CPT | Mod: LT

## 2024-03-06 PROCEDURE — 80053 COMPREHEN METABOLIC PANEL: CPT | Mod: ZL

## 2024-03-06 PROCEDURE — 36415 COLL VENOUS BLD VENIPUNCTURE: CPT | Mod: ZL

## 2024-03-06 PROCEDURE — 83735 ASSAY OF MAGNESIUM: CPT | Mod: ZL

## 2024-03-06 PROCEDURE — 85004 AUTOMATED DIFF WBC COUNT: CPT | Mod: ZL

## 2024-03-06 PROCEDURE — G0463 HOSPITAL OUTPT CLINIC VISIT: HCPCS | Mod: 25

## 2024-03-06 PROCEDURE — 99214 OFFICE O/P EST MOD 30 MIN: CPT

## 2024-03-06 PROCEDURE — 85379 FIBRIN DEGRADATION QUANT: CPT | Mod: ZL

## 2024-03-06 ASSESSMENT — PAIN SCALES - GENERAL: PAINLEVEL: WORST PAIN (10)
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
## 2024-03-06 NOTE — PROGRESS NOTES
"Clinic Care Coordination Contact    Initiated phone contact with patient.  Cira Becker, answered phone and put patient on speaker phone. Introduced self as Clinic Care Coordinator that was referred by MD to support patient, family and Home Care team in reaching patient goal of getting stronger and staying in the home independently for as long as possible. They express hope that Home Care will assist in getting him back to his former, stronger baseline again.     Rosita, Cira and patient said that it is \"working\" to have two dtrs, Soledad and Cira, involved daily.  He is still home alone most of the time in between their supportive visits.      Patient does state that his left leg/wound is painful at this time.  Utilizes Gabapentin but recent reduced dosing as there was concern re: side effects. He does supplement with Tylenol.  Writer encouraged patient to inform Home Care nurse and/or MD of his increased pains so they can discuss alternatives. (Writer will communicate this with Home Care RN once we discuss case later today. )    Cira Becker, requested that I talk with other daughter, Soledad, as she \"knows more about the medical information than I do\" later this afternoon when she becomes available.   Patient also agreed to this.  So writer initiate a phone call to Soledad later this afternoon. Also waiting to speak to Home Care admitting RNKait--waiting on her call back later this afternoon as well.     Plan: Discuss Care Coordination with Soledad becker, this afternoon and get approval to Enroll him/them into Care Coordination program.     SANDIE COWAN on 3/6/2024 at 12:49 PM    Clinic Care Coordination Contact    Discussed patient's status with Kait Garcia.  We will continue to collaborate on patient's status and Care Coordination needs.  Spoke also with Soledad Becker, but they were at an appointment and she deferred conversation until later time.     So writer reached out again just now and spoke to Soledad/rosita. " " She is reporting that they are waiting on response from medical team in re: his various issues:  diabetes management/high blood sugars; intense pain that is limiting his daily functions; his d dimer results appear concerning to patient/family.      Rosita/Soledad did say that patient and MD, Dr. Dahl, did discuss \"comfort cares\" and patient is open to this approach.  Dtr feels that he may see this as an imminent end of life decision but she feels hospice/comfort care referral doesn't have to mean that.   She herself is open to having a hospice consultation initiated but will await discussion for now.     Writer reached out to Home Care RN, Kait, again today to discuss situation.     Plan:  Enroll patient in Care Coordination to continue to assess and offer assist with community resources/referrals; ongoing encouragement, validation, support as needed.     SANDIE COWAN on 3/7/2024 at 1:35 PM      Clinic Care Coordination Contact    Spoke to rosita, Soledad, via phone conversation.  Dtr was able to get PALS/Sofia to come to home and set up an alert system for patient as he is alone sometimes.      Writer shared that patient's Home Care RN has not been available /had time off and will be back this following Monday to follow up with situation.  Soledad/rosita stated that was good timing and will welcome discussion then.      Family continues to work together to be available to patient and his needs in the home.  Also, Home Care PT/OT has been seeing him and he remains engaged with the process.    Soledad/rosita shared that she had discussed hospice as an eventual option with patient and he said \"ok\" but no other comments.  Soledad will reapproach with patient if condition changes/declines.     Plan: Ongoing Care Coordination to assess and offer assist with community resources/referrals; ongoing encouragement, validation, reframing and support.  Rosita/Soledad agreed to a follow up call next week.     SANDIE COWAN on 3/8/2024 at 5:10 " PM

## 2024-03-06 NOTE — NURSING NOTE
"Chief Complaint   Patient presents with    Pain     Left leg        Initial /54   Pulse 67   Temp 98.2  F (36.8  C) (Tympanic)   Resp 16   Ht 1.791 m (5' 10.5\")   Wt 83.9 kg (185 lb)   SpO2 98%   BMI 26.17 kg/m   Estimated body mass index is 26.17 kg/m  as calculated from the following:    Height as of this encounter: 1.791 m (5' 10.5\").    Weight as of this encounter: 83.9 kg (185 lb).  Medication Reconciliation: stone Clarke LPN on 3/6/2024 at 2:19 PM     "

## 2024-03-08 NOTE — NURSING NOTE
After Visit Summary   5/8/2017    Meg Diallo    MRN: [id number]           Patient Information     Date Of Birth          2000        Visit Information        Provider Department      5/8/2017 2:00 PM Orin Kaplan LMFT [facility]        Today's Diagnoses     Major depressive disorder, single episode, severe with mood-congruent psychotic features (H)    -  1       Follow-ups after your visit        Your next 10 appointments already scheduled     May 18, 2017 11:00 AM CDT   (Arrive by 10:45 AM)   ESTABLISHED PRENATAL with Zev Dominguez MD   [facility] [place] ([facility])    [address]  [place] [address]   [phone]            May 19, 2017 11:00 AM CDT   (Arrive by 10:45 AM)   Return Visit with SVITLANA Haynes   [facility] ([facility])    [address]  [place] [address]   [phone]            May 26, 2017 11:00 AM CDT   (Arrive by 10:45 AM)   Return Visit with SVITLANA Haynes   [facility] ([facility])    [address]  [place] [address]   [phone]            Jun 01, 2017 10:00 AM CDT   (Arrive by 9:45 AM)   New Prenatal with Tai Gaspar NP   [facility] [place] ([facility])    [address]  [place] [address]   [phone]            Jun 06, 2017  1:00 PM CDT   (Arrive by 12:45 PM)   Return Visit with Barb Vasquez MD   [facility] [place] ([facility])    [address]  [place] [address]   [phone]            Jun 08, 2017 10:00 AM CDT   (Arrive by 9:45 AM)   Return Visit with SVITLANA Haynes   [facility] ([facility] [place] [facility])    [address]  [place] [address]   [phone]            Jun 22, 2017 11:00 AM CDT   (Arrive by 10:45 AM)   Return Visit with SVITLANA Haynes   [facility] ([facility] [place] [facility])    [address]  [place] [address]   [phone]              Who to contact  
Patient presents to clinic for medication management today.  Marely Gan LPN ....................  7/30/2018   9:12 AM    
   If you have questions or need follow up information about today's clinic visit or your schedule please contact [facility] directly at [phone].  Normal or non-critical lab and imaging results will be communicated to you by MyChart, letter or phone within 4 business days after the clinic has received the results. If you do not hear from us within 7 days, please contact the clinic through [employer] or phone. If you have a critical or abnormal lab result, we will notify you by phone as soon as possible.  Submit refill requests through [employer] or call your pharmacy and they will forward the refill request to us. Please allow 3 business days for your refill to be completed.          Additional Information About Your Visit        [employer] Information     eng[employer]ply lets you send messages to your doctor, view your test results, renew your prescriptions, schedule appointments and more. To sign up, go to [email or web address], contact your [place] clinic or call [phone] during business hours.            Care EveryWhere ID     This is your Care EveryWhere ID. This could be used by other organizations to access your [place] medical records  [id number]        Your Vitals Were     Last Period                   03/23/2017 (Approximate)            Blood Pressure from Last 3 Encounters:   05/04/17 102/56   05/01/17 118/69   04/27/17 122/70    Weight from Last 3 Encounters:   05/04/17 162 lb (73.5 kg) (91 %)*   05/01/17 163 lb (73.9 kg) (92 %)*   04/27/17 162 lb 3.2 oz (73.6 kg) (91 %)*     * Growth percentiles are based on CDC 2-20 Years data.              Today, you had the following     No orders found for display       Primary Care Provider Office Phone # Fax #    Del Klein [phone]       [facility] [address] [place] [address]        Thank you!     Thank you for choosing [facility]  for your care. Our goal is always to provide you with excellent care. 
Hearing back from our patients is one way we can continue to improve our services. Please take a few minutes to complete the written survey that you may receive in the mail after your visit with us. Thank you!             Your Updated Medication List - Protect others around you: Learn how to safely use, store and throw away your medicines at www.disposemymeds.org.          This list is accurate as of: 5/8/17  3:08 PM.  Always use your most recent med list.                   Brand Name Dispense Instructions for use    melatonin 3 MG Caps     30 capsule    Take 1 capful by mouth every evening as needed       Prenatal Vitamins 28-0.8 MG Tabs     30 tablet    Take 1 tablet by mouth daily       VENTOLIN  (90 BASE) MCG/ACT Inhaler   Generic drug:  albuterol     18 g    INHALE 2 PUFFS EVERY 4-6 HOURS AS NEEDED         
[Negative] : Heme/Lymph

## 2024-03-12 ENCOUNTER — PATIENT OUTREACH (OUTPATIENT)
Dept: CARE COORDINATION | Facility: CLINIC | Age: 89
End: 2024-03-12
Payer: COMMERCIAL

## 2024-03-12 NOTE — PROGRESS NOTES
Clinic Care Coordination Contact      Writer initiated phone contact with [facility] RN to discuss recent conversations writer has had with rosita/Soledad re: patient's status.      Discussed possible outcomes/options re: patient's current medical issues.  Kait/DAVID encouraged writer to speak to patient's pablor/Soledad re: following up directly with providers involved in patient's care and to seek answers re: his various issues/treatment options.     Goal is to rule out possible endstage processes and whether or not a hospice referral would be an option at this time or not.     Writer will follow up with rosita/Soledad via phone contact tomorrow 3/13/24 to discuss.     Plan: Ongoing Care Coordination to assess and offer assist with community resources/referrals; offer encouragement, reframing, support.     SANDIE COWAN on 3/12/2024 at 5:25 PM    Clinic Care Coordination Contact      Left phone message with patient's Soledad becker, requesting she call back re: writer's conversation with [facility] RNKait.  Goal is to resolve some medical questions re: patient's status.  Resolution pending with possible hospice referral.    Plan: Ongoing Care Coordination to assess and offer assist with community resources/referrals; ongoing support, encouragement, reframing as needed.  Await call back from dtr.     SANDIE COWAN on 3/13/2024 at 11:39 AM    Clinic Care Coordination Contact    Initiated call with Soledad becker.  Dtr had called writer back and requested this time/date for follow up discussion.  Soledad said that patient's leg pain is quite reduced after all and much more manageable at this time.      They are currently awaiting [facility]/Home [facility] PT provider to arrive this AM for PT session.    Shared details of conversation with DAVID/Kait from [facility] earlier this week.  Kait/DAVID encouraged that family/patient [place] back to Providers involved to discuss treatment recommendations  re: diabetic management, pain, Dr. Galindo/oncology results.      Dtr/Soledad stated that that seems to be their course right now and more hopeful with patient's pain reduction.  Soledad/dtr felt that patient was not wanting to become more aggressive with diabetic management but would possibly be open to oral medication adjustments after all.  Diabetic diet not an issue as this has already been implemented in the home.     Donator/Soledad agreed to have writer/Care Coordination reach out in next 30 days to check on patient/family system/status overall.  She agreed to reach back for any needs/concerns as well.     Plan: Ongoing Care Coordination to be available to assess and offer assist with community resources/referrals; ongoing support/encouragement; validation.     SANDIE COWAN on 3/14/2024 at 9:42 AM

## 2024-03-15 DIAGNOSIS — M21.372 LEFT FOOT DROP: Primary | ICD-10-CM

## 2024-03-15 DIAGNOSIS — I63.312 CEREBROVASCULAR ACCIDENT (CVA) DUE TO THROMBOSIS OF LEFT MIDDLE CEREBRAL ARTERY (H): Chronic | ICD-10-CM

## 2024-03-15 NOTE — PROGRESS NOTES
Dr. Dahl reviewed and completed the following home care or hospice form(s) for [facility] on 3/14/2024. This covers the certification period effective 3/5/2024 to 5/3/2024.  Elana Davis RN on 3/15/2024 at 2:19 PM

## 2024-03-25 DIAGNOSIS — E11.8 CONTROLLED TYPE 2 DIABETES MELLITUS WITH COMPLICATION, WITHOUT LONG-TERM CURRENT USE OF INSULIN (H): Chronic | ICD-10-CM

## 2024-03-28 NOTE — TELEPHONE ENCOUNTER
Ruddy Zimmerman sent Rx request for the following:      Requested Prescriptions   Pending Prescriptions Disp Refills    metFORMIN (GLUCOPHAGE) 500 MG tablet [Pharmacy Med Name: METFORMIN 500MG TABLET] 90 tablet 3     Sig: TAKE 1 TABLET (500 MG) BY MOUTH 2 TIMES DAILY (WITH MEALS)       Biguanide Agents Failed - 3/25/2024 12:50 PM        Failed - Patient has documented A1c within the specified period of time.     If HgbA1C is 8 or greater, it needs to be on file within the past 3 months.  If less than 8, must be on file within the past 6 months.     Recent Labs   Lab Test 09/18/23  1117 02/14/18  0412 07/26/17  1504   A1C 7.2*   < >  --    [id number]  --   --  6.2    < > = values in this interval not displayed.        Last Prescription Date:   09/18/24  Last Fill Qty/Refills:         90, R-3    Last Office Visit:              03/01/24   Future Office visit:           none    Routing refill request to provider for review/approval.     Christi Dinero RN on 3/28/2024 at 11:26 AM

## 2024-04-04 NOTE — PATIENT INSTRUCTIONS
Patient Information     Patient Name MRN Ilia Gerardo [id number] Male 1932      Patient Instructions by Leonila Elise RN at 2017  9:30 AM     Author:  Leonila Elise RN Service:  (none) Author Type:  NURS- Registered Nurse     Filed:  2017 11:00 AM Encounter Date:  2017 Status:  Signed     :  Leonila Elise RN (NURS- Registered Nurse)            Diabetes Goals and Reminders  Your A1c test should be done every 3 months.  Your goal is less than 8%.   Your last result is:  HEMOGLOBIN A1C MONITORING (POCT) (%)    Date Value   2017 7.3 (H)     HEMOGLOBIN A1C GIH (%)    Date Value   2010 6.3 (H)       Your LDL cholesterol test should be done at least once a year.    Your last result is:  LDL CHOLESTEROL (mg/dL)    Date Value   2017 107 (H)       Have Blood pressure and weight checked every three months.  Your blood pressure goal is 140/90 or less.  Your last blood pressure reading was: 122/80    Test your blood sugar 1 time per day.  Your home blood glucose target ranges are:  Before meals:   2 hours after a meal: Less than 180      Avoid all tobacco.  Follow your healthy diet and exercise plan.  See the eye doctor every year.  See the dentist every six months.  Have kidney function tested yearly.    Take all medicine as prescribed.  Please let me know if you are having symptoms you don t expect or if you wish to stop any medicine.    Follow Up Plan  Please call or visit Diabetes Education if blood sugars are consistently out of target.  Your future lab plan is:  HgA1c after 2017.    If you need your cholesterol checked at your next appointment, you should fast 8 to 10 hours before your appointment.  Do not eat or drink anything besides water.  Drink plenty of water and take all your usual medicine.    SUMMARY FOR TODAY'S VISIT    1.  Begin testing blood sugar 1 time daily, as directed.  2.  Begin carbohydrate counting, 4 choices per meal, 0 - 1 choices  per snack (limiting snacks to help with weight loss and tighter blood sugar control).   3.  Exercise minimum of 30 minutes most days, as tolerated.  4.  Follow-up for continued diabetes education, as needed.  Consider attending the Diabetes BASICS class, Wednesday, May 24th, from 9am - 11am, at the [facility].  If you would like to attend, please call [phone] or [phone].          Leonila Elise RN, BSN, CDE  5/4/2017 9:37 AM               .

## 2024-04-08 ENCOUNTER — OFFICE VISIT (OUTPATIENT)
Dept: FAMILY MEDICINE | Facility: OTHER | Age: 89
End: 2024-04-08
Attending: STUDENT IN AN ORGANIZED HEALTH CARE EDUCATION/TRAINING PROGRAM
Payer: COMMERCIAL

## 2024-04-08 VITALS
OXYGEN SATURATION: 98 % | BODY MASS INDEX: 25.18 KG/M2 | TEMPERATURE: 96.9 F | HEART RATE: 69 BPM | RESPIRATION RATE: 20 BRPM | DIASTOLIC BLOOD PRESSURE: 68 MMHG | WEIGHT: 175.9 LBS | HEIGHT: 70 IN | SYSTOLIC BLOOD PRESSURE: 120 MMHG

## 2024-04-08 DIAGNOSIS — L03.116 CELLULITIS OF LEFT LOWER EXTREMITY: ICD-10-CM

## 2024-04-08 DIAGNOSIS — T14.8XXA BLOOD BLISTER: Primary | ICD-10-CM

## 2024-04-08 PROCEDURE — 99213 OFFICE O/P EST LOW 20 MIN: CPT | Performed by: STUDENT IN AN ORGANIZED HEALTH CARE EDUCATION/TRAINING PROGRAM

## 2024-04-08 PROCEDURE — G0463 HOSPITAL OUTPT CLINIC VISIT: HCPCS

## 2024-04-08 RX ORDER — CEPHALEXIN 500 MG/1
500 CAPSULE ORAL 3 TIMES DAILY
Qty: 21 CAPSULE | Refills: 0 | Status: SHIPPED | OUTPATIENT
Start: 2024-04-08 | End: 2024-04-15

## 2024-04-08 ASSESSMENT — PAIN SCALES - GENERAL: PAINLEVEL: MILD PAIN (3)

## 2024-04-08 NOTE — PATIENT INSTRUCTIONS
Cellulitis with blister    Keflex three times a day for one week.    Follow up with podiatry.    Return to rapid clinic or ER if symptoms worsen or change.      [facility]    Address: [address], [place] [address]  Phone: [phone]    [facility]    [address] [place] [place] [address] [phone]

## 2024-04-08 NOTE — PROGRESS NOTES
"  Assessment & Plan     (T14.8XXA) Blood blister  (primary encounter diagnosis)    Comment: Persistent blood blister.  It seems as though the blood has coagulated at this time.  Some surrounding erythema, consider secondary bacterial infection.  A kory was placed around this area.  His vital signs are stable.  No tenderness at this time.    Plan: cephALEXin (KEFLEX) 500 MG capsule          Treat with Keflex 3 times a day for 1 week.  Consider follow-up with podiatry for further evaluation and management.  Return to rapid clinic or ER if symptoms worsen or change in the meantime.    (L03.116) Cellulitis of left lower extremity    Comment: Cellulitis surrounding blood blister.    Plan: cephALEXin (KEFLEX) 500 MG capsule      Antibiotics.  Follow-up with podiatry for further evaluation.  Return to rapid clinic or ER if symptoms worsen or change.      Mary Morillo is a 89 year old, presenting for the following health issues:  Foot Problems (Sore on L foot)    HPI     Patient presents today with daughter for concerns of persistent sore on his left medial foot.  States he first noticed it a couple months ago, it has stayed about the same though over the past week there is developed some slight redness around the area.  He denies any changes in shoes.  He has had minimal pain with this area.  No drainage.      Review of Systems  Constitutional, HEENT, cardiovascular, pulmonary, gi and gu systems are negative, except as otherwise noted.        Objective    /68 (BP Location: Left arm, Patient Position: Sitting, Cuff Size: Adult Regular)   Pulse 69   Temp 96.9  F (36.1  C) (Tympanic)   Resp 20   Ht 1.778 m (5' 10\")   Wt 79.8 kg (175 lb 14.4 oz)   SpO2 98%   BMI 25.24 kg/m    Body mass index is 25.24 kg/m .    Physical Exam   GENERAL: alert and no distress  RESP: No increased work of breathing  MS: Approximately 1 cm x 1 cm firm, circular flat dark area on medial left posterior foot, area surrounded by " approximately 1 - 2 cm area of erythema, no edema, warmth, nontender to palpation, not fluctuant to palpation, distal pulses intact, full range of motion of ankle and toes, see clinical image        Signed Electronically by: Violet Truong PA-C

## 2024-04-08 NOTE — NURSING NOTE
"Pt presents to  with his daughter, Cira. Pt has had sore on his L foot x2 months. Physical Therapy looked at his foot this morning and said it should be looked at by provider.    Chief Complaint   Patient presents with    Foot Problems     Sore on L foot       FOOD SECURITY SCREENING QUESTIONS  Hunger Vital Signs:  Within the past 12 months we worried whether our food would run out before we got money to buy more. Never  Within the past 12 months the food we bought just didn't last and we didn't have money to get more. Never  Sulema Dearmon 4/8/2024 2:00 PM      Initial /68 (BP Location: Left arm, Patient Position: Sitting, Cuff Size: Adult Regular)   Pulse 69   Temp 96.9  F (36.1  C) (Tympanic)   Resp 20   Ht 1.778 m (5' 10\")   Wt 79.8 kg (175 lb 14.4 oz)   SpO2 98%   BMI 25.24 kg/m   Estimated body mass index is 25.24 kg/m  as calculated from the following:    Height as of this encounter: 1.778 m (5' 10\").    Weight as of this encounter: 79.8 kg (175 lb 14.4 oz).  Medication Reconciliation: complete    Sulema Deagypsy    "

## 2024-04-23 ENCOUNTER — TELEPHONE (OUTPATIENT)
Dept: PEDIATRICS | Facility: OTHER | Age: 89
End: 2024-04-23
Payer: COMMERCIAL

## 2024-04-23 DIAGNOSIS — R53.81 PHYSICAL DECONDITIONING: ICD-10-CM

## 2024-04-23 DIAGNOSIS — E11.42 DIABETIC POLYNEUROPATHY ASSOCIATED WITH TYPE 2 DIABETES MELLITUS (H): ICD-10-CM

## 2024-04-23 DIAGNOSIS — I48.4 ATYPICAL ATRIAL FLUTTER (H): ICD-10-CM

## 2024-04-23 DIAGNOSIS — I63.312 CEREBROVASCULAR ACCIDENT (CVA) DUE TO THROMBOSIS OF LEFT MIDDLE CEREBRAL ARTERY (H): Primary | Chronic | ICD-10-CM

## 2024-04-23 DIAGNOSIS — R26.89 BALANCE PROBLEMS: ICD-10-CM

## 2024-04-23 DIAGNOSIS — M21.372 LEFT FOOT DROP: ICD-10-CM

## 2024-04-23 DIAGNOSIS — M54.31 SCIATICA OF RIGHT SIDE: ICD-10-CM

## 2024-04-23 DIAGNOSIS — G81.91 RIGHT HEMIPLEGIA (H): ICD-10-CM

## 2024-04-23 NOTE — TELEPHONE ENCOUNTER
Ilia has been working with [facility] physical therapy since 3/5/24.  We plan to discharge him next week.  He would like to transition to outpatient physical therapy now that he is able to get in/out of his home easier.      Please place orders for outpatient physical therapy.  Patient requests [facility].    Thank you!   Sury Parra, PT on 4/23/2024 at 11:14 AM

## 2024-05-02 ENCOUNTER — MEDICAL CORRESPONDENCE (OUTPATIENT)
Dept: HEALTH INFORMATION MANAGEMENT | Facility: OTHER | Age: 89
End: 2024-05-02
Payer: COMMERCIAL

## 2024-05-04 ENCOUNTER — HEALTH MAINTENANCE LETTER (OUTPATIENT)
Age: 89
End: 2024-05-04

## 2024-05-06 ENCOUNTER — PATIENT OUTREACH (OUTPATIENT)
Dept: CARE COORDINATION | Facility: CLINIC | Age: 89
End: 2024-05-06
Payer: COMMERCIAL

## 2024-05-06 NOTE — PROGRESS NOTES
Clinic Care Coordination Contact    Writer initiated a phone contact with patient's dtr/primary caregiver, Soledad, today to inquire re: patient's current status.      Dtr/Soledad said that patient is doing much better and has now graduated to out patient PT visits in the next month.  She reports that patient is doing better overall at this time.  We discussed how the season changes seem to be very helpful with mood and  motivation as well.     Soledad/rosita agreed to reach back for any further Care Coordination needs such as placement/hospice etc if/when patient were to decline again.  She requested that writer text her contact information so writer did so.     Plan: Close Care Coordination for now since patient is stable with no Care Coordination needs at this time.  Dtr/Soledad agreed to call back if/when need for assist with community resources/referrals as needed.     SANDIE COWAN on 5/6/2024 at 3:09 PM

## 2024-05-16 ENCOUNTER — MYC MEDICAL ADVICE (OUTPATIENT)
Dept: PEDIATRICS | Facility: OTHER | Age: 89
End: 2024-05-16
Payer: COMMERCIAL

## 2024-06-04 DIAGNOSIS — I50.9 ACUTE CONGESTIVE HEART FAILURE, UNSPECIFIED HEART FAILURE TYPE (H): ICD-10-CM

## 2024-06-05 ENCOUNTER — THERAPY VISIT (OUTPATIENT)
Dept: PHYSICAL THERAPY | Facility: OTHER | Age: 89
End: 2024-06-05
Attending: INTERNAL MEDICINE
Payer: MEDICARE

## 2024-06-05 DIAGNOSIS — I48.4 ATYPICAL ATRIAL FLUTTER (H): ICD-10-CM

## 2024-06-05 DIAGNOSIS — E11.42 DIABETIC POLYNEUROPATHY ASSOCIATED WITH TYPE 2 DIABETES MELLITUS (H): ICD-10-CM

## 2024-06-05 DIAGNOSIS — M21.372 LEFT FOOT DROP: ICD-10-CM

## 2024-06-05 DIAGNOSIS — M54.31 SCIATICA OF RIGHT SIDE: ICD-10-CM

## 2024-06-05 DIAGNOSIS — R53.81 PHYSICAL DECONDITIONING: ICD-10-CM

## 2024-06-05 DIAGNOSIS — R26.89 BALANCE PROBLEMS: ICD-10-CM

## 2024-06-05 DIAGNOSIS — I63.312 CEREBROVASCULAR ACCIDENT (CVA) DUE TO THROMBOSIS OF LEFT MIDDLE CEREBRAL ARTERY (H): Chronic | ICD-10-CM

## 2024-06-05 DIAGNOSIS — G81.91 RIGHT HEMIPLEGIA (H): ICD-10-CM

## 2024-06-05 PROCEDURE — 97161 PT EVAL LOW COMPLEX 20 MIN: CPT | Mod: GP,PN | Performed by: PHYSICAL THERAPIST

## 2024-06-05 PROCEDURE — 97110 THERAPEUTIC EXERCISES: CPT | Mod: GP,PN | Performed by: PHYSICAL THERAPIST

## 2024-06-05 NOTE — PROGRESS NOTES
PHYSICAL THERAPY EVALUATION  Type of Visit: Evaluation    See electronic medical record for Abuse and Falls Screening details.    Subjective       Presenting condition or subjective complaint: Balance & strength; difficulty walking, numbness and tingling;  Gets dizzy once in awhile.  Usually about 1 hr after taking pills in the morning.  Doesn't happen every day.  Worse with fast head motions.  Usually gone within another hour or so.   Date of onset: 01/01/18    Relevant medical history: Diabetes; Foot drop; Numbness or tingling in perianal area; Stroke   Prior diagnostic imaging/testing results:     none  Prior therapy history for the same diagnosis, illness or injury:    home care (completed about 1 month ago)  Pain assessment: Pain in both feet, neuropathy, rates pain 3-4/10, constant from ankles to toes; Takes gabapentin;    Prior Level of Function  Transfers: Assistive equipment  Ambulation: Assistive equipment  ADL: Assistive equipment    Living Environment  Social support: Alone   Type of home: House; 2-story; Basement   Stairs to enter the home: Yes 5 Is there a railing: Yes   Ramp: No   Stairs inside the home: Yes   Is there a railing: Yes   Help at home: Self Cares (home health aide/personal care attendant, family, etc); Home management tasks (cooking, cleaning); Medication and/or finances; Home and Yard maintenance tasks; Assist for driving and community activities; Meals on wheels/meal service; Emergency call system  Equipment owned: Straight Cane; Walker with wheels; Bedrail; Grab bars; Bath bench; Has been using 4WW for walking for around 1 year.  Employment: No    Hobbies/Interests:  Goes to lunch on Thursdays with friends;     Patient goals for therapy:  improve balance; walk with a cane;       Objective      Cognitive Status Examination  Orientation: x4   RANGE OF MOTION: WFL for transfers and ambulation  STRENGTH:   Strength Scale: 0-5/5 Left Right   Hip Flexion 3+/5 2/5   Hip Extension 4/5 3/5    Hip Abduction 4-/5 2/5   Hip External Rotation 4/5 3/5   Knee Flexion 4+/5 2/5   Knee Extension 4+/5 4+/5   Ankle Dorsiflexion 0/5 3/5     BED MOBILITY: Independent  TRANSFERS: modified independent with 4WW  GAIT:   Level of [place]: modified independent    Assistive Device(s): 4WW  Gait Deviations: increased left hip flexion to advance and place LLE due to foot drop  BALANCE: did not complete balance assessment today - upon completion of home care scored in the low 20's on HERNANDEZ balance assessment placing patient in a high risk for falls category and recommending use of a walker for increased safety with mobility.  SENSATION: NT      Assessment & Plan   CLINICAL IMPRESSIONS  Medical Diagnosis: 1) CVA  2) R sciatica  3) L foot drop  4) Balance problem  5) physical deconditioning  6) R hemiplegia  7) atypical Atrial flutter  8) diabetic polyneuropathy    Treatment Diagnosis: Impaired mobility   Impression/Assessment: Patient is a 89 year old male with complaints of instability.  The following significant findings have been identified: Pain, Decreased strength, Impaired balance, Decreased proprioception, Impaired sensation, Impaired gait, Decreased activity tolerance, and Instability. These impairments interfere with their ability to perform self care tasks, recreational activities, household chores, household mobility, and community mobility as compared to previous level of function.     Clinical Decision Making (Complexity):  Clinical Presentation: Stable/Uncomplicated  Clinical Presentation Rationale: based on medical and personal factors listed in PT evaluation  Clinical Decision Making (Complexity): Low complexity    PLAN OF CARE  Treatment Interventions:  Interventions: Gait Training, Manual Therapy, Neuromuscular Re-education, Therapeutic Exercise, Orthotic Fitting/Training    Long Term Goals     PT Goal 1  Goal Identifier: strength  Goal Description: Patient will demonstrate increased right hip strength  from 2/5 to 3/5 or better for increased stability with standing and walking.  Target Date: 07/31/24  PT Goal 2  Goal Identifier: strength  Goal Description: Patient will demonstrate increased right knee flexion from 2/5 to 3/5 or better for increased stability with standing and walking.  Target Date: 07/31/24  PT Goal 3  Goal Identifier: strength  Goal Description: Patient will demonstrate increased right hip flexion strength from 2/5 to 3/5 or better for improved stability with standing and walking.  Target Date: 07/31/24  PT Goal 4  Goal Identifier: walking  Goal Description: Patient will demonstrate ability to walk with LLE AFO and SEC household distances 15-25 feet safely.  Target Date: 07/31/24      Frequency of Treatment: 2x/week  Duration of Treatment: 8 weeks    Recommended Referrals to Other Professionals: NA  Education Assessment:   Learner/Method: Patient;Listening    Risks and benefits of evaluation/treatment have been explained.   Patient/Family/caregiver agrees with Plan of Care.     Evaluation Time:     PT Eval, Low Complexity Minutes (56476): 20       Signing Clinician: MATT [facility]                                                                                   OUTPATIENT PHYSICAL THERAPY      PLAN OF TREATMENT FOR OUTPATIENT REHABILITATION   Patient's Last Name, First Name, Ilia Waddell YOB: 1932   Provider's Name   [facility]   Medical Record No.  [id number]     Onset Date: 01/01/18  Start of Care Date: 06/05/24     Medical Diagnosis:  1) CVA  2) R sciatica  3) L foot drop  4) Balance problem  5) physical deconditioning  6) R hemiplegia  7) atypical Atrial flutter  8) diabetic polyneuropathy      PT Treatment Diagnosis:  Impaired mobility Plan of Treatment  Frequency/Duration: 2x/week/ 8 weeks    Certification date from 06/05/24 to 07/31/24         See note for plan of treatment  details and functional goals     Sury Parra, PT                         I CERTIFY THE NEED FOR THESE SERVICES FURNISHED UNDER        THIS PLAN OF TREATMENT AND WHILE UNDER MY CARE     (Physician attestation of this document indicates review and certification of the therapy plan).              Referring Provider:  Trino Dahl    Initial Assessment  See Epic Evaluation- Start of Care Date: 06/05/24

## 2024-06-05 NOTE — TELEPHONE ENCOUNTER
KENIA sent Rx request for the following:      Requested Prescriptions   Pending Prescriptions Disp Refills    furosemide (LASIX) 40 MG tablet [Pharmacy Med Name: FUROSEMIDE 40MG TABLET] 30 tablet 3     Sig: TAKE 1 TABLET (40 MG) BY MOUTH DAILY  DAYS       Diuretics (Including Combos) Protocol Failed - 6/5/2024  4:18 PM   Last Prescription Date:   2/5/24  Last Fill Qty/Refills:         30, R-3    Last Office Visit:              3/1/24   Future Office visit:                Carolynn Bonilla RN on 6/5/2024 at 4:21 PM

## 2024-06-06 RX ORDER — FUROSEMIDE 40 MG
40 TABLET ORAL DAILY
Qty: 30 TABLET | Refills: 3 | Status: SHIPPED | OUTPATIENT
Start: 2024-06-06 | End: 2024-09-27

## 2024-06-10 ENCOUNTER — THERAPY VISIT (OUTPATIENT)
Dept: PHYSICAL THERAPY | Facility: OTHER | Age: 89
End: 2024-06-10
Attending: INTERNAL MEDICINE
Payer: MEDICARE

## 2024-06-10 DIAGNOSIS — R26.89 BALANCE PROBLEMS: Primary | ICD-10-CM

## 2024-06-10 PROCEDURE — 97110 THERAPEUTIC EXERCISES: CPT | Mod: GP,PN | Performed by: PHYSICAL THERAPIST

## 2024-06-12 ENCOUNTER — THERAPY VISIT (OUTPATIENT)
Dept: PHYSICAL THERAPY | Facility: OTHER | Age: 89
End: 2024-06-12
Attending: INTERNAL MEDICINE
Payer: MEDICARE

## 2024-06-12 DIAGNOSIS — R26.89 BALANCE PROBLEMS: Primary | ICD-10-CM

## 2024-06-12 PROCEDURE — 97110 THERAPEUTIC EXERCISES: CPT | Mod: GP,PN | Performed by: PHYSICAL THERAPIST

## 2024-06-17 ENCOUNTER — THERAPY VISIT (OUTPATIENT)
Dept: PHYSICAL THERAPY | Facility: OTHER | Age: 89
End: 2024-06-17
Attending: INTERNAL MEDICINE
Payer: MEDICARE

## 2024-06-17 DIAGNOSIS — R26.89 BALANCE PROBLEMS: Primary | ICD-10-CM

## 2024-06-17 PROCEDURE — 97110 THERAPEUTIC EXERCISES: CPT | Mod: GP,PN | Performed by: PHYSICAL THERAPIST

## 2024-06-21 ENCOUNTER — THERAPY VISIT (OUTPATIENT)
Dept: PHYSICAL THERAPY | Facility: OTHER | Age: 89
End: 2024-06-21
Attending: INTERNAL MEDICINE
Payer: MEDICARE

## 2024-06-21 DIAGNOSIS — R26.89 BALANCE PROBLEMS: Primary | ICD-10-CM

## 2024-06-21 PROCEDURE — 97110 THERAPEUTIC EXERCISES: CPT | Mod: GP,PN | Performed by: PHYSICAL THERAPIST

## 2024-06-24 ENCOUNTER — THERAPY VISIT (OUTPATIENT)
Dept: PHYSICAL THERAPY | Facility: OTHER | Age: 89
End: 2024-06-24
Attending: INTERNAL MEDICINE
Payer: MEDICARE

## 2024-06-24 DIAGNOSIS — R26.89 BALANCE PROBLEMS: Primary | ICD-10-CM

## 2024-06-24 PROCEDURE — 97110 THERAPEUTIC EXERCISES: CPT | Mod: GP,PN | Performed by: PHYSICAL THERAPIST

## 2024-06-26 ENCOUNTER — THERAPY VISIT (OUTPATIENT)
Dept: PHYSICAL THERAPY | Facility: OTHER | Age: 89
End: 2024-06-26
Attending: INTERNAL MEDICINE
Payer: MEDICARE

## 2024-06-26 DIAGNOSIS — R26.89 BALANCE PROBLEMS: Primary | ICD-10-CM

## 2024-06-26 PROCEDURE — 97110 THERAPEUTIC EXERCISES: CPT | Mod: GP,PN | Performed by: PHYSICAL THERAPIST

## 2024-07-01 ENCOUNTER — THERAPY VISIT (OUTPATIENT)
Dept: PHYSICAL THERAPY | Facility: OTHER | Age: 89
End: 2024-07-01
Attending: INTERNAL MEDICINE
Payer: MEDICARE

## 2024-07-01 DIAGNOSIS — R26.89 BALANCE PROBLEMS: Primary | ICD-10-CM

## 2024-07-01 PROCEDURE — 97110 THERAPEUTIC EXERCISES: CPT | Mod: GP,PN | Performed by: PHYSICAL THERAPIST

## 2024-07-03 ENCOUNTER — THERAPY VISIT (OUTPATIENT)
Dept: PHYSICAL THERAPY | Facility: OTHER | Age: 89
End: 2024-07-03
Attending: INTERNAL MEDICINE
Payer: MEDICARE

## 2024-07-03 DIAGNOSIS — R26.89 BALANCE PROBLEMS: Primary | ICD-10-CM

## 2024-07-03 PROCEDURE — 97110 THERAPEUTIC EXERCISES: CPT | Mod: GP,PN | Performed by: PHYSICAL THERAPIST

## 2024-07-03 NOTE — PROGRESS NOTES
PROGRESS NOTE:  Patient has been seen 9 visits.  Current certification through 7/31/24.  Patient has demonstrated improvement with general LE strength.  Continues to have difficulty with right hip flexion and abduction, and right knee flexion against gravity.  Feels kinesiotape has been helpful for control of left foot drop and foot placement when walking, but is not interested in an AFO at this time.    PLAN  Continue therapy per current plan of care.    Beginning/End Dates of Progress Note Reporting Period:    6/5/24 to 07/03/2024    Referring Provider:  Trino Dahl     07/03/24 1116   Appointment Info   Signing clinician's name / credentials Raymundo Wheeler, PT   Visits Used 9   Medical Diagnosis 1) CVA  2) R sciatica  3) L foot drop  4) Balance problem  5) physical deconditioning  6) R hemiplegia  7) atypical Atrial flutter  8) diabetic polyneuropathy   PT Tx Diagnosis Impaired mobility   Progress Note/Certification   Start of Care Date 06/05/24   Onset of illness/injury or Date of Surgery 01/01/18   Therapy Frequency 2x/week   Predicted Duration 8 weeks   Certification date from 06/05/24   Certification date to 07/31/24   Progress Note Due Date 07/31/24   GOALS   PT Goals 2;3;4   PT Goal 1   Goal Identifier strength   Goal Description Patient will demonstrate increased right hip strength from 2/5 to 3/5 or better for increased stability with standing and walking.   Goal Progress Progress made - increased muscle contraction and able to lift through partial range against gravity.   Target Date 07/31/24   PT Goal 2   Goal Identifier strength   Goal Description Patient will demonstrate increased right knee flexion from 2/5 to 3/5 or better for increased stability with standing and walking.   Goal Progress Progress made, 3-/5   Target Date 07/31/24   PT Goal 3   Goal Identifier strength   Goal Description Patient will demonstrate increased right hip flexion strength from 2/5 to 3/5 or better for improved  stability with standing and walking.   Goal Progress Progress made - increased ability to elevate RLE against gravity to place foot on machine and onto treatment table.   Target Date 07/31/24   PT Goal 4   Goal Identifier walking   Goal Description Patient will demonstrate ability to walk with LLE AFO and SEC household distances 15-25 feet safely.   Goal Progress no change - patient is not interested in AFO at this time.   Target Date 07/31/24   Subjective Report   Subjective Report Patient feels he has gotten only slightly stronger.  Continues to feel the kinesiotape helps left foot placement, but is not interested in use of an AFO.   Treatment Interventions (PT)   Interventions Therapeutic Procedure/Exercise;Gait Training   Therapeutic Procedure/Exercise   Therapeutic Procedures: strength, endurance, ROM, flexibility minutes (05504) 45   Ther Proc 1 Leg Press 130# 15 x 2; Abduction 50# 15 x 2; Seated Hamstring curls 70# 10 x 1 (primarily LLE), 32# 10 x 2 RLE; Sidelying RLE ABD 5 x 4;    Supine hip IR/ER with green tb, 5 count, 10 x 1 each; bridge with weight shift 10 x 1 bilaterally;  Application of kinesiotape to left anterior lower leg for tibialis anterior facilitation followed by a mechanical correction for dorsiflexion.   Ther Proc 1 - Details *next visit - HEP.   Education   Learner/Method Patient;Listening   Total Session Time   Timed Code Treatment Minutes 45   Total Treatment Time (sum of timed and untimed services) 45

## 2024-07-08 ENCOUNTER — THERAPY VISIT (OUTPATIENT)
Dept: PHYSICAL THERAPY | Facility: OTHER | Age: 89
End: 2024-07-08
Attending: INTERNAL MEDICINE
Payer: MEDICARE

## 2024-07-08 DIAGNOSIS — R26.89 BALANCE PROBLEMS: Primary | ICD-10-CM

## 2024-07-08 PROCEDURE — 97110 THERAPEUTIC EXERCISES: CPT | Mod: GP,PN | Performed by: PHYSICAL THERAPIST

## 2024-07-10 ENCOUNTER — THERAPY VISIT (OUTPATIENT)
Dept: PHYSICAL THERAPY | Facility: OTHER | Age: 89
End: 2024-07-10
Attending: INTERNAL MEDICINE
Payer: MEDICARE

## 2024-07-10 DIAGNOSIS — R26.89 BALANCE PROBLEMS: Primary | ICD-10-CM

## 2024-07-10 PROCEDURE — 97110 THERAPEUTIC EXERCISES: CPT | Mod: GP,PN | Performed by: PHYSICAL THERAPIST

## 2024-07-15 ENCOUNTER — THERAPY VISIT (OUTPATIENT)
Dept: PHYSICAL THERAPY | Facility: OTHER | Age: 89
End: 2024-07-15
Attending: INTERNAL MEDICINE
Payer: MEDICARE

## 2024-07-15 DIAGNOSIS — R26.89 BALANCE PROBLEMS: Primary | ICD-10-CM

## 2024-07-15 PROCEDURE — 97110 THERAPEUTIC EXERCISES: CPT | Mod: GP,PN | Performed by: PHYSICAL THERAPIST

## 2024-07-17 ENCOUNTER — THERAPY VISIT (OUTPATIENT)
Dept: PHYSICAL THERAPY | Facility: OTHER | Age: 89
End: 2024-07-17
Attending: INTERNAL MEDICINE
Payer: MEDICARE

## 2024-07-17 DIAGNOSIS — R26.89 BALANCE PROBLEMS: Primary | ICD-10-CM

## 2024-07-17 PROCEDURE — 97110 THERAPEUTIC EXERCISES: CPT | Mod: GP,PN | Performed by: PHYSICAL THERAPIST

## 2024-07-22 ENCOUNTER — THERAPY VISIT (OUTPATIENT)
Dept: PHYSICAL THERAPY | Facility: OTHER | Age: 89
End: 2024-07-22
Attending: INTERNAL MEDICINE
Payer: MEDICARE

## 2024-07-22 DIAGNOSIS — R26.89 BALANCE PROBLEMS: Primary | ICD-10-CM

## 2024-07-22 PROCEDURE — 97110 THERAPEUTIC EXERCISES: CPT | Mod: GP,PN | Performed by: PHYSICAL THERAPIST

## 2024-07-24 ENCOUNTER — THERAPY VISIT (OUTPATIENT)
Dept: PHYSICAL THERAPY | Facility: OTHER | Age: 89
End: 2024-07-24
Attending: INTERNAL MEDICINE
Payer: MEDICARE

## 2024-07-24 DIAGNOSIS — R26.89 BALANCE PROBLEMS: Primary | ICD-10-CM

## 2024-07-24 PROCEDURE — 97110 THERAPEUTIC EXERCISES: CPT | Mod: GP,PN | Performed by: PHYSICAL THERAPIST

## 2024-07-29 ENCOUNTER — THERAPY VISIT (OUTPATIENT)
Dept: PHYSICAL THERAPY | Facility: OTHER | Age: 89
End: 2024-07-29
Attending: INTERNAL MEDICINE
Payer: MEDICARE

## 2024-07-29 DIAGNOSIS — R26.89 BALANCE PROBLEMS: Primary | ICD-10-CM

## 2024-07-29 PROCEDURE — 97110 THERAPEUTIC EXERCISES: CPT | Mod: GP,PN | Performed by: PHYSICAL THERAPIST

## 2024-07-31 ENCOUNTER — THERAPY VISIT (OUTPATIENT)
Dept: PHYSICAL THERAPY | Facility: OTHER | Age: 89
End: 2024-07-31
Attending: INTERNAL MEDICINE
Payer: MEDICARE

## 2024-07-31 DIAGNOSIS — R26.89 BALANCE PROBLEMS: Primary | ICD-10-CM

## 2024-07-31 PROCEDURE — 97110 THERAPEUTIC EXERCISES: CPT | Mod: GP,PN | Performed by: PHYSICAL THERAPIST

## 2024-07-31 NOTE — PROGRESS NOTES
DISCHARGE  Reason for Discharge: Patient has met a plateau in functional improvement at this time.  Patient demonstrates some improved right hip flexion and abduction strength.  Felt application of kinesiotape was helpful (mechanical correction for foot drop), but is not interested in using an AFO at this time.  Continues to walk with 4WW for stability due to left ankle, and right hip and knee weakness, along with bilateral peripheral neuropathy.      Equipment Issued: [employer]    Discharge Plan: Patient was instructed in HEP during home care therapy and a few additional exercises during outpatient program.  Patient expresses limited interest in performance of HEP at this time.    Referring Provider:  Trino Dahl           07/31/24 1635   Appointment Info   Signing clinician's name / credentials Raymundo Wheeler, PT   Total/Authorized Visits 8/10   Visits Used 16   Medical Diagnosis 1) CVA  2) R sciatica  3) L foot drop  4) Balance problem  5) physical deconditioning  6) R hemiplegia  7) atypical Atrial flutter  8) diabetic polyneuropathy   PT Tx Diagnosis Impaired mobility   Progress Note/Certification   Start of Care Date 06/05/24   Onset of illness/injury or Date of Surgery 01/01/18   Therapy Frequency 2x/week   Predicted Duration 8 weeks   Certification date from 06/05/24   Certification date to 07/31/24   Progress Note Due Date 07/31/24   GOALS   PT Goals 2;3;4   PT Goal 1   Goal Identifier strength   Goal Description Patient will demonstrate increased right hip Abduction strength from 2/5 to 3/5 or better for increased stability with standing and walking.   Goal Progress Progress made, but goal remains unmet.  Patient demonstrates slightly increased muscle contraction and ability to lift through partial range against gravity in sidelying.   Target Date 07/31/24   PT Goal 2   Goal Identifier strength   Goal Description Patient will demonstrate increased right knee flexion from 2/5 to 3/5 or better  for increased stability with standing and walking.   Goal Progress Progress made, goal remains unmet.  Right knee flexion = 3-/5   Target Date 07/31/24   PT Goal 3   Goal Identifier strength   Goal Description Patient will demonstrate increased right hip flexion strength from 2/5 to 3/5 or better for improved stability with standing and walking.   Goal Progress Progress made, but goal not met.  Patient not able to lift through full range against gravity.  Patient demonstrates slightly increased ability to elevate RLE against gravity to place foot on machine and onto treatment table.   Target Date 07/31/24   PT Goal 4   Goal Identifier walking   Goal Description Patient will demonstrate ability to walk with LLE AFO and SEC household distances 15-25 feet safely.   Goal Progress no change - patient is not interested in AFO at this time.   Target Date 07/31/24   Subjective Report   Subjective Report Feels hip IR/ER rotation exercise helps the most of laying down exercises.  Minimal overal functional mobility change.  Continues to feel application of kinesiotape has been helpful for left foot dorsiflexion (mechanical correction) due to drop-foot, but is not interested in pursuing an AFO at this time.   Treatment Interventions (PT)   Interventions Therapeutic Procedure/Exercise;Gait Training   Therapeutic Procedure/Exercise   Therapeutic Procedures: strength, endurance, ROM, flexibility minutes (29313) 45   Ther Proc 1 Leg Press 140# 15 x 2; Abduction 50# 15 x 2; Seated Hamstring curls 70# 10 x 1 (primarily LLE), 36# 15 x 2 RLE;    (not completed: bridges with wt shift 5 x 1 BLE;)   Supine hip IR/ER with green tb, 5 count, 10 x 1 each;  Application of kinesiotape to Left lower leg as prior visits.  Patient demonstrates improved consistency with use of 4WW brakes and correct positioning during transfers.   Patient Response/Progress patient will consider HEP   Education   Learner/Method Patient;Listening   Total Session  Time   Timed Code Treatment Minutes 45   Total Treatment Time (sum of timed and untimed services) 45

## 2024-08-15 DIAGNOSIS — E11.8 CONTROLLED TYPE 2 DIABETES MELLITUS WITH COMPLICATION, WITHOUT LONG-TERM CURRENT USE OF INSULIN (H): Chronic | ICD-10-CM

## 2024-08-16 NOTE — TELEPHONE ENCOUNTER
[facility] sent Rx request for the following:      Requested Prescriptions   Pending Prescriptions Disp Refills    metFORMIN (GLUCOPHAGE) 500 MG tablet [Pharmacy Med Name: METFORMIN 500MG TABLET] 90 tablet 3     Sig: TAKE 1 TABLET (500 MG) BY MOUTH 2 TIMES DAILY (WITH MEALS)       Biguanide Agents Passed - 8/15/2024  9:26 AM        Passed - Patient is age 10 or older        Passed - Patient has documented A1c within the specified period of time.     If HgbA1C is 8 or greater, it needs to be on file within the past 3 months.  If less than 8, must be on file within the past 6 months.     Recent Labs   Lab Test 09/18/23  1117 02/14/18  0412 07/26/17  1504   A1C 7.2*   < >  --    [id number]  --   --  6.2    < > = values in this interval not displayed.             Passed - Patient does NOT have a diagnosis of CHF.        Passed - Medication is active on med list        Passed - Medication indicated for associated diagnosis     Medication is associated with one or more of the following diagnoses:     Gestational diabetes mellitus     Hyperinsulinar obesity     Hypersecretion of ovarian androgens    Non-alcoholic fatty liver    Polycystic ovarian syndrome               Pre-diabetes (DM 2 prevention)    Type 2 diabetes mellitus     Weight gain, antipsychotic therapy-induced    Impaired fasting glucose          Passed - Has GFR on file in past 12 months and most recent value is normal        Passed - Recent (6 mo) or future (90 days) visit within the authorizing provider's specialty     The patient must have completed an in-person or virtual visit within the past 6 months or has a future visit scheduled within the next 90 days with the authorizing provider s specialty.  Urgent care and e-visits do not quality as an office visit for this protocol.               Last Prescription Date:   3/28/24  Last Fill Qty/Refills:         90, R-3    Last Office Visit:              3/1/24   Future Office visit:     none on file           Unable to  complete prescription refill per RN Medication Refill Policy. A1c not on file past 6 months.  Lisseth Saucedo RN on 8/16/2024 at 4:08 PM

## 2024-08-20 DIAGNOSIS — I10 ESSENTIAL HYPERTENSION: ICD-10-CM

## 2024-08-23 RX ORDER — ENALAPRIL MALEATE 20 MG/1
40 TABLET ORAL DAILY
Qty: 180 TABLET | Refills: 0 | Status: SHIPPED | OUTPATIENT
Start: 2024-08-23

## 2024-08-23 NOTE — TELEPHONE ENCOUNTER
[id number] sent Rx request for the following:      Requested Prescriptions   Pending Prescriptions Disp Refills    enalapril (VASOTEC) 20 MG tablet [Pharmacy Med Name: ENALAPRIL MALEATE 20MG TABLET] 180 tablet 3     Sig: TAKE 2 TABLETS (40 MG) BY MOUTH DAILY       ACE Inhibitors (Including Combos) Protocol Passed - 8/23/2024  2:01 PM        Passed - Blood pressure under 140/90 in past 12 months- Clinicial or Patient Reported     BP Readings from Last 3 Encounters:   04/08/24 120/68   03/06/24 124/54   03/01/24 104/60       No data recorded            Passed - Medication is active on med list        Passed - Medication indicated for associated diagnosis     Medication is associated with one or more of the following diagnoses:     Chronic Kidney Disease (CKD)   Coronary Artery Disease (CAD)   Diabetes   Heart Failure (HF)   Hypertension (HTN)   Nephropathy            Passed - Recent (12 mo) or future (90 days) visit within the authorizing provider's specialty     The patient must have completed an in-person or virtual visit within the past 12 months or has a future visit scheduled within the next 90 days with the authorizing provider s specialty.  Urgent care and e-visits do not quality as an office visit for this protocol.          Passed - Most recent GFR on file in the past 12 months >30        Passed - Patient is age 18 or older        Passed - Normal serum potassium on file in past 12 months     Recent Labs   Lab Test 03/06/24  1510   POTASSIUM 4.6                  Last Prescription Date:   9/18/23  Last Fill Qty/Refills:         180, R-3    Last Office Visit:              9/18/23 (discussed - [facility])   Future Office visit:           None    Prescription approved per [facility] Refill Protocol.    Varinder Gr RN on 8/23/2024 at 2:04 PM

## 2024-09-24 DIAGNOSIS — I50.9 ACUTE CONGESTIVE HEART FAILURE, UNSPECIFIED HEART FAILURE TYPE (H): ICD-10-CM

## 2024-09-26 NOTE — TELEPHONE ENCOUNTER
[id number] sent Rx request for the following:      Requested Prescriptions   Pending Prescriptions Disp Refills    furosemide (LASIX) 40 MG tablet [Pharmacy Med Name: FUROSEMIDE 40MG TABLET] 30 tablet 3     Sig: TAKE 1 TABLET (40 MG) BY MOUTH DAILY  DAYS       Diuretics (Including Combos) Protocol Failed - 9/26/2024  4:10 PM        Failed - Recent (12 mo) or future (90 days) visit within the authorizing provider's specialty     Last Prescription Date:     furosemide (LASIX) 40 MG tablet 30 tablet 3 6/6/2024 10/4/2024 No   Sig - Route: TAKE 1 TABLET (40 MG) BY MOUTH DAILY  DAYS - Oral     Last Office Visit:              3/1/24   Future Office visit:           None    Acute congestive heart failure, unspecified heart failure type [I50.9]        Unable to complete prescription refill per RN Medication Refill Policy. Routing to covering provider for refill consideration, as PCP/provider is out of clinic >48 hours or Pt is completely out of medication and provider is out of the clinic today.Sulema Gamino RN .............. 9/26/2024  4:29 PM

## 2024-09-27 RX ORDER — FUROSEMIDE 40 MG
40 TABLET ORAL DAILY
Qty: 30 TABLET | Refills: 3 | Status: SHIPPED | OUTPATIENT
Start: 2024-09-27 | End: 2025-01-25

## 2024-11-18 DIAGNOSIS — I63.312 CEREBROVASCULAR ACCIDENT (CVA) DUE TO THROMBOSIS OF LEFT MIDDLE CEREBRAL ARTERY (H): ICD-10-CM

## 2024-11-19 RX ORDER — CLOPIDOGREL BISULFATE 75 MG/1
75 TABLET ORAL DAILY
Qty: 90 TABLET | Refills: 0 | Status: SHIPPED | OUTPATIENT
Start: 2024-11-19

## 2024-11-19 RX ORDER — ATORVASTATIN CALCIUM 40 MG/1
40 TABLET, FILM COATED ORAL DAILY
Qty: 90 TABLET | Refills: 0 | Status: SHIPPED | OUTPATIENT
Start: 2024-11-19

## 2024-11-19 NOTE — TELEPHONE ENCOUNTER
[id number] sent Rx request for the following:      Requested Prescriptions   Pending Prescriptions Disp Refills    atorvastatin (LIPITOR) 40 MG tablet [Pharmacy Med Name: ATORVASTATIN 40MG TABLET] 90 tablet 4     Sig: TAKE 1 TABLET (40 MG) BY MOUTH DAILY   Last Prescription Date:   9/18/23  Last Fill Qty/Refills:         90, R-4      Antihyperlipidemic agents Failed - 11/19/2024 11:03 AM        Failed - LDL on file in the past 12 months       clopidogrel (PLAVIX) 75 MG tablet [Pharmacy Med Name: CLOPIDOGREL 75MG TABLET] 90 tablet 3     Sig: TAKE 1 TABLET (75 MG) BY MOUTH DAILY   Last Prescription Date:   9/18/23  Last Fill Qty/Refills:         90, R-3      Plavix Failed - 11/19/2024 11:03 AM        Failed - Normal HGB on file in past 12 months     Recent Labs   Lab Test 03/06/24  1510   HGB 11.7*        Last Office Visit:              3/1/24 (CVA discussed)  Future Office visit:           11/21/24    Unable to complete prescription refill per RN Medication Refill Policy. Sulema Gamino RN .............. 11/19/2024  11:15 AM

## 2024-11-21 ENCOUNTER — OFFICE VISIT (OUTPATIENT)
Dept: PEDIATRICS | Facility: OTHER | Age: 89
End: 2024-11-21
Attending: INTERNAL MEDICINE
Payer: MEDICARE

## 2024-11-21 VITALS
SYSTOLIC BLOOD PRESSURE: 136 MMHG | BODY MASS INDEX: 26.87 KG/M2 | HEART RATE: 66 BPM | WEIGHT: 187.3 LBS | TEMPERATURE: 97.4 F | OXYGEN SATURATION: 92 % | DIASTOLIC BLOOD PRESSURE: 78 MMHG | RESPIRATION RATE: 16 BRPM

## 2024-11-21 DIAGNOSIS — R33.8 BENIGN PROSTATIC HYPERPLASIA WITH URINARY RETENTION: ICD-10-CM

## 2024-11-21 DIAGNOSIS — B96.89 ACUTE BACTERIAL SINUSITIS: Primary | ICD-10-CM
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
## 2024-11-21 DIAGNOSIS — R32 URINARY INCONTINENCE, UNSPECIFIED TYPE: ICD-10-CM

## 2024-11-21 DIAGNOSIS — I50.32 CHRONIC HEART FAILURE WITH PRESERVED EJECTION FRACTION (H): ICD-10-CM

## 2024-11-21 DIAGNOSIS — I87.8 VENOUS STASIS OF LOWER EXTREMITY: ICD-10-CM

## 2024-11-21 DIAGNOSIS — M62.89 PELVIC FLOOR DYSFUNCTION: ICD-10-CM

## 2024-11-21 DIAGNOSIS — N40.1 BENIGN PROSTATIC HYPERPLASIA WITH URINARY RETENTION: ICD-10-CM

## 2024-11-21 DIAGNOSIS — J01.90 ACUTE BACTERIAL SINUSITIS: Primary | ICD-10-CM

## 2024-11-21 PROCEDURE — G0463 HOSPITAL OUTPT CLINIC VISIT: HCPCS

## 2024-11-21 RX ORDER — FUROSEMIDE 40 MG/1
40 TABLET ORAL DAILY
Qty: 90 TABLET | Refills: 3 | Status: SHIPPED | OUTPATIENT
Start: 2024-11-21

## 2024-11-21 ASSESSMENT — ENCOUNTER SYMPTOMS
SHORTNESS OF BREATH: 1
SORE THROAT: 1

## 2024-11-21 ASSESSMENT — PAIN SCALES - GENERAL: PAINLEVEL_OUTOF10: NO PAIN (0)

## 2024-11-21 NOTE — PROGRESS NOTES
Assessment & Plan   1. Acute bacterial sinusitis (Primary)  With regards to his acute symptoms I think an acute bacterial sinusitis would make a lot of sense however differential diagnosis also includes back-to-back viral URI, sinus irritation from indoor exposure such as dust, COVID-19 with long symptoms, early bacterial pneumonia, pertussis, blasto, others.  We discussed options and decided on a course of Augmentin.  Warning signs were discussed and prompt repeat evaluation recommended if symptoms persist or worsen.  - amoxicillin-clavulanate (AUGMENTIN) 875-125 MG tablet; Take 1 tablet by mouth 2 times daily for 10 days.  Dispense: 20 tablet; Refill: 0    2. Chronic heart failure with preserved ejection fraction (H)  His previous echocardiogram revealed normal systolic function with some concentric thickening.  I educated on the use of loop diuretics.  I encouraged protein intake.  - furosemide (LASIX) 40 MG tablet; Take 1 tablet (40 mg) by mouth daily.  Dispense: 90 tablet; Refill: 3    3. Venous stasis of lower extremity  Lower extremity edema is more consistent with venous stasis.  He is not wearing compression socks today but usually does.  Recommend a low-sodium diet.    4. Benign prostatic hyperplasia with urinary retention  5. Urinary incontinence, unspecified type  6. Pelvic floor dysfunction  He has been having some episodes of incontinence.  1 was during a deep sleep.  He often loses some urine when he gets to the toilet from when he pulls down his depend until he is ready to urinate.  I am suspicious that pelvic floor dysfunction may be playing a role however differential diagnosis also includes neurogenic bladder, benign prostate hypertrophy, bladder stone, bladder cancer, prostate cancer, others.  We discussed the possibility of further workup or evaluation and the patient declined.  We decided on a trial of pelvic floor physical therapy to see how much that would help.  - Physical Therapy   Referral; Future    7. Fall in home, initial encounter  He had 1 fall recently when he was trying to feed the cat.  I encouraged him to be really careful.  I encouraged home exercise program.  I offered more therapy including PT and OT and the patient declined.  He may need to move into assisted living if he keeps getting weaker.      Patient Instructions    -- Augmentin   -- Eat yogurt 1-2 times per day while on antibiotics (and for a few weeks after) to reduce the chances of diarrhea     -- Start metamucil 1 tbsp in at least 8 oz water daily   -- Pelvic floor therapy    Nasal saline (salt water) irrigation will help with ear pain, sore throat from post-nasal drainage and sinus congestion. Use of distilled water (or boiled water that cools to room temperature) reduces the risk of a secondary infection.   -- Premixed saline spray bottles   -- NeilMed rinse bottle   -- Neti pot   -- Navage   -- Make your own saline: 1 cup distilled water, 1/2 tsp salt, 1/2 tsp baking soda.      -- Salt water gargle a few times per day for sore throat   -- Elevate head of bed to facilitate sinus drainage   -- Consider getting a HEPA filter to remove particulate (eg from burning wood for heat, pet dander, etc)   -- Use a cool mist humidifier in the bedroom during the dry season, clean weekly with vinegar   -- Drink warm liquids (eg apple juice, tea, chicken soup)   -- Look for benzocaine sore throat drops   -- Honey mixed with hot water or tea for cough   -- Over-the-counter cough/cold medications not recommended   -- Okay to use acetaminophen (Tylenol) and ibuprofen (Advil)   -- Watch for dehydration, try to stay hydrated   -- For nasal congestion, okay to use Afrin 2 sprays both nostrils daily, no more than 3-4 days then stop as can cause rebound congestion   -- If symptoms are not improving over 7-10 days, or worse at any point return for evaluation.              Signed, Trino Dahl MD, FAAP, FACP  Internal Medicine &  Pediatrics      Patient Instructions    -- Augmentin   -- Eat yogurt 1-2 times per day while on antibiotics (and for a few weeks after) to reduce the chances of diarrhea     -- Start metamucil 1 tbsp in at least 8 oz water daily   -- Pelvic floor therapy    Nasal saline (salt water) irrigation will help with ear pain, sore throat from post-nasal drainage and sinus congestion. Use of distilled water (or boiled water that cools to room temperature) reduces the risk of a secondary infection.   -- Premixed saline spray bottles   -- NeilMed rinse bottle   -- Neti pot   -- Navage   -- Make your own saline: 1 cup distilled water, 1/2 tsp salt, 1/2 tsp baking soda.      -- Salt water gargle a few times per day for sore throat   -- Elevate head of bed to facilitate sinus drainage   -- Consider getting a HEPA filter to remove particulate (eg from burning wood for heat, pet dander, etc)   -- Use a cool mist humidifier in the bedroom during the dry season, clean weekly with vinegar   -- Drink warm liquids (eg apple juice, tea, chicken soup)   -- Look for benzocaine sore throat drops   -- Honey mixed with hot water or tea for cough   -- Over-the-counter cough/cold medications not recommended   -- Okay to use acetaminophen (Tylenol) and ibuprofen (Advil)   -- Watch for dehydration, try to stay hydrated   -- For nasal congestion, okay to use Afrin 2 sprays both nostrils daily, no more than 3-4 days then stop as can cause rebound congestion   -- If symptoms are not improving over 7-10 days, or worse at any point return for evaluation.              No follow-ups on file.    Signed, Trino Dahl MD, FAAP, FACP  Internal Medicine & Pediatrics    Subjective   Ilia Ferguson is a 89 year old male who presents for Shortness of Breath, Chills, Pharyngitis, and Recheck Medication (Furosemide ).  Has been feeling sick over the course of the last month.  Associated with shortness of breath, chills and a sore throat.  No cough.  No chest pain.   No rigors.  No heartburn.  He is not bringing phlegm up from his chest.  No sick contacts.  No orthopnea.  He does feel some windedness.  He feels like he is weaker and his exercise tolerance is going down.  His nose is really runny.  It has been productive of some gray phlegm.  He does have some pressure over the left frontal sinus area.  His legs get swollen but they are worse in the evening and better in the morning.    Objective   Vitals: /78   Pulse 66   Temp 97.4  F (36.3  C) (Tympanic)   Resp 16   Wt 85 kg (187 lb 4.8 oz)   SpO2 92%   BMI 26.87 kg/m      Cardiovascular: Regular rate and rhythm, no murmur  Pulmonary: Clear to auscultation bilaterally without wheezing, rales or rhonchi  MSK: 1+ pitting edema up through the calves bilaterally.    Review and Analysis of Data   I personally reviewed the following:  External notes: No  Results: Yes echocardiogram, lab work  Use of an independent historian: Yes I spoke with his daughters  Independent review of a test performed by another physician: No  Discussion of management with another physician: No  Moderate risk of morbidity from additional diagnostic testing and/or treatment.

## 2024-11-21 NOTE — PATIENT INSTRUCTIONS
-- Augmentin   -- Eat yogurt 1-2 times per day while on antibiotics (and for a few weeks after) to reduce the chances of diarrhea     -- Start metamucil 1 tbsp in at least 8 oz water daily   -- Pelvic floor therapy    Nasal saline (salt water) irrigation will help with ear pain, sore throat from post-nasal drainage and sinus congestion. Use of distilled water (or boiled water that cools to room temperature) reduces the risk of a secondary infection.   -- Premixed saline spray bottles   -- NeilMed rinse bottle   -- Neti pot   -- Navage   -- Make your own saline: 1 cup distilled water, 1/2 tsp salt, 1/2 tsp baking soda.      -- Salt water gargle a few times per day for sore throat   -- Elevate head of bed to facilitate sinus drainage   -- Consider getting a HEPA filter to remove particulate (eg from burning wood for heat, pet dander, etc)   -- Use a cool mist humidifier in the bedroom during the dry season, clean weekly with vinegar   -- Drink warm liquids (eg apple juice, tea, chicken soup)   -- Look for benzocaine sore throat drops   -- Honey mixed with hot water or tea for cough   -- Over-the-counter cough/cold medications not recommended   -- Okay to use acetaminophen (Tylenol) and ibuprofen (Advil)   -- Watch for dehydration, try to stay hydrated   -- For nasal congestion, okay to use Afrin 2 sprays both nostrils daily, no more than 3-4 days then stop as can cause rebound congestion   -- If symptoms are not improving over 7-10 days, or worse at any point return for evaluation.

## 2024-11-21 NOTE — NURSING NOTE
"Chief Complaint   Patient presents with    Shortness of Breath    Chills    Pharyngitis    Recheck Medication     Furosemide        Initial /78   Pulse 66   Temp 97.4  F (36.3  C) (Tympanic)   Resp 16   Wt 85 kg (187 lb 4.8 oz)   SpO2 92%   BMI 26.87 kg/m   Estimated body mass index is 26.87 kg/m  as calculated from the following:    Height as of 4/8/24: 1.778 m (5' 10\").    Weight as of this encounter: 85 kg (187 lb 4.8 oz).  Medication Review: complete    The next two questions are to help us understand your food security.  If you are feeling you need any assistance in this area, we have resources available to support you today.          2/2/2024   SDOH- Food Insecurity   Within the past 12 months, did you worry that your food would run out before you got money to buy more? N    Within the past 12 months, did the food you bought just not last and you didn t have money to get more? N        Patient-reported         Health Care Directive:  Patient has a Health Care Directive on file      Cecilia Jenkins CMA      "

## 2024-11-30 ENCOUNTER — HEALTH MAINTENANCE LETTER (OUTPATIENT)
Age: 89
End: 2024-11-30

## 2024-12-02 ENCOUNTER — NURSE TRIAGE (OUTPATIENT)
Dept: PEDIATRICS | Facility: OTHER | Age: 89
End: 2024-12-02
Payer: COMMERCIAL

## 2024-12-02 ENCOUNTER — HOSPITAL ENCOUNTER (INPATIENT)
Facility: OTHER | Age: 89
End: 2024-12-02
Admitting: INTERNAL MEDICINE
Payer: MEDICARE

## 2024-12-02 DIAGNOSIS — J96.01 ACUTE RESPIRATORY FAILURE WITH HYPOXIA (H): ICD-10-CM

## 2024-12-02 DIAGNOSIS — R09.02 HYPOXIA: ICD-10-CM

## 2024-12-02 DIAGNOSIS — J18.9 PNEUMONIA DUE TO INFECTIOUS ORGANISM, UNSPECIFIED LATERALITY, UNSPECIFIED PART OF LUNG: Primary | ICD-10-CM

## 2024-12-02 DIAGNOSIS — D64.9 ANEMIA, UNSPECIFIED TYPE: ICD-10-CM

## 2024-12-02 DIAGNOSIS — I50.32 CHRONIC HEART FAILURE WITH PRESERVED EJECTION FRACTION (H): ICD-10-CM

## 2024-12-02 DIAGNOSIS — I47.10 PAROXYSMAL SVT (SUPRAVENTRICULAR TACHYCARDIA) (H): Chronic | ICD-10-CM

## 2024-12-02 DIAGNOSIS — D64.9 ANEMIA: ICD-10-CM

## 2024-12-02 DIAGNOSIS — J18.9 PNEUMONIA: ICD-10-CM

## 2024-12-02 LAB
ALBUMIN SERPL BCG-MCNC: 3.4 G/DL (ref 3.5–5.2)
ALP SERPL-CCNC: 89 U/L (ref 40–150)
ALT SERPL W P-5'-P-CCNC: 14 U/L (ref 0–70)
ANION GAP SERPL CALCULATED.3IONS-SCNC: 10 MMOL/L (ref 7–15)
AST SERPL W P-5'-P-CCNC: 18 U/L (ref 0–45)
BASE EXCESS BLDV CALC-SCNC: 2.4 MMOL/L (ref -3–3)
BASOPHILS # BLD AUTO: 0 10E3/UL (ref 0–0.2)
BASOPHILS NFR BLD AUTO: 0 %
BILIRUB SERPL-MCNC: 0.6 MG/DL
BUN SERPL-MCNC: 25 MG/DL (ref 8–23)
CALCIUM SERPL-MCNC: 9 MG/DL (ref 8.8–10.4)
CHLORIDE SERPL-SCNC: 102 MMOL/L (ref 98–107)
CREAT SERPL-MCNC: 1.11 MG/DL (ref 0.67–1.17)
EGFRCR SERPLBLD CKD-EPI 2021: 62 ML/MIN/1.73M2
EOSINOPHIL # BLD AUTO: 0 10E3/UL (ref 0–0.7)
EOSINOPHIL NFR BLD AUTO: 0 %
ERYTHROCYTE [DISTWIDTH] IN BLOOD BY AUTOMATED COUNT: 13.5 % (ref 10–15)
FLUAV RNA SPEC QL NAA+PROBE: NEGATIVE
FLUBV RNA RESP QL NAA+PROBE: NEGATIVE
GLUCOSE SERPL-MCNC: 350 MG/DL (ref 70–99)
HCO3 BLDV-SCNC: 27 MMOL/L (ref 21–28)
HCO3 SERPL-SCNC: 26 MMOL/L (ref 22–29)
HCT VFR BLD AUTO: 30.5 % (ref 40–53)
HGB BLD-MCNC: 9.7 G/DL (ref 13.3–17.7)
HOLD SPECIMEN: NORMAL
IMM GRANULOCYTES # BLD: 0.1 10E3/UL
IMM GRANULOCYTES NFR BLD: 1 %
LACTATE SERPL-SCNC: 2.1 MMOL/L (ref 0.7–2)
LACTATE SERPL-SCNC: 3.1 MMOL/L (ref 0.7–2)
LYMPHOCYTES # BLD AUTO: 0.7 10E3/UL (ref 0.8–5.3)
LYMPHOCYTES NFR BLD AUTO: 7 %
MCH RBC QN AUTO: 30.1 PG (ref 26.5–33)
MCHC RBC AUTO-ENTMCNC: 31.8 G/DL (ref 31.5–36.5)
MCV RBC AUTO: 95 FL (ref 78–100)
MONOCYTES # BLD AUTO: 0.8 10E3/UL (ref 0–1.3)
MONOCYTES NFR BLD AUTO: 8 %
NEUTROPHILS # BLD AUTO: 8.4 10E3/UL (ref 1.6–8.3)
NEUTROPHILS NFR BLD AUTO: 84 %
NRBC # BLD AUTO: 0 10E3/UL
NRBC BLD AUTO-RTO: 0 /100
NT-PROBNP SERPL-MCNC: 2921 PG/ML (ref 0–1800)
O2/TOTAL GAS SETTING VFR VENT: 28 %
OXYHGB MFR BLDV: 81 % (ref 70–75)
PCO2 BLDV: 39 MM HG (ref 40–50)
PH BLDV: 7.45 [PH] (ref 7.32–7.43)
PLATELET # BLD AUTO: 286 10E3/UL (ref 150–450)
PO2 BLDV: 45 MM HG (ref 25–47)
POTASSIUM SERPL-SCNC: 4.8 MMOL/L (ref 3.4–5.3)
PROT SERPL-MCNC: 6.3 G/DL (ref 6.4–8.3)
RBC # BLD AUTO: 3.22 10E6/UL (ref 4.4–5.9)
RSV RNA SPEC NAA+PROBE: NEGATIVE
SAO2 % BLDV: 82.3 % (ref 70–75)
SARS-COV-2 RNA RESP QL NAA+PROBE: NEGATIVE
SODIUM SERPL-SCNC: 138 MMOL/L (ref 135–145)
TROPONIN T SERPL HS-MCNC: 87 NG/L
TROPONIN T SERPL HS-MCNC: 88 NG/L
WBC # BLD AUTO: 10 10E3/UL (ref 4–11)

## 2024-12-02 PROCEDURE — 999N000157 HC STATISTIC RCP TIME EA 10 MIN

## 2024-12-02 PROCEDURE — 85041 AUTOMATED RBC COUNT: CPT

## 2024-12-02 PROCEDURE — 36415 COLL VENOUS BLD VENIPUNCTURE: CPT

## 2024-12-02 PROCEDURE — 93005 ELECTROCARDIOGRAM TRACING: CPT

## 2024-12-02 PROCEDURE — 93010 ELECTROCARDIOGRAM REPORT: CPT | Performed by: INTERNAL MEDICINE

## 2024-12-02 PROCEDURE — 96375 TX/PRO/DX INJ NEW DRUG ADDON: CPT

## 2024-12-02 PROCEDURE — 83605 ASSAY OF LACTIC ACID: CPT

## 2024-12-02 PROCEDURE — 250N000009 HC RX 250

## 2024-12-02 PROCEDURE — 258N000003 HC RX IP 258 OP 636

## 2024-12-02 PROCEDURE — 84484 ASSAY OF TROPONIN QUANT: CPT

## 2024-12-02 PROCEDURE — 87040 BLOOD CULTURE FOR BACTERIA: CPT

## 2024-12-02 PROCEDURE — 96367 TX/PROPH/DG ADDL SEQ IV INF: CPT

## 2024-12-02 PROCEDURE — 250N000011 HC RX IP 250 OP 636

## 2024-12-02 PROCEDURE — 96365 THER/PROPH/DIAG IV INF INIT: CPT

## 2024-12-02 PROCEDURE — 82310 ASSAY OF CALCIUM: CPT

## 2024-12-02 PROCEDURE — 99285 EMERGENCY DEPT VISIT HI MDM: CPT

## 2024-12-02 PROCEDURE — 85004 AUTOMATED DIFF WBC COUNT: CPT

## 2024-12-02 PROCEDURE — 87637 SARSCOV2&INF A&B&RSV AMP PRB: CPT

## 2024-12-02 PROCEDURE — 99285 EMERGENCY DEPT VISIT HI MDM: CPT | Mod: 25

## 2024-12-02 PROCEDURE — 83880 ASSAY OF NATRIURETIC PEPTIDE: CPT

## 2024-12-02 PROCEDURE — 94640 AIRWAY INHALATION TREATMENT: CPT

## 2024-12-02 PROCEDURE — 250N000013 HC RX MED GY IP 250 OP 250 PS 637

## 2024-12-02 PROCEDURE — 120N000001 HC R&B MED SURG/OB

## 2024-12-02 PROCEDURE — 82805 BLOOD GASES W/O2 SATURATION: CPT

## 2024-12-02 RX ORDER — TAMSULOSIN HYDROCHLORIDE 0.4 MG/1
0.8 CAPSULE ORAL DAILY
Status: DISCONTINUED | OUTPATIENT
Start: 2024-12-03 | End: 2024-12-09 | Stop reason: HOSPADM

## 2024-12-02 RX ORDER — IOPAMIDOL 755 MG/ML
79 INJECTION, SOLUTION INTRAVASCULAR ONCE
Status: DISCONTINUED | OUTPATIENT
Start: 2024-12-02 | End: 2024-12-02

## 2024-12-02 RX ORDER — LIDOCAINE 40 MG/G
CREAM TOPICAL
Status: DISCONTINUED | OUTPATIENT
Start: 2024-12-02 | End: 2024-12-09 | Stop reason: HOSPADM

## 2024-12-02 RX ORDER — CEFTRIAXONE 2 G/1
2 INJECTION, POWDER, FOR SOLUTION INTRAMUSCULAR; INTRAVENOUS ONCE
Status: COMPLETED | OUTPATIENT
Start: 2024-12-02 | End: 2024-12-02

## 2024-12-02 RX ORDER — CAPSAICIN 0.025 %
CREAM (GRAM) TOPICAL 3 TIMES DAILY PRN
Status: DISCONTINUED | OUTPATIENT
Start: 2024-12-02 | End: 2024-12-03

## 2024-12-02 RX ORDER — ACETAMINOPHEN 325 MG/1
650 TABLET ORAL EVERY 8 HOURS PRN
Status: DISCONTINUED | OUTPATIENT
Start: 2024-12-02 | End: 2024-12-09 | Stop reason: HOSPADM

## 2024-12-02 RX ORDER — CLOPIDOGREL BISULFATE 75 MG/1
75 TABLET ORAL DAILY
Status: DISCONTINUED | OUTPATIENT
Start: 2024-12-03 | End: 2024-12-09 | Stop reason: HOSPADM

## 2024-12-02 RX ORDER — FUROSEMIDE 10 MG/ML
40 INJECTION INTRAMUSCULAR; INTRAVENOUS ONCE
Status: COMPLETED | OUTPATIENT
Start: 2024-12-02 | End: 2024-12-02

## 2024-12-02 RX ORDER — DOCUSATE SODIUM 100 MG/1
100 CAPSULE, LIQUID FILLED ORAL 2 TIMES DAILY
Status: DISCONTINUED | OUTPATIENT
Start: 2024-12-03 | End: 2024-12-09 | Stop reason: HOSPADM

## 2024-12-02 RX ORDER — BENZONATATE 100 MG/1
200 CAPSULE ORAL 3 TIMES DAILY PRN
Status: DISCONTINUED | OUTPATIENT
Start: 2024-12-02 | End: 2024-12-09 | Stop reason: HOSPADM

## 2024-12-02 RX ORDER — ENALAPRIL MALEATE 10 MG/1
40 TABLET ORAL DAILY
Status: DISCONTINUED | OUTPATIENT
Start: 2024-12-03 | End: 2024-12-09 | Stop reason: HOSPADM

## 2024-12-02 RX ORDER — IPRATROPIUM BROMIDE AND ALBUTEROL SULFATE 2.5; .5 MG/3ML; MG/3ML
3 SOLUTION RESPIRATORY (INHALATION) ONCE
Status: COMPLETED | OUTPATIENT
Start: 2024-12-02 | End: 2024-12-02

## 2024-12-02 RX ORDER — GABAPENTIN 100 MG/1
200 CAPSULE ORAL 2 TIMES DAILY
Status: DISCONTINUED | OUTPATIENT
Start: 2024-12-03 | End: 2024-12-09 | Stop reason: HOSPADM

## 2024-12-02 RX ORDER — ATORVASTATIN CALCIUM 40 MG/1
40 TABLET, FILM COATED ORAL DAILY
Status: DISCONTINUED | OUTPATIENT
Start: 2024-12-03 | End: 2024-12-09 | Stop reason: HOSPADM

## 2024-12-02 RX ORDER — ACETAMINOPHEN 325 MG/1
650 TABLET ORAL ONCE
Status: COMPLETED | OUTPATIENT
Start: 2024-12-02 | End: 2024-12-02

## 2024-12-02 RX ORDER — AMOXICILLIN 250 MG
1 CAPSULE ORAL 2 TIMES DAILY PRN
Status: DISCONTINUED | OUTPATIENT
Start: 2024-12-02 | End: 2024-12-09 | Stop reason: HOSPADM

## 2024-12-02 RX ORDER — CEFTRIAXONE 2 G/1
2 INJECTION, POWDER, FOR SOLUTION INTRAMUSCULAR; INTRAVENOUS EVERY 24 HOURS
Status: DISCONTINUED | OUTPATIENT
Start: 2024-12-03 | End: 2024-12-05

## 2024-12-02 RX ORDER — CALCIUM CARBONATE 500 MG/1
1000 TABLET, CHEWABLE ORAL 4 TIMES DAILY PRN
Status: DISCONTINUED | OUTPATIENT
Start: 2024-12-02 | End: 2024-12-09 | Stop reason: HOSPADM

## 2024-12-02 RX ORDER — AZITHROMYCIN 500 MG/5ML
500 INJECTION, POWDER, LYOPHILIZED, FOR SOLUTION INTRAVENOUS ONCE
Status: COMPLETED | OUTPATIENT
Start: 2024-12-02 | End: 2024-12-02

## 2024-12-02 RX ORDER — FUROSEMIDE 10 MG/ML
40 INJECTION INTRAMUSCULAR; INTRAVENOUS EVERY 8 HOURS
Status: DISCONTINUED | OUTPATIENT
Start: 2024-12-03 | End: 2024-12-04

## 2024-12-02 RX ORDER — MULTIPLE VITAMINS W/ MINERALS TAB 9MG-400MCG
1 TAB ORAL DAILY
Status: DISCONTINUED | OUTPATIENT
Start: 2024-12-03 | End: 2024-12-09 | Stop reason: HOSPADM

## 2024-12-02 RX ORDER — GUAIFENESIN 200 MG/10ML
200 LIQUID ORAL EVERY 4 HOURS PRN
Status: DISCONTINUED | OUTPATIENT
Start: 2024-12-02 | End: 2024-12-03

## 2024-12-02 RX ORDER — AMOXICILLIN 250 MG
2 CAPSULE ORAL 2 TIMES DAILY PRN
Status: DISCONTINUED | OUTPATIENT
Start: 2024-12-02 | End: 2024-12-09 | Stop reason: HOSPADM

## 2024-12-02 RX ADMIN — CEFTRIAXONE 2 G: 2 INJECTION, POWDER, FOR SOLUTION INTRAMUSCULAR; INTRAVENOUS at 20:18

## 2024-12-02 RX ADMIN — FUROSEMIDE 40 MG: 10 INJECTION, SOLUTION INTRAVENOUS at 22:34

## 2024-12-02 RX ADMIN — AZITHROMYCIN MONOHYDRATE 500 MG: 500 INJECTION, POWDER, LYOPHILIZED, FOR SOLUTION INTRAVENOUS at 21:00

## 2024-12-02 RX ADMIN — SODIUM CHLORIDE 1000 ML: 9 INJECTION, SOLUTION INTRAVENOUS at 20:18

## 2024-12-02 RX ADMIN — ACETAMINOPHEN 650 MG: 325 TABLET, FILM COATED ORAL at 20:01

## 2024-12-02 RX ADMIN — IPRATROPIUM BROMIDE AND ALBUTEROL SULFATE 3 ML: .5; 3 SOLUTION RESPIRATORY (INHALATION) at 20:51

## 2024-12-02 ASSESSMENT — ACTIVITIES OF DAILY LIVING (ADL)
ADLS_ACUITY_SCORE: 64

## 2024-12-02 ASSESSMENT — COLUMBIA-SUICIDE SEVERITY RATING SCALE - C-SSRS
1. IN THE PAST MONTH, HAVE YOU WISHED YOU WERE DEAD OR WISHED YOU COULD GO TO SLEEP AND NOT WAKE UP?: NO
2. HAVE YOU ACTUALLY HAD ANY THOUGHTS OF KILLING YOURSELF IN THE PAST MONTH?: NO
6. HAVE YOU EVER DONE ANYTHING, STARTED TO DO ANYTHING, OR PREPARED TO DO ANYTHING TO END YOUR LIFE?: NO

## 2024-12-02 ASSESSMENT — ENCOUNTER SYMPTOMS
COUGH: 1
SHORTNESS OF BREATH: 1
FATIGUE: 1
ABDOMINAL PAIN: 1

## 2024-12-02 NOTE — TELEPHONE ENCOUNTER
S-(situation): Patient was seen 11/21/24 for sinusitis.     B-(background): Daughter calls, patient is increasing short of breath, unable to lay down at night, up 6-7 times a night, has tried resting in chair and pillows behind back. Daughter states that she can here wheezing. States patient complains about feeling weak, shaky, and chills. Temp 98.8, Heart rate 75-82, oxygen sats 74% after eating breakfast and 89% when out of chair this am. Daughter is concerned about increasing difficulty with breathing.    A-(assessment): shortness of breath and low oxygen sats.    R-(recommendations): Advised to go to ED and be evaluated.    Huong Corcoran RN on 12/2/2024 at 12:25 PM          Reason for Disposition   Oxygen level (e.g., pulse oximetry) 90% or lower    Additional Information   Negative: SEVERE difficulty breathing (e.g., struggling for each breath, speaks in single words, pulse > 120)   Negative: Breathing stopped and hasn't returned   Negative: Choking on something   Negative: Bluish (or gray) lips or face   Negative: Difficult to awaken or acting confused (e.g., disoriented, slurred speech)   Negative: Passed out (e.g., fainted, lost consciousness, blacked out and was not responding)   Negative: Wheezing started suddenly after medicine, an allergic food, or bee sting   Negative: Stridor (harsh sound while breathing in)   Negative: Slow, shallow and weak breathing   Negative: Sounds like a life-threatening emergency to the triager   Negative: Chest pain   Negative: Wheezing (high pitched whistling sound) and previous asthma attacks or use of asthma medicines   Negative: Breathing difficulty and within 14 days of COVID-19 EXPOSURE (close contact) with someone diagnosed with COVID-19 (e.g., COVID test positive)   Negative: Breathing difficulty and COVID-19 is widespread in the community   Negative: Breathing diffculty and only present when coughing   Negative: Breathing difficulty and only from stuffy nose    "Negative: Breathing diffculty and only from stuffy nose or runny nose from common cold    Answer Assessment - Initial Assessment Questions  1. RESPIRATORY STATUS: \"Describe your breathing?\" (e.g., wheezing, shortness of breath, unable to speak, severe coughing)       wheezing  2. ONSET: \"When did this breathing problem begin?\"       2 nights ago  3. PATTERN \"Does the difficult breathing come and go, or has it been constant since it started?\"       Comes and goes  4. SEVERITY: \"How bad is your breathing?\" (e.g., mild, moderate, severe)       moderate  5. RECURRENT SYMPTOM: \"Have you had difficulty breathing before?\" If Yes, ask: \"When was the last time?\" and \"What happened that time?\"       yes  6. CARDIAC HISTORY: \"Do you have any history of heart disease?\" (e.g., heart attack, angina, bypass surgery, angioplasty)       yes  7. LUNG HISTORY: \"Do you have any history of lung disease?\"  (e.g., pulmonary embolus, asthma, emphysema)      pneumonia  8. CAUSE: \"What do you think is causing the breathing problem?\"       Recent problem with sinusitis  9. OTHER SYMPTOMS: \"Do you have any other symptoms?\" (e.g., chest pain, cough, dizziness, fever, runny nose)      weakness  10. O2 SATURATION MONITOR:  \"Do you use an oxygen saturation monitor (pulse oximeter) at home?\" If Yes, ask: \"What is your reading (oxygen level) today?\" \"What is your usual oxygen saturation reading?\" (e.g., 95%)        74-89 %  11. PREGNANCY: \"Is there any chance you are pregnant?\" \"When was your last menstrual period?\"        N/a  12. TRAVEL: \"Have you traveled out of the country in the last month?\" (e.g., travel history, exposures)        no    Protocols used: Breathing Difficulty-A-[place]    "

## 2024-12-02 NOTE — TELEPHONE ENCOUNTER
Patient is having some SOB and daughter is wondering what she should do. Please call       Leilani Dorantes on 12/2/2024 at 12:05 PM

## 2024-12-03 ENCOUNTER — APPOINTMENT (OUTPATIENT)
Dept: CARDIOLOGY | Facility: OTHER | Age: 89
End: 2024-12-03
Attending: INTERNAL MEDICINE
Payer: MEDICARE

## 2024-12-03 PROBLEM — J96.01 ACUTE RESPIRATORY FAILURE WITH HYPOXIA (H): Status: ACTIVE | Noted: 2024-12-03

## 2024-12-03 PROBLEM — I50.33 ACUTE ON CHRONIC DIASTOLIC CONGESTIVE HEART FAILURE (H): Status: ACTIVE | Noted: 2024-01-01

## 2024-12-03 LAB
ANION GAP SERPL CALCULATED.3IONS-SCNC: 11 MMOL/L (ref 7–15)
ATRIAL RATE - MUSE: 66 BPM
BUN SERPL-MCNC: 24.1 MG/DL (ref 8–23)
CALCIUM SERPL-MCNC: 8.7 MG/DL (ref 8.8–10.4)
CHLORIDE SERPL-SCNC: 107 MMOL/L (ref 98–107)
CREAT SERPL-MCNC: 1.09 MG/DL (ref 0.67–1.17)
DIASTOLIC BLOOD PRESSURE - MUSE: NORMAL MMHG
EGFRCR SERPLBLD CKD-EPI 2021: 64 ML/MIN/1.73M2
ERYTHROCYTE [DISTWIDTH] IN BLOOD BY AUTOMATED COUNT: 13.8 % (ref 10–15)
FERRITIN SERPL-MCNC: 255 NG/ML (ref 31–409)
GLUCOSE BLDC GLUCOMTR-MCNC: 269 MG/DL (ref 70–99)
GLUCOSE SERPL-MCNC: 230 MG/DL (ref 70–99)
HCO3 SERPL-SCNC: 23 MMOL/L (ref 22–29)
HCT VFR BLD AUTO: 32.2 % (ref 40–53)
HGB BLD-MCNC: 9.3 G/DL (ref 13.3–17.7)
INTERPRETATION ECG - MUSE: NORMAL
IRON BINDING CAPACITY (ROCHE): 175 UG/DL (ref 240–430)
IRON SATN MFR SERPL: 11 % (ref 15–46)
IRON SERPL-MCNC: 20 UG/DL (ref 61–157)
LACTATE SERPL-SCNC: 1.5 MMOL/L (ref 0.7–2)
LVEF ECHO: NORMAL
MCH RBC QN AUTO: 29.9 PG (ref 26.5–33)
MCHC RBC AUTO-ENTMCNC: 28.9 G/DL (ref 31.5–36.5)
MCV RBC AUTO: 104 FL (ref 78–100)
NT-PROBNP SERPL-MCNC: 3351 PG/ML (ref 0–1800)
P AXIS - MUSE: -17 DEGREES
PLATELET # BLD AUTO: 239 10E3/UL (ref 150–450)
POTASSIUM SERPL-SCNC: 4.2 MMOL/L (ref 3.4–5.3)
PR INTERVAL - MUSE: 200 MS
QRS DURATION - MUSE: 84 MS
QT - MUSE: 404 MS
QTC - MUSE: 423 MS
R AXIS - MUSE: -42 DEGREES
RBC # BLD AUTO: 3.11 10E6/UL (ref 4.4–5.9)
SODIUM SERPL-SCNC: 141 MMOL/L (ref 135–145)
SYSTOLIC BLOOD PRESSURE - MUSE: NORMAL MMHG
T AXIS - MUSE: 50 DEGREES
TROPONIN T SERPL HS-MCNC: 92 NG/L
VENTRICULAR RATE- MUSE: 66 BPM
WBC # BLD AUTO: 6.6 10E3/UL (ref 4–11)

## 2024-12-03 PROCEDURE — 120N000001 HC R&B MED SURG/OB

## 2024-12-03 PROCEDURE — 83605 ASSAY OF LACTIC ACID: CPT | Performed by: INTERNAL MEDICINE

## 2024-12-03 PROCEDURE — 36415 COLL VENOUS BLD VENIPUNCTURE: CPT | Performed by: INTERNAL MEDICINE

## 2024-12-03 PROCEDURE — 93306 TTE W/DOPPLER COMPLETE: CPT

## 2024-12-03 PROCEDURE — 250N000013 HC RX MED GY IP 250 OP 250 PS 637: Performed by: INTERNAL MEDICINE

## 2024-12-03 PROCEDURE — 83880 ASSAY OF NATRIURETIC PEPTIDE: CPT | Performed by: INTERNAL MEDICINE

## 2024-12-03 PROCEDURE — 85014 HEMATOCRIT: CPT | Performed by: INTERNAL MEDICINE

## 2024-12-03 PROCEDURE — 80048 BASIC METABOLIC PNL TOTAL CA: CPT | Performed by: INTERNAL MEDICINE

## 2024-12-03 PROCEDURE — 250N000011 HC RX IP 250 OP 636: Performed by: INTERNAL MEDICINE

## 2024-12-03 PROCEDURE — 99222 1ST HOSP IP/OBS MODERATE 55: CPT | Performed by: INTERNAL MEDICINE

## 2024-12-03 PROCEDURE — 93306 TTE W/DOPPLER COMPLETE: CPT | Mod: 26 | Performed by: STUDENT IN AN ORGANIZED HEALTH CARE EDUCATION/TRAINING PROGRAM

## 2024-12-03 PROCEDURE — 83550 IRON BINDING TEST: CPT | Performed by: INTERNAL MEDICINE

## 2024-12-03 PROCEDURE — 258N000003 HC RX IP 258 OP 636: Performed by: INTERNAL MEDICINE

## 2024-12-03 PROCEDURE — 84484 ASSAY OF TROPONIN QUANT: CPT | Performed by: INTERNAL MEDICINE

## 2024-12-03 PROCEDURE — 82435 ASSAY OF BLOOD CHLORIDE: CPT | Performed by: INTERNAL MEDICINE

## 2024-12-03 PROCEDURE — 999N000157 HC STATISTIC RCP TIME EA 10 MIN

## 2024-12-03 PROCEDURE — 82728 ASSAY OF FERRITIN: CPT | Performed by: INTERNAL MEDICINE

## 2024-12-03 RX ORDER — DEXTROSE MONOHYDRATE 25 G/50ML
25-50 INJECTION, SOLUTION INTRAVENOUS
Status: DISCONTINUED | OUTPATIENT
Start: 2024-12-03 | End: 2024-12-08

## 2024-12-03 RX ORDER — ALBUTEROL SULFATE 0.83 MG/ML
2.5 SOLUTION RESPIRATORY (INHALATION)
Status: DISCONTINUED | OUTPATIENT
Start: 2024-12-03 | End: 2024-12-09 | Stop reason: HOSPADM

## 2024-12-03 RX ORDER — GUAIFENESIN/DEXTROMETHORPHAN 100-10MG/5
10 SYRUP ORAL EVERY 4 HOURS PRN
Status: DISCONTINUED | OUTPATIENT
Start: 2024-12-03 | End: 2024-12-09 | Stop reason: HOSPADM

## 2024-12-03 RX ORDER — NICOTINE POLACRILEX 4 MG
15-30 LOZENGE BUCCAL
Status: DISCONTINUED | OUTPATIENT
Start: 2024-12-03 | End: 2024-12-08

## 2024-12-03 RX ADMIN — FUROSEMIDE 40 MG: 10 INJECTION, SOLUTION INTRAVENOUS at 21:49

## 2024-12-03 RX ADMIN — ACETAMINOPHEN 650 MG: 325 TABLET, FILM COATED ORAL at 07:58

## 2024-12-03 RX ADMIN — CLOPIDOGREL BISULFATE 75 MG: 75 TABLET, FILM COATED ORAL at 09:21

## 2024-12-03 RX ADMIN — DOCUSATE SODIUM 100 MG: 100 CAPSULE, LIQUID FILLED ORAL at 21:51

## 2024-12-03 RX ADMIN — FAMOTIDINE 20 MG: 10 INJECTION, SOLUTION INTRAVENOUS at 00:37

## 2024-12-03 RX ADMIN — Medication 1 TABLET: at 09:20

## 2024-12-03 RX ADMIN — AZITHROMYCIN MONOHYDRATE 250 MG: 500 INJECTION, POWDER, LYOPHILIZED, FOR SOLUTION INTRAVENOUS at 17:58

## 2024-12-03 RX ADMIN — APIXABAN 2.5 MG: 2.5 TABLET, FILM COATED ORAL at 09:20

## 2024-12-03 RX ADMIN — FAMOTIDINE 20 MG: 10 INJECTION, SOLUTION INTRAVENOUS at 21:47

## 2024-12-03 RX ADMIN — TAMSULOSIN HYDROCHLORIDE 0.8 MG: 0.4 CAPSULE ORAL at 09:21

## 2024-12-03 RX ADMIN — DOCUSATE SODIUM 100 MG: 100 CAPSULE, LIQUID FILLED ORAL at 09:21

## 2024-12-03 RX ADMIN — METFORMIN HYDROCHLORIDE 500 MG: 500 TABLET ORAL at 17:59

## 2024-12-03 RX ADMIN — APIXABAN 2.5 MG: 2.5 TABLET, FILM COATED ORAL at 21:51

## 2024-12-03 RX ADMIN — GABAPENTIN 200 MG: 100 CAPSULE ORAL at 09:21

## 2024-12-03 RX ADMIN — GABAPENTIN 200 MG: 100 CAPSULE ORAL at 21:50

## 2024-12-03 RX ADMIN — ATORVASTATIN CALCIUM 40 MG: 40 TABLET, FILM COATED ORAL at 09:21

## 2024-12-03 RX ADMIN — ENALAPRIL MALEATE 40 MG: 10 TABLET ORAL at 07:53

## 2024-12-03 RX ADMIN — FUROSEMIDE 40 MG: 10 INJECTION, SOLUTION INTRAVENOUS at 13:14

## 2024-12-03 RX ADMIN — FUROSEMIDE 40 MG: 10 INJECTION, SOLUTION INTRAVENOUS at 05:16

## 2024-12-03 RX ADMIN — METFORMIN HYDROCHLORIDE 500 MG: 500 TABLET ORAL at 07:41

## 2024-12-03 RX ADMIN — CEFTRIAXONE 2 G: 2 INJECTION, POWDER, FOR SOLUTION INTRAMUSCULAR; INTRAVENOUS at 17:21

## 2024-12-03 ASSESSMENT — ACTIVITIES OF DAILY LIVING (ADL)
ADLS_ACUITY_SCORE: 61
ADLS_ACUITY_SCORE: 57
ADLS_ACUITY_SCORE: 56
ADLS_ACUITY_SCORE: 56
ADLS_ACUITY_SCORE: 61
ADLS_ACUITY_SCORE: 57
ADLS_ACUITY_SCORE: 56
ADLS_ACUITY_SCORE: 61
ADLS_ACUITY_SCORE: 56
ADLS_ACUITY_SCORE: 57
ADLS_ACUITY_SCORE: 51
ADLS_ACUITY_SCORE: 56
ADLS_ACUITY_SCORE: 57
ADLS_ACUITY_SCORE: 61
ADLS_ACUITY_SCORE: 56
ADLS_ACUITY_SCORE: 57
ADLS_ACUITY_SCORE: 56
ADLS_ACUITY_SCORE: 61

## 2024-12-03 NOTE — ED TRIAGE NOTES
Patient arrives today with c/o Shortness of breath, difficulty ambulating and chills. States that it initially started approximately a month ago and has not been getting better. In triage oxygen saturations are 87% on room air. Family reports oxygen got as low as 80% today. Family game intranasal saline spray that helped increase his oxygen and alleviate some of his SOB. No tylenol or Motrin today for fever.      Triage Assessment (Adult)       Row Name 12/02/24 9409          Triage Assessment    Airway WDL WDL        Respiratory WDL    Respiratory WDL X;rhythm/pattern     Rhythm/Pattern, Respiratory shortness of breath        Skin Circulation/Temperature WDL    Skin Circulation/Temperature WDL WDL        Cardiac WDL    Cardiac WDL WDL        Peripheral/Neurovascular WDL    Peripheral Neurovascular WDL WDL        Cognitive/Neuro/Behavioral WDL    Cognitive/Neuro/Behavioral WDL WDL

## 2024-12-03 NOTE — PROGRESS NOTES
" NS ADMISSION NOTE    Patient admitted to room 350 at approximately 2330 via bed from emergency room. Patient was accompanied by daughter.     Verbal SBAR report received from Greer HERNANDEZ prior to patient arrival.     Patient trasferred to bed via slide sheet Patient alert and oriented X 3. The patient is not having any pain.  . Admission vital signs: Blood pressure 103/49, pulse 65, temperature 97.7  F (36.5  C), temperature source Tympanic, resp. rate 22, height 1.778 m (5' 10\"), weight 86.7 kg (191 lb 3.2 oz), SpO2 92%. Patient was oriented to plan of care, call light, bed controls, tv, telephone, bathroom, and visiting hours.     Risk Assessment    The following safety risks were identified during admission: fall. Yellow risk band applied: YES.     Skin Initial Assessment    This writer admitted this patient and completed a full skin assessment and Sudhakar score in the Adult PCS flowsheet.   Photo documentation of skin problem and/or wound competed via [employer] application (located under Media):  N/A    Appropriate interventions initiated as needed.         Sudhakar Risk Assessment  Sensory Perception: 3-->slightly limited  Moisture: 3-->occasionally moist  Activity: 3-->walks occasionally  Mobility: 3-->slightly limited  Nutrition: 2-->probably inadequate  Friction and Shear: 2-->potential problem  Sudhakar Score: 16  Moisture Interventions: Incontinence pad, Perineal cleanser, Urinary collection device  Nutrition Interventions: Encourage protein intake  Friction/Shear Interventions: Silicone foam sacral dressing, HOB 30 degrees or less  Mattress: Standard gel/foam mattress (IsoFlex, Atmos Air, etc.)  Bed Frame: Standard width and length    Education    Patient has a [place] to Observation order: No  Observation education completed and documented: N/A      Radha Zimmerman RN      "

## 2024-12-03 NOTE — PROGRESS NOTES
[facility]    Medicine Progress Note - Hospitalist Service    Date of Admission:  12/2/2024    Assessment & Plan   Principal Problem:    Acute respiratory failure with hypoxia (H)    Acute on chronic diastolic congestive heart failure (H)    CAP (community acquired pneumonia)    Assessment: multifactorial. I suspect both pneumonia and acute on chronic diastolic heart failure exacerbation. The patient has negative COVID, Influenza and RSV, and a normal white blood cell count. He does have a fever to 101 on admission and a mildly elevated lactate that corrected with intravenous fluids. Chest xray shows a multifocal pneumonia. In addition, I suspect an exacerbation of heart failure as well.     Plan: Continue ceftriaxone and azithromycin    IV furosemide   Echo    Supplemental oxygen, wean as able     Active Problems:    Anemia    Assessment: new on admit. Suspect iron deficiency, blood loss given advanced age and DOAC use.     Plan: add iron studies to labs   Monitor      Paroxysmal SVT (supraventricular tachycardia) (H)    Assessment: chronic    Plan: continue apixaban      Cerebrovascular accident (CVA) due to thrombosis of left middle cerebral artery (H)    Assessment: chronic    Plan: continue clopidogrel and apixaban      Essential hypertension    Assessment: chronic    Plan: monitor, treat with enalapril      Controlled type 2 diabetes mellitus with complication, without long-term current use of insulin (H)    Assessment: chronic    Plan: continue metformin      Restless legs syndrome (RLS)    Hereditary and idiopathic peripheral neuropathy    Assessment: chronic    Plan: treated with gabapentin         Diet: Fluid restriction 1200 ML FLUID  Combination Diet Regular Diet Adult    DVT Prophylaxis: DOAC  Cedeno Catheter: Not present  Lines: None     Cardiac Monitoring: None  Code Status: No CPR- Do NOT Intubate      Clinically Significant Risk Factors Present on Admission               #  "Hypoalbuminemia: Lowest albumin = 3.4 g/dL at 12/2/2024  7:50 PM, will monitor as appropriate  # Drug Induced Coagulation Defect: home medication list includes an anticoagulant medication  # Drug Induced Platelet Defect: home medication list includes an antiplatelet medication   # Hypertension: Noted on problem list  # Acute heart failure with preserved ejection fraction: heart failure noted on problem list, last echo with EF >50%, and receiving IV diuretics    # Acute Hypoxic Respiratory Failure: Documented O2 saturation < 90%. Continue supplemental oxygen as needed   # Anemia: based on hgb <11       # Overweight: Estimated body mass index is 26.54 kg/m  as calculated from the following:    Height as of this encounter: 1.778 m (5' 10\").    Weight as of this encounter: 83.9 kg (185 lb).              Social Drivers of Health            Disposition Plan     Medically Ready for Discharge: Anticipated in 2-4 Days             Tulio Ibrahim MD  Hospitalist Service  [facility]  Securely message with Vocera (more info)  Text page via [employer] Paging/Directory   ______________________________________________________________________    Interval History   Chilled, some dyspnea and cough    Physical Exam   Vital Signs: Temp: 99.5  F (37.5  C) Temp src: Tympanic BP: 105/49 Pulse: 68   Resp: 20 SpO2: 92 % O2 Device: Nasal cannula with humidification Oxygen Delivery: 4 LPM  Weight: 185 lbs 0 oz    GENERAL: Comfortable, talkative, in no apparent distress.  HEENT: Anicteric, non-injected sclera, mouth moist.   NECK: No JVD.  CARDIOVASCULAR: regular rate and rhythm, no murmur. No lower extremity edema   RESPIRATORY: Clear to auscultation bilaterally, no wheezes, no crackles.  GI: Non-distended, normal bowel sounds, soft, non-tender.  SKIN: No rashes, sores.   NEUROLOGY: Alert and oriented x3, follows commands, speech and language normal.       Medical Decision Making       50 MINUTES SPENT BY ME on the date of " service doing chart review, history, exam, documentation & further activities per the note.      Data     I have personally reviewed the following data over the past 24 hrs:    6.6  \   9.3 (L)   / 239     141 107 24.1 (H) /  230 (H)   4.2 23 1.09 \     ALT: 14 AST: 18 AP: 89 TBILI: 0.6   ALB: 3.4 (L) TOT PROTEIN: 6.3 (L) LIPASE: N/A     Trop: 92 (H) BNP: 2,921 (H)  3,351 (H)     Procal: N/A CRP: N/A Lactic Acid: 1.5         Imaging results reviewed over the past 24 hrs:   Recent Results (from the past 24 hours)   XR Chest 2 Views    Narrative    XR CHEST 2 VIEWS    HISTORY: 89 years Male cough, shortness of breath, fever, ? pneumonia,  CHF    COMPARISON: 10/7/2023, CT 2/27/2024    TECHNIQUE: 2 views of the chest were obtained.    FINDINGS: Lung volumes are low. There is blunting of both costophrenic  sulci. Vascularity is prominent with indistinct fascial margins.  Multifocal bilateral groundglass opacities are present, largest is in  the left apex. There is also increasing opacity at the left lung base.  Airspace opacification similar in appearance to the process seen on  the CT of 2/27/2024.        Impression    IMPRESSION: Multifocal atypical infectious or inflammatory process  versus metastatic disease. Interval worsening from the prior study.    Finding suggesting overlying pulmonary edema, small bilateral pleural  effusions. Superimposed pneumonia cannot be excluded.    SAVANA METZGER MD         SYSTEM ID:  [id number]

## 2024-12-03 NOTE — PROGRESS NOTES
Interdisciplinary Discharge Planning Note    Anticipated Discharge Date:12/5-12/6    Anticipated Discharge Location: Home    Clinical Needs Before Discharge:   thomas Brice, PT/OT eval, stable oxygen needs    Treatment Needs After Discharge:  None identified at this time    Potential Barriers to Discharge: None Identified     MAURO Rock  12/3/2024,  12:31 PM

## 2024-12-03 NOTE — PLAN OF CARE
Goal Outcome Evaluation:      Plan of Care Reviewed With: patient    Overall Patient Progress: no changeOverall Patient Progress: no change         Patient continues to have coarse lung sounds, on 2 LPM via nasal cannula. +2 bilateral lower extremity edema. Incontinent of urine, purewick in place for accurate output measuring. Mepilex placed on sacrum for pre existing breakdown, pre existing skin tear on right elbow from recent fall at home, Allevyn placed. Assist of 1-2 with walker, bed alarm on for safety.

## 2024-12-03 NOTE — PHARMACY-ADMISSION MEDICATION HISTORY
Pharmacist Admission Medication History    Admission medication history is complete. The information provided in this note is only as accurate as the sources available at the time of the update.    Information Source(s): Patient, Family member, Hospital records, and CareEverywhere/SureScripts via in-person and phone    Pertinent Information: Patient manages his own medication with the help of his daughter. Patient when interviewed, but was not able to go into detail about his medication. Patient stated that he relays on his home list. Patient's daughter was called. Patient daughter was able to go into detail about his home medication.    Changes made to PTA medication list:  Added: None  Deleted:   Capsaicin cream  Changed: None    Allergies reviewed with patient and updates made in EHR: yes; Patient's daughter stated that he has an allergy to Sulfa drugs, but could not remember was type of reaction he had.     Medication History Completed By: Mack Briseno TYREL 12/3/2024 9:26 AM    PTA Med List   Medication Sig Last Dose/Taking    apixaban ANTICOAGULANT (ELIQUIS) 2.5 MG tablet Take 1 tablet (2.5 mg) by mouth 2 times daily 12/2/2024 Evening    atorvastatin (LIPITOR) 40 MG tablet TAKE 1 TABLET (40 MG) BY MOUTH DAILY 12/2/2024 Evening    clopidogrel (PLAVIX) 75 MG tablet TAKE 1 TABLET (75 MG) BY MOUTH DAILY 12/2/2024 Morning    enalapril (VASOTEC) 20 MG tablet TAKE 2 TABLETS (40 MG) BY MOUTH DAILY 12/2/2024 Evening    furosemide (LASIX) 40 MG tablet Take 1 tablet (40 mg) by mouth daily. 12/2/2024 Morning    gabapentin (NEURONTIN) 100 MG capsule Take 2 capsules (200 mg) by mouth 2 times daily 12/2/2024 Evening    metFORMIN (GLUCOPHAGE) 500 MG tablet TAKE 1 TABLET (500 MG) BY MOUTH 2 TIMES DAILY (WITH MEALS) 12/2/2024 Evening    Multiple Vitamin (MULTI-VITAMINS) TABS Take 1 tablet by mouth daily 12/2/2024 Morning    tamsulosin (FLOMAX) 0.4 MG capsule Take 2 capsules (0.8 mg) by mouth daily 12/2/2024 Evening
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
## 2024-12-03 NOTE — PROVIDER NOTIFICATION
Pt found 87% on acute 2 lpm nc, titrated to 4 lpm, resp 24, low grade temp 100.2, Md notified and seen pt at bedside. Rt notified of oxygen increase.
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
## 2024-12-03 NOTE — PHARMACY
Pharmacy- Renal Dose Adjustment    Patient Active Problem List   Diagnosis    Anemia    Psychosexual dysfunction with inhibited sexual excitement    Hematuria    Paroxysmal SVT (supraventricular tachycardia) (H)    Personal history of other disorder of urinary system    Sciatica of right side    Mediastinal adenopathy    Benign prostatic hyperplasia with urinary retention    Cerebrovascular accident (CVA) due to thrombosis of left middle cerebral artery (H)    Essential hypertension    Mixed hyperlipidemia    Incidental pulmonary nodule, benign    Lumbar disc disease    Controlled type 2 diabetes mellitus with complication, without long-term current use of insulin (H)    Restless legs syndrome (RLS)    Hereditary and idiopathic peripheral neuropathy    Pneumonia of left upper lobe due to infectious organism    Atypical atrial flutter (H)    Piriformis syndrome, right    Diabetic polyneuropathy associated with type 2 diabetes mellitus (H)    Balance problems    Pneumonia    Hypoxia        Relevant Labs:  Recent Labs   Lab Test 12/03/24  0528 12/02/24  1950   WBC 6.6 10.0   HGB 9.3* 9.7*    286        CrCl: 51.3 mL/min      Intake/Output Summary (Last 24 hours) at 12/3/2024 0723  Last data filed at 12/3/2024 0615  Gross per 24 hour   Intake 250 ml   Output 600 ml   Net -350 ml          Per Renal Dose Adjustment Protocol, will adjust:  -Famotidine to 20 mg Q24H (from 20 mg Q12H) due to CrCl 30-60 mL/min.      Will continue to follow and make adjustments accordingly. Thank You.    Zaki Mead [facility] ....................  12/3/2024   7:23 AM

## 2024-12-03 NOTE — PLAN OF CARE
"A&O x 4, low grade temp earlier in shift, tylenol given with improvement, all vss. Lungs diminished throughout, coarse in RUL,EDMOND, fine crackles in RLL and LLL, was able to weaned to acute 2 lpm nc from 4 lpm. Dyspnea upon exertion present,use of abdominal muscles, refused incentive spirometer instruction. +2 to +3 edema to BLE, pt given lasix.Incontinent of urine at times, primofit in place for accurate urine output. Assist of 2 ,walker and gaitbelt for transfers, CCRQ2.    /54   Pulse 66   Temp 97.5  F (36.4  C) (Tympanic)   Resp 22   Ht 1.778 m (5' 10\")   Wt 83.9 kg (185 lb)   SpO2 92%   BMI 26.54 kg/m          Goal Outcome Evaluation:      Plan of Care Reviewed With: patient, family    Overall Patient Progress: improving      Problem: Skin Injury Risk Increased  Goal: Skin Health and Integrity  Intervention: Plan: Nurse Driven Intervention: Friction and Shear  Recent Flowsheet Documentation  Taken 12/3/2024 1525 by Omar Alves, RN  Friction/Shear Interventions:   Silicone foam sacral dressing   HOB 30 degrees or less    Problem: Pneumonia  Goal: Fluid Balance  Intervention: Monitor and Manage Fluid Balance  Recent Flowsheet Documentation  Taken 12/3/2024 1525 by Omar Alves, RN  Fluid/Electrolyte Management: fluids provided                 "

## 2024-12-03 NOTE — PROVIDER NOTIFICATION
12/02/24 3312   Initial Information   Patient Belongings remains with patient   Patient Belongings Remaining with Patient other (see comments);clothing;dental appliance  (life alert,)   Did you bring any home meds/supplements to the hospital?  No     A               Admission:  I am responsible for any personal items that are not sent to the safe or pharmacy.  [facility] is not responsible for loss, theft or damage of any property in my possession.    Signature:  _________________________________ Date: _______  Time: _____                                              Staff Signature:  ____________________________ Date: ________  Time: _____      2nd Staff person, if patient is unable/unwilling to sign:    Signature: ________________________________ Date: ________  Time: _____     Discharge:  [facility] has returned all of my personal belongings:    Signature: _________________________________ Date: ________  Time: _____                                          Staff Signature:  ____________________________ Date: ________  Time: _____

## 2024-12-04 LAB
ANION GAP SERPL CALCULATED.3IONS-SCNC: 9 MMOL/L (ref 7–15)
BUN SERPL-MCNC: 32.3 MG/DL (ref 8–23)
CALCIUM SERPL-MCNC: 8.5 MG/DL (ref 8.8–10.4)
CHLORIDE SERPL-SCNC: 104 MMOL/L (ref 98–107)
CREAT SERPL-MCNC: 1.22 MG/DL (ref 0.67–1.17)
EGFRCR SERPLBLD CKD-EPI 2021: 56 ML/MIN/1.73M2
ERYTHROCYTE [DISTWIDTH] IN BLOOD BY AUTOMATED COUNT: 13.7 % (ref 10–15)
GLUCOSE BLDC GLUCOMTR-MCNC: 175 MG/DL (ref 70–99)
GLUCOSE BLDC GLUCOMTR-MCNC: 181 MG/DL (ref 70–99)
GLUCOSE BLDC GLUCOMTR-MCNC: 271 MG/DL (ref 70–99)
GLUCOSE SERPL-MCNC: 214 MG/DL (ref 70–99)
HCO3 SERPL-SCNC: 28 MMOL/L (ref 22–29)
HCT VFR BLD AUTO: 29.5 % (ref 40–53)
HGB BLD-MCNC: 9.5 G/DL (ref 13.3–17.7)
HOLD SPECIMEN: NORMAL
HOLD SPECIMEN: NORMAL
MCH RBC QN AUTO: 30.4 PG (ref 26.5–33)
MCHC RBC AUTO-ENTMCNC: 32.2 G/DL (ref 31.5–36.5)
MCV RBC AUTO: 95 FL (ref 78–100)
PLATELET # BLD AUTO: 301 10E3/UL (ref 150–450)
POTASSIUM SERPL-SCNC: 4.2 MMOL/L (ref 3.4–5.3)
RBC # BLD AUTO: 3.12 10E6/UL (ref 4.4–5.9)
SODIUM SERPL-SCNC: 141 MMOL/L (ref 135–145)
WBC # BLD AUTO: 8.2 10E3/UL (ref 4–11)

## 2024-12-04 PROCEDURE — 99232 SBSQ HOSP IP/OBS MODERATE 35: CPT | Performed by: INTERNAL MEDICINE

## 2024-12-04 PROCEDURE — 97110 THERAPEUTIC EXERCISES: CPT | Mod: GP

## 2024-12-04 PROCEDURE — 80048 BASIC METABOLIC PNL TOTAL CA: CPT | Performed by: INTERNAL MEDICINE

## 2024-12-04 PROCEDURE — 999N000157 HC STATISTIC RCP TIME EA 10 MIN

## 2024-12-04 PROCEDURE — 250N000013 HC RX MED GY IP 250 OP 250 PS 637: Performed by: INTERNAL MEDICINE

## 2024-12-04 PROCEDURE — 85018 HEMOGLOBIN: CPT | Performed by: INTERNAL MEDICINE

## 2024-12-04 PROCEDURE — 85041 AUTOMATED RBC COUNT: CPT | Performed by: INTERNAL MEDICINE

## 2024-12-04 PROCEDURE — 97530 THERAPEUTIC ACTIVITIES: CPT | Mod: GO | Performed by: OCCUPATIONAL THERAPIST

## 2024-12-04 PROCEDURE — 120N000001 HC R&B MED SURG/OB

## 2024-12-04 PROCEDURE — 36415 COLL VENOUS BLD VENIPUNCTURE: CPT | Performed by: INTERNAL MEDICINE

## 2024-12-04 PROCEDURE — 97161 PT EVAL LOW COMPLEX 20 MIN: CPT | Mod: GP

## 2024-12-04 PROCEDURE — 97165 OT EVAL LOW COMPLEX 30 MIN: CPT | Mod: GO | Performed by: OCCUPATIONAL THERAPIST

## 2024-12-04 PROCEDURE — 258N000003 HC RX IP 258 OP 636: Performed by: INTERNAL MEDICINE

## 2024-12-04 PROCEDURE — 250N000011 HC RX IP 250 OP 636: Performed by: INTERNAL MEDICINE

## 2024-12-04 RX ORDER — FERROUS SULFATE 325(65) MG
325 TABLET, DELAYED RELEASE (ENTERIC COATED) ORAL 2 TIMES DAILY WITH MEALS
Status: DISCONTINUED | OUTPATIENT
Start: 2024-12-04 | End: 2024-12-09 | Stop reason: HOSPADM

## 2024-12-04 RX ADMIN — FUROSEMIDE 40 MG: 10 INJECTION, SOLUTION INTRAVENOUS at 05:37

## 2024-12-04 RX ADMIN — ATORVASTATIN CALCIUM 40 MG: 40 TABLET, FILM COATED ORAL at 10:09

## 2024-12-04 RX ADMIN — Medication 1 TABLET: at 10:08

## 2024-12-04 RX ADMIN — DOCUSATE SODIUM 100 MG: 100 CAPSULE, LIQUID FILLED ORAL at 21:51

## 2024-12-04 RX ADMIN — APIXABAN 2.5 MG: 2.5 TABLET, FILM COATED ORAL at 21:51

## 2024-12-04 RX ADMIN — GABAPENTIN 200 MG: 100 CAPSULE ORAL at 10:09

## 2024-12-04 RX ADMIN — METFORMIN HYDROCHLORIDE 500 MG: 500 TABLET ORAL at 17:37

## 2024-12-04 RX ADMIN — CLOPIDOGREL BISULFATE 75 MG: 75 TABLET, FILM COATED ORAL at 10:08

## 2024-12-04 RX ADMIN — CEFTRIAXONE 2 G: 2 INJECTION, POWDER, FOR SOLUTION INTRAMUSCULAR; INTRAVENOUS at 17:34

## 2024-12-04 RX ADMIN — GABAPENTIN 200 MG: 100 CAPSULE ORAL at 21:50

## 2024-12-04 RX ADMIN — FERROUS SULFATE TAB EC 325 MG (65 MG FE EQUIVALENT) 325 MG: 325 (65 FE) TABLET DELAYED RESPONSE at 10:55

## 2024-12-04 RX ADMIN — AZITHROMYCIN MONOHYDRATE 250 MG: 500 INJECTION, POWDER, LYOPHILIZED, FOR SOLUTION INTRAVENOUS at 18:15

## 2024-12-04 RX ADMIN — FERROUS SULFATE TAB EC 325 MG (65 MG FE EQUIVALENT) 325 MG: 325 (65 FE) TABLET DELAYED RESPONSE at 17:37

## 2024-12-04 RX ADMIN — METFORMIN HYDROCHLORIDE 500 MG: 500 TABLET ORAL at 07:55

## 2024-12-04 RX ADMIN — ACETAMINOPHEN 650 MG: 325 TABLET, FILM COATED ORAL at 05:37

## 2024-12-04 RX ADMIN — APIXABAN 2.5 MG: 2.5 TABLET, FILM COATED ORAL at 10:08

## 2024-12-04 RX ADMIN — FAMOTIDINE 20 MG: 10 INJECTION, SOLUTION INTRAVENOUS at 21:50

## 2024-12-04 RX ADMIN — TAMSULOSIN HYDROCHLORIDE 0.8 MG: 0.4 CAPSULE ORAL at 10:08

## 2024-12-04 RX ADMIN — ENALAPRIL MALEATE 40 MG: 10 TABLET ORAL at 07:56

## 2024-12-04 ASSESSMENT — ACTIVITIES OF DAILY LIVING (ADL)
ADLS_ACUITY_SCORE: 59
ADLS_ACUITY_SCORE: 58
ADLS_ACUITY_SCORE: 58
ADLS_ACUITY_SCORE: 59
ADLS_ACUITY_SCORE: 59
ADLS_ACUITY_SCORE: 63
ADLS_ACUITY_SCORE: 59
ADLS_ACUITY_SCORE: 59
ADLS_ACUITY_SCORE: 58
ADLS_ACUITY_SCORE: 63
ADLS_ACUITY_SCORE: 63
ADLS_ACUITY_SCORE: 59
ADLS_ACUITY_SCORE: 58
ADLS_ACUITY_SCORE: 58
ADLS_ACUITY_SCORE: 63
ADLS_ACUITY_SCORE: 59
ADLS_ACUITY_SCORE: 59
ADLS_ACUITY_SCORE: 63
ADLS_ACUITY_SCORE: 63
ADLS_ACUITY_SCORE: 58
ADLS_ACUITY_SCORE: 59
ADLS_ACUITY_SCORE: 58
ADLS_ACUITY_SCORE: 63

## 2024-12-04 NOTE — PROGRESS NOTES
[facility]    Medicine Progress Note - Hospitalist Service    Date of Admission:  12/2/2024    Assessment & Plan   Principal Problem:    Acute respiratory failure with hypoxia (H)    Acute on chronic diastolic congestive heart failure (H)    CAP (community acquired pneumonia)    Assessment: multifactorial. I suspect both pneumonia and acute on chronic diastolic heart failure exacerbation. The patient has negative COVID, Influenza and RSV, and a normal white blood cell count. He does have a fever to 101 on admission and a mildly elevated lactate that corrected with intravenous fluids. Chest xray shows a multifocal pneumonia. In addition, I suspect an exacerbation of heart failure as well. Echo shows mild aortic stenosis. Good diuresis, will hold IV furosemide.     Plan: Continue ceftriaxone and azithromycin day 2   IV furosemide stopped   Supplemental oxygen, wean as able   Consider skilled nursing facility due to weakness     Active Problems:    Anemia    Assessment: new on admit. Suspect iron deficiency, blood loss given advanced age and DOAC use. Low iron studies,    Plan: add iron sulfate BID   Monitor      Paroxysmal SVT (supraventricular tachycardia) (H)    Assessment: chronic    Plan: continue apixaban      Cerebrovascular accident (CVA) due to thrombosis of left middle cerebral artery (H)    Assessment: chronic    Plan: continue clopidogrel and apixaban      Essential hypertension    Assessment: chronic    Plan: monitor, treat with enalapril, but holding due to creatinine bump       Controlled type 2 diabetes mellitus with complication, without long-term current use of insulin (H)    Assessment: chronic    Plan: continue metformin      Restless legs syndrome (RLS)    Hereditary and idiopathic peripheral neuropathy    Assessment: chronic    Plan: treated with gabapentin      Acute kidney injury  Assessment: mild, not present on admission. Likely related to diuresis  Plan: hold enalapril,  "stop IV furosemide. Watch labs and intake and output        Diet: Fluid restriction 1200 ML FLUID  Combination Diet Regular Diet Adult    DVT Prophylaxis: DOAC  Cedeno Catheter: Not present  Lines: None     Cardiac Monitoring: None  Code Status: No CPR- Do NOT Intubate            Diet: Fluid restriction 1200 ML FLUID  Combination Diet Regular Diet Adult    DVT Prophylaxis: DOAC  Cedeno Catheter: Not present  Lines: None     Cardiac Monitoring: None  Code Status: No CPR- Do NOT Intubate      Clinically Significant Risk Factors           # Hypocalcemia: Lowest Ca = 8.5 mg/dL in last 2 days, will monitor and replace as appropriate     # Hypoalbuminemia: Lowest albumin = 3.4 g/dL at 12/2/2024  7:50 PM, will monitor as appropriate     # Hypertension: Noted on problem list  # Acute heart failure with preserved ejection fraction: heart failure noted on problem list, last echo with EF >50%, and receiving IV diuretics           # Overweight: Estimated body mass index is 26.43 kg/m  as calculated from the following:    Height as of this encounter: 1.778 m (5' 10\").    Weight as of this encounter: 83.6 kg (184 lb 3.2 oz)., PRESENT ON ADMISSION            Social Drivers of Health            Disposition Plan     Medically Ready for Discharge: Anticipated in 2-4 Days             Tulio Ibrahim MD  Hospitalist Service  [facility]  Securely message with [employer] (more info)  Text page via [employer] Paging/Directory   ______________________________________________________________________    Interval History   Feels better. Still weak. No nausea, vomiting. Complains of unsteadiness and dyspnea on exertion.     Physical Exam   Vital Signs: Temp: 97.2  F (36.2  C) Temp src: Tympanic BP: (!) 140/51 Pulse: 69   Resp: 18 SpO2: 94 % O2 Device: Nasal cannula with humidification Oxygen Delivery: 1 LPM  Weight: 184 lbs 3.2 oz    GENERAL: Comfortable, no apparent distress.  CARDIOVASCULAR: regular rate and rhythm, 1/6 systolic " murmur. No lower extremity edema   RESPIRATORY: Clear to auscultation bilaterally, no wheezes or crackles.  GI: non-tender, non-distended, normal bowel sounds.   SKIN: warm periphery, no rashes      Medical Decision Making       45 MINUTES SPENT BY ME on the date of service doing chart review, history, exam, documentation & further activities per the note.      Data     I have personally reviewed the following data over the past 24 hrs:    8.2  \   9.5 (L)   / 301     141 104 32.3 (H) /  214 (H)   4.2 28 1.22 (H) \       Imaging results reviewed over the past 24 hrs:   Recent Results (from the past 24 hours)   Echocardiogram Complete   Result Value    LVEF  55-60%    Narrative    [id number]  [id number]  [id number]  [id number]^FRANCISCO^KATELYN^L     [facility]  [address] [place] [address]     Name: AMINA STEELE  MRN: [id number]  : 1932  Study Date: 2024 09:13 AM  Age: 89 yrs  Gender: Male  Patient Location: [facility]  Reason For Study: CHF  Ordering Physician: KATELYN SINGH  Performed By: GILMAR Loza, RDCS, RVT     BSA: 2.0 m2  Height: 70 in  Weight: 185 lb  HR: 66  BP: 170/75 mmHg  ______________________________________________________________________________  Procedure  Complete Portable Echo Adult. Echocardiogram with two-dimensional, color and  spectral Doppler performed.  ______________________________________________________________________________  Interpretation Summary  Global and regional left ventricular function is normal with an EF of 55-60%.  Relative wall thickness is increased consistent with concentric remodeling.  The right ventricle is normal size. Global right ventricular function is  normal.  Borderline mild aortic stenosis. Vmax 2.3 m/s, mean gradient 12 mmHg, DVI  0.47, SVi 47 ml/m^2.  The right ventricular systolic pressure is 69mmHg above the right atrial  pressure.  IVC diameter and respiratory changes fall into an intermediate range  suggesting  an RA pressure of 8 mmHg.  Severe (pulmonary artery systolic pressure >75mmHg) pulmonary hypertension is  present.  Ascending Aorta dilatation is present. Ascending aorta 4.2 cm.  No pericardial effusion is present.     This study was compared with the study from 10/09/2023, there is modest  increase in aortic stenosis severity.  ______________________________________________________________________________  Left Ventricle  Global and regional left ventricular function is normal with an EF of 55-60%.  Relative wall thickness is increased consistent with concentric remodeling.  Grade III or advanced diastolic dysfunction. No regional wall motion  abnormalities are seen.     Right Ventricle  The right ventricle is normal size. Global right ventricular function is  normal.     Atria  Severe left atrial enlargement is present. Mild right atrial enlargement is  present.     Mitral Valve  Mitral leaflet thickness is normal . Mild mitral insufficiency is present.     Aortic Valve  The aortic valve is tricuspid. Trace aortic insufficiency is present.  Borderline mild aortic stenosis. Vmax 2.3 m/s, mean gradient 12 mmHg, DVI  0.47, SVi 47 ml/m^2.     Tricuspid Valve  Trace tricuspid insufficiency is present. The right ventricular systolic  pressure is 69mmHg above the right atrial pressure. Severe (pulmonary artery  systolic pressure >75mmHg) pulmonary hypertension is present.     Pulmonic Valve  The valve leaflets are not well visualized. On Doppler interrogation, there is  no significant stenosis or regurgitation.     Vessels  The aorta root is normal. Ascending Aorta dilatation is present. Ascending  aorta 4.2 cm. IVC diameter and respiratory changes fall into an intermediate  range suggesting an RA pressure of 8 mmHg.     Pericardium  No pericardial effusion is present.     Compared to Previous Study  This study was compared with the study from 10/09/2023, there is modest  increase in aortic stenosis .      ______________________________________________________________________________  MMode/2D Measurements & Calculations  IVSd: 1.1 cm  LVIDd: 4.6 cm  LVIDs: 3.2 cm  LVPWd: 1.1 cm  FS: 29.4 %     LV mass(C)d: 174.6 grams  LV mass(C)dI: 86.4 grams/m2  Ao root diam: 3.7 cm  asc Aorta Diam: 4.2 cm  LVOT diam: 2.3 cm  LVOT area: 4.1 cm2  Ao root diam index Ht(cm/m): 2.1  Ao root diam index BSA (cm/m2): 1.8  Asc Ao diam index BSA (cm/m2): 2.1  Asc Ao diam index Ht(cm/m): 2.3  LA Volume (BP): 124.0 ml  LA Volume Index (BP): 61.4 ml/m2     RWT: 0.48  TAPSE: 1.8 cm     Doppler Measurements & Calculations  MV E max donaldo: 133.0 cm/sec  MV A max donaldo: 33.3 cm/sec  MV E/A: 4.0  MV dec time: 0.17 sec  Ao V2 max: 233.0 cm/sec  Ao max P.0 mmHg  Ao V2 mean: 160.0 cm/sec  Ao mean P.0 mmHg  Ao V2 VTI: 46.1 cm  STEVEN(I,D): 2.0 cm2  STEVEN(V,D): 1.9 cm2  AI P1/2t: 543.3 msec  LV V1 max P.8 mmHg  LV V1 max: 109.5 cm/sec  LV V1 VTI: 23.0 cm  SV(LVOT): 93.9 ml  SI(LVOT): 46.5 ml/m2  TR max donaldo: 415.7 cm/sec  TR max P.2 mmHg  AV Donaldo Ratio (DI): 0.47  STEVEN Index (cm2/m2): 1.0  E/E' av.2  Lateral E/e': 10.8  Medial E/e': 15.7     RV S Donaldo: 15.2 cm/sec     ______________________________________________________________________________  Report approved by: Dr Tacos Jeff on 2024 12:06 PM

## 2024-12-04 NOTE — PROGRESS NOTES
:     Met with patient in room to discuss discharge planning needs. Patient stated that he agrees to short term rehab. Provided patient with a list of skilled nursing facilities in the area and patient chose to have a referral sent to [facility]. A referral has been sent to [facility].     Pt/family was given the Medicare Compare list for SNF, with associated star ratings to assist with choice for referrals/discharge planning Yes    Education was given to pt/family that star ratings are updated/maintained by Medicare and can be reviewed by visiting www.medicare.gov Yes     will continue to follow for discharge planning needs.     MAURO Rock on 12/4/2024 at 1:29 PM

## 2024-12-04 NOTE — PROGRESS NOTES
12/04/24 1700   Appointment Info   Signing Clinician's Name / Credentials (PT) Varinder Bills DPT   Rehab Comments (PT) Pt seen for evaluation following SOB and low saturations at home. Pt's daughter present for evaluation.   Living Environment   People in Home alone   Current Living Arrangements house   Home Accessibility stairs to enter home;stairs within home   Number of Stairs, Main Entrance 6   Stair Railings, Main Entrance railings on both sides of stairs   Number of Stairs, Within Home, Primary greater than 10 stairs   Stair Railings, Within Home, Primary railings safe and in good condition   Transportation Anticipated family or friend will provide   Living Environment Comments Pt has multi-level home but stays on one level.   Self-Care   Usual Activity Tolerance good   Current Activity Tolerance fair   Equipment Currently Used at Home walker, rolling;shower chair;grab bar, toilet;grab bar, tub/shower   Fall history within last six months yes   Number of times patient has fallen within last six months 3   General Information   Onset of Illness/Injury or Date of Surgery 12/02/24   Referring Physician Dr. Ibrahim   Patient/Family Therapy Goals Statement (PT) Pt would like to get stronger and feel better   General Observations Pt up in chair at start of session. 1L of O2 needed.   Cognition   Affect/Mental Status (Cognition) WNL   Orientation Status (Cognition) oriented x 4   Follows Commands (Cognition) WNL   Pain Assessment   Patient Currently in Pain No   Posture    Posture Not impaired   Range of Motion (ROM)   ROM Comment limited forward bending   Strength (Manual Muscle Testing)   Strength Comments fair dynamic strength. No significant assist needed. Poor endurance   Transfers   Comment, (Transfers) Sit to stand with CGA and FWW   Gait/Stairs (Locomotion)   [place] Level (Gait) contact guard;1 person assist   Assistive Device (Gait) walker, front-wheeled   Distance in Feet (Gait) 150   Pattern  (Gait) 3-point   Deviations/Abnormal Patterns (Gait) left sided deviations   Left Sided Gait Deviations foot drop/toe drag   Balance   Balance Comments does well with FWW use. No loss of balance   Clinical Impression   Criteria for Skilled Therapeutic Intervention Yes, treatment indicated   PT Diagnosis (PT) impaired mobility and function.   Influenced by the following impairments weakness, poor endurance, SOB   Functional limitations due to impairments limited activity tolerance   Clinical Presentation (PT Evaluation Complexity) stable   Clinical Decision Making (Complexity) low complexity   Planned Therapy Interventions (PT) strengthening;bed mobility training;gait training   PT Total Evaluation Time   PT Eval, Low Complexity Minutes (95718) 15   Physical Therapy Goals   PT Frequency Daily   PT Predicted Duration/Target Date for Goal Attainment 12/06/24   PT Goals Bed Mobility;Transfers;Gait   PT: Bed Mobility Supervision/stand-by assist   PT: Transfers Supervision/stand-by assist   PT: Gait Rolling walker;Greater than 200 feet   Interventions   Interventions Quick Adds Therapeutic Procedure   Therapeutic Procedure/Exercise   Ther. Procedure: strength, endurance, ROM, flexibillity Minutes (10436) 15   Symptoms Noted During/After Treatment shortness of breath  (O2 sats after ambulation 89% on 1L. Took 2 minutes to recover to 92%)   Treatment Detail/Skilled Intervention Ambulation in hallway with FWW.   PT Discharge Planning   PT Plan Continue PT   PT Discharge Recommendation (DC Rec) Transitional Care Facility;home with home care physical therapy   PT Rationale for DC Rec Pt is not at baseline level of function. Increased O2 needs currently with evidence of weakness and impaired mobility.   PT Brief overview of current status Sit to stand with CGA. Ambulated in hallway 150' with FWW. foot drop noted from old CVA. Standing rest breaks needed but appears to be slightly improving.   Physical Therapy Time and Intention    Timed Code Treatment Minutes 15   Total Session Time (sum of timed and untimed services) 30

## 2024-12-04 NOTE — PLAN OF CARE
"A&O x4,afebrile, b/p elevated earlier in shift now wdl after htn medication. Lungs clear/diminished throughout, on acute 1 lpm nc, dyspnea upon exertion present but improved. Pt ambulating with 1 assist and walker. Allevyn applied to buttock wound, picture uploaded to chart. Pt denies any pain. Pt missed hat with urination. Complaint with fluid restriction.    /55   Pulse 67   Temp 98.1  F (36.7  C) (Tympanic)   Resp 18   Ht 1.778 m (5' 10\")   Wt 83.6 kg (184 lb 3.2 oz)   SpO2 94%   BMI 26.43 kg/m        Goal Outcome Evaluation:      Plan of Care Reviewed With: patient    Overall Patient Progress: improving         Problem: Adult Inpatient Plan of Care  Goal: Absence of Hospital-Acquired Illness or Injury  Intervention: Prevent and Manage VTE (Venous Thromboembolism) Risk  Recent Flowsheet Documentation  Taken 12/4/2024 1353 by Omar Alves, RN  VTE Prevention/Management: (eliquis) other (see comments)         "

## 2024-12-04 NOTE — PLAN OF CARE
Goal Outcome Evaluation:           Overall Patient Progress: improving    Patient is alert and oriented. Given tylenol for mild headache.  Patient reports feeling short of breath with crackles and expiratory wheezes.  Patient weaned back down to 2L via NC and has maintained Sp02 >90%.  Patient receiving IV lasix with 1050 mL urine output.  Patient weight down less than a pound from yesterday.  Patient is compliant with fluid restriction.  VSS and afebrile.

## 2024-12-04 NOTE — PROGRESS NOTES
Interdisciplinary Discharge Planning Note    Anticipated Discharge Date:12/6-12/7    Anticipated Discharge Location: Skilled Nursing Facility    Clinical Needs Before Discharge:  stable oxygen requirement and antibiotics, PT/OT    Treatment Needs After Discharge:  rehab (PT, OT, ST)    Potential Barriers to Discharge: None Identified     MAURO Rock  12/4/2024,  1:29 PM

## 2024-12-04 NOTE — PROGRESS NOTES
12/04/24 8250   Appointment Info   Signing Clinician's Name / Credentials (OT) Sulema Unger, OTR/L   Living Environment   People in Home alone   Current Living Arrangements house   Home Accessibility stairs to enter home;stairs within home   Number of Stairs, Main Entrance 6   Stair Railings, Main Entrance railings on both sides of stairs   Number of Stairs, Within Home, Primary greater than 10 stairs   Stair Railings, Within Home, Primary railings safe and in good condition   Transportation Anticipated family or friend will provide   Living Environment Comments Pt states he does not drive, family assists when needed. Pt has an upper level and lower level of home but he doesnt use them   Self-Care   Usual Activity Tolerance good   Current Activity Tolerance fair   Equipment Currently Used at Home walker, rolling;shower chair;grab bar, toilet;grab bar, tub/shower   Fall history within last six months yes   Number of times patient has fallen within last six months 3   Cognitive Status Examination   Orientation Status orientation to person, place and time   Affect/Mental Status (Cognitive) WFL   Follows Commands WFL   Pain Assessment   Patient Currently in Pain No   Range of Motion Comprehensive   General Range of Motion no range of motion deficits identified   Transfers   Transfers sit-stand transfer   Sit-Stand Transfer   Sit-Stand Midway (Transfers) contact guard;1 person to manage equipment   Clinical Impression   Criteria for Skilled Therapeutic Interventions Met (OT) Yes, treatment indicated   OT Diagnosis acute respiratory failure with hypoxia   Influenced by the following impairments decresed endurance for function   OT Problem List-Impairments impacting ADL activity tolerance impaired   Assessment of Occupational Performance 1-3 Performance Deficits   Identified Performance Deficits mobility and self care   Planned Therapy Interventions (OT) ADL retraining;progressive activity/exercise   Clinical  "Decision Making Complexity (OT) problem focused assessment/low complexity   Risk & Benefits of therapy have been explained risks/benefits reviewed   OT Total Evaluation Time   OT Eval, Low Complexity Minutes (47665) 15   OT Goals   Therapy Frequency (OT) Daily   OT Predicted Duration/Target Date for Goal Attainment 12/06/24   OT Goals Hygiene/Grooming;Upper Body Dressing;Upper Body Bathing;Transfers;Toilet Transfer/Toileting   OT: Hygiene/Grooming supervision/stand-by assist   OT: Upper Body Dressing Supervision/stand-by assist   OT: Upper Body Bathing Supervision/stand-by assist   OT: Transfer Supervision/stand-by assist   OT: Toilet Transfer/Toileting Minimal assist   Interventions   Interventions Quick Adds Therapeutic Activity   Therapeutic Activities   Therapeutic Activity Minutes (18833) 30   Symptoms noted during/after treatment fatigue   Treatment Detail/Skilled Intervention Pt ambulated in room and hallway with FWW and CGA/Min A, pt states he feels \"worse\" than yesterday but unable to describe. Pt fatigued with ambulation and needed a rest break.   OT Discharge Planning   OT Plan progress functional mobility and ADL's   OT Discharge Recommendation (DC Rec) Transitional Care Facility   OT Rationale for DC Rec Pt would benefit from continued PT/OT to maximize level of independence needed for safe return home   OT Brief overview of current status Pt very pleasant, cooperative, reports he feels \"worse\" today vs yesterday, pt reports some recent falls at home lately when feeding his cat   OT Equipment Needed at Discharge   (pt has all necessary equipment)   Total Session Time   Timed Code Treatment Minutes 30   Total Session Time (sum of timed and untimed services) 45       "

## 2024-12-04 NOTE — PLAN OF CARE
Goal Outcome Evaluation:      Plan of Care Reviewed With: patient    Overall Patient Progress: improving         Pt was 96% on acute 2 lpm, attempted to wean to room air, desaturated to 89%, placed back on 1 lpm and now 94%, RT notified.

## 2024-12-04 NOTE — PROGRESS NOTES
Pt remains on 2 lpm NC, SpO2 low 90s. No PRN nebs needed throughout night. Will continue to titrate O2 as tolerated.    Sapphire Hazel, RT

## 2024-12-05 VITALS
WEIGHT: 186.6 LBS | RESPIRATION RATE: 20 BRPM | BODY MASS INDEX: 26.71 KG/M2 | HEART RATE: 67 BPM | SYSTOLIC BLOOD PRESSURE: 159 MMHG | OXYGEN SATURATION: 91 % | TEMPERATURE: 97.9 F | HEIGHT: 70 IN | DIASTOLIC BLOOD PRESSURE: 61 MMHG

## 2024-12-05 LAB
ANION GAP SERPL CALCULATED.3IONS-SCNC: 8 MMOL/L (ref 7–15)
BACTERIA BLD CULT: NORMAL
BUN SERPL-MCNC: 38.1 MG/DL (ref 8–23)
CALCIUM SERPL-MCNC: 8.5 MG/DL (ref 8.8–10.4)
CHLORIDE SERPL-SCNC: 106 MMOL/L (ref 98–107)
CREAT SERPL-MCNC: 1.24 MG/DL (ref 0.67–1.17)
EGFRCR SERPLBLD CKD-EPI 2021: 55 ML/MIN/1.73M2
ERYTHROCYTE [DISTWIDTH] IN BLOOD BY AUTOMATED COUNT: 13.5 % (ref 10–15)
GLUCOSE BLDC GLUCOMTR-MCNC: 202 MG/DL (ref 70–99)
GLUCOSE BLDC GLUCOMTR-MCNC: 227 MG/DL (ref 70–99)
GLUCOSE SERPL-MCNC: 221 MG/DL (ref 70–99)
HCO3 SERPL-SCNC: 28 MMOL/L (ref 22–29)
HCT VFR BLD AUTO: 27.1 % (ref 40–53)
HGB BLD-MCNC: 8.8 G/DL (ref 13.3–17.7)
MCH RBC QN AUTO: 30.7 PG (ref 26.5–33)
MCHC RBC AUTO-ENTMCNC: 32.5 G/DL (ref 31.5–36.5)
MCV RBC AUTO: 94 FL (ref 78–100)
PLATELET # BLD AUTO: 324 10E3/UL (ref 150–450)
POTASSIUM SERPL-SCNC: 4.2 MMOL/L (ref 3.4–5.3)
RBC # BLD AUTO: 2.87 10E6/UL (ref 4.4–5.9)
SODIUM SERPL-SCNC: 142 MMOL/L (ref 135–145)
WBC # BLD AUTO: 5.2 10E3/UL (ref 4–11)

## 2024-12-05 PROCEDURE — 999N000157 HC STATISTIC RCP TIME EA 10 MIN

## 2024-12-05 PROCEDURE — 99232 SBSQ HOSP IP/OBS MODERATE 35: CPT | Performed by: INTERNAL MEDICINE

## 2024-12-05 PROCEDURE — 97530 THERAPEUTIC ACTIVITIES: CPT | Mod: GP

## 2024-12-05 PROCEDURE — 94640 AIRWAY INHALATION TREATMENT: CPT

## 2024-12-05 PROCEDURE — 250N000013 HC RX MED GY IP 250 OP 250 PS 637: Performed by: INTERNAL MEDICINE

## 2024-12-05 PROCEDURE — 120N000001 HC R&B MED SURG/OB

## 2024-12-05 PROCEDURE — 85041 AUTOMATED RBC COUNT: CPT | Performed by: INTERNAL MEDICINE

## 2024-12-05 PROCEDURE — 250N000009 HC RX 250: Performed by: INTERNAL MEDICINE

## 2024-12-05 PROCEDURE — 85018 HEMOGLOBIN: CPT | Performed by: INTERNAL MEDICINE

## 2024-12-05 PROCEDURE — 97530 THERAPEUTIC ACTIVITIES: CPT | Mod: GO | Performed by: OCCUPATIONAL THERAPIST

## 2024-12-05 PROCEDURE — 97535 SELF CARE MNGMENT TRAINING: CPT | Mod: GO | Performed by: OCCUPATIONAL THERAPIST

## 2024-12-05 PROCEDURE — 36415 COLL VENOUS BLD VENIPUNCTURE: CPT | Performed by: INTERNAL MEDICINE

## 2024-12-05 PROCEDURE — 80048 BASIC METABOLIC PNL TOTAL CA: CPT | Performed by: INTERNAL MEDICINE

## 2024-12-05 RX ORDER — AZITHROMYCIN 250 MG/1
250 TABLET, FILM COATED ORAL DAILY
Status: DISCONTINUED | OUTPATIENT
Start: 2024-12-05 | End: 2024-12-06

## 2024-12-05 RX ORDER — CEFUROXIME AXETIL 250 MG/1
500 TABLET ORAL EVERY 12 HOURS SCHEDULED
Status: DISCONTINUED | OUTPATIENT
Start: 2024-12-05 | End: 2024-12-06

## 2024-12-05 RX ADMIN — AZITHROMYCIN DIHYDRATE 250 MG: 250 TABLET, FILM COATED ORAL at 14:23

## 2024-12-05 RX ADMIN — FERROUS SULFATE TAB EC 325 MG (65 MG FE EQUIVALENT) 325 MG: 325 (65 FE) TABLET DELAYED RESPONSE at 08:15

## 2024-12-05 RX ADMIN — FERROUS SULFATE TAB EC 325 MG (65 MG FE EQUIVALENT) 325 MG: 325 (65 FE) TABLET DELAYED RESPONSE at 17:17

## 2024-12-05 RX ADMIN — APIXABAN 2.5 MG: 2.5 TABLET, FILM COATED ORAL at 10:09

## 2024-12-05 RX ADMIN — Medication 1 TABLET: at 10:09

## 2024-12-05 RX ADMIN — METFORMIN HYDROCHLORIDE 500 MG: 500 TABLET ORAL at 08:15

## 2024-12-05 RX ADMIN — ACETAMINOPHEN 650 MG: 325 TABLET, FILM COATED ORAL at 11:54

## 2024-12-05 RX ADMIN — CEFUROXIME AXETIL 500 MG: 250 TABLET ORAL at 20:43

## 2024-12-05 RX ADMIN — TAMSULOSIN HYDROCHLORIDE 0.8 MG: 0.4 CAPSULE ORAL at 10:10

## 2024-12-05 RX ADMIN — CLOPIDOGREL BISULFATE 75 MG: 75 TABLET, FILM COATED ORAL at 10:09

## 2024-12-05 RX ADMIN — GABAPENTIN 200 MG: 100 CAPSULE ORAL at 21:55

## 2024-12-05 RX ADMIN — GABAPENTIN 200 MG: 100 CAPSULE ORAL at 10:10

## 2024-12-05 RX ADMIN — METFORMIN HYDROCHLORIDE 500 MG: 500 TABLET ORAL at 17:17

## 2024-12-05 RX ADMIN — ATORVASTATIN CALCIUM 40 MG: 40 TABLET, FILM COATED ORAL at 10:10

## 2024-12-05 RX ADMIN — DOCUSATE SODIUM 100 MG: 100 CAPSULE, LIQUID FILLED ORAL at 10:09

## 2024-12-05 RX ADMIN — DOCUSATE SODIUM 100 MG: 100 CAPSULE, LIQUID FILLED ORAL at 21:55

## 2024-12-05 RX ADMIN — ALBUTEROL SULFATE 2.5 MG: 2.5 SOLUTION RESPIRATORY (INHALATION) at 16:25

## 2024-12-05 ASSESSMENT — ACTIVITIES OF DAILY LIVING (ADL)
ADLS_ACUITY_SCORE: 55
ADLS_ACUITY_SCORE: 59
ADLS_ACUITY_SCORE: 55
ADLS_ACUITY_SCORE: 59
ADLS_ACUITY_SCORE: 59
ADLS_ACUITY_SCORE: 58
ADLS_ACUITY_SCORE: 55
ADLS_ACUITY_SCORE: 59
ADLS_ACUITY_SCORE: 58
ADLS_ACUITY_SCORE: 59
ADLS_ACUITY_SCORE: 55
ADLS_ACUITY_SCORE: 58
ADLS_ACUITY_SCORE: 59
ADLS_ACUITY_SCORE: 59
ADLS_ACUITY_SCORE: 55

## 2024-12-05 NOTE — PROGRESS NOTES
12/05/24 2687   Appointment Info   Signing Clinician's Name / Credentials (OT) Sulema Unger, OTR/L   Interventions   Interventions Quick Adds Self-Care/Home Management   Self-Care/Home Management   Self-Care/Home Mgmt/ADL, Compensatory, Meal Prep Minutes (28694) 30   Symptoms Noted During/After Treatment (Meal Preparation/Planning Training) fatigue   [place] Level (Grooming Training) stand-by assist   Assistance (Grooming Training) supervision;set-up required  (while seated)   [place] Level (Bathing Training) contact guard   Assistance (Bathing Training) 1 person assist  (sponge bathing)   [place] Level (Upper Body Dressing Training) minimum assist (75% patient effort)   Assistance (Upper Body Dressing Training) 1 person assist   Lower Body Dressing Training Assistance minimum assist (75% patient effort)   Lower Body Dressing Training Assistance 1 person assist   [place] Level (Toilet Training) maximum assist (25% patient effort)   Assistance (Toilet Training) 1 person assist   Therapeutic Activities   Therapeutic Activity Minutes (27683) 15   Symptoms noted during/after treatment fatigue   Treatment Detail/Skilled Intervention Pt able to ambulate in hallway agreater distance today vs yesterday, reports he feels better today, completed self cares with min to mod assist overall   OT Discharge Planning   OT Plan progress functional mobility and ADL's   OT Discharge Recommendation (DC Rec) Transitional Care Facility   OT Rationale for DC Rec Pt would benefit from continued PT/OT to maximize level of independence needed for safe return home   OT Brief overview of current status Pt making slow but steady progress, reports feeling better today and tolerated increased functional activity today. Pt's O2 was 94% on room air throughout session   Total Session Time   Timed Code Treatment Minutes 45   Total Session Time (sum of timed and untimed services) 45

## 2024-12-05 NOTE — PROGRESS NOTES
[facility]    Medicine Progress Note - Hospitalist Service    Date of Admission:  12/2/2024    Assessment & Plan   Principal Problem:    Acute respiratory failure with hypoxia (H)    Acute on chronic diastolic congestive heart failure (H)    CAP (community acquired pneumonia)    Assessment: multifactorial. I suspect both pneumonia and acute on chronic diastolic heart failure exacerbation. The patient has negative COVID, Influenza and RSV, and a normal white blood cell count. He does have a fever to 101 on admission and a mildly elevated lactate that corrected with intravenous fluids. Chest xray shows a multifocal pneumonia. In addition, I suspect an exacerbation of heart failure as well. Echo shows mild aortic stenosis. Good diuresis.    Plan: Continue ceftriaxone and azithromycin day 3   Supplemental oxygen, wean as able   Plan discharge to skilled nursing facility      Active Problems:    Anemia    Assessment: new on admit. Suspect iron deficiency, blood loss given advanced age and DOAC use. Low iron studies,    Plan: added iron sulfate BID   Monitor      Paroxysmal SVT (supraventricular tachycardia) (H)    Assessment: chronic    Plan: continue apixaban      Cerebrovascular accident (CVA) due to thrombosis of left middle cerebral artery (H)    Assessment: chronic    Plan: continue clopidogrel and apixaban      Essential hypertension    Assessment: chronic    Plan: monitor, treat with enalapril, but holding due to creatinine bump       Controlled type 2 diabetes mellitus with complication, without long-term current use of insulin (H)    Assessment: chronic    Plan: continue metformin      Restless legs syndrome (RLS)    Hereditary and idiopathic peripheral neuropathy    Assessment: chronic    Plan: treated with gabapentin      Acute kidney injury  Assessment: mild, not present on admission. Likely related to diuresis  Plan: hold enalapril, stopped IV furosemide. Watch labs and intake and  "output          Diet: Fluid restriction 1200 ML FLUID  Combination Diet Regular Diet Adult    DVT Prophylaxis: DOAC  Cedeno Catheter: Not present  Lines: None     Cardiac Monitoring: None  Code Status: No CPR- Do NOT Intubate      Clinically Significant Risk Factors               # Hypoalbuminemia: Lowest albumin = 3.4 g/dL at 12/2/2024  7:50 PM, will monitor as appropriate     # Hypertension: Noted on problem list  # Acute heart failure with preserved ejection fraction: heart failure noted on problem list, last echo with EF >50%, and receiving IV diuretics           # Overweight: Estimated body mass index is 26.77 kg/m  as calculated from the following:    Height as of this encounter: 1.778 m (5' 10\").    Weight as of this encounter: 84.6 kg (186 lb 9.6 oz)., PRESENT ON ADMISSION            Social Drivers of Health            Disposition Plan     Medically Ready for Discharge: Anticipated Tomorrow             Tulio Ibrahim MD  Hospitalist Service  [facility]  Securely message with [employer] (more info)  Text page via [employer] Paging/Directory   ______________________________________________________________________    Interval History   Feeling better. Mild dyspnea. No nausea, vomiting. No fevers, chills.     Physical Exam   Vital Signs: Temp: 97.3  F (36.3  C) Temp src: Tympanic BP: (!) 156/69 Pulse: 75   Resp: 22 SpO2: 92 % O2 Device: None (Room air) Oxygen Delivery: 1 LPM (weaned to room air)  Weight: 186 lbs 9.6 oz    GENERAL: Comfortable, no apparent distress.  CARDIOVASCULAR: regular rate and rhythm, no murmur. No lower extremity edema   RESPIRATORY: Clear to auscultation bilaterally, no wheezes or crackles.  GI: non-tender, non-distended, normal bowel sounds.   SKIN: warm periphery, no rashes      Medical Decision Making       35 MINUTES SPENT BY ME on the date of service doing chart review, history, exam, documentation & further activities per the note.      Data     I have personally " reviewed the following data over the past 24 hrs:    5.2  \   8.8 (L)   / 324     142 106 38.1 (H) /  221 (H)   4.2 28 1.24 (H) \

## 2024-12-05 NOTE — PLAN OF CARE
Goal Outcome Evaluation:         Overall Patient Progress: improvingOverall Patient Progress: improving    Outcome Evaluation: Patient lungs are clear and diminished.   Patient maintains Sp02 >92% on 1L.  had to increase to 2L briefly after patient was laying supine in bed.  after assisted up to bathroom and in recliner was able to wean back to 1L.  patient has remained alert and oriented.  pt has denied any pain this shift.  VSS and afebrile.

## 2024-12-05 NOTE — PLAN OF CARE
"Afebrile,vss. Lungs have fine crackles in bilateral lower lobes, was able to wean to room air for part of the day became short of breath desaturated to 89%, 1 lpm nc applied now 92-94%. Petechiae on lower back, across buttocks and bilateral feet, md aware. +1 to +2 edema in bilateral feet and ankles. Ambulates  with 1 assist and walker, declined afternoon walk. Pain in right arm at previous IV site, prn tylenol given, heat/ice rotated with moderate relief. Incontinent of urine x 1, did void 200mls this afternoon.    /59   Pulse 67   Temp 97.9  F (36.6  C) (Tympanic)   Resp 22   Ht 1.778 m (5' 10\")   Wt 84.6 kg (186 lb 9.6 oz)   SpO2 93%   BMI 26.77 kg/m        Goal Outcome Evaluation:      Plan of Care Reviewed With: patient    Overall Patient Progress: no change         Problem: Adult Inpatient Plan of Care  Goal: Absence of Hospital-Acquired Illness or Injury  Intervention: Prevent Skin Injury  Recent Flowsheet Documentation  Taken 12/5/2024 1433 by Omar Alves RN  Skin Protection:   adhesive use limited   incontinence pads utilized    Problem: Adult Inpatient Plan of Care  Goal: Optimal Comfort and Wellbeing  Intervention: Monitor Pain and Promote Comfort  Recent Flowsheet Documentation  Taken 12/5/2024 1423 by Omar Alves, RN  Pain Management Interventions: declines  Taken 12/5/2024 1240 by Omar Alves, RN  Pain Management Interventions: heat applied  Taken 12/5/2024 1153 by Omar Alves RN  Pain Management Interventions:   medication (see MAR)   cold applied       "

## 2024-12-05 NOTE — PROGRESS NOTES
:     Spoke with Greer at [facility]. She is currently screening patient.      will continue to follow for discharge planning needs.     MAURO Rock on 12/5/2024 at 2:27 PM

## 2024-12-05 NOTE — PROGRESS NOTES
Patient currently on 1L NC.  Patient did require 2L for a couple hours overnight while laying in bed to maintain sats. Weaned back to 1L after getting up to the recliner.

## 2024-12-05 NOTE — PROGRESS NOTES
12/05/24 1400   Appointment Info   Signing Clinician's Name / Credentials (PT) Varinder Bills DPT   Rehab Comments (PT) Pt wanting to go for a walk and get cleaned up.   Physical Therapy Goals   PT Frequency Daily   PT Predicted Duration/Target Date for Goal Attainment 12/06/24   PT Goals Bed Mobility;Transfers;Gait   PT: Bed Mobility Supervision/stand-by assist   PT: Transfers Supervision/stand-by assist   PT: Gait Rolling walker;Greater than 200 feet   Interventions   Interventions Quick Adds Therapeutic Procedure;Therapeutic Activity   Therapeutic Activity   Therapeutic Activities: dynamic activities to improve functional performance Minutes (85542) 30   Symptoms Noted During/After Treatment Fatigue   Treatment Detail/Skilled Intervention Sit to stands with CGA/MIN A depending on height of surface. Pt needing MOD A for changing brief. Ambulation into hallway with FWW, Needed seated rest break. No supplemental O2 needed today but some SOB.   PT Discharge Planning   PT Plan Continue PT   PT Discharge Recommendation (DC Rec) Transitional Care Facility;home with home care physical therapy   PT Rationale for DC Rec Pt is not at baseline level of function. Increased O2 needs currently with evidence of weakness and impaired mobility.   PT Brief overview of current status Increased ambulation distance today. Pt walked further into hallway with FWW. No O2 needs. Pt on 94% on RA. Some assist for self-cares today.   Physical Therapy Time and Intention   Timed Code Treatment Minutes 30   Total Session Time (sum of timed and untimed services) 30

## 2024-12-06 LAB
ANION GAP SERPL CALCULATED.3IONS-SCNC: 8 MMOL/L (ref 7–15)
BUN SERPL-MCNC: 37.1 MG/DL (ref 8–23)
CALCIUM SERPL-MCNC: 8.8 MG/DL (ref 8.8–10.4)
CHLORIDE SERPL-SCNC: 106 MMOL/L (ref 98–107)
CREAT SERPL-MCNC: 1.13 MG/DL (ref 0.67–1.17)
EGFRCR SERPLBLD CKD-EPI 2021: 61 ML/MIN/1.73M2
ERYTHROCYTE [DISTWIDTH] IN BLOOD BY AUTOMATED COUNT: 13.7 % (ref 10–15)
GLUCOSE BLDC GLUCOMTR-MCNC: 228 MG/DL (ref 70–99)
GLUCOSE BLDC GLUCOMTR-MCNC: 259 MG/DL (ref 70–99)
GLUCOSE SERPL-MCNC: 249 MG/DL (ref 70–99)
HCO3 SERPL-SCNC: 27 MMOL/L (ref 22–29)
HCT VFR BLD AUTO: 28.8 % (ref 40–53)
HGB BLD-MCNC: 9 G/DL (ref 13.3–17.7)
MCH RBC QN AUTO: 29.4 PG (ref 26.5–33)
MCHC RBC AUTO-ENTMCNC: 31.3 G/DL (ref 31.5–36.5)
MCV RBC AUTO: 94 FL (ref 78–100)
PLATELET # BLD AUTO: 271 10E3/UL (ref 150–450)
POTASSIUM SERPL-SCNC: 3.9 MMOL/L (ref 3.4–5.3)
RBC # BLD AUTO: 3.06 10E6/UL (ref 4.4–5.9)
SODIUM SERPL-SCNC: 141 MMOL/L (ref 135–145)
WBC # BLD AUTO: 6 10E3/UL (ref 4–11)

## 2024-12-06 PROCEDURE — 85027 COMPLETE CBC AUTOMATED: CPT | Performed by: INTERNAL MEDICINE

## 2024-12-06 PROCEDURE — 99232 SBSQ HOSP IP/OBS MODERATE 35: CPT | Performed by: INTERNAL MEDICINE

## 2024-12-06 PROCEDURE — 120N000001 HC R&B MED SURG/OB

## 2024-12-06 PROCEDURE — 250N000013 HC RX MED GY IP 250 OP 250 PS 637: Performed by: INTERNAL MEDICINE

## 2024-12-06 PROCEDURE — 250N000011 HC RX IP 250 OP 636: Performed by: INTERNAL MEDICINE

## 2024-12-06 PROCEDURE — 80048 BASIC METABOLIC PNL TOTAL CA: CPT | Performed by: INTERNAL MEDICINE

## 2024-12-06 PROCEDURE — 36415 COLL VENOUS BLD VENIPUNCTURE: CPT | Performed by: INTERNAL MEDICINE

## 2024-12-06 RX ORDER — FLUORIDE TOOTHPASTE
30 TOOTHPASTE DENTAL 4 TIMES DAILY PRN
Status: DISCONTINUED | OUTPATIENT
Start: 2024-12-06 | End: 2024-12-09 | Stop reason: HOSPADM

## 2024-12-06 RX ORDER — FUROSEMIDE 10 MG/ML
20 INJECTION INTRAMUSCULAR; INTRAVENOUS
Status: DISCONTINUED | OUTPATIENT
Start: 2024-12-06 | End: 2024-12-07

## 2024-12-06 RX ADMIN — GABAPENTIN 200 MG: 100 CAPSULE ORAL at 09:16

## 2024-12-06 RX ADMIN — Medication 30 ML: at 20:57

## 2024-12-06 RX ADMIN — GABAPENTIN 200 MG: 100 CAPSULE ORAL at 21:21

## 2024-12-06 RX ADMIN — METFORMIN HYDROCHLORIDE 500 MG: 500 TABLET ORAL at 18:11

## 2024-12-06 RX ADMIN — CLOPIDOGREL BISULFATE 75 MG: 75 TABLET, FILM COATED ORAL at 09:16

## 2024-12-06 RX ADMIN — Medication 1 TABLET: at 09:16

## 2024-12-06 RX ADMIN — Medication 30 ML: at 16:19

## 2024-12-06 RX ADMIN — APIXABAN 2.5 MG: 2.5 TABLET, FILM COATED ORAL at 09:16

## 2024-12-06 RX ADMIN — FUROSEMIDE 20 MG: 10 INJECTION, SOLUTION INTRAMUSCULAR; INTRAVENOUS at 13:58

## 2024-12-06 RX ADMIN — FERROUS SULFATE TAB EC 325 MG (65 MG FE EQUIVALENT) 325 MG: 325 (65 FE) TABLET DELAYED RESPONSE at 18:12

## 2024-12-06 RX ADMIN — DOCUSATE SODIUM 100 MG: 100 CAPSULE, LIQUID FILLED ORAL at 21:21

## 2024-12-06 RX ADMIN — FERROUS SULFATE TAB EC 325 MG (65 MG FE EQUIVALENT) 325 MG: 325 (65 FE) TABLET DELAYED RESPONSE at 08:34

## 2024-12-06 RX ADMIN — DOCUSATE SODIUM 100 MG: 100 CAPSULE, LIQUID FILLED ORAL at 09:16

## 2024-12-06 RX ADMIN — FAMOTIDINE 20 MG: 10 INJECTION, SOLUTION INTRAVENOUS at 21:22

## 2024-12-06 RX ADMIN — FUROSEMIDE 20 MG: 10 INJECTION, SOLUTION INTRAMUSCULAR; INTRAVENOUS at 09:27

## 2024-12-06 RX ADMIN — ATORVASTATIN CALCIUM 40 MG: 40 TABLET, FILM COATED ORAL at 09:16

## 2024-12-06 RX ADMIN — APIXABAN 2.5 MG: 2.5 TABLET, FILM COATED ORAL at 21:21

## 2024-12-06 RX ADMIN — METFORMIN HYDROCHLORIDE 500 MG: 500 TABLET ORAL at 08:34

## 2024-12-06 RX ADMIN — AZITHROMYCIN DIHYDRATE 250 MG: 250 TABLET, FILM COATED ORAL at 09:16

## 2024-12-06 RX ADMIN — CEFUROXIME AXETIL 500 MG: 250 TABLET ORAL at 08:34

## 2024-12-06 RX ADMIN — TAMSULOSIN HYDROCHLORIDE 0.8 MG: 0.4 CAPSULE ORAL at 09:16

## 2024-12-06 ASSESSMENT — ACTIVITIES OF DAILY LIVING (ADL)
ADLS_ACUITY_SCORE: 59

## 2024-12-06 NOTE — PROGRESS NOTES
:     Patient has been accepted to [facility] for short term rehab. Plan for patient to discharge to [facility] tomorrow. Call [facility] at [phone] to set up a transportation time.     Update given to patient. Patient requested that I call his daughter, Cira, to update on discharge plan.     Call placed to Cira to update on discharge plan.     PAS Completed: [id number]    MAURO Rock on 12/6/2024 at 11:13 AM

## 2024-12-06 NOTE — PROGRESS NOTES
Received report from DAVID Gutierrez. Resumed patient care.    SAFETY CHECKLIST  ID Bands and Risk clasps correct and in place (DNR, Fall risk, Allergy, Latex, Limb):  Yes  All Lines Reconciled and labeled correctly: Yes  Whiteboard updated:Yes  Environmental interventions: Yes

## 2024-12-06 NOTE — PROGRESS NOTES
Pt remains on 1L/min NC.  BS fine crackles.  Pt states has shortness of breath if room is too warm.  Informed Pt that room temp can be adjusted as need. Respiratory will continue to provide support as needed.    Christin Chairez, RT

## 2024-12-06 NOTE — PROGRESS NOTES
Patient reports that he is having a harder time breathing this afternoon than he did this morning. However, sats have gone up since this morning, patients work of breathing at rest appears to be improving some, respirations have decreased, and lung sounds have cleared up. Patient also asking if there is anything he can do for a dry mouth besides chapstick and ice chips. MD notified, order requested for oral spray.

## 2024-12-06 NOTE — PROGRESS NOTES
[facility]    Medicine Progress Note - Hospitalist Service    Date of Admission:  12/2/2024    Assessment & Plan   Principal Problem:    Acute respiratory failure with hypoxia (H)    Acute on chronic diastolic congestive heart failure (H)    CAP (community acquired pneumonia)    Assessment: multifactorial. I suspect both pneumonia and acute on chronic diastolic heart failure exacerbation. The patient has negative COVID, Influenza and RSV, and a normal white blood cell count. He does have a fever to 101 on admission and a mildly elevated lactate that corrected with intravenous fluids. Chest xray shows a multifocal pneumonia. In addition, I suspect an exacerbation of heart failure as well. Echo shows mild aortic stenosis. Developed a rash - started with ceftin?     Plan: Continue total of 5 days azithromycin, stop cephalosporin.   Supplemental oxygen, wean as able   Plan discharge to skilled nursing facility    Diurese further today     Active Problems:    Anemia    Assessment: new on admit. Suspect iron deficiency, blood loss given advanced age and DOAC use. Low iron studies,    Plan: added iron sulfate BID   Monitor      Paroxysmal SVT (supraventricular tachycardia) (H)    Assessment: chronic    Plan: continue apixaban      Cerebrovascular accident (CVA) due to thrombosis of left middle cerebral artery (H)    Assessment: chronic    Plan: continue clopidogrel and apixaban      Essential hypertension    Assessment: chronic    Plan: monitor, treat with enalapril, but holding due to creatinine bump       Controlled type 2 diabetes mellitus with complication, without long-term current use of insulin (H)    Assessment: chronic    Plan: continue metformin      Restless legs syndrome (RLS)    Hereditary and idiopathic peripheral neuropathy    Assessment: chronic    Plan: treated with gabapentin      Acute kidney injury  Assessment: mild, not present on admission. Likely related to diuresis. Improved.  "  Plan: lasix today to diurese         Diet: Fluid restriction 1200 ML FLUID  Combination Diet Regular Diet Adult    DVT Prophylaxis: DOAC  Cedeno Catheter: Not present  Lines: None     Cardiac Monitoring: None  Code Status: No CPR- Do NOT Intubate            Diet: Fluid restriction 1200 ML FLUID  Combination Diet Regular Diet Adult    DVT Prophylaxis: DOAC  Cedeno Catheter: Not present  Lines: None     Cardiac Monitoring: None  Code Status: No CPR- Do NOT Intubate      Clinically Significant Risk Factors               # Hypoalbuminemia: Lowest albumin = 3.4 g/dL at 12/2/2024  7:50 PM, will monitor as appropriate     # Hypertension: Noted on problem list  # Acute heart failure with preserved ejection fraction: heart failure noted on problem list, last echo with EF >50%, and receiving IV diuretics           # Overweight: Estimated body mass index is 26.77 kg/m  as calculated from the following:    Height as of this encounter: 1.778 m (5' 10\").    Weight as of this encounter: 84.6 kg (186 lb 9.6 oz)., PRESENT ON ADMISSION            Social Drivers of Health            Disposition Plan     Medically Ready for Discharge: Anticipated Tomorrow             Tulio Ibrahim MD  Hospitalist Service  [facility]  Securely message with [employer] (more info)  Text page via [facility] Paging/Directory   ______________________________________________________________________    Interval History   Feels tired, more dyspnea     Physical Exam   Vital Signs: Temp: 98.3  F (36.8  C) Temp src: Tympanic BP: (!) 169/75 Pulse: 91   Resp: 26 SpO2: 90 % O2 Device: Nasal cannula Oxygen Delivery: 1 LPM  Weight: 186 lbs 9.6 oz    GENERAL: Comfortable, no apparent distress.  CARDIOVASCULAR: regular rate and rhythm, no murmur. 2+ lower extremity edema   RESPIRATORY: scattered crackles.  GI: non-tender, non-distended, normal bowel sounds.   SKIN: warm periphery, no rashes      Medical Decision Making       45 MINUTES SPENT BY ME on the " date of service doing chart review, history, exam, documentation & further activities per the note.      Data     I have personally reviewed the following data over the past 24 hrs:    6.0  \   9.0 (L)   / 271     141 106 37.1 (H) /  249 (H)   3.9 27 1.13 \       Imaging results reviewed over the past 24 hrs:   No results found for this or any previous visit (from the past 24 hours).

## 2024-12-06 NOTE — PLAN OF CARE
"Goal Outcome Evaluation:  BP (!) 169/75 (BP Location: Left arm, Patient Position: Semi-Braswell's, Cuff Size: Adult Regular)   Pulse 91   Temp 98.3  F (36.8  C) (Tympanic)   Resp 26   Ht 1.778 m (5' 10\")   Wt 84.6 kg (186 lb 9.6 oz)   SpO2 90%   BMI 26.77 kg/m   Patient reports that it is hard to breathe this morning. Asked patient if it is worse this morning than it has been or about the same, patient stated about the same. Shortness of breath/increased respirations noted at rest. Boosted patient in bed and sat head of bed up. Satting 90% on 1L nasal cannula. 3+ edema to BLE. Lasix ordered per provider. Lung sounds diminished with fine crackles throughout. Petechia present to posterior bilateral legs, buttocks, hips, back and abdomen. Provider aware. No itching or discomfort to these areas.                         "

## 2024-12-06 NOTE — PLAN OF CARE
Goal Outcome Evaluation:    Patient continues to have dyspnea with exertion and denies shortness of breath at rest.  Patient lungs are clear and diminished in bases.  Patient maintains Sp02 >90% on 1L oxygen.  Patient has petechiae rash from feet up to trunk area.  MD gave orders to hold eliquis for the night.  Patient blood pressures have remained stable and patient denies any other symptoms.  VSS, afebrile.

## 2024-12-06 NOTE — PROGRESS NOTES
RT called to give pt neb.  Pt found on 1 lpm nc with faint ex. Wheeze in LLL.  Neb tx given and tolerated well.  Reminded pt to continue to use IS until he is up walking around.    RT will continue to attempt to wean pt off of O2.    Deborah Chavez, RT on 12/5/2024 at 7:24 PM

## 2024-12-07 LAB
BACTERIA BLD CULT: NO GROWTH
GLUCOSE BLDC GLUCOMTR-MCNC: 221 MG/DL (ref 70–99)
GLUCOSE BLDC GLUCOMTR-MCNC: 261 MG/DL (ref 70–99)

## 2024-12-07 PROCEDURE — 120N000001 HC R&B MED SURG/OB

## 2024-12-07 PROCEDURE — 97530 THERAPEUTIC ACTIVITIES: CPT | Mod: GO | Performed by: OCCUPATIONAL THERAPIST

## 2024-12-07 PROCEDURE — 99232 SBSQ HOSP IP/OBS MODERATE 35: CPT | Performed by: HOSPITALIST

## 2024-12-07 PROCEDURE — 97535 SELF CARE MNGMENT TRAINING: CPT | Mod: GO | Performed by: OCCUPATIONAL THERAPIST

## 2024-12-07 PROCEDURE — 250N000013 HC RX MED GY IP 250 OP 250 PS 637: Performed by: INTERNAL MEDICINE

## 2024-12-07 PROCEDURE — 97530 THERAPEUTIC ACTIVITIES: CPT | Mod: GP

## 2024-12-07 PROCEDURE — 250N000013 HC RX MED GY IP 250 OP 250 PS 637: Performed by: HOSPITALIST

## 2024-12-07 PROCEDURE — 250N000011 HC RX IP 250 OP 636: Performed by: HOSPITALIST

## 2024-12-07 PROCEDURE — 250N000011 HC RX IP 250 OP 636: Performed by: INTERNAL MEDICINE

## 2024-12-07 RX ORDER — FERROUS SULFATE 325(65) MG
325 TABLET, DELAYED RELEASE (ENTERIC COATED) ORAL DAILY
Qty: 30 TABLET | Refills: 0 | Status: SHIPPED | OUTPATIENT
Start: 2024-12-07 | End: 2025-01-06

## 2024-12-07 RX ORDER — METOPROLOL SUCCINATE 25 MG/1
25 TABLET, EXTENDED RELEASE ORAL DAILY
Qty: 90 TABLET | Refills: 0 | Status: SHIPPED | OUTPATIENT
Start: 2024-12-08 | End: 2025-03-08

## 2024-12-07 RX ORDER — BUDESONIDE AND FORMOTEROL FUMARATE DIHYDRATE 160; 4.5 UG/1; UG/1
2 AEROSOL RESPIRATORY (INHALATION) 2 TIMES DAILY
Qty: 1 G | Refills: 0 | Status: SHIPPED | OUTPATIENT
Start: 2024-12-07 | End: 2025-01-06

## 2024-12-07 RX ORDER — METOPROLOL SUCCINATE 25 MG/1
25 TABLET, EXTENDED RELEASE ORAL DAILY
Status: DISCONTINUED | OUTPATIENT
Start: 2024-12-07 | End: 2024-12-09 | Stop reason: HOSPADM

## 2024-12-07 RX ORDER — FUROSEMIDE 10 MG/ML
40 INJECTION INTRAMUSCULAR; INTRAVENOUS
Status: DISCONTINUED | OUTPATIENT
Start: 2024-12-07 | End: 2024-12-09 | Stop reason: HOSPADM

## 2024-12-07 RX ORDER — FUROSEMIDE 40 MG/1
60 TABLET ORAL DAILY
Qty: 90 TABLET | Refills: 3 | Status: SHIPPED | OUTPATIENT
Start: 2024-12-07

## 2024-12-07 RX ADMIN — METFORMIN HYDROCHLORIDE 500 MG: 500 TABLET ORAL at 17:46

## 2024-12-07 RX ADMIN — ENALAPRIL MALEATE 40 MG: 10 TABLET ORAL at 10:53

## 2024-12-07 RX ADMIN — Medication 1 TABLET: at 09:06

## 2024-12-07 RX ADMIN — CLOPIDOGREL BISULFATE 75 MG: 75 TABLET, FILM COATED ORAL at 09:06

## 2024-12-07 RX ADMIN — GABAPENTIN 200 MG: 100 CAPSULE ORAL at 22:13

## 2024-12-07 RX ADMIN — Medication 30 ML: at 06:19

## 2024-12-07 RX ADMIN — METFORMIN HYDROCHLORIDE 500 MG: 500 TABLET ORAL at 09:06

## 2024-12-07 RX ADMIN — APIXABAN 2.5 MG: 2.5 TABLET, FILM COATED ORAL at 09:06

## 2024-12-07 RX ADMIN — TAMSULOSIN HYDROCHLORIDE 0.8 MG: 0.4 CAPSULE ORAL at 09:07

## 2024-12-07 RX ADMIN — FAMOTIDINE 20 MG: 10 INJECTION, SOLUTION INTRAVENOUS at 22:13

## 2024-12-07 RX ADMIN — METOPROLOL SUCCINATE 25 MG: 25 TABLET, EXTENDED RELEASE ORAL at 10:53

## 2024-12-07 RX ADMIN — APIXABAN 2.5 MG: 2.5 TABLET, FILM COATED ORAL at 22:12

## 2024-12-07 RX ADMIN — BENZONATATE 200 MG: 100 CAPSULE ORAL at 00:38

## 2024-12-07 RX ADMIN — FERROUS SULFATE TAB EC 325 MG (65 MG FE EQUIVALENT) 325 MG: 325 (65 FE) TABLET DELAYED RESPONSE at 17:46

## 2024-12-07 RX ADMIN — ATORVASTATIN CALCIUM 40 MG: 40 TABLET, FILM COATED ORAL at 09:07

## 2024-12-07 RX ADMIN — DOCUSATE SODIUM 100 MG: 100 CAPSULE, LIQUID FILLED ORAL at 09:07

## 2024-12-07 RX ADMIN — FERROUS SULFATE TAB EC 325 MG (65 MG FE EQUIVALENT) 325 MG: 325 (65 FE) TABLET DELAYED RESPONSE at 09:06

## 2024-12-07 RX ADMIN — FUROSEMIDE 40 MG: 10 INJECTION, SOLUTION INTRAVENOUS at 09:05

## 2024-12-07 RX ADMIN — FUROSEMIDE 40 MG: 10 INJECTION, SOLUTION INTRAVENOUS at 14:24

## 2024-12-07 RX ADMIN — DOCUSATE SODIUM 100 MG: 100 CAPSULE, LIQUID FILLED ORAL at 22:13

## 2024-12-07 RX ADMIN — GABAPENTIN 200 MG: 100 CAPSULE ORAL at 09:07

## 2024-12-07 ASSESSMENT — ACTIVITIES OF DAILY LIVING (ADL)
ADLS_ACUITY_SCORE: 59

## 2024-12-07 NOTE — PLAN OF CARE
D/I/A: Pt resting between cares and treatments.  A+O x4 and making needs known.  Denies pain. C/o SOA, resolved with repositioning.  Remains on O2 @1L/min nasal cannula.  VSS, afebrile.  Pleasant and cooperative.  LE edema noted.  Repositioned for comfort and to maintain skin integrity. Bed alarm on while in bed; sat up in chair for a couple hours over night.  Using call light for assistance.  P: Continue to monitor status.  Tentative plan to discharge to [facility].

## 2024-12-07 NOTE — PROGRESS NOTES
[facility]    Medicine Progress Note - Hospitalist Service    Date of Admission:  12/2/2024    Assessment & Plan   Ilia Ferguson is a 89 year old male, who lives alone, with history of CVA. 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  He has the following acute medical issues:    Principal Problem:    Acute respiratory failure with hypoxia (H)    Acute on chronic diastolic congestive heart failure (H)    CAP (community acquired pneumonia)    Assessment: Today the patient is much more short of breath and has bibasilar crackles and worsening edema.  Respiratory failure was probably multifactorial.  Being treated for both pneumonia and acute on chronic diastolic heart failure exacerbation. The patient has negative COVID, Influenza and RSV, and a normal white blood cell count. He did have a fever to 101 on admission and a mildly elevated lactate that corrected with intravenous fluids. Chest xray shows a multifocal pneumonia.  clinically he has congestive heart failure exacerbation.  Echo shows mild aortic stenosis. Developed a rash - started with ceftin?     Plan: I will diurese him with Lasix 40 mg IV twice daily.  Continue total of 5 days azithromycin, stop cephalosporin.   Supplemental oxygen, wean as able   Plan discharge to skilled nursing facility     Active Problems:    Anemia    Assessment: new on admit. Suspect iron deficiency, blood loss given advanced age and DOAC use. Low iron studies,    Plan: added iron sulfate BID   Monitor      Paroxysmal SVT (supraventricular tachycardia) (H)    Assessment: chronic    Plan: continue apixaban.  Started on beta-blockers.      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 Likely related to diuresis. Improved.   Plan: Monitor closely on IV diuresis.         Diet: Fluid restriction 1200 ML FLUID  Combination Diet Regular Diet Adult    DVT Prophylaxis: DOAC  Cedeno Catheter: Not present  Lines: None     Cardiac Monitoring: None  Code Status: No CPR- Do NOT Intubate          Diet: Fluid restriction 1200 ML FLUID  Combination Diet Regular Diet Adult    DVT Prophylaxis: DOAC  Cedeno Catheter: Not present  Lines: None     Cardiac Monitoring: None  Code Status: No CPR- Do NOT Intubate      Clinically Significant Risk Factors               # Hypoalbuminemia: Lowest albumin = 3.4 g/dL at 12/2/2024  7:50 PM, will monitor as appropriate     # Hypertension: Noted on problem list    # Acute heart failure with preserved ejection fraction: heart failure noted on problem list, last echo with EF >50%, and receiving IV diuretics           # Overweight: Estimated body mass index is 26.77 kg/m  as calculated from the following:    Height as of this encounter: 1.778 m (5' 10\").    Weight as of this encounter: 84.6 kg (186 lb 9.6 oz).             Social Drivers of Health            Disposition Plan     Medically Ready for Discharge: Anticipated Tomorrow    Discussed with the patient in detail.  He will need to be diuresed with IV Lasix.  Once improved he can go to [facility].         Stacey Bronson MD  Hospitalist " Service  [facility]  Securely message with Bet[employer] (more info)  Text page via [facility] Paging/Directory   ______________________________________________________________________    Interval History   The patient was seen and examined.  Overnight events reviewed.  Discussed with the nursing staff.  He is feeling much more short of breath today and does have some orthopnea as well.  Denies any chest pain.  No fevers or chills.  No headaches or blurring of vision.     Gen: No fevers/chills  CVS: No chest pain   GI: no nausea/vomiting. No abdominal pain  Neuro: No headaches or Seizures     Physical Exam   Vital Signs: Temp: 98.4  F (36.9  C) Temp src: Tympanic BP: (!) 163/64 Pulse: 93   Resp: 24 SpO2: 90 % O2 Device: Nasal cannula Oxygen Delivery: 1 LPM  Weight: 186 lbs 9.6 oz    GENERAL APPEARANCE: Elderly male who looks short of breath  HEENT: No oropharyngeal lesions.  NECK: Supple.   CHEST: Symmetric. Nontender to palpation.  LUNGS: Bibasilar crackles up to mid lungs.  HEART: S1+S2 [place]  ABDOMEN: Soft, flat, and benign. BS +ve  EXTREMITIES: +++Edema  NEUROLOGIC: Moving all extremities  SKIN: No new rashes.         Medical Decision Making       38 MINUTES SPENT BY ME on the date of service doing chart review, history, exam, documentation & further activities per the note.      Current Facility-Administered Medications   Medication Dose Route Frequency Provider Last Rate Last Admin    acetaminophen (TYLENOL) tablet 650 mg  650 mg Oral Q8H PRN Lavinia Patterson MD   650 mg at 12/05/24 1154    albuterol (PROVENTIL) neb solution 2.5 mg  2.5 mg Nebulization Q2H PRN Tulio Ibrahim MD   2.5 mg at 12/05/24 1625    apixaban ANTICOAGULANT (ELIQUIS) tablet 2.5 mg  2.5 mg Oral BID Lavinia Patterson MD   2.5 mg at 12/07/24 0906    artificial saliva (BIOTENE DRY MOUTHWASH) liquid 30 mL  30 mL Swish & Spit 4x Daily PRN Tulio Ibrahim MD   30 mL at 12/07/24 0619    atorvastatin (LIPITOR) tablet 40 mg   40 mg Oral Daily Lavinia Patterson MD   40 mg at 12/07/24 0907    benzonatate (TESSALON) capsule 200 mg  200 mg Oral TID PRN Lavinia Patterson MD   200 mg at 12/07/24 0038    calcium carbonate (TUMS) chewable tablet 1,000 mg  1,000 mg Oral 4x Daily PRN Lavinia Patterson MD        clopidogrel (PLAVIX) tablet 75 mg  75 mg Oral Daily Lavinia Patterson MD   75 mg at 12/07/24 0906    glucose gel 15-30 g  15-30 g Oral Q15 Min PRN Lavinia Patterson MD        Or    dextrose 50 % injection 25-50 mL  25-50 mL Intravenous Q15 Min PRN Lavinia Patterson MD        Or    glucagon injection 1 mg  1 mg Subcutaneous Q15 Min PRN Lavinia Patterson MD        docusate sodium (COLACE) capsule 100 mg  100 mg Oral BID Lavinia Patterson MD   100 mg at 12/07/24 0907    enalapril (VASOTEC) tablet 40 mg  40 mg Oral Daily Stacey Bronson MD   40 mg at 12/04/24 0756    famotidine (PEPCID) injection 20 mg  20 mg Intravenous Q24H Lavinia Patterson MD   20 mg at 12/06/24 2122    ferrous sulfate (FE TABS) EC tablet 325 mg  325 mg Oral BID w/meals Tulio Ibrahim MD   325 mg at 12/07/24 0906    furosemide (LASIX) injection 40 mg  40 mg Intravenous BID Stacey Bronson MD   40 mg at 12/07/24 0905    gabapentin (NEURONTIN) capsule 200 mg  200 mg Oral BID Lavinia Patterson MD   200 mg at 12/07/24 0907    guaiFENesin-dextromethorphan (ROBITUSSIN DM) 100-10 MG/5ML syrup 10 mL  10 mL Oral Q4H PRN Tulio Ibrahim MD        lidocaine (LMX4) cream   Topical Q1H PRN Lavinia Patterson MD        lidocaine 1 % 0.1-1 mL  0.1-1 mL Other Q1H PRN Lavinia Patterson MD        metFORMIN (GLUCOPHAGE) tablet 500 mg  500 mg Oral BID w/meals Lavinia Patterson MD   500 mg at 12/07/24 0906    multivitamin w/minerals (THERA-VIT-M) tablet 1 tablet  1 tablet Oral Daily Lavinia Patterson MD   1 tablet at 12/07/24 0906    Patient is already receiving anticoagulation with heparin, enoxaparin (LOVENOX), warfarin (COUMADIN)  or other  anticoagulant medication   Does not apply Continuous PRN Lavinia Patterson MD        senna-docusate (SENOKOT-S/PERICOLACE) 8.6-50 MG per tablet 1 tablet  1 tablet Oral BID PRN Lavinia Patterson MD        Or    senna-docusate (SENOKOT-S/PERICOLACE) 8.6-50 MG per tablet 2 tablet  2 tablet Oral BID PRN Lavinia Patterson MD        sodium chloride (PF) 0.9% PF flush 3 mL  3 mL Intracatheter q1 min prn Lavinia Patterson MD        tamsulosin (FLOMAX) capsule 0.8 mg  0.8 mg Oral Daily Lavinia Patterson MD   0.8 mg at 12/07/24 0907      Data         Imaging results reviewed over the past 24 hrs:   No results found for this or any previous visit (from the past 24 hours).

## 2024-12-07 NOTE — PHARMACY - DISCHARGE MEDICATION RECONCILIATION AND EDUCATION
[facility]  [facility] of [facility]  [address] [place] [id number]    December 7, 2024    Dear Pharmacist,    Your customer, Ilia Ferguson, born on 2/20/1932, was recently discharged from [facility].  We have updated his medication list and want to alert you to the following:       Review of your medicines        START taking        Dose / Directions   budesonide-formoterol 160-4.5 MCG/ACT Inhaler  Commonly known as: SYMBICORT      Dose: 2 puff  Inhale 2 puffs into the lungs 2 times daily.  Quantity: 1 g  Refills: 0     ferrous sulfate 325 (65 Fe) MG EC tablet  Commonly known as: FE TABS      Dose: 325 mg  Take 1 tablet (325 mg) by mouth daily.  Quantity: 30 tablet  Refills: 0     metoprolol succinate ER 25 MG 24 hr tablet  Commonly known as: TOPROL XL  Used for: Paroxysmal SVT (supraventricular tachycardia) (H)      Dose: 25 mg  Start taking on: December 8, 2024  Take 1 tablet (25 mg) by mouth daily.  Quantity: 90 tablet  Refills: 0            CONTINUE these medicines which may have CHANGED, or have new prescriptions. If we are uncertain of the size of tablets/capsules you have at home, strength may be listed as something that might have changed.        Dose / Directions   furosemide 40 MG tablet  Commonly known as: LASIX  This may have changed: how much to take  Used for: Chronic heart failure with preserved ejection fraction (H)      Dose: 60 mg  Take 1.5 tablets (60 mg) by mouth daily.  Quantity: 90 tablet  Refills: 3            CONTINUE these medicines which have NOT CHANGED        Dose / Directions   acetaminophen 650 MG CR tablet  Commonly known as: TYLENOL  Used for: Neuropathy      Dose: 650 mg  Take 1 tablet (650 mg) by mouth every 8 hours as needed for mild pain or fever  Quantity: 90 tablet  Refills: 0     apixaban ANTICOAGULANT 2.5 MG tablet  Commonly known as: ELIQUIS  Indication: Atrial Fibrillation Not Caused By A Heart Valve Problem  Used for:  Atypical atrial flutter (H)      Dose: 2.5 mg  Take 1 tablet (2.5 mg) by mouth 2 times daily  Quantity: 180 tablet  Refills: 4     atorvastatin 40 MG tablet  Commonly known as: LIPITOR  Used for: Cerebrovascular accident (CVA) due to thrombosis of left middle cerebral artery (H)      Dose: 40 mg  TAKE 1 TABLET (40 MG) BY MOUTH DAILY  Quantity: 90 tablet  Refills: 0     clopidogrel 75 MG tablet  Commonly known as: PLAVIX  Used for: Cerebrovascular accident (CVA) due to thrombosis of left middle cerebral artery (H)      Dose: 75 mg  TAKE 1 TABLET (75 MG) BY MOUTH DAILY  Quantity: 90 tablet  Refills: 0     enalapril 20 MG tablet  Commonly known as: VASOTEC  Used for: Essential hypertension      Dose: 40 mg  TAKE 2 TABLETS (40 MG) BY MOUTH DAILY  Quantity: 180 tablet  Refills: 0     gabapentin 100 MG capsule  Commonly known as: NEURONTIN  Used for: Hereditary and idiopathic peripheral neuropathy      Dose: 200 mg  Take 2 capsules (200 mg) by mouth 2 times daily  Quantity: 360 capsule  Refills: 4     metFORMIN 500 MG tablet  Commonly known as: GLUCOPHAGE  Used for: Controlled type 2 diabetes mellitus with complication, without long-term current use of insulin (H)      Dose: 500 mg  TAKE 1 TABLET (500 MG) BY MOUTH 2 TIMES DAILY (WITH MEALS)  Quantity: 90 tablet  Refills: 3     Multi-Vitamins Tabs      Dose: 1 tablet  Take 1 tablet by mouth daily  Refills: 0     tamsulosin 0.4 MG capsule  Commonly known as: FLOMAX  Used for: Benign prostatic hyperplasia without lower urinary tract symptoms, Elevated prostate specific antigen (PSA)      Dose: 0.8 mg  Take 2 capsules (0.8 mg) by mouth daily  Quantity: 180 capsule  Refills: 3               Where to get your medicines        These medications were sent to [id number] - [place], [place] - [address]  [address] [facility] [address]      Phone: [phone]   budesonide-formoterol 160-4.5 MCG/ACT Inhaler  ferrous sulfate 325 (65 Fe) MG EC  tablet  furosemide 40 MG tablet  metoprolol succinate ER 25 MG 24 hr tablet         We also reviewed Ilia Ferguson's allergy list and updated it as needed:  Allergies: Sulfa antibiotics and Ceftin [cefuroxime]    Thank you for continuing to care for Ilia Ferguson.  We look forward to working together with you in the future.    Sincerely,  Linda Quiles [facility]

## 2024-12-07 NOTE — PROGRESS NOTES
12/07/24 7838   Appointment Info   Signing Clinician's Name / Credentials (PT) Breanne Kwong, PT, DPT   Therapeutic Activity   Therapeutic Activities: dynamic activities to improve functional performance Minutes (53528) 30   Symptoms Noted During/After Treatment Fatigue;Shortness of breath   Treatment Detail/Skilled Intervention Patient was seated in recliner upon PT entering the room. Patient was agreeable to therapy. Patient reports no pain. Patient is currently on 1L O2 via NC.  socks are on. Gait belt was donned in sitting. Patient was min assist x2 in order to stand using FWW. Patient ambulated. 20 feet from recliner to toilet. Patient required assistance doffing brief and pants. Patient was mod assist in order to stand from toilet. He required assistance with self cares. Patient was  CGA in order to ambulate 10 feet from toilet to EOB with FWW. Patient was min assist in order to complete sit to supine. Patient was max assist x2 in order to boost in bed. Patient tolerated therapy session fair. Patient ambulated with a bradykinetic gait. Patient demonstrates a short shuffled step length. Sit to stands are effortful and patient requires assistance. Patients BLEs are so fluid overloaded they are firm to the touch. Patient was incontinent of urine during therapy session. O2 sats were at 94% during therapy session on 1L O2. Patient would benefit from continued skilled physical therapy in order to improve strength, balance and endurance to facilitate return to PLOF. Patient was lying in bed with call light in reach upon PT exiting the room.   PT Discharge Planning   PT Plan Continue PT   PT Discharge Recommendation (DC Rec) Transitional Care Facility   PT Rationale for DC Rec Patient would benefit from continued skilled physical therapy in order to improve strength, balance and endurance to facilitate return to PLOF.   PT Brief overview of current status Min-mod assist x2 for sit to stands. CGA for  ambulation using FWW. Patient ambulated 30 feet total during therapy session. Patient is fatigued and SOB during therapy session. O2 sats during therapy session were 94%.   Physical Therapy Time and Intention   Timed Code Treatment Minutes 30   Total Session Time (sum of timed and untimed services) 30        No

## 2024-12-07 NOTE — PROGRESS NOTES
12/07/24 1125   Appointment Info   Signing Clinician's Name / Credentials (OT) Sharifa Albright OTR/L   Self-Care/Home Management   Self-Care/Home Mgmt/ADL, Compensatory, Meal Prep Minutes (65654) 15   Symptoms Noted During/After Treatment (Meal Preparation/Planning Training) shortness of breath   Treatment Detail/Skilled Intervention pt had large amount of urine in brief, nursing notified for output, pt completed toileting and brief change with CGA/min assist with FWW for transfer and max assist for donning/doffing brief and pulling over hips   Lower Body Dressing Training Assistance maximum assist (25% patient effort)   Lower Body Dressing Training Assistance 1 person assist   [place] Level (Toilet Training) minimum assist (75% patient effort)   Assistance (Toilet Training) 1 person assist   Therapeutic Activities   Therapeutic Activity Minutes (17386) 15   Symptoms noted during/after treatment shortness of breath   Treatment Detail/Skilled Intervention pt SOB at rest today, 1 lpm continuous O2. Needed min/mod assist from sit to stand from recliner chair and ambulated to bathroom for toileting with CGA/min assist   OT Discharge Planning   OT Plan Continue OT   OT Discharge Recommendation (DC Rec) Transitional Care Facility   OT Rationale for DC Rec pt is not at baseline and would benefit from continued rehab to increase strength and safety with performance of self cares prior to return home.   OT Brief overview of current status pt with increased SOB and edema today; able to ambulate short distances in room with FWW and CGA/min assist; max assist needed for LB self cares; maintains oxygen sats at 90% or above on 1 lpm

## 2024-12-07 NOTE — PLAN OF CARE
"Goal Outcome Evaluation:  BP (!) 163/64 (BP Location: Left arm, Patient Position: Chair, Cuff Size: Adult Regular)   Pulse 93   Temp 98.4  F (36.9  C) (Tympanic)   Resp 24   Ht 1.778 m (5' 10\")   Wt 84.6 kg (186 lb 9.6 oz)   SpO2 90%   BMI 26.77 kg/m   Patient remains on 1L via nasal cannula. O2 remaining in the low 90's. Whenever asked, patient reports feeling worse than the day before. Patient does have dyspnea at rest today, exacerbated with activity. Lung sounds are clear but diminished this morning. Bilateral lower extremities edematous +2. Small open area to coccyx covered with mepilex. [place] to lower extremities and back looks significantly better and is fading in color.                         "

## 2024-12-07 NOTE — PLAN OF CARE
Goal Outcome Evaluation: Patient is A&Ox4 and VSS with exception of elevated BP. On 1L Oxygen. Patient up in chair all evening. Some work of breathing upon exertion.    Ambulated to bathroom a few times. Had large BM this afternoon.    Patient used oral biotene twice this afternoon/evening and reported that it improved dry mouth.    Spoke with [facility] this afternoon to prepare for patient discharge there tomorrow. Provided them with information they needed about patient's cares.      Plan of Care Reviewed With: patient    Overall Patient Progress: improving    Outcome Evaluation: Patient lung sounds clear and diminished. Oxygen above 92% on 1L. Patient A&O and denies pain. VSS.

## 2024-12-08 VITALS
OXYGEN SATURATION: 92 % | DIASTOLIC BLOOD PRESSURE: 55 MMHG | TEMPERATURE: 97.2 F | HEART RATE: 64 BPM | HEIGHT: 70 IN | BODY MASS INDEX: 26.71 KG/M2 | SYSTOLIC BLOOD PRESSURE: 126 MMHG | RESPIRATION RATE: 26 BRPM | WEIGHT: 186.6 LBS

## 2024-12-08 LAB
ANION GAP SERPL CALCULATED.3IONS-SCNC: 11 MMOL/L (ref 7–15)
BASOPHILS # BLD AUTO: 0 10E3/UL (ref 0–0.2)
BASOPHILS NFR BLD AUTO: 0 %
BUN SERPL-MCNC: 38.3 MG/DL (ref 8–23)
CALCIUM SERPL-MCNC: 9.1 MG/DL (ref 8.8–10.4)
CHLORIDE SERPL-SCNC: 103 MMOL/L (ref 98–107)
CREAT SERPL-MCNC: 1.14 MG/DL (ref 0.67–1.17)
CRP SERPL-MCNC: 106.92 MG/L
EGFRCR SERPLBLD CKD-EPI 2021: 60 ML/MIN/1.73M2
EOSINOPHIL # BLD AUTO: 0 10E3/UL (ref 0–0.7)
EOSINOPHIL NFR BLD AUTO: 0 %
ERYTHROCYTE [DISTWIDTH] IN BLOOD BY AUTOMATED COUNT: 13.8 % (ref 10–15)
GLUCOSE BLDC GLUCOMTR-MCNC: 245 MG/DL (ref 70–99)
GLUCOSE BLDC GLUCOMTR-MCNC: 256 MG/DL (ref 70–99)
GLUCOSE BLDC GLUCOMTR-MCNC: 259 MG/DL (ref 70–99)
GLUCOSE BLDC GLUCOMTR-MCNC: 274 MG/DL (ref 70–99)
GLUCOSE BLDC GLUCOMTR-MCNC: 282 MG/DL (ref 70–99)
GLUCOSE SERPL-MCNC: 300 MG/DL (ref 70–99)
HCO3 SERPL-SCNC: 27 MMOL/L (ref 22–29)
HCT VFR BLD AUTO: 32.5 % (ref 40–53)
HGB BLD-MCNC: 10.2 G/DL (ref 13.3–17.7)
HOLD SPECIMEN: NORMAL
IMM GRANULOCYTES # BLD: 0.1 10E3/UL
IMM GRANULOCYTES NFR BLD: 1 %
LYMPHOCYTES # BLD AUTO: 0.7 10E3/UL (ref 0.8–5.3)
LYMPHOCYTES NFR BLD AUTO: 4 %
MAGNESIUM SERPL-MCNC: 2 MG/DL (ref 1.7–2.3)
MCH RBC QN AUTO: 29.9 PG (ref 26.5–33)
MCHC RBC AUTO-ENTMCNC: 31.4 G/DL (ref 31.5–36.5)
MCV RBC AUTO: 95 FL (ref 78–100)
MONOCYTES # BLD AUTO: 0.6 10E3/UL (ref 0–1.3)
MONOCYTES NFR BLD AUTO: 4 %
MRSA DNA SPEC QL NAA+PROBE: NEGATIVE
NEUTROPHILS # BLD AUTO: 14.2 10E3/UL (ref 1.6–8.3)
NEUTROPHILS NFR BLD AUTO: 91 %
NRBC # BLD AUTO: 0 10E3/UL
NRBC BLD AUTO-RTO: 0 /100
NT-PROBNP SERPL-MCNC: 2475 PG/ML (ref 0–1800)
PLATELET # BLD AUTO: 414 10E3/UL (ref 150–450)
POTASSIUM SERPL-SCNC: 4.9 MMOL/L (ref 3.4–5.3)
PROCALCITONIN SERPL IA-MCNC: 0.64 NG/ML
RBC # BLD AUTO: 3.41 10E6/UL (ref 4.4–5.9)
SA TARGET DNA: NEGATIVE
SODIUM SERPL-SCNC: 141 MMOL/L (ref 135–145)
WBC # BLD AUTO: 15.5 10E3/UL (ref 4–11)

## 2024-12-08 PROCEDURE — 250N000012 HC RX MED GY IP 250 OP 636 PS 637: Performed by: HOSPITALIST

## 2024-12-08 PROCEDURE — 250N000011 HC RX IP 250 OP 636: Performed by: HOSPITALIST

## 2024-12-08 PROCEDURE — 250N000013 HC RX MED GY IP 250 OP 250 PS 637: Performed by: INTERNAL MEDICINE

## 2024-12-08 PROCEDURE — 250N000009 HC RX 250: Performed by: HOSPITALIST

## 2024-12-08 PROCEDURE — 250N000011 HC RX IP 250 OP 636: Performed by: INTERNAL MEDICINE

## 2024-12-08 PROCEDURE — 36415 COLL VENOUS BLD VENIPUNCTURE: CPT | Performed by: HOSPITALIST

## 2024-12-08 PROCEDURE — 120N000001 HC R&B MED SURG/OB

## 2024-12-08 PROCEDURE — 250N000013 HC RX MED GY IP 250 OP 250 PS 637: Performed by: HOSPITALIST

## 2024-12-08 PROCEDURE — 87641 MR-STAPH DNA AMP PROBE: CPT | Performed by: HOSPITALIST

## 2024-12-08 PROCEDURE — 94667 MNPJ CHEST WALL 1ST: CPT

## 2024-12-08 PROCEDURE — 80048 BASIC METABOLIC PNL TOTAL CA: CPT | Performed by: HOSPITALIST

## 2024-12-08 PROCEDURE — 87040 BLOOD CULTURE FOR BACTERIA: CPT | Performed by: HOSPITALIST

## 2024-12-08 PROCEDURE — 86140 C-REACTIVE PROTEIN: CPT | Performed by: HOSPITALIST

## 2024-12-08 PROCEDURE — 999N000157 HC STATISTIC RCP TIME EA 10 MIN

## 2024-12-08 PROCEDURE — 85025 COMPLETE CBC W/AUTO DIFF WBC: CPT | Performed by: HOSPITALIST

## 2024-12-08 PROCEDURE — 83735 ASSAY OF MAGNESIUM: CPT | Performed by: HOSPITALIST

## 2024-12-08 PROCEDURE — 84145 PROCALCITONIN (PCT): CPT | Performed by: HOSPITALIST

## 2024-12-08 PROCEDURE — 99232 SBSQ HOSP IP/OBS MODERATE 35: CPT | Performed by: HOSPITALIST

## 2024-12-08 PROCEDURE — 250N000009 HC RX 250: Performed by: INTERNAL MEDICINE

## 2024-12-08 PROCEDURE — 83880 ASSAY OF NATRIURETIC PEPTIDE: CPT | Performed by: HOSPITALIST

## 2024-12-08 RX ORDER — VANCOMYCIN HYDROCHLORIDE
1250 EVERY 24 HOURS
Status: DISCONTINUED | OUTPATIENT
Start: 2024-12-09 | End: 2024-12-09 | Stop reason: HOSPADM

## 2024-12-08 RX ORDER — ACETYLCYSTEINE 200 MG/ML
2 SOLUTION ORAL; RESPIRATORY (INHALATION) EVERY 4 HOURS
Status: DISCONTINUED | OUTPATIENT
Start: 2024-12-08 | End: 2024-12-08

## 2024-12-08 RX ORDER — IOPAMIDOL 755 MG/ML
100 INJECTION, SOLUTION INTRAVASCULAR ONCE
Status: COMPLETED | OUTPATIENT
Start: 2024-12-08 | End: 2024-12-08

## 2024-12-08 RX ORDER — ALBUTEROL SULFATE 0.83 MG/ML
2.5 SOLUTION RESPIRATORY (INHALATION) ONCE
Status: COMPLETED | OUTPATIENT
Start: 2024-12-08 | End: 2024-12-08

## 2024-12-08 RX ORDER — GUAIFENESIN 600 MG/1
600 TABLET, EXTENDED RELEASE ORAL 2 TIMES DAILY
Status: DISCONTINUED | OUTPATIENT
Start: 2024-12-08 | End: 2024-12-09 | Stop reason: HOSPADM

## 2024-12-08 RX ORDER — DOXYCYCLINE 100 MG/1
100 CAPSULE ORAL EVERY 12 HOURS SCHEDULED
Status: DISCONTINUED | OUTPATIENT
Start: 2024-12-08 | End: 2024-12-08

## 2024-12-08 RX ORDER — PIPERACILLIN SODIUM, TAZOBACTAM SODIUM 4; .5 G/20ML; G/20ML
4.5 INJECTION, POWDER, LYOPHILIZED, FOR SOLUTION INTRAVENOUS EVERY 6 HOURS
Status: DISCONTINUED | OUTPATIENT
Start: 2024-12-08 | End: 2024-12-09 | Stop reason: HOSPADM

## 2024-12-08 RX ORDER — VANCOMYCIN 1.75 GRAM/500 ML IN 0.9 % SODIUM CHLORIDE INTRAVENOUS
1750 ONCE
Status: COMPLETED | OUTPATIENT
Start: 2024-12-08 | End: 2024-12-08

## 2024-12-08 RX ORDER — NICOTINE POLACRILEX 4 MG
15-30 LOZENGE BUCCAL
Status: DISCONTINUED | OUTPATIENT
Start: 2024-12-08 | End: 2024-12-09 | Stop reason: HOSPADM

## 2024-12-08 RX ORDER — DEXTROSE MONOHYDRATE 25 G/50ML
25-50 INJECTION, SOLUTION INTRAVENOUS
Status: DISCONTINUED | OUTPATIENT
Start: 2024-12-08 | End: 2024-12-09 | Stop reason: HOSPADM

## 2024-12-08 RX ADMIN — PIPERACILLIN AND TAZOBACTAM 4.5 G: 4; .5 INJECTION, POWDER, FOR SOLUTION INTRAVENOUS; PARENTERAL at 14:32

## 2024-12-08 RX ADMIN — METOPROLOL SUCCINATE 25 MG: 25 TABLET, EXTENDED RELEASE ORAL at 10:00

## 2024-12-08 RX ADMIN — GUAIFENESIN 600 MG: 600 TABLET ORAL at 10:00

## 2024-12-08 RX ADMIN — Medication 1750 MG: at 11:41

## 2024-12-08 RX ADMIN — DOCUSATE SODIUM 100 MG: 100 CAPSULE, LIQUID FILLED ORAL at 10:01

## 2024-12-08 RX ADMIN — CLOPIDOGREL BISULFATE 75 MG: 75 TABLET, FILM COATED ORAL at 10:00

## 2024-12-08 RX ADMIN — GUAIFENESIN 600 MG: 600 TABLET ORAL at 21:36

## 2024-12-08 RX ADMIN — IOPAMIDOL 90 ML: 755 INJECTION, SOLUTION INTRAVENOUS at 17:11

## 2024-12-08 RX ADMIN — FAMOTIDINE 20 MG: 10 INJECTION, SOLUTION INTRAVENOUS at 21:36

## 2024-12-08 RX ADMIN — TAMSULOSIN HYDROCHLORIDE 0.8 MG: 0.4 CAPSULE ORAL at 10:01

## 2024-12-08 RX ADMIN — FUROSEMIDE 40 MG: 10 INJECTION, SOLUTION INTRAVENOUS at 14:31

## 2024-12-08 RX ADMIN — FERROUS SULFATE TAB EC 325 MG (65 MG FE EQUIVALENT) 325 MG: 325 (65 FE) TABLET DELAYED RESPONSE at 17:23

## 2024-12-08 RX ADMIN — ALBUTEROL SULFATE 2.5 MG: 2.5 SOLUTION RESPIRATORY (INHALATION) at 06:26

## 2024-12-08 RX ADMIN — INSULIN ASPART 2 UNITS: 100 INJECTION, SOLUTION INTRAVENOUS; SUBCUTANEOUS at 11:39

## 2024-12-08 RX ADMIN — FUROSEMIDE 40 MG: 10 INJECTION, SOLUTION INTRAVENOUS at 06:21

## 2024-12-08 RX ADMIN — GABAPENTIN 200 MG: 100 CAPSULE ORAL at 10:01

## 2024-12-08 RX ADMIN — DOXYCYCLINE HYCLATE 100 MG: 100 CAPSULE ORAL at 07:59

## 2024-12-08 RX ADMIN — SODIUM CHLORIDE 60 ML: 9 INJECTION, SOLUTION INTRAVENOUS at 17:12

## 2024-12-08 RX ADMIN — Medication 1 TABLET: at 10:00

## 2024-12-08 RX ADMIN — INSULIN ASPART 2 UNITS: 100 INJECTION, SOLUTION INTRAVENOUS; SUBCUTANEOUS at 08:02

## 2024-12-08 RX ADMIN — PIPERACILLIN AND TAZOBACTAM 4.5 G: 4; .5 INJECTION, POWDER, FOR SOLUTION INTRAVENOUS; PARENTERAL at 07:57

## 2024-12-08 RX ADMIN — FERROUS SULFATE TAB EC 325 MG (65 MG FE EQUIVALENT) 325 MG: 325 (65 FE) TABLET DELAYED RESPONSE at 07:59

## 2024-12-08 RX ADMIN — PIPERACILLIN AND TAZOBACTAM 4.5 G: 4; .5 INJECTION, POWDER, FOR SOLUTION INTRAVENOUS; PARENTERAL at 20:00

## 2024-12-08 RX ADMIN — INSULIN ASPART 2 UNITS: 100 INJECTION, SOLUTION INTRAVENOUS; SUBCUTANEOUS at 17:23

## 2024-12-08 RX ADMIN — APIXABAN 2.5 MG: 2.5 TABLET, FILM COATED ORAL at 10:00

## 2024-12-08 RX ADMIN — GABAPENTIN 200 MG: 100 CAPSULE ORAL at 21:36

## 2024-12-08 RX ADMIN — ATORVASTATIN CALCIUM 40 MG: 40 TABLET, FILM COATED ORAL at 10:01

## 2024-12-08 RX ADMIN — DOCUSATE SODIUM 100 MG: 100 CAPSULE, LIQUID FILLED ORAL at 21:36

## 2024-12-08 RX ADMIN — APIXABAN 2.5 MG: 2.5 TABLET, FILM COATED ORAL at 21:36

## 2024-12-08 RX ADMIN — ENALAPRIL MALEATE 40 MG: 10 TABLET ORAL at 10:04

## 2024-12-08 ASSESSMENT — ACTIVITIES OF DAILY LIVING (ADL)
ADLS_ACUITY_SCORE: 61
ADLS_ACUITY_SCORE: 58
ADLS_ACUITY_SCORE: 61
ADLS_ACUITY_SCORE: 58
ADLS_ACUITY_SCORE: 61
ADLS_ACUITY_SCORE: 57
ADLS_ACUITY_SCORE: 58
ADLS_ACUITY_SCORE: 58
ADLS_ACUITY_SCORE: 59
ADLS_ACUITY_SCORE: 58
ADLS_ACUITY_SCORE: 61
ADLS_ACUITY_SCORE: 58
ADLS_ACUITY_SCORE: 57
ADLS_ACUITY_SCORE: 61
ADLS_ACUITY_SCORE: 58
ADLS_ACUITY_SCORE: 59
ADLS_ACUITY_SCORE: 59
ADLS_ACUITY_SCORE: 61

## 2024-12-08 NOTE — PROGRESS NOTES
[facility]    Medicine Progress Note - Hospitalist Service    Date of Admission:  12/2/2024    Assessment & Plan   Ilia Ferguson is a 89 year old male, who lives alone, with history of CVA. 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  He has the following acute medical issues:    Principal Problem:    Acute respiratory failure with hypoxia (H)    Acute on chronic diastolic congestive heart failure (H)    CAP (community acquired pneumonia)    Assessment: Today the patient is much more short of breath and has bibasilar crackles and worsening edema.  Respiratory failure was probably multifactorial.  Being treated for both pneumonia and acute on chronic diastolic heart failure exacerbation. The patient has negative COVID, Influenza and RSV, and a normal white blood cell count. He did have a fever to 101 on admission and a mildly elevated lactate that corrected with intravenous fluids. Chest xray shows a multifocal pneumonia.  clinically he has congestive heart failure exacerbation.  Echo shows mild aortic stenosis. Developed a rash to ceftriaxone.    Plan:  The patient was started on ceftriaxone and azithromycin.  He developed an adverse reaction to ceftriaxone so it was stopped and he completed 5 days course of azithromycin.  He has leukocytosis as well as worsening bilateral infiltrates.  I will get a swallowing evaluation.  Will send blood cultures.  The patient will be started on vancomycin and Zosyn to cover for healthcare associated pneumonia as well.  Will check for MRSA.  His shortness of breath was worsening.  I will get a CT scan of the chest as he had some bronchiectasis and possibility of malignancy on the CT scan  done in February 2024.  CRP and procalcitonin are elevated so probably he is infected with pneumonia.  Also continue on diuresis for now.    Active Problems:    Anemia    Assessment: new on admit. Suspect iron deficiency, blood loss given advanced age and DOAC use. Low iron studies,    Plan: added iron sulfate BID   Monitor      Paroxysmal SVT (supraventricular tachycardia) (H)    Assessment: chronic    Plan: continue apixaban.  Started on beta-blockers.      Cerebrovascular accident (CVA) due to thrombosis of left middle cerebral artery (H)    Assessment: chronic    Plan: continue clopidogrel and apixaban      Essential hypertension    Assessment: chronic    Plan: monitor, treat with enalapril, but holding due to creatinine bump       Controlled type 2 diabetes mellitus with complication, without long-term current use of insulin (H)    Assessment: chronic    Plan: Hold metformin for contrast.  Cover with sliding scale insulin.      Restless legs syndrome (RLS)    Hereditary and idiopathic peripheral neuropathy    Assessment: chronic    Plan: treated with gabapentin      Acute kidney injury  Assessment: mild, not present on admission. Likely related to diuresis. Improved.   Plan: Monitor closely on IV diuresis.    Elevated troponins: Mildly elevated troponin secondary to congestive heart failure.  Trending down now.       Diet: Fluid restriction 1200 ML FLUID  Combination Diet Regular Diet Adult    DVT Prophylaxis: DOAC  Cedeno Catheter: Not present  Lines: None     Cardiac Monitoring: None  Code Status: No CPR- Do NOT Intubate          Diet: Fluid restriction 1200 ML FLUID  Combination Diet Regular Diet Adult    DVT Prophylaxis: DOAC  Cedeno Catheter: Not present  Lines: None     Cardiac Monitoring: None  Code Status: No CPR- Do NOT Intubate      Clinically Significant Risk Factors               # Hypoalbuminemia: Lowest albumin = 3.4 g/dL at 12/2/2024  7:50 PM, will monitor as appropriate     # Hypertension: Noted  "on problem list    # Acute heart failure with preserved ejection fraction: heart failure noted on problem list, last echo with EF >50%, and receiving IV diuretics    # Acute Hypoxic Respiratory Failure: Documented O2 saturation < 90%. Continue supplemental oxygen as needed         # Overweight: Estimated body mass index is 26.77 kg/m  as calculated from the following:    Height as of this encounter: 1.778 m (5' 10\").    Weight as of this encounter: 84.6 kg (186 lb 9.6 oz).             Social Drivers of Health            Disposition Plan     Medically Ready for Discharge: Anticipated discharge in 2 to 4 days.  The patient will go to [facility] once he improves.    Discussed with the patient in detail.           Stacey Bronson MD  Hospitalist Service  [facility]  Securely message with [employer] (more info)  Text page via [employer] Paging/Directory   ______________________________________________________________________    Interval History   The patient was seen and examined.  Overnight events reviewed.  Discussed with the nursing staff.  The patient developed worsening shortness of breath last night.  Chest x-ray done showed bilateral worsening infiltrates.  He continues to be on diuresis.  This morning he started feeling better after getting some Mucomyst and DuoNebs.    Denies any chest pain.  No fevers or chills.  No headaches or blurring of vision.     Gen: No fevers/chills  CVS: No chest pain   GI: no nausea/vomiting. No abdominal pain  Neuro: No headaches or Seizures     Physical Exam   Vital Signs: Temp: 97.3  F (36.3  C) Temp src: Tympanic BP: 130/68 Pulse: 71   Resp: 24 SpO2: 95 % O2 Device: Nasal cannula Oxygen Delivery: 5 LPM  Weight: 186 lbs 9.6 oz    GENERAL APPEARANCE: Elderly male who looks short of breath  HEENT: No oropharyngeal lesions.  NECK: Supple.   CHEST: Symmetric. Nontender to palpation.  LUNGS: Bibasilar crackles up to mid lungs.  HEART: S1+S2 [place]  ABDOMEN: Soft, flat, and " benign. BS +ve  EXTREMITIES: ++Edema  NEUROLOGIC: Moving all extremities  SKIN: No new rashes.         Medical Decision Making       38 MINUTES SPENT BY ME on the date of service doing chart review, history, exam, documentation & further activities per the note.      Current Facility-Administered Medications   Medication Dose Route Frequency Provider Last Rate Last Admin    acetaminophen (TYLENOL) tablet 650 mg  650 mg Oral Q8H PRN Lavinia Patterson MD   650 mg at 12/05/24 1154    albuterol (PROVENTIL) neb solution 2.5 mg  2.5 mg Nebulization Q2H PRN Tulio Ibrahim MD   2.5 mg at 12/08/24 0626    apixaban ANTICOAGULANT (ELIQUIS) tablet 2.5 mg  2.5 mg Oral BID Lavinia Patterson MD   2.5 mg at 12/08/24 1000    artificial saliva (BIOTENE DRY MOUTHWASH) liquid 30 mL  30 mL Swish & Spit 4x Daily PRN Tulio Ibrahim MD   30 mL at 12/07/24 0619    atorvastatin (LIPITOR) tablet 40 mg  40 mg Oral Daily Lavinia Patterson MD   40 mg at 12/08/24 1001    benzonatate (TESSALON) capsule 200 mg  200 mg Oral TID PRN Lavinia Patterson MD   200 mg at 12/07/24 0038    calcium carbonate (TUMS) chewable tablet 1,000 mg  1,000 mg Oral 4x Daily PRN Lavinia Patterson MD        clopidogrel (PLAVIX) tablet 75 mg  75 mg Oral Daily Lavinia Patterson MD   75 mg at 12/08/24 1000    glucose gel 15-30 g  15-30 g Oral Q15 Min PRN Stacey Bronson MD        Or    dextrose 50 % injection 25-50 mL  25-50 mL Intravenous Q15 Min PRN Stacey Bronson MD        Or    glucagon injection 1 mg  1 mg Subcutaneous Q15 Min PRN Stacey Bronson MD        docusate sodium (COLACE) capsule 100 mg  100 mg Oral BID Lavinia Patterson MD   100 mg at 12/08/24 1001    doxycycline hyclate (VIBRAMYCIN) capsule 100 mg  100 mg Oral Q12H PATSY (08/20) Stacey Bronson MD   100 mg at 12/08/24 0759    enalapril (VASOTEC) tablet 40 mg  40 mg Oral Daily Stacey Bronson MD   40 mg at 12/08/24 1004    famotidine (PEPCID) injection 20 mg  20 mg Intravenous Q24H Leonardo  Lavinia WHALEN MD   20 mg at 12/07/24 2213    ferrous sulfate (FE TABS) EC tablet 325 mg  325 mg Oral BID w/meals Tulio Ibrahim MD   325 mg at 12/08/24 0759    furosemide (LASIX) injection 40 mg  40 mg Intravenous BID Stacey Bronson MD   40 mg at 12/08/24 0621    gabapentin (NEURONTIN) capsule 200 mg  200 mg Oral BID Lavinia Patterson MD   200 mg at 12/08/24 1001    guaiFENesin (MUCINEX) 12 hr tablet 600 mg  600 mg Oral BID Nadine Zafar DO   600 mg at 12/08/24 1000    guaiFENesin-dextromethorphan (ROBITUSSIN DM) 100-10 MG/5ML syrup 10 mL  10 mL Oral Q4H PRN Tulio Ibrahim MD        insulin aspart (NovoLOG) injection (RAPID ACTING)  1-3 Units Subcutaneous TID AC Stacey Bronson MD   2 Units at 12/08/24 0802    insulin aspart (NovoLOG) injection (RAPID ACTING)  1-3 Units Subcutaneous At Bedtime Stacey Bronson MD        lidocaine (LMX4) cream   Topical Q1H PRN Lavinia Patterson MD        lidocaine 1 % 0.1-1 mL  0.1-1 mL Other Q1H PRN Lavinia Patterson MD        metoprolol succinate ER (TOPROL XL) 24 hr tablet 25 mg  25 mg Oral Daily Stacey Bronson MD   25 mg at 12/08/24 1000    multivitamin w/minerals (THERA-VIT-M) tablet 1 tablet  1 tablet Oral Daily Lavinia Patterson MD   1 tablet at 12/08/24 1000    Patient is already receiving anticoagulation with heparin, enoxaparin (LOVENOX), warfarin (COUMADIN)  or other anticoagulant medication   Does not apply Continuous PRN Lavinia Patterson MD        piperacillin-tazobactam (ZOSYN) 4.5 g vial to attach to  mL bag  4.5 g Intravenous Q6H Stacey Bronson MD   4.5 g at 12/08/24 0757    senna-docusate (SENOKOT-S/PERICOLACE) 8.6-50 MG per tablet 1 tablet  1 tablet Oral BID PRN Lavinia Patterson MD        Or    senna-docusate (SENOKOT-S/PERICOLACE) 8.6-50 MG per tablet 2 tablet  2 tablet Oral BID PRN Lavinia Patterson MD        sodium chloride (PF) 0.9% PF flush 3 mL  3 mL Intracatheter q1 min prn Lavinia Patterson MD   3 mL at 12/07/24 0790     tamsulosin (FLOMAX) capsule 0.8 mg  0.8 mg Oral Daily Lavinia Patterson MD   0.8 mg at 12/08/24 1001      Data     I have personally reviewed the following data over the past 24 hrs:    15.5 (H)  \   10.2 (L)   / 414     141 103 38.3 (H) /  259 (H)   4.9 27 1.14 \     Trop: N/A BNP: 2,475 (H)     Procal: 0.64 (H) CRP: 106.92 (H) Lactic Acid: N/A         Imaging results reviewed over the past 24 hrs:   Recent Results (from the past 24 hours)   XR Chest Port 1 View    Narrative    PROCEDURE INFORMATION:   Exam: XR Chest   Exam date and time: 12/8/2024 6:35 AM   Age: 89 years old   Clinical indication: Dyspnea and other: Hypoxia; Additional info: Dyspnea,   hypoxia     TECHNIQUE:   Imaging protocol: Radiologic exam of the chest.   Views: 1 view.     COMPARISON:   CR XR CHEST 2 VIEWS 12/2/2024 8:14 PM     FINDINGS:   Lungs: Patchy parenchymal airspace opacities in both lungs are overall   increased from the comparison imaging. In particular right lung opacities are   greater than prior. Low lung volumes.   Pleural spaces: Bilateral pleural effusions redemonstrated. No pneumothorax.   Heart/Mediastinum: Unremarkable. No cardiomegaly.   Bones/joints: Unremarkable.       Impression    IMPRESSION:   Worsening nonspecific airspace disease.     THIS DOCUMENT HAS BEEN ELECTRONICALLY SIGNED BY ENRIKE MONCADA MD

## 2024-12-08 NOTE — PROVIDER NOTIFICATION
D/I/A: Upon VS assessments pt sats mid 50s with 1L/min; O2 increased with minimal effect; Oxi Mask placed on pt and RT called; MD Zafar notified and orders obtained.  Normal/baseline skin/lip color.  Neb treatment administered by RN; AM dose of Lasix given early after discussion with MD; CXR completed.  LS: crackles at lower lobes.  Resp effort unchanged from previous assessment; increase in rate noted.  O2 sats and RR improved post neb.  O2 decreased to 5L/min per nasal cannula with sats 92-94.  A+Ox4 and making needs known. Large amount of urine(incontinent) post Lasix dose; saturated brief.    P: Continue to monitor status.

## 2024-12-08 NOTE — PLAN OF CARE
Goal Outcome Evaluation:      Plan of Care Reviewed With: patient    Overall Patient Progress: no changeOverall Patient Progress: no change       D/I/A: Pt resting between cares and treatments.  A+O x4 and making needs known.  Denies pain. LIMA noted; bilat fine crackles at bilat lower lobes. Remains on O2 @1L/min nasal cannula.  VSS, afebrile.  Pleasant and cooperative.  LE edema noted.  Repositioned for comfort and to maintain skin integrity. Bed alarm on while in bed; Using call light for assistance.  P: Continue to monitor status.

## 2024-12-08 NOTE — PHARMACY-VANCOMYCIN DOSING SERVICE
"Pharmacy Vancomycin Initial Note  Date of Service 2024  Patient's  1932  89 year old, male    Indication: Healthcare-Associated Pneumonia    Current estimated CrCl = Estimated Creatinine Clearance: 49.5 mL/min (based on SCr of 1.14 mg/dL).    Creatinine for last 3 days  2024:  5:34 AM Creatinine 1.13 mg/dL  2024:  5:59 AM Creatinine 1.14 mg/dL    Recent Vancomycin Level(s) for last 3 days  No results found for requested labs within last 3 days.      Vancomycin IV Administrations (past 72 hours)        No vancomycin orders with administrations in past 72 hours.                    Nephrotoxins and other renal medications (From now, onward)      Start     Dose/Rate Route Frequency Ordered Stop    24 1000  vancomycin (VANCOCIN) 1,250 mg in 0.9% NaCl 250 mL intermittent infusion         1,250 mg  166.7 mL/hr over 90 Minutes Intravenous EVERY 24 HOURS 24 1111      24 1200  vancomycin (VANCOCIN) 1,750 mg in 0.9% NaCl 500 mL intermittent infusion         1,750 mg  250 mL/hr over 2 Hours Intravenous ONCE 24 1111      24 0800  piperacillin-tazobactam (ZOSYN) 4.5 g vial to attach to  mL bag        Note to Pharmacy: For [facility], [facility] and [facility]: For Zosyn-naive patients, use the \"Zosyn initial dose + extended infusion\" order panel.    4.5 g  over 30 Minutes Intravenous EVERY 6 HOURS 24 0708      24 0830  furosemide (LASIX) injection 40 mg         40 mg  over 1-3 Minutes Intravenous 2 TIMES DAILY (Diuretics and Nitrates) 24 0801      24 0000  furosemide (LASIX) 40 MG tablet         60 mg Oral DAILY 24 1237      24 1000  enalapril (VASOTEC) tablet 40 mg        Note to Pharmacy: PTA Sig:TAKE 2 TABLETS (40 MG) BY MOUTH DAILY      40 mg Oral DAILY 24 2326              Contrast Orders - past 72 hours (72h ago, onward)      None            [employer] Prediction of Planned Initial Vancomycin Regimen  Loading dose: 1750 mg at 12:00 " 12/08/2024.  Regimen: 1250 mg IV every 24 hours.  Start time: 12:00 on 12/09/2024  Exposure target: AUC24 (range)400-600 mg/L.hr   AUC24,ss: 556 mg/L.hr  Probability of AUC24 > 400: 83 %  Ctrough,ss: 17.6 mg/L  Probability of Ctrough,ss > 20: 39 %  Probability of nephrotoxicity (Lodise SID 2009): 13 %        Plan:  Start vancomycin  1750 mg IV once, followed by 1250 q24h starting 12/8  MRSA swab ordered and collected prior to vancomycin administration   Vancomycin monitoring method: AUC  Vancomycin therapeutic monitoring goal: 400-600 mg*h/L  Pharmacy will check vancomycin levels as appropriate in 1-3 Days.    Serum creatinine levels will be ordered daily for the first week of therapy and at least twice weekly for subsequent weeks.      Linda Quiles RPH    2 weeks

## 2024-12-08 NOTE — PROGRESS NOTES
Pt received on 5L/min NC.  During morning rounds spoke with Dr. Bronson and mucomyst was discharge. Attempted acapella several times today but Pt was sleeping and unable to participate in therapy.  During shift NC weaned to 4L/min. Respiratory will continue to provide support as needed.    Christin Chairez, RT

## 2024-12-08 NOTE — PROGRESS NOTES
Called by RT.   Worsening hypoxia, dyspnea, tachypnea  Patient seen and examined via virtual telemed   Tachypnea noted, +Bibasilar crackles  Pt already on 40mg IV Lasix BID  Most recent echo shows normal EF  CT from Feb shows bronchiectasis, chronic changes and possible malignancy  Will obtain XR Chest stat  Add Mucinex 600mg Q12  Albuterol neb STAT with Mucomyst  Chest PT with acapella  Monitor response  Recommend Pulmonology eval for abnormal lung findings which appear acute on chronic - fibrosis vs malignancy vs atypical infection

## 2024-12-08 NOTE — PROGRESS NOTES
PT/OT holding on patient. PT/OT checked on patient multiple times and patient was sleeping. Patient had a decline overnight with oxygen and is now requiring 5L O2. Patient is going to have CT scan later today. Will hold treatment today and re-assess tomorrow.

## 2024-12-08 NOTE — PLAN OF CARE
"Goal Outcome Evaluation:  Patients vss. /49 (BP Location: Right arm, Patient Position: Fowlers, Cuff Size: Adult Regular)   Pulse 71   Temp 97.3  F (36.3  C) (Tympanic)   Resp 24   Ht 1.778 m (5' 10\")   Wt 84.6 kg (186 lb 9.6 oz)   SpO2 95%   BMI 26.77 kg/m   Patient is currently on 5L of O2 via nasal cannula. At rest patients work of breathing appears to be less labored and no longer tachypneic. Patient is somewhat lethargic today, waking up to answer questions and then going back to sleep. Patient is unsure how he feels, stated to the doctor that he felt better, but stated to me that he felt worse this morning. Writer did not hear any crackles upon auscultation of lung fields. Petechiae to legs and trunk improving. Mepilex in place for open area/redness to coccyx.                       "

## 2024-12-08 NOTE — PHARMACY-CONSULT NOTE
Pharmacy- Renal Dose Adjustment    Patient Active Problem List   Diagnosis    Anemia    Psychosexual dysfunction with inhibited sexual excitement    Hematuria    Paroxysmal SVT (supraventricular tachycardia) (H)    Personal history of other disorder of urinary system    Sciatica of right side    Mediastinal adenopathy    Benign prostatic hyperplasia with urinary retention    Cerebrovascular accident (CVA) due to thrombosis of left middle cerebral artery (H)    Essential hypertension    Mixed hyperlipidemia    Incidental pulmonary nodule, benign    Lumbar disc disease    Controlled type 2 diabetes mellitus with complication, without long-term current use of insulin (H)    Restless legs syndrome (RLS)    Hereditary and idiopathic peripheral neuropathy    Pneumonia of left upper lobe due to infectious organism    Atypical atrial flutter (H)    Piriformis syndrome, right    Diabetic polyneuropathy associated with type 2 diabetes mellitus (H)    Balance problems    CAP (community acquired pneumonia)    Hypoxia    Acute respiratory failure with hypoxia (H)    Acute on chronic diastolic congestive heart failure (H)        Relevant Labs:  Recent Labs   Lab Test 12/08/24  0559 12/06/24  0534   WBC 15.5* 6.0   HGB 10.2* 9.0*    271        CrCl: 49.5 mL/min      Intake/Output Summary (Last 24 hours) at 12/8/2024 0715  Last data filed at 12/7/2024 2212  Gross per 24 hour   Intake 2080 ml   Output 600 ml   Net 1480 ml          Per Renal Dose Adjustment Protocol, will adjust:  Zosyn from 4.5 grams q8h to q6h for ordered indication of nosocomial pneumonia and CrCl >40 mL/min      Will continue to follow and make adjustments accordingly. Thank You.    Linda Quiles [facility] ....................  12/8/2024   7:15 AM

## 2024-12-09 PROCEDURE — 99207 PR NOT IN PERSON INPATIENT CONSULT STATISTICAL MARKER: CPT | Performed by: FAMILY MEDICINE

## 2024-12-09 ASSESSMENT — ACTIVITIES OF DAILY LIVING (ADL)
ADLS_ACUITY_SCORE: 58

## 2024-12-09 NOTE — PROGRESS NOTES
D/I/A: Entered patient room to obtain VS, noted patient sleeping.  Upon attempt to wake patient, noted no breath sounds with oxygen cannula from nose.  VS obtained with no values.  No heart sounds/breath sounds with auscultation.  Pt DNR/DNI; Supervisor notified.  MD and family contact notified by Nursing Supervisor.  P: Await further instruction for plan post mortem.

## 2024-12-09 NOTE — DEATH PRONOUNCEMENT
Nursing supervisor notified me that this patient passed away at 12:25 this morning.  I did not receive any other messages about this patient.  He was a DNR/DNI.  Daytime physician will handle the death certificate and discharge summary.

## 2024-12-09 NOTE — DISCHARGE SUMMARY
"[facility]    Death Summary - Hospitalist Service     Date of Admission:  12/2/2024  Date of Death: 12/9/2024  Discharging Provider: Frandy Hernandez MD    Discharge Diagnoses   Principal Problem:    Acute respiratory failure with hypoxia (H)  Active Problems:    Anemia    Paroxysmal SVT (supraventricular tachycardia) (H)    Cerebrovascular accident (CVA) due to thrombosis of left middle cerebral artery (H)    Essential hypertension    Controlled type 2 diabetes mellitus with complication, without long-term current use of insulin (H)    Restless legs syndrome (RLS)    Hereditary and idiopathic peripheral neuropathy    CAP (community acquired pneumonia)    Acute on chronic diastolic congestive heart failure (H)    Cause of death: acute respiratory failure with hypoxia due to acute on chronic diastolic heart failure and pneumonia    Hospital Course   Patient passed away in the night after being seen by hospitalist earlier in the day. He was declining and had updated imaging showing large bilateral pleural effusions and bilateral airspace opacities suggestive of pneumonia.  Had been treated for heart failure as well as pneumonia, but did not respond to treatments    As per Dr. Bronson's progress note from 12/8:  \"Ilia Ferguson is a 89 year old male, who lives alone, with history of CVA. 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  He has the following acute medical issues:     Principal Problem:    Acute respiratory failure with hypoxia (H)    Acute on chronic diastolic congestive heart failure (H)    CAP (community acquired pneumonia)    Assessment: Today the patient is much more short of breath and has bibasilar crackles " and worsening edema.  Respiratory failure was probably multifactorial.  Being treated for both pneumonia and acute on chronic diastolic heart failure exacerbation. The patient has negative COVID, Influenza and RSV, and a normal white blood cell count. He did have a fever to 101 on admission and a mildly elevated lactate that corrected with intravenous fluids. Chest xray shows a multifocal pneumonia.  clinically he has congestive heart failure exacerbation.  Echo shows mild aortic stenosis. Developed a rash to ceftriaxone.    Plan:  The patient was started on ceftriaxone and azithromycin.  He developed an adverse reaction to ceftriaxone so it was stopped and he completed 5 days course of azithromycin.  He has leukocytosis as well as worsening bilateral infiltrates.  I will get a swallowing evaluation.  Will send blood cultures.  The patient will be started on vancomycin and Zosyn to cover for healthcare associated pneumonia as well.  Will check for MRSA.  His shortness of breath was worsening.  I will get a CT scan of the chest as he had some bronchiectasis and possibility of malignancy on the CT scan done in February 2024.  CRP and procalcitonin are elevated so probably he is infected with pneumonia.  Also continue on diuresis for now.    Active Problems:    Anemia    Assessment: new on admit. Suspect iron deficiency, blood loss given advanced age and DOAC use. Low iron studies,    Plan: added iron sulfate BID              Monitor       Paroxysmal SVT (supraventricular tachycardia) (H)    Assessment: chronic    Plan: continue apixaban.  Started on beta-blockers.       Cerebrovascular accident (CVA) due to thrombosis of left middle cerebral artery (H)    Assessment: chronic    Plan: continue clopidogrel and apixaban       Essential hypertension    Assessment: chronic    Plan: monitor, treat with enalapril, but holding due to creatinine bump        Controlled type 2 diabetes mellitus with complication, without  "long-term current use of insulin (H)    Assessment: chronic    Plan: Hold metformin for contrast.  Cover with sliding scale insulin.       Restless legs syndrome (RLS)    Hereditary and idiopathic peripheral neuropathy    Assessment: chronic    Plan: treated with gabapentin       Acute kidney injury  Assessment: mild, not present on admission. Likely related to diuresis. Improved.   Plan: Monitor closely on IV diuresis.     Elevated troponins: Mildly elevated troponin secondary to congestive heart failure.  Trending down now.\"       Bilateral pleural effusions  POA      Frandy Hernandez MD  [facility]  ______________________________________________________________________      Significant Results and Procedures   Most Recent 3 CBC's:  Recent Labs   Lab Test 12/08/24  0559 12/06/24  0534 12/05/24  0536   WBC 15.5* 6.0 5.2   HGB 10.2* 9.0* 8.8*   MCV 95 94 94    271 324     Most Recent 3 BMP's:  Recent Labs   Lab Test 12/08/24  2138 12/08/24  1657 12/08/24  1119 12/08/24  0751 12/08/24  0559 12/06/24  1714 12/06/24  0534 12/05/24  1710 12/05/24  0536   NA  --   --   --   --  141  --  141  --  142   POTASSIUM  --   --   --   --  4.9  --  3.9  --  4.2   CHLORIDE  --   --   --   --  103  --  106  --  106   CO2  --   --   --   --  27  --  27  --  28   BUN  --   --   --   --  38.3*  --  37.1*  --  38.1*   CR  --   --   --   --  1.14  --  1.13  --  1.24*   ANIONGAP  --   --   --   --  11  --  8  --  8   VON  --   --   --   --  9.1  --  8.8  --  8.5*   * 256* 245*   < > 300*   < > 249*   < > 221*    < > = values in this interval not displayed.     Most Recent 2 LFT's:  Recent Labs   Lab Test 12/02/24  1950 03/06/24  1510   AST 18 13   ALT 14 13   ALKPHOS 89 134   BILITOTAL 0.6 0.5   ,   Results for orders placed or performed during the hospital encounter of 12/02/24   XR Chest 2 Views    Narrative    XR CHEST 2 VIEWS    HISTORY: 89 years Male cough, shortness of breath, fever, ? " pneumonia,  CHF    COMPARISON: 10/7/2023, CT 2/27/2024    TECHNIQUE: 2 views of the chest were obtained.    FINDINGS: Lung volumes are low. There is blunting of both costophrenic  sulci. Vascularity is prominent with indistinct fascial margins.  Multifocal bilateral groundglass opacities are present, largest is in  the left apex. There is also increasing opacity at the left lung base.  Airspace opacification similar in appearance to the process seen on  the CT of 2/27/2024.        Impression    IMPRESSION: Multifocal atypical infectious or inflammatory process  versus metastatic disease. Interval worsening from the prior study.    Finding suggesting overlying pulmonary edema, small bilateral pleural  effusions. Superimposed pneumonia cannot be excluded.    SAVANA METZGER MD         SYSTEM ID:  [id number]   XR Chest Port 1 View    Narrative    PROCEDURE INFORMATION:   Exam: XR Chest   Exam date and time: 12/8/2024 6:35 AM   Age: 89 years old   Clinical indication: Dyspnea and other: Hypoxia; Additional info: Dyspnea,   hypoxia     TECHNIQUE:   Imaging protocol: Radiologic exam of the chest.   Views: 1 view.     COMPARISON:   CR XR CHEST 2 VIEWS 12/2/2024 8:14 PM     FINDINGS:   Lungs: Patchy parenchymal airspace opacities in both lungs are overall   increased from the comparison imaging. In particular right lung opacities are   greater than prior. Low lung volumes.   Pleural spaces: Bilateral pleural effusions redemonstrated. No pneumothorax.   Heart/Mediastinum: Unremarkable. No cardiomegaly.   Bones/joints: Unremarkable.       Impression    IMPRESSION:   Worsening nonspecific airspace disease.     THIS DOCUMENT HAS BEEN ELECTRONICALLY SIGNED BY ENRIKE MONCADA MD   CT Chest w Contrast    Narrative    CT CHEST W CONTRAST    HISTORY: 89 years Male acute respiratory failure and hypoxia.  Community-acquired pneumonia. Severe shortness of breath.    TECHNIQUE: Axial CT imaging of the chest was performed  intravenous  contrast. Coronal and sagittal reconstructions were obtained.  This exam was performed using one or more of the following dose  reduction techniques:  Automated exposure control, adjustment of the JOEY and/or KV according  to patient's size, and/or use of iterative reconstruction technique.    COMPARISON: Recent chest x-ray 2024 and CT dated 2024    FINDINGS:    Large bilateral pleural effusions are present. The right pleural  effusion is slightly larger than the left. There is associated volume  loss and atelectasis of the lower lobes bilaterally. There is  atelectasis in the dependent upper lobes as well as right middle lobe.    Moderately enlarged mediastinal lymph nodes are present measuring up  to 9 mm. These are similar to the prior study.    There is patchy airspace opacity in the right upper lobe. There is a  more focal, poorly defined dense airspace opacity or mass in the  medial left upper lobe with central air bronchograms. This left upper  lobe process measures 5.0 cm AP, 4.0 cm transversely and 5.8 cm in  craniocaudad dimension. This opacity is similar in appearance and size  to the prior CT from February.  No concerning osseous lesions are identified      Impression    IMPRESSION: Large bilateral pleural effusions, right slightly larger  than left. There is associated atelectasis and volume loss.    Bilateral airspace opacities suggestive of pneumonia or atypical  inflammatory process.    Chronic opacity with bronchiectasis in the medial left upper lobe.  This is similar to 2024. An atypical infectious inflammatory  process or neoplasm are considerations.    SAVANA METZGER MD         SYSTEM ID:  [id number]   Echocardiogram Complete     Value    LVEF  55-60%    Narrative    [id number]  [id number]  [id number]  [id number]^FRANCISCO^KATELYN^L     [facility]  [address] [place] [place] [address]     Name: AMINA STEELE  MRN: [id number]  : 1932  Study  Date: 12/03/2024 09:13 AM  Age: 89 yrs  Gender: Male  Patient Location: [facility]  Reason For Study: CHF  Ordering Physician: KATELYN SINGH  Performed By: GILMAR Loza, [facility], T     BSA: 2.0 m2  Height: 70 in  Weight: 185 lb  HR: 66  BP: 170/75 mmHg  ______________________________________________________________________________  Procedure  Complete Portable Echo Adult. Echocardiogram with two-dimensional, color and  spectral Doppler performed.  ______________________________________________________________________________  Interpretation Summary  Global and regional left ventricular function is normal with an EF of 55-60%.  Relative wall thickness is increased consistent with concentric remodeling.  The right ventricle is normal size. Global right ventricular function is  normal.  Borderline mild aortic stenosis. Vmax 2.3 m/s, mean gradient 12 mmHg, DVI  0.47, SVi 47 ml/m^2.  The right ventricular systolic pressure is 69mmHg above the right atrial  pressure.  IVC diameter and respiratory changes fall into an intermediate range  suggesting an RA pressure of 8 mmHg.  Severe (pulmonary artery systolic pressure >75mmHg) pulmonary hypertension is  present.  Ascending Aorta dilatation is present. Ascending aorta 4.2 cm.  No pericardial effusion is present.     This study was compared with the study from 10/09/2023, there is modest  increase in aortic stenosis severity.  ______________________________________________________________________________  Left Ventricle  Global and regional left ventricular function is normal with an EF of 55-60%.  Relative wall thickness is increased consistent with concentric remodeling.  Grade III or advanced diastolic dysfunction. No regional wall motion  abnormalities are seen.     Right Ventricle  The right ventricle is normal size. Global right ventricular function is  normal.     Atria  Severe left atrial enlargement is present. Mild right atrial enlargement is  present.     Mitral  Valve  Mitral leaflet thickness is normal . Mild mitral insufficiency is present.     Aortic Valve  The aortic valve is tricuspid. Trace aortic insufficiency is present.  Borderline mild aortic stenosis. Vmax 2.3 m/s, mean gradient 12 mmHg, DVI  0.47, SVi 47 ml/m^2.     Tricuspid Valve  Trace tricuspid insufficiency is present. The right ventricular systolic  pressure is 69mmHg above the right atrial pressure. Severe (pulmonary artery  systolic pressure >75mmHg) pulmonary hypertension is present.     Pulmonic Valve  The valve leaflets are not well visualized. On Doppler interrogation, there is  no significant stenosis or regurgitation.     Vessels  The aorta root is normal. Ascending Aorta dilatation is present. Ascending  aorta 4.2 cm. IVC diameter and respiratory changes fall into an intermediate  range suggesting an RA pressure of 8 mmHg.     Pericardium  No pericardial effusion is present.     Compared to Previous Study  This study was compared with the study from 10/09/2023, there is modest  increase in aortic stenosis .     ______________________________________________________________________________  MMode/2D Measurements & Calculations  IVSd: 1.1 cm  LVIDd: 4.6 cm  LVIDs: 3.2 cm  LVPWd: 1.1 cm  FS: 29.4 %     LV mass(C)d: 174.6 grams  LV mass(C)dI: 86.4 grams/m2  Ao root diam: 3.7 cm  asc Aorta Diam: 4.2 cm  LVOT diam: 2.3 cm  LVOT area: 4.1 cm2  Ao root diam index Ht(cm/m): 2.1  Ao root diam index BSA (cm/m2): 1.8  Asc Ao diam index BSA (cm/m2): 2.1  Asc Ao diam index Ht(cm/m): 2.3  LA Volume (BP): 124.0 ml  LA Volume Index (BP): 61.4 ml/m2     RWT: 0.48  TAPSE: 1.8 cm     Doppler Measurements & Calculations  MV E max stephanie: 133.0 cm/sec  MV A max stephanie: 33.3 cm/sec  MV E/A: 4.0  MV dec time: 0.17 sec  Ao V2 max: 233.0 cm/sec  Ao max P.0 mmHg  Ao V2 mean: 160.0 cm/sec  Ao mean P.0 mmHg  Ao V2 VTI: 46.1 cm  STEVEN(I,D): 2.0 cm2  STEVEN(V,D): 1.9 cm2  AI P1/2t: 543.3 msec  LV V1 max P.8 mmHg  LV V1 max:  109.5 cm/sec  LV V1 VTI: 23.0 cm  SV(LVOT): 93.9 ml  SI(LVOT): 46.5 ml/m2  TR max donaldo: 415.7 cm/sec  TR max P.2 mmHg  AV Donaldo Ratio (DI): 0.47  STEVEN Index (cm2/m2): 1.0  E/E' av.2  Lateral E/e': 10.8  Medial E/e': 15.7     RV S Donaldo: 15.2 cm/sec     ______________________________________________________________________________  Report approved by: Dr Tacos Jeff on 2024 12:06 PM             Consultations This Hospital Stay   PHYSICAL THERAPY ADULT IP CONSULT  OCCUPATIONAL THERAPY ADULT IP CONSULT  SOCIAL WORK IP CONSULT  SPEECH LANGUAGE PATH ADULT IP CONSULT  PHARMACY TO DOSE [facility]    Primary Care Physician   Trino Dahl    Time Spent on this Encounter   I, Frandy Hernandez MD, personally saw the patient today and spent less than or equal to 30 minutes discharging this patient.

## 2024-12-12 LAB — BACTERIA BLD CULT: NORMAL

## 2024-12-13 LAB — BACTERIA BLD CULT: NO GROWTH

## 2025-07-08 NOTE — PROGRESS NOTES
Interdisciplinary Discharge Planning Note    Anticipated Discharge Date:12/6-12/7    Anticipated Discharge Location: [facility]     Clinical Needs Before Discharge:   abx stable oxygen requirement    Treatment Needs After Discharge:  rehab (PT, OT, ST)    Potential Barriers to Discharge: None Identified      MAURO Rock  12/5/2024,  2:26 PM      Detail Level: Detailed Depth Of Biopsy: dermis Was A Bandage Applied: Yes Size Of Lesion In Cm: 0 Biopsy Type: H and E Biopsy Method: Dermablade Anesthesia Type: 1% lidocaine with epinephrine Anesthesia Volume In Cc: 0.5 Hemostasis: Aluminum Chloride Wound Care: Aquaphor Dressing: bandage Destruction After The Procedure: No Type Of Destruction Used: Curettage Curettage Text: The wound bed was treated with curettage after the biopsy was performed. Cryotherapy Text: The wound bed was treated with cryotherapy after the biopsy was performed. Electrodesiccation Text: The wound bed was treated with electrodesiccation after the biopsy was performed. Electrodesiccation And Curettage Text: The wound bed was treated with electrodesiccation and curettage after the biopsy was performed. Silver Nitrate Text: The wound bed was treated with silver nitrate after the biopsy was performed. Lab: 6 Lab Facility: 3 Medical Necessity Information: It is in your best interest to select a reason for this procedure from the list below. All of these items fulfill various CMS LCD requirements except the new and changing color options. Consent: Verbal consent was obtained and risks were reviewed including but not limited to scarring, infection, bleeding, scabbing, incomplete removal, nerve damage and allergy to anesthesia. Post-Care Instructions: I reviewed with the patient in detail post-care instructions. Patient is to keep the biopsy site dry overnight, and then apply bacitracin twice daily until healed. Patient may apply hydrogen peroxide soaks to remove any crusting. Notification Instructions: Patient will be notified of biopsy results. However, patient instructed to call the office if not contacted within 2 weeks. Billing Type: Third-Party Bill Information: Selecting Yes will display possible errors in your note based on the variables you have selected. This validation is only offered as a suggestion for you. PLEASE NOTE THAT THE VALIDATION TEXT WILL BE REMOVED WHEN YOU FINALIZE YOUR NOTE. IF YOU WANT TO FAX A PRELIMINARY NOTE YOU WILL NEED TO TOGGLE THIS TO 'NO' IF YOU DO NOT WANT IT IN YOUR FAXED NOTE.

## (undated) RX ORDER — AZITHROMYCIN 250 MG/1
TABLET, FILM COATED ORAL
Status: DISPENSED
Start: 2023-10-07

## (undated) RX ORDER — HYDROCODONE BITARTRATE AND ACETAMINOPHEN 5; 325 MG/1; MG/1
TABLET ORAL
Status: DISPENSED
Start: 2023-10-07

## (undated) RX ORDER — ACETAMINOPHEN 325 MG/1
TABLET ORAL
Status: DISPENSED
Start: 2024-12-02

## (undated) RX ORDER — REGADENOSON 0.08 MG/ML
INJECTION, SOLUTION INTRAVENOUS
Status: DISPENSED
Start: 2021-06-24

## (undated) RX ORDER — IPRATROPIUM BROMIDE AND ALBUTEROL SULFATE 2.5; .5 MG/3ML; MG/3ML
SOLUTION RESPIRATORY (INHALATION)
Status: DISPENSED
Start: 2024-12-02

## (undated) RX ORDER — FUROSEMIDE 10 MG/ML
INJECTION INTRAMUSCULAR; INTRAVENOUS
Status: DISPENSED
Start: 2023-10-07

## (undated) RX ORDER — FUROSEMIDE 10 MG/ML
INJECTION INTRAMUSCULAR; INTRAVENOUS
Status: DISPENSED
Start: 2024-12-02

## (undated) RX ORDER — CEFTRIAXONE 2 G/1
INJECTION, POWDER, FOR SOLUTION INTRAMUSCULAR; INTRAVENOUS
Status: DISPENSED
Start: 2024-12-02

## (undated) RX ORDER — CEFTRIAXONE SODIUM 1 G
VIAL (EA) INJECTION
Status: DISPENSED
Start: 2023-10-07

## (undated) RX ORDER — AZITHROMYCIN 500 MG/5ML
INJECTION, POWDER, LYOPHILIZED, FOR SOLUTION INTRAVENOUS
Status: DISPENSED
Start: 2024-12-02